# Patient Record
Sex: MALE | Race: BLACK OR AFRICAN AMERICAN | Employment: OTHER | URBAN - METROPOLITAN AREA
[De-identification: names, ages, dates, MRNs, and addresses within clinical notes are randomized per-mention and may not be internally consistent; named-entity substitution may affect disease eponyms.]

---

## 2017-01-05 ENCOUNTER — CLINICAL SUPPORT (OUTPATIENT)
Dept: CARDIOLOGY CLINIC | Age: 77
End: 2017-01-05

## 2017-01-05 DIAGNOSIS — R06.09 DOE (DYSPNEA ON EXERTION): ICD-10-CM

## 2017-01-05 DIAGNOSIS — E78.2 MIXED HYPERLIPIDEMIA: ICD-10-CM

## 2017-01-05 DIAGNOSIS — Z98.61 S/P PTCA (PERCUTANEOUS TRANSLUMINAL CORONARY ANGIOPLASTY): ICD-10-CM

## 2017-01-05 DIAGNOSIS — I10 ESSENTIAL HYPERTENSION: ICD-10-CM

## 2017-01-05 DIAGNOSIS — J44.1 COPD WITH EXACERBATION (HCC): ICD-10-CM

## 2017-01-05 DIAGNOSIS — R93.1 ABNORMAL NUCLEAR CARDIAC IMAGING TEST: Primary | ICD-10-CM

## 2017-01-05 DIAGNOSIS — J84.9 CHRONIC INTERSTITIAL LUNG DISEASE (HCC): ICD-10-CM

## 2017-01-05 DIAGNOSIS — I21.09 ACUTE MYOCARDIAL INFARCTION OF ANTERIOR WALL, SUBSEQUENT EPISODE OF CARE (HCC): ICD-10-CM

## 2017-01-05 DIAGNOSIS — I71.9 PENETRATING ATHEROSCLEROTIC ULCER OF AORTA (HCC): ICD-10-CM

## 2017-01-05 NOTE — PROGRESS NOTES
The patient was identified by name and date of birth. The risks and side effects were explained to the patient and questions were answered prior to testing. Myoview stress test was completed.

## 2017-01-09 NOTE — PROGRESS NOTES
Stress test shows old scar, but no evidence of new blockages in heart arteries. This test is not a good test for determining how strong his heart is. Therefore I recommend we set him up for an echocardiogram now. If that is normal, I suspect that his shortness of breath is due to his lung disease and should continue to follow-up with her lung doctor for that and see me in 6 months.

## 2017-01-10 ENCOUNTER — HOSPITAL ENCOUNTER (OUTPATIENT)
Dept: CT IMAGING | Age: 77
Discharge: HOME | End: 2017-01-10
Attending: INTERNAL MEDICINE
Payer: MEDICARE

## 2017-01-10 DIAGNOSIS — J84.9 CHRONIC INTERSTITIAL LUNG DISEASE (HCC): ICD-10-CM

## 2017-01-10 DIAGNOSIS — I71.9 PENETRATING ATHEROSCLEROTIC ULCER OF AORTA (HCC): ICD-10-CM

## 2017-01-10 DIAGNOSIS — J44.1 COPD WITH EXACERBATION (HCC): ICD-10-CM

## 2017-01-10 DIAGNOSIS — E78.2 MIXED HYPERLIPIDEMIA: ICD-10-CM

## 2017-01-10 DIAGNOSIS — R06.09 DOE (DYSPNEA ON EXERTION): ICD-10-CM

## 2017-01-10 DIAGNOSIS — I21.09 ACUTE MYOCARDIAL INFARCTION OF ANTERIOR WALL, SUBSEQUENT EPISODE OF CARE (HCC): ICD-10-CM

## 2017-01-10 DIAGNOSIS — Z98.61 S/P PTCA (PERCUTANEOUS TRANSLUMINAL CORONARY ANGIOPLASTY): ICD-10-CM

## 2017-01-10 DIAGNOSIS — I10 ESSENTIAL HYPERTENSION: ICD-10-CM

## 2017-01-10 PROCEDURE — 74011636320 HC RX REV CODE- 636/320: Performed by: INTERNAL MEDICINE

## 2017-01-10 PROCEDURE — 74011250636 HC RX REV CODE- 250/636: Performed by: INTERNAL MEDICINE

## 2017-01-10 PROCEDURE — 71275 CT ANGIOGRAPHY CHEST: CPT

## 2017-01-10 RX ORDER — SODIUM CHLORIDE 0.9 % (FLUSH) 0.9 %
10 SYRINGE (ML) INJECTION
Status: COMPLETED | OUTPATIENT
Start: 2017-01-10 | End: 2017-01-10

## 2017-01-10 RX ORDER — SODIUM CHLORIDE 9 MG/ML
50 INJECTION, SOLUTION INTRAVENOUS
Status: COMPLETED | OUTPATIENT
Start: 2017-01-10 | End: 2017-01-10

## 2017-01-10 RX ADMIN — IOPAMIDOL 100 ML: 755 INJECTION, SOLUTION INTRAVENOUS at 16:18

## 2017-01-10 RX ADMIN — SODIUM CHLORIDE 50 ML/HR: 900 INJECTION, SOLUTION INTRAVENOUS at 16:19

## 2017-01-10 RX ADMIN — Medication 10 ML: at 16:18

## 2017-01-11 ENCOUNTER — TELEPHONE (OUTPATIENT)
Dept: CARDIOLOGY CLINIC | Age: 77
End: 2017-01-11

## 2017-01-11 NOTE — PROGRESS NOTES
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 There is evidence of severe emphysema, which I think is likely the cause of his shortness of breath. Follow-up as scheduled with his lung doctors. Awaiting echocardiogram as per previous note's recommendation. Please verify it is scheduled.

## 2017-01-11 NOTE — TELEPHONE ENCOUNTER
Result Notes   Notes Recorded by Mack Ennis MD on 2017 at 4:04 PM  Stress test shows old scar, but no evidence of new blockages in heart arteries.  This test is not a good test for determining how strong his heart is.  Therefore I recommend we set him up for an echocardiogram now.  If that is normal, I suspect that his shortness of breath is due to his lung disease and should continue to follow-up with her lung doctor for that and see me in 6 months.     Natalya Sharma informed on on Hippa verified  [facility] will be calling for Echo appt now they are requesting result of the recent CTA

## 2017-01-11 NOTE — TELEPHONE ENCOUNTER
Patient's sister Cari Wolfe(verified HIPPA) returned call. Verified patient with 2 patient identifier  Informed per Dr Luque Massed on the aorta is stable compared with 2015. Pati Waldron is evidence of severe emphysema, which he thinks is likely the cause of his shortness of breath. Need follow-up as scheduled with his lung doctors.  Awaiting echocardiogram as per previous note's recommendation. Informed  will call to schedule ECHO.     Patient verbalized understanding, sent message to  to call and schedule ECHO per Dr Abdirahman Stafford request.

## 2017-01-19 ENCOUNTER — CLINICAL SUPPORT (OUTPATIENT)
Dept: CARDIOLOGY CLINIC | Age: 77
End: 2017-01-19

## 2017-01-19 DIAGNOSIS — R06.09 DOE (DYSPNEA ON EXERTION): ICD-10-CM

## 2017-01-19 DIAGNOSIS — I71.9 PENETRATING ATHEROSCLEROTIC ULCER OF AORTA (HCC): ICD-10-CM

## 2017-01-19 DIAGNOSIS — R93.1 ABNORMAL NUCLEAR CARDIAC IMAGING TEST: ICD-10-CM

## 2017-01-19 DIAGNOSIS — Z98.61 S/P PTCA (PERCUTANEOUS TRANSLUMINAL CORONARY ANGIOPLASTY): ICD-10-CM

## 2017-01-19 DIAGNOSIS — J84.9 CHRONIC INTERSTITIAL LUNG DISEASE (HCC): ICD-10-CM

## 2017-01-19 DIAGNOSIS — J44.1 COPD WITH EXACERBATION (HCC): ICD-10-CM

## 2017-01-19 DIAGNOSIS — I21.09 ACUTE MYOCARDIAL INFARCTION OF ANTERIOR WALL, SUBSEQUENT EPISODE OF CARE (HCC): ICD-10-CM

## 2017-01-19 DIAGNOSIS — I10 ESSENTIAL HYPERTENSION: ICD-10-CM

## 2017-01-19 DIAGNOSIS — E78.2 MIXED HYPERLIPIDEMIA: ICD-10-CM

## 2017-01-27 RX ORDER — METOPROLOL TARTRATE 50 MG/1
50 TABLET ORAL 2 TIMES DAILY
Qty: 60 TAB | Refills: 6 | Status: SHIPPED | OUTPATIENT
Start: 2017-01-27 | End: 2017-03-24 | Stop reason: SDUPTHER

## 2017-01-27 RX ORDER — METOPROLOL TARTRATE 50 MG/1
TABLET ORAL 2 TIMES DAILY
COMMUNITY
End: 2017-01-27 | Stop reason: DRUGHIGH

## 2017-01-27 NOTE — PROGRESS NOTES
Spoke with patient's sister Oscar Palmer)  Verified patient with 2 patient identifier  Informed per Dr Salbador Andrea Echo shows normal heart function.  Dr Salbador Andrea suspect most of his shortness of breath is due to COPD. Burton Fabry, he is on a lot of metoprolol and his heart rate is kind of slow.  He might feel better going down from 75 mg twice a day to 50 mg twice a day.  Please advise him to try.  Follow-up with Dr. Conchita Sanders of pulmonary and if stable from a heart standpoint follow-up in 6 months with Dr Salbador Andrea. Sister verbalized understanding, states that patient has appointment with pulmonary 2/3/2017 and request submit refill request for Metoprolol 50 mg bid to [facility] in [place], [place].

## 2017-03-24 RX ORDER — METOPROLOL TARTRATE 50 MG/1
50 TABLET ORAL 2 TIMES DAILY
Qty: 180 TAB | Refills: 0 | Status: SHIPPED | OUTPATIENT
Start: 2017-03-24 | End: 2017-04-03 | Stop reason: SDUPTHER

## 2017-04-03 RX ORDER — METOPROLOL TARTRATE 50 MG/1
50 TABLET ORAL 2 TIMES DAILY
Qty: 180 TAB | Refills: 0 | Status: SHIPPED | OUTPATIENT
Start: 2017-04-03 | End: 2017-04-05 | Stop reason: SDUPTHER

## 2017-04-05 RX ORDER — METOPROLOL TARTRATE 50 MG/1
50 TABLET ORAL 2 TIMES DAILY
Qty: 180 TAB | Refills: 0 | Status: SHIPPED | OUTPATIENT
Start: 2017-04-05 | End: 2017-04-17 | Stop reason: SDUPTHER

## 2017-04-18 RX ORDER — METOPROLOL TARTRATE 50 MG/1
50 TABLET ORAL 2 TIMES DAILY
Qty: 180 TAB | Refills: 0 | Status: SHIPPED | OUTPATIENT
Start: 2017-04-18 | End: 2017-09-17 | Stop reason: SDUPTHER

## 2017-04-25 ENCOUNTER — OFFICE VISIT (OUTPATIENT)
Dept: INTERNAL MEDICINE CLINIC | Age: 77
End: 2017-04-25

## 2017-04-25 VITALS
TEMPERATURE: 97.5 F | DIASTOLIC BLOOD PRESSURE: 71 MMHG | WEIGHT: 196.6 LBS | HEART RATE: 68 BPM | BODY MASS INDEX: 26.06 KG/M2 | OXYGEN SATURATION: 97 % | SYSTOLIC BLOOD PRESSURE: 128 MMHG | RESPIRATION RATE: 16 BRPM | HEIGHT: 73 IN

## 2017-04-25 DIAGNOSIS — J44.9 CHRONIC OBSTRUCTIVE PULMONARY DISEASE, UNSPECIFIED COPD TYPE (HCC): ICD-10-CM

## 2017-04-25 DIAGNOSIS — I25.10 CORONARY ARTERY DISEASE INVOLVING NATIVE CORONARY ARTERY OF NATIVE HEART WITHOUT ANGINA PECTORIS: ICD-10-CM

## 2017-04-25 DIAGNOSIS — Z98.61 S/P PTCA (PERCUTANEOUS TRANSLUMINAL CORONARY ANGIOPLASTY): ICD-10-CM

## 2017-04-25 DIAGNOSIS — I10 ESSENTIAL HYPERTENSION: Primary | ICD-10-CM

## 2017-04-25 DIAGNOSIS — E78.2 MIXED HYPERLIPIDEMIA: ICD-10-CM

## 2017-04-25 DIAGNOSIS — K21.9 GASTROESOPHAGEAL REFLUX DISEASE, ESOPHAGITIS PRESENCE NOT SPECIFIED: ICD-10-CM

## 2017-04-25 RX ORDER — FAMOTIDINE 20 MG/1
20 TABLET, FILM COATED ORAL
Qty: 90 TAB | Refills: 0 | Status: SHIPPED | OUTPATIENT
Start: 2017-04-25 | End: 2018-04-02 | Stop reason: SDUPTHER

## 2017-04-25 NOTE — PATIENT INSTRUCTIONS
Chronic Obstructive Pulmonary Disease (COPD): Care Instructions  Your Care Instructions    Chronic obstructive pulmonary disease (COPD) is a general term for a group of lung diseases, including emphysema and chronic bronchitis. People with COPD have decreased airflow in and out of the lungs, which makes it hard to breathe. The airways also can get clogged with thick mucus. Cigarette smoking is a major cause of COPD. Although there is no cure for COPD, you can slow its progress. Following your treatment plan and taking care of yourself can help you feel better and live longer. Follow-up care is a key part of your treatment and safety. Be sure to make and go to all appointments, and call your doctor if you are having problems. It's also a good idea to know your test results and keep a list of the medicines you take. How can you care for yourself at home? Staying healthy  · Do not smoke. This is the most important step you can take to prevent more damage to your lungs. If you need help quitting, talk to your doctor about stop-smoking programs and medicines. These can increase your chances of quitting for good. · Avoid colds and flu. Get a pneumococcal vaccine shot. If you have had one before, ask your doctor whether you need a second dose. Get the flu vaccine every fall. If you must be around people with colds or the flu, wash your hands often. · Avoid secondhand smoke, air pollution, and high altitudes. Also avoid cold, dry air and hot, humid air. Stay at home with your windows closed when air pollution is bad. Medicines and oxygen therapy  · Take your medicines exactly as prescribed. Call your doctor if you think you are having a problem with your medicine. · You may be taking medicines such as:  ¨ Bronchodilators. These help open your airways and make breathing easier. Bronchodilators are either short-acting (work for 6 to 9 hours) or long-acting (work for 24 hours).  You inhale most bronchodilators, so they start to act quickly. Always carry your quick-relief inhaler with you in case you need it while you are away from home. ¨ Corticosteroids (prednisone, budesonide). These reduce airway inflammation. They come in pill or inhaled form. You must take these medicines every day for them to work well. · A spacer may help you get more inhaled medicine to your lungs. Ask your doctor or pharmacist if a spacer is right for you. If it is, ask how to use it properly. · Do not take any vitamins, over-the-counter medicine, or herbal products without talking to your doctor first.  · If your doctor prescribed antibiotics, take them as directed. Do not stop taking them just because you feel better. You need to take the full course of antibiotics. · Oxygen therapy boosts the amount of oxygen in your blood and helps you breathe easier. Use the flow rate your doctor has recommended, and do not change it without talking to your doctor first.  Activity  · Get regular exercise. Walking is an easy way to get exercise. Start out slowly, and walk a little more each day. · Pay attention to your breathing. You are exercising too hard if you cannot talk while you are exercising. · Take short rest breaks when doing household chores and other activities. · Learn breathing methodssuch as breathing through pursed lipsto help you become less short of breath. · If your doctor has not set you up with a pulmonary rehabilitation program, talk to him or her about whether rehab is right for you. Rehab includes exercise programs, education about your disease and how to manage it, help with diet and other changes, and emotional support. Diet  · Eat regular, healthy meals. Use bronchodilators about 1 hour before you eat to make it easier to eat. Eat several small meals instead of three large ones. Drink beverages at the end of the meal. Avoid foods that are hard to chew.   · Eat foods that contain protein so that you do not lose muscle mass.  Mental health  · Talk to your family, friends, or a therapist about your feelings. It is normal to feel frightened, angry, hopeless, helpless, and even guilty. Talking openly about bad feelings can help you cope. If these feelings last, talk to your doctor. When should you call for help? Call 911 anytime you think you may need emergency care. For example, call if:  · You have severe trouble breathing. Call your doctor now or seek immediate medical care if:  · You have new or worse trouble breathing. · You cough up blood. · You have a fever. Watch closely for changes in your health, and be sure to contact your doctor if:  · You cough more deeply or more often, especially if you notice more mucus or a change in the color of your mucus. · You have new or worse swelling in your legs or belly. · You are not getting better as expected. Where can you learn more? Go to http://ria-matt.info/. Roel Sharp in the search box to learn more about \"Chronic Obstructive Pulmonary Disease (COPD): Care Instructions. \"  Current as of: May 23, 2016  Content Version: 11.2  © 2059-9395 [employer]. Care instructions adapted under license by [employer] (which disclaims liability or warranty for this information). If you have questions about a medical condition or this instruction, always ask your healthcare professional. [address] any warranty or liability for your use of this information.

## 2017-04-25 NOTE — PROGRESS NOTES
Patient received paperwork for advance directive during previous visit but has not completed at this time     Reviewed record In preparation for visit and have obtained necessary documentation      1. Have you been to the ER, urgent care clinic since your last visit? Hospitalized since your last visit? NO    2. Have you seen or consulted any other health care providers outside of the [address] [place] [address] since your last visit? Include any pap smears or colon screening.  NO

## 2017-04-25 NOTE — PROGRESS NOTES
HPI  Mr. Sherran Gilford is a 76y.o. year old male, he is seen today for follow up copd, HTN. Gets sob with exertion, not worse since he has seen cardiology. Says after he decreased metoprolol to 50mg bid from 75mg bid per cardiology he has more energy. No dizziness or lightheadedness. No cough or wheezing. Using spiriva daily, not needing albuterol. Has indigestion at night for a couple weeks, eats dinner then goes to bed 15-20 min later. Will always have heartburn with tomato sauce, hamburgers. No edema, no PND or orthopnea. Followed by Dr. Arjun Clark for prostate cancer, gets injections monthly. 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 Chief Complaint   Patient presents with    Blood Pressure Check     Room 1// FASTING    COPD        Prior to Admission medications    Medication Sig Start Date End Date Taking? Authorizing Provider   famotidine (PEPCID) 20 mg tablet Take 1 Tab by mouth daily as needed. Indications: HEARTBURN 4/25/17  Yes Alec Romberg, MD   metoprolol tartrate (LOPRESSOR) 50 mg tablet Take 1 Tab by mouth two (2) times a day. 4/18/17  Yes Barbara Romo NP   atorvastatin (LIPITOR) 40 mg tablet TAKE 1 TABLET EVERY DAY 10/24/16  Yes Alec Romberg, MD   mometasone (NASONEX) 50 mcg/actuation nasal spray 2 Sprays by Both Nostrils route daily. 10/24/16  Yes Alec Romberg, MD   cetirizine (ZYRTEC) 10 mg tablet Take 10 mg by mouth daily as needed for Allergies. Yes Historical Provider   lisinopril (PRINIVIL, ZESTRIL) 2.5 mg tablet TAKE 1 TABLET EVERY DAY 9/4/16  Yes Fabio Gallardo MD   tiotropium [facility]) 18 mcg inhalation capsule Take 1 Cap by inhalation daily. Yes Historical Provider   aspirin 81 mg chewable tablet Take 1 Tab by mouth daily.  RISK OF HEART ATTACK IF ANY MISSED DOSES 10/21/15  Yes Fabio Gallardo MD   albuterol (PROVENTIL HFA, VENTOLIN HFA, PROAIR HFA) 90 mcg/actuation inhaler Take  by inhalation. 117 MCG OF ALBUTEROL SULFATE AND 97 MCG OF ALBUTEROL BASE    Historical Provider         No Known Allergies      REVIEW OF SYSTEMS:  Per HPI    PHYSICAL EXAM:  Visit Vitals    /71 (BP 1 Location: Right arm, BP Patient Position: Sitting)    Pulse 68    Temp 97.5 °F (36.4 °C) (Oral)    Resp 16    Ht 6' 1\" (1.854 m)    Wt 196 lb 9.6 oz (89.2 kg)    SpO2 97%    BMI 25.94 kg/m2     Constitutional: Appears well-developed and well-nourished. No distress. HENT:   Head: Normocephalic and atraumatic. Eyes: No scleral icterus. Neck: no lad, no tm, supple   Cardiovascular: Normal S1/S2, regular rhythm. No murmurs, rubs, or gallops. Pulmonary/Chest: Effort normal and breath sounds normal. No respiratory distress. No wheezes, rhonchi, or rales. Abdomen: Soft, NT/ND, +BS, no rebound or guarding, no masses, no HSM appreciated. Ext: No edema. Neurological: Alert. Psychiatric: Normal mood and affect. Behavior is normal.     Lab Results   Component Value Date/Time    Sodium 143 04/25/2017 02:38 PM    Potassium 4.5 04/25/2017 02:38 PM    Chloride 105 04/25/2017 02:38 PM    CO2 21 04/25/2017 02:38 PM    Anion gap 7 06/09/2016 03:06 AM    Glucose 88 04/25/2017 02:38 PM    BUN 20 04/25/2017 02:38 PM    Creatinine 1.39 04/25/2017 02:38 PM    BUN/Creatinine ratio 14 04/25/2017 02:38 PM    GFR est AA 55 04/25/2017 02:38 PM    GFR est non-AA 48 04/25/2017 02:38 PM    Calcium 9.6 04/25/2017 02:38 PM    Bilirubin, total 0.5 04/25/2017 02:38 PM    AST (SGOT) 18 04/25/2017 02:38 PM    Alk.  phosphatase 44 04/25/2017 02:38 PM    Protein, total 7.2 04/25/2017 02:38 PM    Albumin 4.1 04/25/2017 02:38 PM    Globulin 4.1 06/05/2016 07:15 AM    A-G Ratio 1.3 04/25/2017 02:38 PM    ALT (SGPT) 11 04/25/2017 02:38 PM     Lab Results   Component Value Date/Time    Hemoglobin A1c 6.0 12/12/2011 03:22 PM    Hemoglobin A1c 6.0 07/05/2011 01:44 PM      Lab Results   Component Value Date/Time Cholesterol, total 73 04/25/2017 02:38 PM    HDL Cholesterol 31 04/25/2017 02:38 PM    LDL, calculated 19 04/25/2017 02:38 PM    VLDL, calculated 23 04/25/2017 02:38 PM    Triglyceride 116 04/25/2017 02:38 PM    CHOL/HDL Ratio 2.9 10/20/2015 04:06 AM          ASSESSMENT/PLAN  [place] was seen today for blood pressure check and copd. Diagnoses and all orders for this visit:    Essential hypertension  -     CBC WITH AUTOMATED DIFF    Mixed hyperlipidemia  -     LIPID PANEL  -     METABOLIC PANEL, COMPREHENSIVE  Check lipids  Coronary artery disease involving native coronary artery of native heart without angina pectoris  Followed by cardiology, no symptoms of recurrence, on appropriate medications  S/P PTCA (percutaneous transluminal coronary angioplasty)    Chronic obstructive pulmonary disease, unspecified COPD type ([facility] [address]  Stable, continue current medications - sees pulmonary as well  Gastroesophageal reflux disease, esophagitis presence not specified  -     famotidine (PEPCID) 20 mg tablet; Take 1 Tab by mouth daily as needed. Indications: HEARTBURN  pepcid prn, avoid foods which exacerbate, wait 2-3 hours after eating before going to bed  Other orders  -     CVD REPORT  -     CKD REPORT          There are no preventive care reminders to display for this patient. Follow-up Disposition:  Return in about 6 months (around 10/25/2017) for bp, copd. Reviewed plan of care. Patient has provided input and agrees with goals. The nurse provided the patient and/or family with advanced directive information if needed and encouraged the patient to provide a copy to the office when available.

## 2017-04-25 NOTE — MR AVS SNAPSHOT
Visit Information Date & Time Provider Department Dept. Phone Encounter #  
 4/25/2017  2:00 PM Fabricio Cabrera MD [facility] [phone] [id number] Follow-up Instructions Return in about 6 months (around 10/25/2017) for bp, copd. Routing History Upcoming Health Maintenance Date Due  
 MEDICARE YEARLY EXAM 7/23/2017 GLAUCOMA SCREENING Q2Y 1/1/2019 DTaP/Tdap/Td series (2 - Td) 10/24/2026 Allergies as of 4/25/2017  Review Complete On: 4/25/2017 By: Fabricio Cabrera MD  
 No Known Allergies Current Immunizations  Reviewed on 10/24/2016 Name Date Influenza High Dose Vaccine PF 10/24/2016  9:35 AM  
 Influenza Vaccine (Quad) PF 10/21/2015  1:51 PM  
 Pneumococcal Conjugate (PCV-13) 10/24/2016  9:36 AM,  Deferred (Patient Refused) Pneumococcal Polysaccharide (PPSV-23) 10/21/2015  1:52 PM  
  
 Not reviewed this visit You Were Diagnosed With   
  
 Codes Comments Essential hypertension    -  Primary ICD-10-CM: I10 
ICD-9-CM: 401.9 Mixed hyperlipidemia     ICD-10-CM: E78.2 ICD-9-CM: 272.2 Coronary artery disease involving native coronary artery of native heart without angina pectoris     ICD-10-CM: I25.10 ICD-9-CM: 414.01 S/P PTCA (percutaneous transluminal coronary angioplasty)     ICD-10-CM: Z98.61 ICD-9-CM: V45.82 Chronic obstructive pulmonary disease, unspecified COPD type ([facility]     ICD-10-CM: J44.9 ICD-9-CM: 383 Gastroesophageal reflux disease, esophagitis presence not specified     ICD-10-CM: K21.9 ICD-9-CM: 530.81 Vitals BP Pulse Temp Resp Height(growth percentile) Weight(growth percentile) 128/71 (BP 1 Location: Right arm, BP Patient Position: Sitting) 68 97.5 °F (36.4 °C) (Oral) 16 6' 1\" (1.854 m) 196 lb 9.6 oz (89.2 kg) SpO2 BMI Smoking Status 97% 25.94 kg/m2 Former Smoker Vitals History BMI and BSA Data Body Mass Index Body Surface Area 25.94 kg/m 2 2.14 m 2 Preferred Pharmacy Pharmacy Name Phone [facility] [address] [place] [address] [phone] Your Updated Medication List  
  
   
This list is accurate as of: 4/25/17  2:28 PM.  Always use your most recent med list.  
  
  
  
  
 albuterol 90 mcg/actuation inhaler Commonly known as:  PROVENTIL HFA, VENTOLIN HFA, PROAIR HFA Take  by inhalation. 117 MCG OF ALBUTEROL SULFATE AND 97 MCG OF ALBUTEROL BASE  
  
 aspirin 81 mg chewable tablet Take 1 Tab by mouth daily. RISK OF HEART ATTACK IF ANY MISSED DOSES  
  
 atorvastatin 40 mg tablet Commonly known as:  LIPITOR  
TAKE 1 TABLET EVERY DAY  
  
 famotidine 20 mg tablet Commonly known as:  PEPCID Take 1 Tab by mouth daily as needed. Indications: HEARTBURN  
  
 lisinopril 2.5 mg tablet Commonly known as:  PRINIVIL, ZESTRIL  
TAKE 1 TABLET EVERY DAY  
  
 metoprolol tartrate 50 mg tablet Commonly known as:  LOPRESSOR Take 1 Tab by mouth two (2) times a day. mometasone 50 mcg/actuation nasal spray Commonly known as:  NASONEX  
2 Sprays by Both Nostrils route daily. tiotropium 18 mcg inhalation capsule Commonly known as:  Deatra Severance Take 1 Cap by inhalation daily. ZyrTEC 10 mg tablet Generic drug:  cetirizine Take 10 mg by mouth daily as needed for Allergies. Prescriptions Sent to Pharmacy Refills  
 famotidine (PEPCID) 20 mg tablet 0 Sig: Take 1 Tab by mouth daily as needed. Indications: HEARTBURN Class: Normal  
 Pharmacy: [address] [place] [address], [address] [place] [address] Ph #: [phone] Route: Oral  
  
We Performed the Following CBC WITH AUTOMATED DIFF [94157 CPT(R)] LIPID PANEL [07038 CPT(R)] METABOLIC PANEL, COMPREHENSIVE [05648 CPT(R)] Follow-up Instructions Return in about 6 months (around 10/25/2017) for bp, copd. Patient Instructions Chronic Obstructive Pulmonary Disease (COPD): Care Instructions Your Care Instructions Chronic obstructive pulmonary disease (COPD) is a general term for a group of lung diseases, including emphysema and chronic bronchitis. People with COPD have decreased airflow in and out of the lungs, which makes it hard to breathe. The airways also can get clogged with thick mucus. Cigarette smoking is a major cause of COPD. Although there is no cure for COPD, you can slow its progress. Following your treatment plan and taking care of yourself can help you feel better and live longer. Follow-up care is a key part of your treatment and safety. Be sure to make and go to all appointments, and call your doctor if you are having problems. It's also a good idea to know your test results and keep a list of the medicines you take. How can you care for yourself at home? Staying healthy · Do not smoke. This is the most important step you can take to prevent more damage to your lungs. If you need help quitting, talk to your doctor about stop-smoking programs and medicines. These can increase your chances of quitting for good. · Avoid colds and flu. Get a pneumococcal vaccine shot. If you have had one before, ask your doctor whether you need a second dose. Get the flu vaccine every fall. If you must be around people with colds or the flu, wash your hands often. · Avoid secondhand smoke, air pollution, and high altitudes. Also avoid cold, dry air and hot, humid air. Stay at home with your windows closed when air pollution is bad. Medicines and oxygen therapy · Take your medicines exactly as prescribed. Call your doctor if you think you are having a problem with your medicine. · You may be taking medicines such as: ¨ Bronchodilators. These help open your airways and make breathing easier. Bronchodilators are either short-acting (work for 6 to 9 hours) or long-acting (work for 24 hours).  You inhale most bronchodilators, so they start to act quickly. Always carry your quick-relief inhaler with you in case you need it while you are away from home. ¨ Corticosteroids (prednisone, budesonide). These reduce airway inflammation. They come in pill or inhaled form. You must take these medicines every day for them to work well. · A spacer may help you get more inhaled medicine to your lungs. Ask your doctor or pharmacist if a spacer is right for you. If it is, ask how to use it properly. · Do not take any vitamins, over-the-counter medicine, or herbal products without talking to your doctor first. 
· If your doctor prescribed antibiotics, take them as directed. Do not stop taking them just because you feel better. You need to take the full course of antibiotics. · Oxygen therapy boosts the amount of oxygen in your blood and helps you breathe easier. Use the flow rate your doctor has recommended, and do not change it without talking to your doctor first. 
Activity · Get regular exercise. Walking is an easy way to get exercise. Start out slowly, and walk a little more each day. · Pay attention to your breathing. You are exercising too hard if you cannot talk while you are exercising. · Take short rest breaks when doing household chores and other activities. · Learn breathing methodssuch as breathing through pursed lipsto help you become less short of breath. · If your doctor has not set you up with a pulmonary rehabilitation program, talk to him or her about whether rehab is right for you. Rehab includes exercise programs, education about your disease and how to manage it, help with diet and other changes, and emotional support. Diet · Eat regular, healthy meals. Use bronchodilators about 1 hour before you eat to make it easier to eat. Eat several small meals instead of three large ones. Drink beverages at the end of the meal. Avoid foods that are hard to chew. · Eat foods that contain protein so that you do not lose muscle mass. Mental health · Talk to your family, friends, or a therapist about your feelings. It is normal to feel frightened, angry, hopeless, helpless, and even guilty. Talking openly about bad feelings can help you cope. If these feelings last, talk to your doctor. When should you call for help? Call 911 anytime you think you may need emergency care. For example, call if: 
· You have severe trouble breathing. Call your doctor now or seek immediate medical care if: 
· You have new or worse trouble breathing. · You cough up blood. · You have a fever. Watch closely for changes in your health, and be sure to contact your doctor if: 
· You cough more deeply or more often, especially if you notice more mucus or a change in the color of your mucus. · You have new or worse swelling in your legs or belly. · You are not getting better as expected. Where can you learn more? Go to http://riaJetSuite.info/. Rodríguez Hickey in the search box to learn more about \"Chronic Obstructive Pulmonary Disease (COPD): Care Instructions. \" Current as of: May 23, 2016 Content Version: 11.2 © 3363-1767 [employer]. Care instructions adapted under license by [employer] (which disclaims liability or warranty for this information). If you have questions about a medical condition or this instruction, always ask your healthcare professional. [address] any warranty or liability for your use of this information. Please provide this summary of care documentation to your next provider. Your primary care clinician is listed as Rosita Almeida. If you have any questions after today's visit, please call [phone].

## 2017-04-26 LAB
ALBUMIN SERPL-MCNC: 4.1 G/DL (ref 3.5–4.8)
ALBUMIN/GLOB SERPL: 1.3 {RATIO} (ref 1.2–2.2)
ALP SERPL-CCNC: 44 IU/L (ref 39–117)
ALT SERPL-CCNC: 11 IU/L (ref 0–44)
AST SERPL-CCNC: 18 IU/L (ref 0–40)
BASOPHILS # BLD AUTO: 0 X10E3/UL (ref 0–0.2)
BASOPHILS NFR BLD AUTO: 0 %
BILIRUB SERPL-MCNC: 0.5 MG/DL (ref 0–1.2)
BUN SERPL-MCNC: 20 MG/DL (ref 8–27)
BUN/CREAT SERPL: 14 (ref 10–24)
CALCIUM SERPL-MCNC: 9.6 MG/DL (ref 8.6–10.2)
CHLORIDE SERPL-SCNC: 105 MMOL/L (ref 96–106)
CHOLEST SERPL-MCNC: 73 MG/DL (ref 100–199)
CO2 SERPL-SCNC: 21 MMOL/L (ref 18–29)
CREAT SERPL-MCNC: 1.39 MG/DL (ref 0.76–1.27)
EOSINOPHIL # BLD AUTO: 0.1 X10E3/UL (ref 0–0.4)
EOSINOPHIL NFR BLD AUTO: 2 %
ERYTHROCYTE [DISTWIDTH] IN BLOOD BY AUTOMATED COUNT: 18.2 % (ref 12.3–15.4)
GLOBULIN SER CALC-MCNC: 3.1 G/DL (ref 1.5–4.5)
GLUCOSE SERPL-MCNC: 88 MG/DL (ref 65–99)
HCT VFR BLD AUTO: 34.9 % (ref 37.5–51)
HDLC SERPL-MCNC: 31 MG/DL
HGB BLD-MCNC: 11.5 G/DL (ref 12.6–17.7)
IMM GRANULOCYTES # BLD: 0 X10E3/UL (ref 0–0.1)
IMM GRANULOCYTES NFR BLD: 0 %
INTERPRETATION, 910389: NORMAL
INTERPRETATION: NORMAL
LDLC SERPL CALC-MCNC: 19 MG/DL (ref 0–99)
LYMPHOCYTES # BLD AUTO: 2.2 X10E3/UL (ref 0.7–3.1)
LYMPHOCYTES NFR BLD AUTO: 46 %
MCH RBC QN AUTO: 27.3 PG (ref 26.6–33)
MCHC RBC AUTO-ENTMCNC: 33 G/DL (ref 31.5–35.7)
MCV RBC AUTO: 83 FL (ref 79–97)
MONOCYTES # BLD AUTO: 0.4 X10E3/UL (ref 0.1–0.9)
MONOCYTES NFR BLD AUTO: 8 %
NEUTROPHILS # BLD AUTO: 2.1 X10E3/UL (ref 1.4–7)
NEUTROPHILS NFR BLD AUTO: 44 %
PDF IMAGE, 910387: NORMAL
PLATELET # BLD AUTO: 136 X10E3/UL (ref 150–379)
POTASSIUM SERPL-SCNC: 4.5 MMOL/L (ref 3.5–5.2)
PROT SERPL-MCNC: 7.2 G/DL (ref 6–8.5)
RBC # BLD AUTO: 4.21 X10E6/UL (ref 4.14–5.8)
SODIUM SERPL-SCNC: 143 MMOL/L (ref 134–144)
TRIGL SERPL-MCNC: 116 MG/DL (ref 0–149)
VLDLC SERPL CALC-MCNC: 23 MG/DL (ref 5–40)
WBC # BLD AUTO: 4.7 X10E3/UL (ref 3.4–10.8)

## 2017-08-03 ENCOUNTER — OFFICE VISIT (OUTPATIENT)
Dept: INTERNAL MEDICINE CLINIC | Age: 77
End: 2017-08-03

## 2017-08-03 VITALS
DIASTOLIC BLOOD PRESSURE: 68 MMHG | OXYGEN SATURATION: 96 % | BODY MASS INDEX: 26.37 KG/M2 | TEMPERATURE: 95.1 F | HEART RATE: 62 BPM | HEIGHT: 73 IN | RESPIRATION RATE: 14 BRPM | SYSTOLIC BLOOD PRESSURE: 126 MMHG | WEIGHT: 199 LBS

## 2017-08-03 DIAGNOSIS — S39.012A LOW BACK STRAIN, INITIAL ENCOUNTER: ICD-10-CM

## 2017-08-03 DIAGNOSIS — Z00.00 MEDICARE ANNUAL WELLNESS VISIT, SUBSEQUENT: Primary | ICD-10-CM

## 2017-08-03 DIAGNOSIS — Z71.89 ADVANCE DIRECTIVE DISCUSSED WITH PATIENT: ICD-10-CM

## 2017-08-03 NOTE — PATIENT INSTRUCTIONS
Apply heat for 15 minutes 3 or 4 times a day. Do not fall asleep with heating on. Tylenol 325 mg tablets, 2 tablets every 4-6 hours, but no more than 4 times a day  If not improving in 4 weeks, return for recheck and to consider an X-ray    The best way to stay healthy is to live a healthy lifestyle. A healthy lifestyle includes regular exercise, eating a well-balanced diet, keeping a healthy weight and not smoking. Regular physical exams and screening tests are another important way to take care of yourself. Preventive exams provided by health care providers can find health problems early when treatment works best and can keep you from getting certain diseases or illnesses. Preventive services include exams, lab tests, screenings, shots, monitoring and information to help you take care of your own health. All people over 65 should have a pneumonia shot. Pneumonia shots are usually only needed once in a lifetime unless your doctor decides differently. In addition to your physical exam, some screening tests are recommended:    All people over 65 should have a yearly flu shot. People over 65 are at medium to high risk for Hepatitis B. Three shots are needed for complete protection. Bone mass measurement (dexa scan) is recommended every two years. Diabetes Mellitus screening is recommended every year. Glaucoma is an eye disease caused by high pressure in the eye. An eye exam is recommended every year. Cardiovascular screening tests that check your cholesterol and other blood fat (lipid) levels are recommended every five years. Colorectal Cancer screening tests help to find pre-cancerous polyps (growths in the colon) so they can be removed before they turn into cancer. Tests ordered for screening depend on your personal and family history risk factors.     Prostate Cancer Screening (annually up to age 76)    Screening for breast cancer is recommended yearly with a Mammogram.    Screening for cervical and vaginal cancer is recommended with a pelvic and Pap test every two years. However if you have had an abnormal pap in the past  three years or at high risk for cervical or vaginal cancer Medicare will cover a pap test and a pelvic exam every year. Here is a list of your current Health Maintenance items with a due date:  Health Maintenance Due   Topic Date Due    Annual Well Visit  07/23/2017    Flu Vaccine  08/01/2017          Back Strain: Care Instructions  Your Care Instructions    Back strain happens when you overstretch, or pull, a muscle in your back. You may hurt your back in an accident or when you exercise or lift something. Most back pain will get better with rest and time. You can take care of yourself at home to help your back heal.  Follow-up care is a key part of your treatment and safety. Be sure to make and go to all appointments, and call your doctor if you are having problems. It's also a good idea to know your test results and keep a list of the medicines you take. How can you care for yourself at home? · Try to stay as active as you can, but stop or reduce any activity that causes pain. · Put ice or a cold pack on the sore muscle for 10 to 20 minutes at a time to stop swelling. Try this every 1 to 2 hours for 3 days (when you are awake) or until the swelling goes down. Put a thin cloth between the ice pack and your skin. · After 2 or 3 days, apply a heating pad on low or a warm cloth to your back. Some doctors suggest that you go back and forth between hot and cold treatments. · Take pain medicines exactly as directed. ¨ If the doctor gave you a prescription medicine for pain, take it as prescribed. ¨ If you are not taking a prescription pain medicine, ask your doctor if you can take an over-the-counter medicine. · Try sleeping on your side with a pillow between your legs. Or put a pillow under your knees when you lie on your back.  These measures can ease pain in your lower back. · Return to your usual level of activity slowly. When should you call for help? Call 911 anytime you think you may need emergency care. For example, call if:  · You are unable to move a leg at all. Call your doctor now or seek immediate medical care if:  · You have new or worse symptoms in your legs, belly, or buttocks. Symptoms may include:  ¨ Numbness or tingling. ¨ Weakness. ¨ Pain. · You lose bladder or bowel control. Watch closely for changes in your health, and be sure to contact your doctor if you are not getting better as expected. Where can you learn more? Go to http://ria-matt.info/. Enter D011 in the search box to learn more about \"Back Strain: Care Instructions. \"  Current as of: March 21, 2017  Content Version: 11.3  © 2066-7744 [employer]. Care instructions adapted under license by [employer] (which disclaims liability or warranty for this information). If you have questions about a medical condition or this instruction, always ask your healthcare professional. [place] [address] any warranty or liability for your use of this information. Well Visit, Over 72: Care Instructions  Your Care Instructions  Physical exams can help you stay healthy. Your doctor has checked your overall health and may have suggested ways to take good care of yourself. He or she also may have recommended tests. At home, you can help prevent illness with healthy eating, regular exercise, and other steps. Follow-up care is a key part of your treatment and safety. Be sure to make and go to all appointments, and call your doctor if you are having problems. It's also a good idea to know your test results and keep a list of the medicines you take. How can you care for yourself at home? · Reach and stay at a healthy weight.  This will lower your risk for many problems, such as obesity, diabetes, heart disease, and high blood pressure. · Get at least 30 minutes of exercise on most days of the week. Walking is a good choice. You also may want to do other activities, such as running, swimming, cycling, or playing tennis or team sports. · Do not smoke. Smoking can make health problems worse. If you need help quitting, talk to your doctor about stop-smoking programs and medicines. These can increase your chances of quitting for good. · Protect your skin from too much sun. When you're outdoors from 10 a.m. to 4 p.m., stay in the shade or cover up with clothing and a hat with a wide brim. Wear sunglasses that block UV rays. Even when it's cloudy, put broad-spectrum sunscreen (SPF 30 or higher) on any exposed skin. · See a dentist one or two times a year for checkups and to have your teeth cleaned. · Wear a seat belt in the car. · Limit alcohol to 2 drinks a day for men and 1 drink a day for women. Too much alcohol can cause health problems. Follow your doctor's advice about when to have certain tests. These tests can spot problems early. For men and women  · Cholesterol. Your doctor will tell you how often to have this done based on your overall health and other things that can increase your risk for heart attack and stroke. · Blood pressure. Have your blood pressure checked during a routine doctor visit. Your doctor will tell you how often to check your blood pressure based on your age, your blood pressure results, and other factors. · Diabetes. Ask your doctor whether you should have tests for diabetes. · Vision. Experts recommend that you have yearly exams for glaucoma and other age-related eye problems. · Hearing. Tell your doctor if you notice any change in your hearing. You can have tests to find out how well you hear. · Colon cancer tests. Keep having colon cancer tests as your doctor recommends. You can have one of several types of tests. · Heart attack and stroke risk.  At least every 4 to 6 years, you should have your risk for heart attack and stroke assessed. Your doctor uses factors such as your age, blood pressure, cholesterol, and whether you smoke or have diabetes to show what your risk for a heart attack or stroke is over the next 10 years. · Osteoporosis. Talk to your doctor about whether you should have a bone density test to find out whether you have thinning bones. Also ask your doctor about whether you should take calcium and vitamin D supplements. For women  · Pap test and pelvic exam. You may no longer need a Pap test. Talk with your doctor about whether to stop or continue to have Pap tests. · Breast exam and mammogram. Ask how often you should have a mammogram, which is an X-ray of your breasts. A mammogram can spot breast cancer before it can be felt and when it is easiest to treat. · Thyroid disease. Talk to your doctor about whether to have your thyroid checked as part of a regular physical exam. Women have an increased chance of a thyroid problem. For men  · Prostate exam. Talk to your doctor about whether you should have a blood test (called a PSA test) for prostate cancer. Experts disagree on whether men should have this test. Some experts recommend that you discuss the benefits and risks of the test with your doctor. · Abdominal aortic aneurysm. Ask your doctor whether you should have a test to check for an aneurysm. You may need a test if you ever smoked or if your parent, brother, sister, or child has had an aneurysm. When should you call for help? Watch closely for changes in your health, and be sure to contact your doctor if you have any problems or symptoms that concern you. Where can you learn more? Go to http://ria-matt.info/. Enter V859 in the search box to learn more about \"Well Visit, Over 65: Care Instructions. \"  Current as of: July 19, 2016  Content Version: 11.3  © 8691-1586 [employer].  Care instructions adapted under license by [employer] (which disclaims liability or warranty for this information). If you have questions about a medical condition or this instruction, always ask your healthcare professional. [address] any warranty or liability for your use of this information.

## 2017-08-03 NOTE — ACP (ADVANCE CARE PLANNING)
With patient's permission advance care planning discussed. Primary SDM would be daughter Michelle Bernheim. Secondary SDM would be daughter Renuka Brewster. He also names his sister Danial Chatman   He wants  life prolonging treatment if death is imminent. He is not sure what he wants regarding life prolonging treatment if there is overwhelming, permanent neurologic injury. Will consider. Reviewed paperwork. Appointment made with [facility]. Conversation took 4 minutes.

## 2017-08-03 NOTE — PROGRESS NOTES
Reviewed record In preparation for visit and have obtained necessary documentation. Has info on advanced directive but has not filled them out. 1. Have you been to the ER, urgent care clinic or hospitalized since your last visit? No     2. Have you seen or consulted any other health care providers outside of the [address] [place] [address] since your last visit? Include any pap smears or colon screening. Dr Danya Cavanaugh reviewed with provider.     Health Maintenance reviewed:

## 2017-08-03 NOTE — PROGRESS NOTES
HISTORY OF PRESENT ILLNESS  Nathan Garza is a 76 y.o. male. HPI  He presents for presents with a 2 day history of low back problems. Symptoms include intermittent pain in low back (tight band in character; 7/10 in severity), denies weakness, denies numbness, denies paresthesias. No bowel or bladder problem. Initial inciting event: none. Symptoms are worst: no particular time of day. If lies on left side has pain, less pain if lies on right side. Exacerbating factors identifiable by patient are standing, walking. Alleviating factors identifiable by patient are sitting. Treatments so far initiated by patient: none Daughter suggeted he try heat, but he did not. Previous lower back problems: none. Previous workup: none. Previous treatments: none.     Patient Active Problem List   Diagnosis Code    ED (erectile dysfunction) N52.9    HTN (hypertension) I10    Prostate cancer ([facility] C61    PAD (peripheral artery disease) ([facility]) I73.9    STEMI (ST elevation myocardial infarction) ([facility] I21.3    Chronic interstitial lung disease ([address] J84.9    S/P PTCA (percutaneous transluminal coronary angioplasty) Z80.64    COPD with exacerbation ([facility]) J44.1    Acute myocardial infarction of anterior wall, subsequent episode of care ([facility] I21.09    Penetrating atherosclerotic ulcer of aorta ([address]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 Manuela Alaniz - diagnosed 2/2012 - s/p EBRT on Lupron      Past Surgical History:   Procedure Laterality Date    HX ORTHOPAEDIC  1961    left knee surgery     Social History     Social History    Marital status:  Spouse name: N/A    Number of children: N/A    Years of education: N/A     Social History Main Topics    Smoking status: Former Smoker     Packs/day: 0.50     Years: 40.00     Types: Cigarettes     Quit date: 10/19/2015    Smokeless tobacco: Never Used      Comment: trying quit 10/13/15 - 1/2 ppd    Alcohol use No    Drug use: No    Sexual activity: Not Currently     Other Topics Concern    None     Social History Narrative     Family History   Problem Relation Age of Onset    Heart Disease Mother      pacemaker    Diabetes Mother     Diabetes Sister     Heart Disease Brother      No Known Allergies  Current Outpatient Prescriptions   Medication Sig Dispense Refill    famotidine (PEPCID) 20 mg tablet Take 1 Tab by mouth daily as needed. Indications: HEARTBURN 90 Tab 0    metoprolol tartrate (LOPRESSOR) 50 mg tablet Take 1 Tab by mouth two (2) times a day. 180 Tab 0    atorvastatin (LIPITOR) 40 mg tablet TAKE 1 TABLET EVERY DAY 90 Tab 4    mometasone (NASONEX) 50 mcg/actuation nasal spray 2 Sprays by Both Nostrils route daily. 1 Container 6    cetirizine (ZYRTEC) 10 mg tablet Take 10 mg by mouth daily as needed for Allergies.  lisinopril (PRINIVIL, ZESTRIL) 2.5 mg tablet TAKE 1 TABLET EVERY DAY 90 Tab 3    tiotropium (SPIRIVA) 18 mcg inhalation capsule Take 1 Cap by inhalation daily.  albuterol (PROVENTIL HFA, VENTOLIN HFA, PROAIR HFA) 90 mcg/actuation inhaler Take  by inhalation. 117 MCG OF ALBUTEROL SULFATE AND 97 MCG OF ALBUTEROL BASE      aspirin 81 mg chewable tablet Take 1 Tab by mouth daily. RISK OF HEART ATTACK IF ANY MISSED DOSES 90 Tab 3         ROS       Visit Vitals    /68 (BP 1 Location: Left arm, BP Patient Position: Sitting)    Pulse 62    Temp 95.1 °F (35.1 °C) (Oral)    Resp 14    Ht 6' 1\" (1.854 m)    Wt 199 lb (90.3 kg)    SpO2 96%    BMI 26.25 kg/m2     Physical Exam   Constitutional: He is oriented to person, place, and time.  He appears well-developed and well-nourished. HENT:   Head: Normocephalic and atraumatic. Neck: Neck supple. Cardiovascular: Normal rate, regular rhythm, S1 normal, S2 normal and normal heart sounds. Exam reveals no gallop. No murmur heard. Pulses:       Dorsalis pedis pulses are 2+ on the right side, and 2+ on the left side. Posterior tibial pulses are 2+ on the right side, and 2+ on the left side. Pulmonary/Chest: Effort normal and breath sounds normal. He has no wheezes. He has no rales. Musculoskeletal:        Thoracic back: He exhibits no tenderness, no bony tenderness, no deformity and no spasm. Lumbar back: He exhibits pain (in left lumbar area with lateral bending to the right. ). He exhibits normal range of motion, no tenderness, no bony tenderness, no deformity and no spasm. Neurological: He is alert and oriented to person, place, and time. No sensory deficit. Gait normal.   Reflex Scores:       Patellar reflexes are 2+ on the right side and 2+ on the left side. Achilles reflexes are 2+ on the right side and 2+ on the left side. Motor strength on right:  5/5 to dorsiflexion ankle, 5/5 plantar flexion of ankle,   5/5 flexion knee, 5/5 extension of knee,   5/5 flexion of hip    Motor strength on left:  5/5 to dorsiflexion ankle, 5/5 plantar flexion of ankle,   5/5 flexion knee, 5/5 extension of knee,   5/5 flexion of hip   Skin: Skin is warm and dry. Psychiatric: He has a normal mood and affect. Nursing note and vitals reviewed. ASSESSMENT and PLAN    ICD-10-CM ICD-9-CM    1. Medicare annual wellness visit, subsequent Z00.00 V70.0    2. Advance directive discussed with patient Z71.89 V65.49    3. Low back strain, initial encounter S39.012A 847.2      Diagnoses and all orders for this visit:    1. Medicare annual wellness visit, subsequent    2. Advance directive discussed with patient    3. Low back strain, initial encounter  Suggest heat 3-4 times a day. Stay active as tolerated.  Return if pain persists more than one month. May take acetaminophen, but avoid Aleve and Advil. Follow-up Disposition:  Return if symptoms worsen or fail to improve. I have discussed the diagnosis with the patient and the intended plan as seen in the above orders. Patient is in agreement. The patient has received an after-visit summary and questions were answered concerning future plans.   I have discussed medication side effects and warnings with the patient as well.    reviewed diet, exercise and weight control

## 2017-08-03 NOTE — MR AVS SNAPSHOT
Visit Information Date & Time Provider Department Dept. Phone Encounter #  
 8/3/2017  9:15 AM MD Sheldon Chase [phone] [id number] Follow-up Instructions Return if symptoms worsen or fail to improve. Your Appointments 8/18/2017  9:15 AM  
6 MONTH with Jessa Moody MD  
[facility] [address] [place] [address]) Appt Note: [address]  
[phone] [address] [facility]  
  
    
 8/24/2017  1:00 PM  
MEETING with NURSE NAVIGATOR FLODALY Moreira [address] [place] [address]) Appt Note: honoring choices [address] [address] [phone]  
  
   
 [address] [place] [address]  
  
    
 10/25/2017  2:30 PM  
ROUTINE CARE with MD Sheldon Baker [address] [place] [address]) Appt Note: 6mth f/u; bp, copd [address] [address] [phone]  
  
   
 8 [facility] [address] Upcoming Health Maintenance Date Due  
 MEDICARE YEARLY EXAM 7/23/2017 INFLUENZA AGE 9 TO ADULT 8/1/2017 GLAUCOMA SCREENING Q2Y 1/1/2019 DTaP/Tdap/Td series (2 - Td) 10/24/2026 Allergies as of 8/3/2017  Review Complete On: 8/3/2017 By: Karime Alamo MD  
 No Known Allergies Current Immunizations  Reviewed on 8/3/2017 Name Date Influenza High Dose Vaccine PF 10/24/2016  9:35 AM  
 Influenza Vaccine (Quad) PF 10/21/2015  1:51 PM  
 Pneumococcal Conjugate (PCV-13) 10/24/2016  9:36 AM,  Deferred (Patient Refused) Pneumococcal Polysaccharide (PPSV-23) 10/21/2015  1:52 PM  
  
 Reviewed by Nader Abad LPN on 7/1/1227 at  3:60 AM  
You Were Diagnosed With   
  
 Codes Comments Medicare annual wellness visit, subsequent    -  Primary ICD-10-CM: Z00.00 ICD-9-CM: V70.0 Advance directive discussed with patient     ICD-10-CM: Z71.89 ICD-9-CM: V65.49 Low back strain, initial encounter     ICD-10-CM: S39.012A ICD-9-CM: 299. 2 Vitals BP Pulse Temp Resp Height(growth percentile) Weight(growth percentile) 126/68 (BP 1 Location: Left arm, BP Patient Position: Sitting) 62 95.1 °F (35.1 °C) (Oral) 14 6' 1\" (1.854 m) 199 lb (90.3 kg) SpO2 BMI Smoking Status 96% 26.25 kg/m2 Former Smoker Vitals History BMI and BSA Data Body Mass Index Body Surface Area  
 26.25 kg/m 2 2.16 m 2 Preferred Pharmacy Pharmacy Name Phone Ran  [address] [facility] [address] [phone] Your Updated Medication List  
  
   
This list is accurate as of: 8/3/17  9:47 AM.  Always use your most recent med list.  
  
  
  
  
 albuterol 90 mcg/actuation inhaler Commonly known as:  PROVENTIL HFA, VENTOLIN HFA, PROAIR HFA Take  by inhalation. 117 MCG OF ALBUTEROL SULFATE AND 97 MCG OF ALBUTEROL BASE  
  
 aspirin 81 mg chewable tablet Take 1 Tab by mouth daily. RISK OF HEART ATTACK IF ANY MISSED DOSES  
  
 atorvastatin 40 mg tablet Commonly known as:  LIPITOR  
TAKE 1 TABLET EVERY DAY  
  
 famotidine 20 mg tablet Commonly known as:  PEPCID Take 1 Tab by mouth daily as needed. Indications: HEARTBURN  
  
 lisinopril 2.5 mg tablet Commonly known as:  PRINIVIL, ZESTRIL  
TAKE 1 TABLET EVERY DAY  
  
 metoprolol tartrate 50 mg tablet Commonly known as:  LOPRESSOR Take 1 Tab by mouth two (2) times a day. mometasone 50 mcg/actuation nasal spray Commonly known as:  NASONEX  
2 Sprays by Both Nostrils route daily. tiotropium 18 mcg inhalation capsule Commonly known as:  Johnie Aase Take 1 Cap by inhalation daily. ZyrTEC 10 mg tablet Generic drug:  cetirizine Take 10 mg by mouth daily as needed for Allergies. Follow-up Instructions Return if symptoms worsen or fail to improve. Patient Instructions Apply heat for 15 minutes 3 or 4 times a day. Do not fall asleep with heating on. Tylenol 325 mg tablets, 2 tablets every 4-6 hours, but no more than 4 times a day If not improving in 4 weeks, return for recheck and to consider an X-ray The best way to stay healthy is to live a healthy lifestyle. A healthy lifestyle includes regular exercise, eating a well-balanced diet, keeping a healthy weight and not smoking. Regular physical exams and screening tests are another important way to take care of yourself. Preventive exams provided by health care providers can find health problems early when treatment works best and can keep you from getting certain diseases or illnesses. Preventive services include exams, lab tests, screenings, shots, monitoring and information to help you take care of your own health. All people over 65 should have a pneumonia shot. Pneumonia shots are usually only needed once in a lifetime unless your doctor decides differently. In addition to your physical exam, some screening tests are recommended: 
 
All people over 65 should have a yearly flu shot. People over 65 are at medium to high risk for Hepatitis B. Three shots are needed for complete protection. Bone mass measurement (dexa scan) is recommended every two years. Diabetes Mellitus screening is recommended every year. Glaucoma is an eye disease caused by high pressure in the eye. An eye exam is recommended every year. Cardiovascular screening tests that check your cholesterol and other blood fat (lipid) levels are recommended every five years. Colorectal Cancer screening tests help to find pre-cancerous polyps (growths in the colon) so they can be removed before they turn into cancer. Tests ordered for screening depend on your personal and family history risk factors. Prostate Cancer Screening (annually up to age 76) Screening for breast cancer is recommended yearly with a Mammogram. 
 
Screening for cervical and vaginal cancer is recommended with a pelvic and Pap test every two years. However if you have had an abnormal pap in the past  three years or at high risk for cervical or vaginal cancer Medicare will cover a pap test and a pelvic exam every year. Here is a list of your current Health Maintenance items with a due date: 
Health Maintenance Due Topic Date Due  
 Annual Well Visit  07/23/2017  Flu Vaccine  08/01/2017 Back Strain: Care Instructions Your Care Instructions Back strain happens when you overstretch, or pull, a muscle in your back. You may hurt your back in an accident or when you exercise or lift something. Most back pain will get better with rest and time. You can take care of yourself at home to help your back heal. 
Follow-up care is a key part of your treatment and safety. Be sure to make and go to all appointments, and call your doctor if you are having problems. It's also a good idea to know your test results and keep a list of the medicines you take. How can you care for yourself at home? · Try to stay as active as you can, but stop or reduce any activity that causes pain. · Put ice or a cold pack on the sore muscle for 10 to 20 minutes at a time to stop swelling. Try this every 1 to 2 hours for 3 days (when you are awake) or until the swelling goes down. Put a thin cloth between the ice pack and your skin. · After 2 or 3 days, apply a heating pad on low or a warm cloth to your back. Some doctors suggest that you go back and forth between hot and cold treatments. · Take pain medicines exactly as directed. ¨ If the doctor gave you a prescription medicine for pain, take it as prescribed. ¨ If you are not taking a prescription pain medicine, ask your doctor if you can take an over-the-counter medicine. · Try sleeping on your side with a pillow between your legs.  Or put a pillow under your knees when you lie on your back. These measures can ease pain in your lower back. · Return to your usual level of activity slowly. When should you call for help? Call 911 anytime you think you may need emergency care. For example, call if: 
· You are unable to move a leg at all. Call your doctor now or seek immediate medical care if: 
· You have new or worse symptoms in your legs, belly, or buttocks. Symptoms may include: ¨ Numbness or tingling. ¨ Weakness. ¨ Pain. · You lose bladder or bowel control. Watch closely for changes in your health, and be sure to contact your doctor if you are not getting better as expected. Where can you learn more? Go to http://ria-matt.info/. Enter R711 in the search box to learn more about \"Back Strain: Care Instructions. \" Current as of: March 21, 2017 Content Version: 11.3 © 2608-2421 [employer]. Care instructions adapted under license by [employer] (which disclaims liability or warranty for this information). If you have questions about a medical condition or this instruction, always ask your healthcare professional. [place] [address] any warranty or liability for your use of this information. Well Visit, Over 72: Care Instructions Your Care Instructions Physical exams can help you stay healthy. Your doctor has checked your overall health and may have suggested ways to take good care of yourself. He or she also may have recommended tests. At home, you can help prevent illness with healthy eating, regular exercise, and other steps. Follow-up care is a key part of your treatment and safety. Be sure to make and go to all appointments, and call your doctor if you are having problems. It's also a good idea to know your test results and keep a list of the medicines you take. How can you care for yourself at home? · Reach and stay at a healthy weight. This will lower your risk for many problems, such as obesity, diabetes, heart disease, and high blood pressure. · Get at least 30 minutes of exercise on most days of the week. Walking is a good choice. You also may want to do other activities, such as running, swimming, cycling, or playing tennis or team sports. · Do not smoke. Smoking can make health problems worse. If you need help quitting, talk to your doctor about stop-smoking programs and medicines. These can increase your chances of quitting for good. · Protect your skin from too much sun. When you're outdoors from 10 a.m. to 4 p.m., stay in the shade or cover up with clothing and a hat with a wide brim. Wear sunglasses that block UV rays. Even when it's cloudy, put broad-spectrum sunscreen (SPF 30 or higher) on any exposed skin. · See a dentist one or two times a year for checkups and to have your teeth cleaned. · Wear a seat belt in the car. · Limit alcohol to 2 drinks a day for men and 1 drink a day for women. Too much alcohol can cause health problems. Follow your doctor's advice about when to have certain tests. These tests can spot problems early. For men and women · Cholesterol. Your doctor will tell you how often to have this done based on your overall health and other things that can increase your risk for heart attack and stroke. · Blood pressure. Have your blood pressure checked during a routine doctor visit. Your doctor will tell you how often to check your blood pressure based on your age, your blood pressure results, and other factors. · Diabetes. Ask your doctor whether you should have tests for diabetes. · Vision. Experts recommend that you have yearly exams for glaucoma and other age-related eye problems. · Hearing. Tell your doctor if you notice any change in your hearing. You can have tests to find out how well you hear. · Colon cancer tests.  Keep having colon cancer tests as your doctor recommends. You can have one of several types of tests. · Heart attack and stroke risk. At least every 4 to 6 years, you should have your risk for heart attack and stroke assessed. Your doctor uses factors such as your age, blood pressure, cholesterol, and whether you smoke or have diabetes to show what your risk for a heart attack or stroke is over the next 10 years. · Osteoporosis. Talk to your doctor about whether you should have a bone density test to find out whether you have thinning bones. Also ask your doctor about whether you should take calcium and vitamin D supplements. For women · Pap test and pelvic exam. You may no longer need a Pap test. Talk with your doctor about whether to stop or continue to have Pap tests. · Breast exam and mammogram. Ask how often you should have a mammogram, which is an X-ray of your breasts. A mammogram can spot breast cancer before it can be felt and when it is easiest to treat. · Thyroid disease. Talk to your doctor about whether to have your thyroid checked as part of a regular physical exam. Women have an increased chance of a thyroid problem. For men · Prostate exam. Talk to your doctor about whether you should have a blood test (called a PSA test) for prostate cancer. Experts disagree on whether men should have this test. Some experts recommend that you discuss the benefits and risks of the test with your doctor. · Abdominal aortic aneurysm. Ask your doctor whether you should have a test to check for an aneurysm. You may need a test if you ever smoked or if your parent, brother, sister, or child has had an aneurysm. When should you call for help? Watch closely for changes in your health, and be sure to contact your doctor if you have any problems or symptoms that concern you. Where can you learn more? Go to http://ria-matt.info/. Enter U111 in the search box to learn more about \"Well Visit, Over 65: Care Instructions. \" 
 Current as of: July 19, 2016 Content Version: 11.3 © 5507-1221 [employer]. Care instructions adapted under license by [employer] (which disclaims liability or warranty for this information). If you have questions about a medical condition or this instruction, always ask your healthcare professional. [address] any warranty or liability for your use of this information. Introducing [facility]! Dear Yee Alejandro: Thank you for requesting a [employer] account. Our records indicate that you have previously registered for a [employer] account but its currently inactive. Please call our [employer] support line at [phone]. Additional Information If you have questions, please visit the Frequently Asked Questions section of the [employer] website at [email or web address] Remember, [employer] is NOT to be used for urgent needs. For medical emergencies, dial 911. Now available from your iPhone and Android! Please provide this summary of care documentation to your next provider. Your primary care clinician is listed as Rosita Almeida. If you have any questions after today's visit, please call [phone].

## 2017-08-03 NOTE — PROGRESS NOTES
This is a Subsequent Medicare Annual Wellness Visit providing Personalized Prevention Plan Services (PPPS) (Performed 12 months after initial AWV and PPPS )    I have reviewed the patient's medical history in detail and updated the computerized patient record. History     Past Medical History:   Diagnosis Date    CAD (coronary artery disease)     mi 10/19/2015 with stent    COPD [facility]chronic obstructive [facility] disease) [facility]     Hypertension     Prostate cancer ([facility] 5/8/2012    Dr. Bonnie Livingston - diagnosed 2/2012 - s/p EBRT on Lupron       Past Surgical History:   Procedure Laterality Date    HX ORTHOPAEDIC  1961    left knee surgery     Current Outpatient Prescriptions   Medication Sig Dispense Refill    famotidine (PEPCID) 20 mg tablet Take 1 Tab by mouth daily as needed. Indications: HEARTBURN 90 Tab 0    metoprolol tartrate (LOPRESSOR) 50 mg tablet Take 1 Tab by mouth two (2) times a day. 180 Tab 0    atorvastatin (LIPITOR) 40 mg tablet TAKE 1 TABLET EVERY DAY 90 Tab 4    mometasone (NASONEX) 50 mcg/actuation nasal spray 2 Sprays by Both Nostrils route daily. 1 Container 6    cetirizine (ZYRTEC) 10 mg tablet Take 10 mg by mouth daily as needed for Allergies.  lisinopril (PRINIVIL, ZESTRIL) 2.5 mg tablet TAKE 1 TABLET EVERY DAY 90 Tab 3    tiotropium (SPIRIVA) 18 mcg inhalation capsule Take 1 Cap by inhalation daily.  albuterol (PROVENTIL HFA, VENTOLIN HFA, PROAIR HFA) 90 mcg/actuation inhaler Take  by inhalation. 117 MCG OF ALBUTEROL SULFATE AND 97 MCG OF ALBUTEROL BASE      aspirin 81 mg chewable tablet Take 1 Tab by mouth daily.  RISK OF HEART ATTACK IF ANY MISSED DOSES 80 Tab 3     No Known Allergies  Family History   Problem Relation Age of Onset    Heart Disease Mother      pacemaker    Diabetes Mother     Diabetes Sister     Heart Disease Brother      Social History   Substance Use Topics    Smoking status: Former Smoker     Packs/day: 0.50     Years: 40.00     Types: Cigarettes     Quit date: 10/19/2015    Smokeless tobacco: Never Used      Comment: trying quit 10/13/15 - 1/2 ppd    Alcohol use No     Patient Active Problem List   Diagnosis Code    ED (erectile dysfunction) N52.9    HTN (hypertension) I10    Prostate cancer ([facility] C61    PAD (peripheral artery disease) ([facility] I73.9    STEMI (ST elevation myocardial infarction) ([facility] I21.3    Chronic interstitial lung disease ([facility] J84.9    S/P PTCA (percutaneous transluminal coronary angioplasty) Z98.61    COPD with exacerbation ([facility] J44.1    Acute myocardial infarction of anterior wall, subsequent episode of care ([facility] I21.09    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 No    Do you average more than 14 drinks per week? No        Functional Ability and Level of Safety:     Hearing Loss   none    Activities of Daily Living   Self-care. Requires assistance with: no ADLs    Fall Risk   Fall Risk Assessment, last 12 mths 8/3/2017   Able to walk? Yes   Fall in past 12 months?  No     Abuse Screen   Patient is not abused    Review of Systems   Not required    Physical Examination     Evaluation of Cognitive Function:  Mood/affect:  happy  Appearance: age appropriate, casually dressed and within normal Limits  Family member/caregiver input: none  Cognition and memory appear normal.         Patient Care Team:  Jill Blank MD as PCP - Sara Ricketts MD as Physician (Urology)  Farhan Ferris NP as Nurse Practitioner (Nurse Practitioner)  Escobar Gonzales MD as Physician (Cardiology)  Ashwini Vance MD as Surgeon (Cardiothoracic Surgery)  Shashi Campos RN as Nurse Liana Griggs MD as Physician (Urology)    Advice/Referrals/Counseling   Education and counseling provided:  End-of-Life planning (with patient's consent)  Will participate in honoring choices.        Assessment/Plan   See other note this date

## 2017-08-18 ENCOUNTER — OFFICE VISIT (OUTPATIENT)
Dept: CARDIOLOGY CLINIC | Age: 77
End: 2017-08-18

## 2017-08-18 VITALS
RESPIRATION RATE: 20 BRPM | HEIGHT: 73 IN | DIASTOLIC BLOOD PRESSURE: 68 MMHG | HEART RATE: 68 BPM | WEIGHT: 194.8 LBS | OXYGEN SATURATION: 98 % | BODY MASS INDEX: 25.82 KG/M2 | SYSTOLIC BLOOD PRESSURE: 110 MMHG

## 2017-08-18 DIAGNOSIS — I21.09 ACUTE MYOCARDIAL INFARCTION OF ANTERIOR WALL, SUBSEQUENT EPISODE OF CARE (HCC): ICD-10-CM

## 2017-08-18 DIAGNOSIS — J84.9 CHRONIC INTERSTITIAL LUNG DISEASE (HCC): ICD-10-CM

## 2017-08-18 DIAGNOSIS — I10 ESSENTIAL HYPERTENSION: ICD-10-CM

## 2017-08-18 DIAGNOSIS — J44.1 COPD WITH EXACERBATION (HCC): ICD-10-CM

## 2017-08-18 DIAGNOSIS — I71.9 PENETRATING ATHEROSCLEROTIC ULCER OF AORTA (HCC): ICD-10-CM

## 2017-08-18 DIAGNOSIS — Z98.61 S/P PTCA (PERCUTANEOUS TRANSLUMINAL CORONARY ANGIOPLASTY): ICD-10-CM

## 2017-08-18 DIAGNOSIS — I25.10 CORONARY ARTERY DISEASE INVOLVING NATIVE CORONARY ARTERY OF NATIVE HEART WITHOUT ANGINA PECTORIS: Primary | ICD-10-CM

## 2017-08-18 DIAGNOSIS — I73.9 PAD (PERIPHERAL ARTERY DISEASE) (HCC): ICD-10-CM

## 2017-08-18 DIAGNOSIS — E78.2 MIXED HYPERLIPIDEMIA: ICD-10-CM

## 2017-08-18 RX ORDER — CHOLECALCIFEROL (VITAMIN D3) 125 MCG
CAPSULE ORAL
COMMUNITY
End: 2018-02-08

## 2017-08-18 NOTE — PROGRESS NOTES
[address] [facility] [address] [facility]  [phone]     Subjective:      Estrella Raya is a 76 y.o. male is here for routine f/u. The patient denies chest pain/ shortness of breath, orthopnea, PND, LE edema, palpitations, syncope, or presyncope. Patient Active Problem List    Diagnosis Date Noted    Coronary artery disease involving native coronary artery of native heart without angina pectoris 04/25/2017    Advance directive discussed with patient 07/22/2016    Hyperlipidemia 06/27/2016    Acute myocardial infarction of anterior wall, subsequent episode of care [facility]) 06/06/2016    Penetrating atherosclerotic ulcer of aorta ([facility] 06/06/2016    COPD with exacerbation ([facility] 06/05/2016    S/P PTCA (percutaneous transluminal coronary angioplasty) 10/21/2015    Chronic interstitial lung disease ([address] 10/20/2015    STEMI (ST elevation myocardial infarction) ([facility] 10/19/2015    PAD (peripheral artery disease) ([address] 12/31/2013    Prostate cancer ([address] 05/08/2012    HTN (hypertension) 07/05/2011    ED (erectile dysfunction) 02/24/2010      Nasir Cheung MD  Past Medical History:   Diagnosis Date    CAD (coronary artery disease)     mi 10/19/2015 with stent    COPD (chronic obstructive pulmonary disease) ([facility]     Hypertension     Prostate cancer ([facility] 5/8/2012    Dr. Álvaro Rick - diagnosed 2/2012 - s/p EBRT on Lupron       Past Surgical History:   Procedure Laterality Date    HX ORTHOPAEDIC  1961    left knee surgery     No Known Allergies   Family History   Problem Relation Age of Onset    Heart Disease Mother      pacemaker    Diabetes Mother     Diabetes Sister     Heart Disease Brother       Social History     Social History    Marital status:      Spouse name: N/A    Number of children: N/A    Years of education: N/A     Occupational History    Not on file.      Social History Main Topics    Smoking status: Former Smoker     Packs/day: 0.50 Years: 40.00     Types: Cigarettes     Quit date: 10/19/2015    Smokeless tobacco: Never Used      Comment: trying quit 10/13/15 - 1/2 ppd    Alcohol use No    Drug use: No    Sexual activity: Not Currently     Other Topics Concern    Not on file     Social History Narrative      Current Outpatient Prescriptions   Medication Sig    calcium-cholecalciferol, d3, (CALCIUM 600 + D) 600-125 mg-unit tab Take  by mouth.  cholecalciferol, vitamin D3, (VITAMIN D3) 2,000 unit tab Take  by mouth.  famotidine (PEPCID) 20 mg tablet Take 1 Tab by mouth daily as needed. Indications: HEARTBURN    metoprolol tartrate (LOPRESSOR) 50 mg tablet Take 1 Tab by mouth two (2) times a day.  atorvastatin (LIPITOR) 40 mg tablet TAKE 1 TABLET EVERY DAY    mometasone (NASONEX) 50 mcg/actuation nasal spray 2 Sprays by Both Nostrils route daily.  lisinopril (PRINIVIL, ZESTRIL) 2.5 mg tablet TAKE 1 TABLET EVERY DAY    tiotropium (SPIRIVA) 18 mcg inhalation capsule Take 1 Cap by inhalation daily.  albuterol (PROVENTIL HFA, VENTOLIN HFA, PROAIR HFA) 90 mcg/actuation inhaler Take  by inhalation. 117 MCG OF ALBUTEROL SULFATE AND 97 MCG OF ALBUTEROL BASE    aspirin 81 mg chewable tablet Take 1 Tab by mouth daily. RISK OF HEART ATTACK IF ANY MISSED DOSES    cetirizine (ZYRTEC) 10 mg tablet Take 10 mg by mouth daily as needed for Allergies. No current facility-administered medications for this visit. Review of Symptoms:  11 systems reviewed, negative other than as stated in the HPI    Physical ExamPhysical Exam:    Vitals:    08/18/17 0937 08/18/17 0945   BP: 104/60 110/68   Pulse: 68    Resp: 20    SpO2: 98%    Weight: 194 lb 12.8 oz (88.4 kg)    Height: 6' 1\" (1.854 m)      Body mass index is 25.7 kg/(m^2). General PE   Gen:  NAD  Mental Status - Alert. General Appearance - Not in acute distress. Chest and Lung Exam   Inspection: Accessory muscles - No use of accessory muscles in breathing.    Auscultation: Breath sounds: - Normal.   Cardiovascular   Inspection: Jugular vein - Bilateral - Inspection Normal.   Palpation/Percussion:   Apical Impulse: - Normal.   Auscultation: Rhythm - Regular. Heart Sounds - S1 WNL and S2 WNL. No S3 or S4. Murmurs & Other Heart Sounds: Auscultation of the heart reveals - No Murmurs. Peripheral Vascular   Upper Extremity: Inspection - Bilateral - No Cyanotic nailbeds or Digital clubbing. Lower Extremity:   Palpation: Edema - Bilateral - No edema. Abdomen:   Soft, non-tender, bowel sounds are active.   Neuro: A&O times 3, CN and motor grossly WNL    Labs:   Lab Results   Component Value Date/Time    Cholesterol, total 73 04/25/2017 02:38 PM    Cholesterol, total 95 10/20/2015 04:06 AM    Cholesterol, total 124 05/14/2015 11:24 AM    Cholesterol, total 136 12/31/2013 10:41 AM    Cholesterol, total 135 03/22/2013 08:52 AM    HDL Cholesterol 31 04/25/2017 02:38 PM    HDL Cholesterol 33 10/20/2015 04:06 AM    HDL Cholesterol 35 05/14/2015 11:24 AM    HDL Cholesterol 36 12/31/2013 10:41 AM    HDL Cholesterol 34 03/22/2013 08:52 AM    LDL, calculated 19 04/25/2017 02:38 PM    LDL, calculated 39.6 10/20/2015 04:06 AM    LDL, calculated 69 05/14/2015 11:24 AM    LDL, calculated 77 12/31/2013 10:41 AM    LDL, calculated 79 03/22/2013 08:52 AM    Triglyceride 116 04/25/2017 02:38 PM    Triglyceride 112 10/20/2015 04:06 AM    Triglyceride 98 05/14/2015 11:24 AM    Triglyceride 113 12/31/2013 10:41 AM    Triglyceride 109 03/22/2013 08:52 AM    CHOL/HDL Ratio 2.9 10/20/2015 04:06 AM     Lab Results   Component Value Date/Time    CK 1211 10/21/2015 04:12 AM     Lab Results   Component Value Date/Time    Sodium 143 04/25/2017 02:38 PM    Potassium 4.5 04/25/2017 02:38 PM    Chloride 105 04/25/2017 02:38 PM    CO2 21 04/25/2017 02:38 PM    Anion gap 7 06/09/2016 03:06 AM    Glucose 88 04/25/2017 02:38 PM    BUN 20 04/25/2017 02:38 PM    Creatinine 1.39 04/25/2017 02:38 PM    BUN/Creatinine ratio 14 04/25/2017 02:38 PM    GFR est AA 55 04/25/2017 02:38 PM    GFR est non-AA 48 04/25/2017 02:38 PM    Calcium 9.6 04/25/2017 02:38 PM    Bilirubin, total 0.5 04/25/2017 02:38 PM    AST (SGOT) 18 04/25/2017 02:38 PM    Alk. phosphatase 44 04/25/2017 02:38 PM    Protein, total 7.2 04/25/2017 02:38 PM    Albumin 4.1 04/25/2017 02:38 PM    Globulin 4.1 06/05/2016 07:15 AM    A-G Ratio 1.3 04/25/2017 02:38 PM    ALT (SGPT) 11 04/25/2017 02:38 PM       EKG:  NSR, old TWI noted     Assessment:        1. Coronary artery disease involving native coronary artery of native heart without angina pectoris    2. PAD (peripheral artery disease) ([facility]    3. Mixed hyperlipidemia    4. Acute myocardial infarction of anterior wall, subsequent episode of care ([facility]    5. Penetrating atherosclerotic ulcer of aorta ([facility]    6. Essential hypertension    7. COPD with exacerbation ([facility]    8. S/P PTCA (percutaneous transluminal coronary angioplasty)    9. Chronic interstitial lung disease ([facility]        Orders Placed This Encounter    AMB POC EKG ROUTINE W/ 12 LEADS, INTER & REP     Order Specific Question:   Reason for Exam:     Answer:   routine    calcium-cholecalciferol, d3, (CALCIUM 600 + D) 600-125 mg-unit tab     Sig: Take  by mouth.  cholecalciferol, vitamin D3, (VITAMIN D3) 2,000 unit tab     Sig: Take  by mouth. Plan:     Chronic WHEAT:  Suspect due to COPD and interstitial lung disease. Lexiscan Myoview January 2017 showed an anteroapical infarct without ischemia, no ischemia in the distribution of the borderline left circumflex and right coronary lesions. Echo with normal EF and no significant valvular pathology January 2017.   Follows regularly with Dr. Kimberly Dahl of pulmonary, currently uses home oxygen as needed and appears comfortable with light ambulation in the office without oxygen.     CAD status post PCI to the LAD:  Continue current cardioprotective regimen.     Lipids and labs followed by PCP, at goal in April.     Penetrating aortic ulcer:   Stable on serial CT scans in 2015 and 2017.     Counseled on diet and exercise. Follow-up in 1 year, sooner as needed.     Facundo Cedeno MD

## 2017-08-18 NOTE — MR AVS SNAPSHOT
Visit Information Date & Time Provider Department Dept. Phone Encounter #  
 8/18/2017  9:15 AM Galen Mosher, [address] [facility] [phone] [id number] Your Appointments 9/13/2017  1:00 PM  
MEETING with [address]) Appt Note: [facility] [address] [facility] [address] [phone]  
  
   
 [address] [place] [address]  
  
    
 10/25/2017  2:30 PM  
ROUTINE CARE with Minoo Hansen MD  
[address] [place] [address]) Appt Note: 6mth f/u; bp, copd [address] [facility] [address] [phone]  
  
   
 [address] [place] [address]  
  
    
 2/22/2018 10:15 AM  
6 MONTH with Galen Mosher MD  
[facility] [address] [place] [address]) Appt Note: per Dr. Minoo Myers f/u,jaa  
 [address]  
[phone] [address] Upcoming Health Maintenance Date Due INFLUENZA AGE 9 TO ADULT 8/1/2017 MEDICARE YEARLY EXAM 8/4/2018 GLAUCOMA SCREENING Q2Y 1/1/2019 DTaP/Tdap/Td series (2 - Td) 10/24/2026 Allergies as of 8/18/2017  Review Complete On: 8/18/2017 By: Galen Mosher MD  
 No Known Allergies Current Immunizations  Reviewed on 8/3/2017 Name Date Influenza High Dose Vaccine PF 10/24/2016  9:35 AM  
 Influenza Vaccine (Quad) PF 10/21/2015  1:51 PM  
 Pneumococcal Conjugate (PCV-13) 10/24/2016  9:36 AM,  Deferred (Patient Refused) Pneumococcal Polysaccharide (PPSV-23) 10/21/2015  1:52 PM  
  
 Not reviewed this visit You Were Diagnosed With   
  
 Codes Comments Coronary artery disease involving native coronary artery of native heart without angina pectoris    -  Primary ICD-10-CM: I25.10 ICD-9-CM: 414.01   
 PAD [facility]peripheral artery disease) [facility]     ICD-10-CM: I73.9 ICD-9-CM: 443.9 Mixed hyperlipidemia     ICD-10-CM: E78.2 ICD-9-CM: 272.2 Acute myocardial infarction of anterior wall, subsequent episode of care [facility])     ICD-10-CM: I21.09 
ICD-9-CM: 410.12 Penetrating atherosclerotic ulcer of aorta ([facility]     ICD-10-CM: I70.0 ICD-9-CM: 440.0 Essential hypertension     ICD-10-CM: I10 
ICD-9-CM: 401.9 COPD with exacerbation ([facility]     ICD-10-CM: J44.1 ICD-9-CM: 491.21 S/P PTCA (percutaneous transluminal coronary angioplasty)     ICD-10-CM: Z98.61 ICD-9-CM: V45.82 Chronic interstitial lung disease ([facility]     ICD-10-CM: J84.9 ICD-9-CM: 145 Vitals BP Pulse Resp Height(growth percentile) Weight(growth percentile) SpO2  
 110/68 (BP 1 Location: Right arm, BP Patient Position: Sitting) 68 20 6' 1\" (1.854 m) 194 lb 12.8 oz (88.4 kg) 98% BMI Smoking Status 25.7 kg/m2 Former Smoker Vitals History BMI and BSA Data Body Mass Index Body Surface Area 25.7 kg/m 2 2.13 m 2 Preferred Pharmacy Pharmacy Name Phone [facility] [address] [phone] Your Updated Medication List  
  
   
This list is accurate as of: 8/18/17 10:14 AM.  Always use your most recent med list.  
  
  
  
  
 albuterol 90 mcg/actuation inhaler Commonly known as:  PROVENTIL HFA, VENTOLIN HFA, PROAIR HFA Take  by inhalation. 117 MCG OF ALBUTEROL SULFATE AND 97 MCG OF ALBUTEROL BASE  
  
 aspirin 81 mg chewable tablet Take 1 Tab by mouth daily. RISK OF HEART ATTACK IF ANY MISSED DOSES  
  
 atorvastatin 40 mg tablet Commonly known as:  LIPITOR  
TAKE 1 TABLET EVERY DAY  
  
 CALCIUM 600 + D 600-125 mg-unit Tab Generic drug:  calcium-cholecalciferol (d3) Take  by mouth. famotidine 20 mg tablet Commonly known as:  PEPCID Take 1 Tab by mouth daily as needed. Indications: HEARTBURN  
  
 lisinopril 2.5 mg tablet Commonly known as:  Yelena Reynolds  
 TAKE 1 TABLET EVERY DAY  
  
 metoprolol tartrate 50 mg tablet Commonly known as:  LOPRESSOR Take 1 Tab by mouth two (2) times a day. mometasone 50 mcg/actuation nasal spray Commonly known as:  NASONEX  
2 Sprays by Both Nostrils route daily. tiotropium 18 mcg inhalation capsule Commonly known as:  Lex Buckle Take 1 Cap by inhalation daily. VITAMIN D3 2,000 unit Tab Generic drug:  cholecalciferol (vitamin D3) Take  by mouth. ZyrTEC 10 mg tablet Generic drug:  cetirizine Take 10 mg by mouth daily as needed for Allergies. We Performed the Following AMB POC EKG ROUTINE W/ 12 LEADS, INTER & REP [71595 CPT(R)] Introducing [facility]! Uday Mcdonough introduces [employer] patient portal. Now you can access parts of your medical record, email your doctor's office, and request medication refills online. 1. In your internet browser, go to [email or web address] 2. Click on the First Time User? Click Here link in the Sign In box. You will see the New Member Sign Up page. 3. Enter your [employer] Access Code exactly as it appears below. You will not need to use this code after youve completed the sign-up process. If you do not sign up before the expiration date, you must request a new code. · [employer] Access Code: [email or web address] Expires: 11/16/2017  9:30 AM 
 
4. Enter the last four digits of your Social Security Number (xxxx) and Date of Birth (mm/dd/yyyy) as indicated and click Submit. You will be taken to the next sign-up page. 5. Create a [employer] ID. This will be your [employer] login ID and cannot be changed, so think of one that is secure and easy to remember. 6. Create a [employer] password. You can change your password at any time. 7. Enter your Password Reset Question and Answer. This can be used at a later time if you forget your password. 8. Enter your e-mail address.  You will receive e-mail notification when new information is available in [employer]. 9. Click Sign Up. You can now view and download portions of your medical record. 10. Click the Download Summary menu link to download a portable copy of your medical information. If you have questions, please visit the Frequently Asked Questions section of the [employer] website. Remember, [employer] is NOT to be used for urgent needs. For medical emergencies, dial 911. Now available from your iPhone and Android! Please provide this summary of care documentation to your next provider. Your primary care clinician is listed as Rosita Almeida. If you have any questions after today's visit, please call [phone].

## 2017-08-29 ENCOUNTER — TELEPHONE (OUTPATIENT)
Dept: INTERNAL MEDICINE CLINIC | Age: 77
End: 2017-08-29

## 2017-08-29 DIAGNOSIS — C61 PROSTATE CANCER (HCC): Primary | ICD-10-CM

## 2017-08-29 NOTE — TELEPHONE ENCOUNTER
Insurance company calling to advise that per pt's policy,they have someone go out to do a health screening and one thing they screen for is cardiovascular issues; they found that pt does have issues pertaining to his heart and advised a report will be faxed over

## 2017-09-07 NOTE — TELEPHONE ENCOUNTER
----- Message from Cynthia Sanon sent at 9/7/2017  1:33 PM EDT -----  Regarding: Young  Patient is seeing    Physician:  Dileep Camara     Specialty:  Urology      Phone    [phone]   Fax    [phone] [address] [address]    Patient already seen today 09.07.17   Dx: C61-Prostate Cancer    Insurance referral already done they needed one within 15 min or pt could not be seen today.

## 2017-09-14 ENCOUNTER — HOSPITAL ENCOUNTER (OUTPATIENT)
Dept: NUCLEAR MEDICINE | Age: 77
Discharge: HOME | End: 2017-09-14
Attending: UROLOGY
Payer: MEDICARE

## 2017-09-14 ENCOUNTER — HOSPITAL ENCOUNTER (OUTPATIENT)
Dept: CT IMAGING | Age: 77
Discharge: HOME | End: 2017-09-14
Attending: UROLOGY
Payer: MEDICARE

## 2017-09-14 DIAGNOSIS — C61 PROSTATE CANCER (HCC): ICD-10-CM

## 2017-09-14 LAB — CREAT BLD-MCNC: 1.4 MG/DL (ref 0.6–1.3)

## 2017-09-14 PROCEDURE — 78306 BONE IMAGING WHOLE BODY: CPT

## 2017-09-14 PROCEDURE — 82565 ASSAY OF CREATININE: CPT

## 2017-09-14 PROCEDURE — 74011636320 HC RX REV CODE- 636/320: Performed by: UROLOGY

## 2017-09-14 PROCEDURE — 74177 CT ABD & PELVIS W/CONTRAST: CPT

## 2017-09-14 PROCEDURE — 74011250636 HC RX REV CODE- 250/636: Performed by: UROLOGY

## 2017-09-14 PROCEDURE — 74011000255 HC RX REV CODE- 255: Performed by: UROLOGY

## 2017-09-14 RX ORDER — SODIUM CHLORIDE 0.9 % (FLUSH) 0.9 %
10 SYRINGE (ML) INJECTION
Status: COMPLETED | OUTPATIENT
Start: 2017-09-14 | End: 2017-09-14

## 2017-09-14 RX ORDER — SODIUM CHLORIDE 9 MG/ML
50 INJECTION, SOLUTION INTRAVENOUS
Status: COMPLETED | OUTPATIENT
Start: 2017-09-14 | End: 2017-09-14

## 2017-09-14 RX ORDER — BARIUM SULFATE 20 MG/ML
900 SUSPENSION ORAL
Status: COMPLETED | OUTPATIENT
Start: 2017-09-14 | End: 2017-09-14

## 2017-09-14 RX ADMIN — SODIUM CHLORIDE 50 ML/HR: 900 INJECTION, SOLUTION INTRAVENOUS at 13:07

## 2017-09-14 RX ADMIN — Medication 10 ML: at 13:08

## 2017-09-14 RX ADMIN — BARIUM SULFATE 900 ML: 21 SUSPENSION ORAL at 13:08

## 2017-09-14 RX ADMIN — IOPAMIDOL 100 ML: 755 INJECTION, SOLUTION INTRAVENOUS at 13:07

## 2017-09-18 RX ORDER — METOPROLOL TARTRATE 50 MG/1
TABLET ORAL
Qty: 180 TAB | Refills: 0 | Status: SHIPPED | OUTPATIENT
Start: 2017-09-18 | End: 2017-11-20 | Stop reason: SDUPTHER

## 2017-11-20 RX ORDER — METOPROLOL TARTRATE 50 MG/1
TABLET ORAL
Qty: 180 TAB | Refills: 0 | Status: SHIPPED | OUTPATIENT
Start: 2017-11-20 | End: 2018-03-19 | Stop reason: SDUPTHER

## 2017-12-29 DIAGNOSIS — E78.5 HYPERLIPIDEMIA, UNSPECIFIED HYPERLIPIDEMIA TYPE: ICD-10-CM

## 2017-12-29 RX ORDER — ATORVASTATIN CALCIUM 40 MG/1
TABLET, FILM COATED ORAL
Qty: 90 TAB | Refills: 4 | Status: SHIPPED | OUTPATIENT
Start: 2017-12-29 | End: 2018-07-05 | Stop reason: SDUPTHER

## 2018-01-02 ENCOUNTER — TELEPHONE (OUTPATIENT)
Dept: INTERNAL MEDICINE CLINIC | Age: 78
End: 2018-01-02

## 2018-01-02 DIAGNOSIS — E78.5 HYPERLIPIDEMIA, UNSPECIFIED HYPERLIPIDEMIA TYPE: ICD-10-CM

## 2018-01-02 RX ORDER — ATORVASTATIN CALCIUM 40 MG/1
TABLET, FILM COATED ORAL
Qty: 90 TAB | Refills: 4 | Status: CANCELLED | OUTPATIENT
Start: 2018-01-02

## 2018-02-08 ENCOUNTER — OFFICE VISIT (OUTPATIENT)
Dept: INTERNAL MEDICINE CLINIC | Age: 78
End: 2018-02-08

## 2018-02-08 VITALS
DIASTOLIC BLOOD PRESSURE: 52 MMHG | OXYGEN SATURATION: 94 % | TEMPERATURE: 97.9 F | HEIGHT: 73 IN | HEART RATE: 73 BPM | SYSTOLIC BLOOD PRESSURE: 118 MMHG | BODY MASS INDEX: 26.29 KG/M2 | WEIGHT: 198.4 LBS | RESPIRATION RATE: 16 BRPM

## 2018-02-08 DIAGNOSIS — I10 ESSENTIAL HYPERTENSION: ICD-10-CM

## 2018-02-08 DIAGNOSIS — C61 PROSTATE CANCER (HCC): ICD-10-CM

## 2018-02-08 DIAGNOSIS — K21.9 GASTROESOPHAGEAL REFLUX DISEASE, ESOPHAGITIS PRESENCE NOT SPECIFIED: ICD-10-CM

## 2018-02-08 DIAGNOSIS — E78.2 MIXED HYPERLIPIDEMIA: ICD-10-CM

## 2018-02-08 DIAGNOSIS — J44.9 CHRONIC OBSTRUCTIVE PULMONARY DISEASE, UNSPECIFIED COPD TYPE (HCC): Primary | ICD-10-CM

## 2018-02-08 RX ORDER — ENZALUTAMIDE 40 MG/1
160 CAPSULE ORAL DAILY
COMMUNITY
Start: 2018-01-11 | End: 2020-01-01

## 2018-02-08 NOTE — PROGRESS NOTES
HPI  Mr. Polo Aguillon is a 76y.o. year old male, he is seen today for follow up HTN, COPD. Just came from urologist office Dr. Tomy Pallas this morning. Got injection in his stomach which he will continue monthly for prostate CA. No cough, sometimes sob. Had a spell of right sided chest pain while sitting yesterday, lasted few minutes then resolved on its own. No vision changes, focal weakness or sweats or nausea associated. No indigestion or heartburn. Checks BP at home often and no higher than 572 systolic. Chief Complaint   Patient presents with    Blood Pressure Check     Room 3// NON fasting     COPD     pt reports he uses spiriva \"when I think about it\"        Prior to Admission medications    Medication Sig Start Date End Date Taking? Authorizing Provider   XTANDI 40 mg capsule  1/11/18  Yes Historical Provider   atorvastatin (LIPITOR) 40 mg tablet TAKE 1 TABLET EVERY DAY 12/29/17  Yes Jesus Pineda MD   metoprolol tartrate (LOPRESSOR) 50 mg tablet TAKE 1 TABLET TWICE DAILY 11/20/17  Yes Abbe Abarca NP   lisinopril (PRINIVIL, ZESTRIL) 2.5 mg tablet TAKE 1 TABLET EVERY DAY 9/21/17  Yes Casimiro Joiner MD   calcium-cholecalciferol, d3, (CALCIUM 600 + D) 600-125 mg-unit tab Take  by mouth. Yes Historical Provider   famotidine (PEPCID) 20 mg tablet Take 1 Tab by mouth daily as needed. Indications: HEARTBURN 4/25/17  Yes Jesus Pineda MD   mometasone (NASONEX) 50 mcg/actuation nasal spray 2 Sprays by Both Nostrils route daily. 10/24/16  Yes Jesus Pineda MD   tiotropium [facility]) 18 mcg inhalation capsule Take 1 Cap by inhalation daily. Yes Historical Provider   albuterol (PROVENTIL HFA, VENTOLIN HFA, PROAIR HFA) 90 mcg/actuation inhaler Take  by inhalation. 117 MCG OF ALBUTEROL SULFATE AND 97 MCG OF ALBUTEROL BASE   Yes Historical Provider   aspirin 81 mg chewable tablet Take 1 Tab by mouth daily.  RISK OF HEART ATTACK IF ANY MISSED DOSES 10/21/15  Yes Casimiro Joiner MD cholecalciferol, vitamin D3, (VITAMIN D3) 2,000 unit tab Take  by mouth. Historical Provider   cetirizine (ZYRTEC) 10 mg tablet Take 10 mg by mouth daily as needed for Allergies. Historical Provider         No Known Allergies      REVIEW OF SYSTEMS:  Per HPI    PHYSICAL EXAM:  Visit Vitals    /52    Pulse 73    Temp 97.9 °F (36.6 °C) (Oral)    Resp 16    Ht 6' 1\" (1.854 m)    Wt 198 lb 6.4 oz (90 kg)    SpO2 94%    BMI 26.18 kg/m2     Constitutional: Appears well-developed and well-nourished. No distress. HENT:   Head: Normocephalic and atraumatic. Eyes: No scleral icterus. Cardiovascular: Normal S1/S2, regular rhythm. No murmurs, rubs, or gallops. Pulmonary/Chest: Effort normal and breath sounds normal. No respiratory distress. No wheezes, rhonchi, or rales. Ext: No edema. Neurological: Alert. Psychiatric: Normal mood and affect. Behavior is normal.     Lab Results   Component Value Date/Time    Sodium 143 04/25/2017 02:38 PM    Potassium 4.5 04/25/2017 02:38 PM    Chloride 105 04/25/2017 02:38 PM    CO2 21 04/25/2017 02:38 PM    Anion gap 7 06/09/2016 03:06 AM    Glucose 88 04/25/2017 02:38 PM    BUN 20 04/25/2017 02:38 PM    Creatinine 1.39 (H) 04/25/2017 02:38 PM    BUN/Creatinine ratio 14 04/25/2017 02:38 PM    GFR est AA 55 (L) 04/25/2017 02:38 PM    GFR est non-AA 48 (L) 04/25/2017 02:38 PM    Calcium 9.6 04/25/2017 02:38 PM    Bilirubin, total 0.5 04/25/2017 02:38 PM    AST (SGOT) 18 04/25/2017 02:38 PM    Alk.  phosphatase 44 04/25/2017 02:38 PM    Protein, total 7.2 04/25/2017 02:38 PM    Albumin 4.1 04/25/2017 02:38 PM    Globulin 4.1 (H) 06/05/2016 07:15 AM    A-G Ratio 1.3 04/25/2017 02:38 PM    ALT (SGPT) 11 04/25/2017 02:38 PM     Lab Results   Component Value Date/Time    Hemoglobin A1c 6.0 (H) 12/12/2011 03:22 PM    Hemoglobin A1c 6.0 (H) 07/05/2011 01:44 PM      Lab Results   Component Value Date/Time    Cholesterol, total 73 (L) 04/25/2017 02:38 PM    HDL Cholesterol 31 (L) 04/25/2017 02:38 PM    LDL, calculated 19 04/25/2017 02:38 PM    VLDL, calculated 23 04/25/2017 02:38 PM    Triglyceride 116 04/25/2017 02:38 PM    CHOL/HDL Ratio 2.9 10/20/2015 04:06 AM          ASSESSMENT/PLAN  Diagnoses and all orders for this visit:    1. Chronic obstructive pulmonary disease, unspecified COPD type ([facility]  Advised spiriva daily, overall controlled  2. Essential hypertension  -     METABOLIC PANEL, COMPREHENSIVE  -     CBC WITH AUTOMATED DIFF  Controlled on current regimen, continue   3. Mixed hyperlipidemia  Lipids yearly - have been at goal on statin  4. Prostate cancer [facility])  Followed by Dr. Ashley Ceballos - will request notes  5. Gastroesophageal reflux disease, esophagitis presence not specified  Controlled with pepcid prn        There are no preventive care reminders to display for this patient. Follow-up Disposition:  Return in about 6 months (around 8/8/2018) for bp, chol, copd. Reviewed plan of care. Patient has provided input and agrees with goals. The nurse provided the patient and/or family with advanced directive information if needed and encouraged the patient to provide a copy to the office when available.

## 2018-02-08 NOTE — PROGRESS NOTES
Kim Brewster  Identified pt with two pt identifiers(name and ). Chief Complaint   Patient presents with    Blood Pressure Check     Room 3// NON fasting     COPD     pt reports he uses spiriva \"when I think about it\"       1. Have you been to the ER, urgent care clinic since your last visit? Hospitalized since your last visit? NO    2. Have you seen or consulted any other health care providers outside of the [address] [place] [address] since your last visit? Include any pap smears or colon screening. NO      Dr Kayley Lopez notified of reason for visit, vitals and flowsheets obtained on patients. Patient received paperwork for advance directive during previous visit but has not completed at this time     Reviewed record In preparation for visit, huddled with provider and have obtained necessary documentation      There are no preventive care reminders to display for this patient.     Wt Readings from Last 3 Encounters:   18 198 lb 6.4 oz (90 kg)   17 194 lb 12.8 oz (88.4 kg)   17 199 lb (90.3 kg)     Temp Readings from Last 3 Encounters:   18 97.9 °F (36.6 °C) (Oral)   17 95.1 °F (35.1 °C) (Oral)   17 97.5 °F (36.4 °C) (Oral)     BP Readings from Last 3 Encounters:   18 163/75   17 110/68   17 126/68     Pulse Readings from Last 3 Encounters:   18 73   17 68   17 62     Vitals:    18 1424   BP: 163/75   Pulse: 73   Resp: 16   Temp: 97.9 °F (36.6 °C)   TempSrc: Oral   SpO2: 94%   Weight: 198 lb 6.4 oz (90 kg)   Height: 6' 1\" (1.854 m)   PainSc:   0 - No pain         Learning Assessment:  :     Learning Assessment 2014   PRIMARY LEARNER Patient   PRIMARY LANGUAGE ENGLISH   LEARNER PREFERENCE PRIMARY DEMONSTRATION   ANSWERED BY patient     self       Depression Screening:  :     PHQ over the last two weeks 8/3/2017   Little interest or pleasure in doing things Not at all   Feeling down, depressed or hopeless Not at all   Total Score PHQ 2 0       Fall Risk Assessment:  :     Fall Risk Assessment, last 12 mths 8/18/2017   Able to walk? Yes   Fall in past 12 months? No       Abuse Screening:  :     Abuse Screening Questionnaire 8/3/2017 10/13/2015   Do you ever feel afraid of your partner? N N   Are you in a relationship with someone who physically or mentally threatens you? N N   Is it safe for you to go home? Y Y       ADL Screening:  :     ADL Assessment 5/14/2015   Feeding yourself No Help Needed   Getting from bed to chair No Help Needed   Getting dressed No Help Needed   Bathing or showering No Help Needed   Walk across the room (includes cane/walker) No Help Needed   Using the telphone No Help Needed   Taking your medications No Help Needed   Preparing meals No Help Needed   Managing money (expenses/bills) No Help Needed   Moderately strenuous housework (laundry) No Help Needed   Shopping for personal items (toiletries/medicines) No Help Needed   Shopping for groceries No Help Needed   Driving No Help Needed   Climbing a flight of stairs No Help Needed   Getting to places beyond walking distances No Help Needed              I have received verbal consent from Gregorio Quintanilla to discuss any/all medical information while they are present in the room. Medication reconciliation up to date and corrected with patient at this time.

## 2018-02-08 NOTE — MR AVS SNAPSHOT
[address] [place] [address] [phone] Patient: Marhta Quinteros MRN: [id number] Visit Information Date & Time Provider Department Dept. Phone Encounter #  
 2/8/2018  2:30 PM Adolfo Hanson MD Evelia Cabot [phone] [id number] Follow-up Instructions Return in about 6 months (around 8/8/2018) for bp, chol, copd. Your Appointments 2/22/2018 10:15 AM  
6 MONTH with Edgardo John MD  
[facility] Appt Note: per Dr. Maya Larios f/u,jaa  
 [address] [facility]  
[phone] [address] [facility] Upcoming Health Maintenance Date Due  
 MEDICARE YEARLY EXAM 8/4/2018 GLAUCOMA SCREENING Q2Y 1/1/2019 DTaP/Tdap/Td series (2 - Td) 10/24/2026 Allergies as of 2/8/2018  Review Complete On: 2/8/2018 By: Adolfo Hanson MD  
 No Known Allergies Current Immunizations  Reviewed on 2/8/2018 Name Date Influenza High Dose Vaccine PF 10/24/2016  9:35 AM  
 Influenza Vaccine 10/3/2017 Influenza Vaccine (Quad) PF 10/21/2015  1:51 PM  
 Pneumococcal Conjugate (PCV-13) 10/24/2016  9:36 AM,  Deferred (Patient Refused) Pneumococcal Polysaccharide (PPSV-23) 10/21/2015  1:52 PM  
  
 Reviewed by Adolfo Hanson MD on 2/8/2018 at  3:00 PM  
You Were Diagnosed With   
  
 Codes Comments Chronic obstructive pulmonary disease, unspecified COPD type ([facility]    -  Primary ICD-10-CM: J44.9 ICD-9-CM: 126 Essential hypertension     ICD-10-CM: I10 
ICD-9-CM: 401.9 Mixed hyperlipidemia     ICD-10-CM: E78.2 ICD-9-CM: 272.2 Prostate cancer [facility])     ICD-10-CM: M18 ICD-9-CM: 245 Gastroesophageal reflux disease, esophagitis presence not specified     ICD-10-CM: K21.9 ICD-9-CM: 530.81 Vitals BP Pulse Temp Resp Height(growth percentile) Weight(growth percentile) 118/52 73 97.9 °F (36.6 °C) (Oral) 16 6' 1\" (1.854 m) 198 lb 6.4 oz (90 kg) SpO2 BMI Smoking Status 94% 26.18 kg/m2 Former Smoker Vitals History BMI and BSA Data Body Mass Index Body Surface Area  
 26.18 kg/m 2 2.15 m 2 Preferred Pharmacy Pharmacy Name Phone [facility] [address] [place] [address] [address] [place] [address] [phone] Your Updated Medication List  
  
   
This list is accurate as of: 2/8/18  3:01 PM.  Always use your most recent med list.  
  
  
  
  
 albuterol 90 mcg/actuation inhaler Commonly known as:  PROVENTIL HFA, VENTOLIN HFA, PROAIR HFA Take  by inhalation. 117 MCG OF ALBUTEROL SULFATE AND 97 MCG OF ALBUTEROL BASE  
  
 aspirin 81 mg chewable tablet Take 1 Tab by mouth daily. RISK OF HEART ATTACK IF ANY MISSED DOSES  
  
 atorvastatin 40 mg tablet Commonly known as:  LIPITOR  
TAKE 1 TABLET EVERY DAY  
  
 CALCIUM 600 + D 600-125 mg-unit Tab Generic drug:  calcium-cholecalciferol (d3) Take  by mouth. famotidine 20 mg tablet Commonly known as:  PEPCID Take 1 Tab by mouth daily as needed. Indications: HEARTBURN  
  
 lisinopril 2.5 mg tablet Commonly known as:  PRINIVIL, ZESTRIL  
TAKE 1 TABLET EVERY DAY  
  
 metoprolol tartrate 50 mg tablet Commonly known as:  LOPRESSOR  
TAKE 1 TABLET TWICE DAILY  
  
 mometasone 50 mcg/actuation nasal spray Commonly known as:  NASONEX  
2 Sprays by Both Nostrils route daily. tiotropium 18 mcg inhalation capsule Commonly known as:  Charlotte Horseman Take 1 Cap by inhalation daily. XTANDI 40 mg capsule Generic drug:  enzalutamide Take 160 mg by mouth daily. We Performed the Following CBC WITH AUTOMATED DIFF [31343 CPT(R)] METABOLIC PANEL, COMPREHENSIVE [39944 CPT(R)] Follow-up Instructions Return in about 6 months (around 8/8/2018) for bp, chol, copd. Introducing [address]! [facility] introduces [employer]iat patient portal. Now you can access parts of your medical record, email your doctor's office, and request medication refills online. 1. In your internet browser, go to [email or web address] 2. Click on the First Time User? Click Here link in the Sign In box. You will see the New Member Sign Up page. 3. Enter your [employer] Access Code exactly as it appears below. You will not need to use this code after youve completed the sign-up process. If you do not sign up before the expiration date, you must request a new code. · [employer] Access Code: [email or web address] Expires: 5/9/2018  3:01 PM 
 
4. Enter the last four digits of your Social Security Number (xxxx) and Date of Birth (mm/dd/yyyy) as indicated and click Submit. You will be taken to the next sign-up page. 5. Create a [employer] ID. This will be your [employer] login ID and cannot be changed, so think of one that is secure and easy to remember. 6. Create a [employer] password. You can change your password at any time. 7. Enter your Password Reset Question and Answer. This can be used at a later time if you forget your password. 8. Enter your e-mail address. You will receive e-mail notification when new information is available in [address] 9. Click Sign Up. You can now view and download portions of your medical record. 10. Click the Download Summary menu link to download a portable copy of your medical information. If you have questions, please visit the Frequently Asked Questions section of the [employer] website. Remember, [employer] is NOT to be used for urgent needs. For medical emergencies, dial 911. Now available from your iPhone and Android! Please provide this summary of care documentation to your next provider. Your primary care clinician is listed as Rosita Almeida. If you have any questions after today's visit, please call [phone].

## 2018-02-09 LAB
ALBUMIN SERPL-MCNC: 4 G/DL (ref 3.5–4.8)
ALBUMIN/GLOB SERPL: 1.4 {RATIO} (ref 1.2–2.2)
ALP SERPL-CCNC: 72 IU/L (ref 39–117)
ALT SERPL-CCNC: 8 IU/L (ref 0–44)
AST SERPL-CCNC: 12 IU/L (ref 0–40)
BASOPHILS # BLD AUTO: 0 X10E3/UL (ref 0–0.2)
BASOPHILS NFR BLD AUTO: 0 %
BILIRUB SERPL-MCNC: 0.3 MG/DL (ref 0–1.2)
BUN SERPL-MCNC: 12 MG/DL (ref 8–27)
BUN/CREAT SERPL: 11 (ref 10–24)
CALCIUM SERPL-MCNC: 8.8 MG/DL (ref 8.6–10.2)
CHLORIDE SERPL-SCNC: 107 MMOL/L (ref 96–106)
CO2 SERPL-SCNC: 21 MMOL/L (ref 18–29)
CREAT SERPL-MCNC: 1.06 MG/DL (ref 0.76–1.27)
EOSINOPHIL # BLD AUTO: 0.1 X10E3/UL (ref 0–0.4)
EOSINOPHIL NFR BLD AUTO: 1 %
ERYTHROCYTE [DISTWIDTH] IN BLOOD BY AUTOMATED COUNT: 17 % (ref 12.3–15.4)
GFR SERPLBLD CREATININE-BSD FMLA CKD-EPI: 66 ML/MIN/1.73
GFR SERPLBLD CREATININE-BSD FMLA CKD-EPI: 77 ML/MIN/1.73
GLOBULIN SER CALC-MCNC: 2.9 G/DL (ref 1.5–4.5)
GLUCOSE SERPL-MCNC: 105 MG/DL (ref 65–99)
HCT VFR BLD AUTO: 36.4 % (ref 37.5–51)
HGB BLD-MCNC: 12.1 G/DL (ref 13–17.7)
IMM GRANULOCYTES # BLD: 0 X10E3/UL (ref 0–0.1)
IMM GRANULOCYTES NFR BLD: 1 %
LYMPHOCYTES # BLD AUTO: 1.8 X10E3/UL (ref 0.7–3.1)
LYMPHOCYTES NFR BLD AUTO: 42 %
MCH RBC QN AUTO: 28.6 PG (ref 26.6–33)
MCHC RBC AUTO-ENTMCNC: 33.2 G/DL (ref 31.5–35.7)
MCV RBC AUTO: 86 FL (ref 79–97)
MONOCYTES # BLD AUTO: 0.5 X10E3/UL (ref 0.1–0.9)
MONOCYTES NFR BLD AUTO: 13 %
NEUTROPHILS # BLD AUTO: 1.8 X10E3/UL (ref 1.4–7)
NEUTROPHILS NFR BLD AUTO: 43 %
PLATELET # BLD AUTO: 139 X10E3/UL (ref 150–379)
POTASSIUM SERPL-SCNC: 3.9 MMOL/L (ref 3.5–5.2)
PROT SERPL-MCNC: 6.9 G/DL (ref 6–8.5)
RBC # BLD AUTO: 4.23 X10E6/UL (ref 4.14–5.8)
SODIUM SERPL-SCNC: 143 MMOL/L (ref 134–144)
WBC # BLD AUTO: 4.1 X10E3/UL (ref 3.4–10.8)

## 2018-02-22 ENCOUNTER — OFFICE VISIT (OUTPATIENT)
Dept: CARDIOLOGY CLINIC | Age: 78
End: 2018-02-22

## 2018-02-22 VITALS
SYSTOLIC BLOOD PRESSURE: 110 MMHG | OXYGEN SATURATION: 96 % | BODY MASS INDEX: 25.61 KG/M2 | WEIGHT: 189.1 LBS | HEART RATE: 64 BPM | RESPIRATION RATE: 20 BRPM | HEIGHT: 72 IN | DIASTOLIC BLOOD PRESSURE: 60 MMHG

## 2018-02-22 DIAGNOSIS — Z98.61 S/P PTCA (PERCUTANEOUS TRANSLUMINAL CORONARY ANGIOPLASTY): ICD-10-CM

## 2018-02-22 DIAGNOSIS — E78.2 MIXED HYPERLIPIDEMIA: ICD-10-CM

## 2018-02-22 DIAGNOSIS — J84.9 CHRONIC INTERSTITIAL LUNG DISEASE (HCC): ICD-10-CM

## 2018-02-22 DIAGNOSIS — I25.10 CORONARY ARTERY DISEASE INVOLVING NATIVE CORONARY ARTERY OF NATIVE HEART WITHOUT ANGINA PECTORIS: Primary | ICD-10-CM

## 2018-02-22 DIAGNOSIS — I10 ESSENTIAL HYPERTENSION: ICD-10-CM

## 2018-02-22 NOTE — PROGRESS NOTES
Ashlee Gordon DNP, ANP-BC  Subjective/HPI:     Ton Myers is a 76 y.o. male is here for routine f/u. The patient denies exertional  chest pain/ resting shortness of breath, orthopnea, PND, LE edema, palpitations, syncope, presyncope or fatigue. Pt reports mild WHEAT climbing 1 flight of stairs, unchanged from previous visits. C/O tenderness from recent abdominal injection for prostate CA, denies fever. 2017 ECHO   SUMMARY:  Left ventricle: Systolic function was normal. Ejection fraction was  estimated to be 55 %. There was hypokinesis of the apical wall(s). There  was moderate concentric hypertrophy. Doppler parameters were consistent  with abnormal left ventricular relaxation (grade 1 diastolic dysfunction). Aortic valve: There was mild regurgitation. Stress Test: 2017  Impression:   Myocardial perfusion imaging is abnormal: there is a large area of infarction in the anterior and apical regions. Overall left ventricular systolic function was abnormal: with regional wall motion abnormalities (as noted above).  Compared to the study from 11/10/2015, perfusion images are unchanged, however LVEF has reduced from 54% to 26%.      Patient Active Problem List   Diagnosis Code    ED (erectile dysfunction) N52.9    HTN (hypertension) I10    Prostate cancer ([address] C61    PAD (peripheral artery disease) ([address] I73.9    STEMI (ST elevation myocardial infarction) ([address] I21.3    Chronic interstitial lung disease ([address] J84.9    S/P PTCA (percutaneous transluminal coronary angioplasty) Z98.61    COPD with exacerbation ([address] J44.1    Acute myocardial infarction of anterior wall, subsequent episode of care ([address] I21.09    Penetrating atherosclerotic ulcer of aorta ([address] I70.0    Hyperlipidemia E78.5    Advance directive discussed with patient Z70.80    Coronary artery disease involving native coronary artery of native heart without angina pectoris I25.10       PCP Provider  Brook Wilcox, MD  Past Medical History:   Diagnosis Date    CAD (coronary artery disease)     mi 10/19/2015 with stent    COPD (chronic obstructive pulmonary disease) ([facility]     Hypertension     Prostate cancer ([facility] 5/8/2012    Dr. Nivia Celaya - diagnosed 2/2012 - s/p EBRT on Lupron       Past Surgical History:   Procedure Laterality Date    HX ORTHOPAEDIC  1961    left knee surgery     No Known Allergies   Family History   Problem Relation Age of Onset    Heart Disease Mother      pacemaker    Diabetes Mother     Diabetes Sister     Heart Disease Brother       Current Outpatient Prescriptions   Medication Sig    XTANDI 40 mg capsule Take 160 mg by mouth daily.  atorvastatin (LIPITOR) 40 mg tablet TAKE 1 TABLET EVERY DAY    metoprolol tartrate (LOPRESSOR) 50 mg tablet TAKE 1 TABLET TWICE DAILY    lisinopril (PRINIVIL, ZESTRIL) 2.5 mg tablet TAKE 1 TABLET EVERY DAY    famotidine (PEPCID) 20 mg tablet Take 1 Tab by mouth daily as needed. Indications: HEARTBURN    mometasone (NASONEX) 50 mcg/actuation nasal spray 2 Sprays by Both Nostrils route daily.  tiotropium (SPIRIVA) 18 mcg inhalation capsule Take 1 Cap by inhalation daily.  albuterol (PROVENTIL HFA, VENTOLIN HFA, PROAIR HFA) 90 mcg/actuation inhaler Take  by inhalation. 117 MCG OF ALBUTEROL SULFATE AND 97 MCG OF ALBUTEROL BASE    aspirin 81 mg chewable tablet Take 1 Tab by mouth daily. RISK OF HEART ATTACK IF ANY MISSED DOSES    calcium-cholecalciferol, d3, (CALCIUM 600 + D) 600-125 mg-unit tab Take  by mouth. No current facility-administered medications for this visit. Vitals:    02/22/18 1021 02/22/18 1022   BP: 120/60 110/60   Pulse: 64    Resp: 20    SpO2: 96%    Weight: 189 lb 1.6 oz (85.8 kg)    Height: 6' (1.829 m)      Social History     Social History    Marital status:      Spouse name: N/A    Number of children: N/A    Years of education: N/A     Occupational History    Not on file.      Social History Main Topics    Smoking status: Former Smoker     Packs/day: 0.50     Years: 40.00     Types: Cigarettes     Quit date: 10/19/2015    Smokeless tobacco: Never Used      Comment: trying quit 10/13/15 - 1/2 ppd    Alcohol use No    Drug use: No    Sexual activity: Not Currently     Other Topics Concern    Not on file     Social History Narrative       I have reviewed the nurses notes, vitals, problem list, allergy list, medical history, family, social history and medications. Review of Symptoms:    General: Pt denies excessive weight gain or loss. Pt is able to conduct ADL's  HEENT: Denies blurred vision, headaches, epistaxis and difficulty swallowing. Respiratory: Denies shortness of breath, WHEAT, wheezing or stridor. Cardiovascular: Denies precordial pain, palpitations, edema or PND  Gastrointestinal: Denies poor appetite, indigestion, +abdominal pain from recent shots. no blood in stool  Musculoskeletal: Denies pain or swelling from muscles or joints  Neurologic: Denies tremor, paresthesias, or sensory motor disturbance  Skin: Denies rash, itching or texture change. Physical Exam:      General: Well developed, in no acute distress, cooperative and alert  HEENT: No carotid bruits, no JVD, trach is midline. Neck Supple, PEERL, EOM intact. Heart:  Normal S1/S2 negative S3 or S4. Regular, no murmur, gallop or rub.   Respiratory: Clear bilaterally x 4, no wheezing or rales  Abdomen:   Soft, tenderness around umbilical region, , no masses, bowel sounds are active.   Extremities:  No edema, normal cap refill, no cyanosis, atraumatic. Neuro: A&Ox3, speech clear, gait slow/cautious    Skin: Skin color is normal. No rashes or lesions.  Non diaphoretic  Vascular: 2+ pulses symmetric in all extremities    Cardiographics    ECG: NSR   Results for orders placed or performed during the hospital encounter of 06/05/16   EKG, 12 LEAD, INITIAL   Result Value Ref Range    Ventricular Rate 77 BPM    Atrial Rate 77 BPM P-R Interval 146 ms    QRS Duration 86 ms    Q-T Interval 430 ms    QTC Calculation (Bezet) 486 ms    Calculated P Axis 70 degrees    Calculated R Axis 55 degrees    Calculated T Axis 82 degrees    Diagnosis       Normal sinus rhythm  Possible Left atrial enlargement  Cannot rule out Anterior infarct (cited on or before 19-OCT-2015)    When compared with ECG of 21-OCT-2015 05:53,  Questionable change in initial forces of Anteroseptal leads  ST no longer elevated in Anterior leads  Nonspecific T wave abnormality no longer evident in Inferior leads  T wave inversion less evident in Anterior leads  QT has shortened  Confirmed by Abel Bustillo ([id number]) on 6/5/2016 1:03:16 PM     Results for orders placed or performed in visit on 12/31/13   AMB POC EKG ROUTINE W/ 12 LEADS, INTER & REP    Narrative    EKG sinus rhythm with biphasic t in V4-V5         Cardiology Labs:  Lab Results   Component Value Date/Time    Cholesterol, total 73 (L) 04/25/2017 02:38 PM    HDL Cholesterol 31 (L) 04/25/2017 02:38 PM    LDL, calculated 19 04/25/2017 02:38 PM    Triglyceride 116 04/25/2017 02:38 PM    CHOL/HDL Ratio 2.9 10/20/2015 04:06 AM       Lab Results   Component Value Date/Time    Sodium 143 02/08/2018 03:04 PM    Potassium 3.9 02/08/2018 03:04 PM    Chloride 107 (H) 02/08/2018 03:04 PM    CO2 21 02/08/2018 03:04 PM    Anion gap 7 06/09/2016 03:06 AM    Glucose 105 (H) 02/08/2018 03:04 PM    BUN 12 02/08/2018 03:04 PM    Creatinine 1.06 02/08/2018 03:04 PM    BUN/Creatinine ratio 11 02/08/2018 03:04 PM    GFR est AA 77 02/08/2018 03:04 PM    GFR est non-AA 66 02/08/2018 03:04 PM    Calcium 8.8 02/08/2018 03:04 PM    Bilirubin, total 0.3 02/08/2018 03:04 PM    AST (SGOT) 12 02/08/2018 03:04 PM    Alk.  phosphatase 72 02/08/2018 03:04 PM    Protein, total 6.9 02/08/2018 03:04 PM    Albumin 4.0 02/08/2018 03:04 PM    Globulin 4.1 (H) 06/05/2016 07:15 AM    A-G Ratio 1.4 02/08/2018 03:04 PM    ALT (SGPT) 8 02/08/2018 03:04 PM Assessment:     Assessment:     Diagnoses and all orders for this visit:    1. Coronary artery disease involving native coronary artery of native heart without angina pectoris    2. Essential hypertension  -     AMB POC EKG ROUTINE W/ 12 LEADS, INTER & REP    3. Mixed hyperlipidemia    4. S/P PTCA (percutaneous transluminal coronary angioplasty)    5. Chronic interstitial lung disease ([facility]        ICD-10-CM ICD-9-CM    1. Coronary artery disease involving native coronary artery of native heart without angina pectoris I25.10 414.01    2. Essential hypertension I10 401.9 AMB POC EKG ROUTINE W/ 12 LEADS, INTER & REP   3. Mixed hyperlipidemia E78.2 272.2    4. S/P PTCA (percutaneous transluminal coronary angioplasty) Z98.61 V45.82    5. Chronic interstitial lung disease ([facility] J84.9 515      Orders Placed This Encounter    AMB POC EKG ROUTINE W/ 12 LEADS, INTER & REP     Order Specific Question:   Reason for Exam:     Answer:   routine        Plan:     1. Atherosclerotic heart disease: History of PTCA stenting 2015, no ischemia on nuclear stress test 2017. Ejection fraction on echo 55%  2. Hypertension: Controlled 110/60  3. Hyperlipidemia: LDL 19, 2017. Labs per primary care  Dyspnea on exertion going up 1 flight of stairs unchanged from previous evaluation and workup. Followed by pulmonary for interstitial lung disease. 4.  Counseled on diet and exercise- eventual goal of 30-60 minutes 5-7 times a week as per AHA guidelines. Melina George MD    This note was created using voice recognition software. Despite editing, there may be syntax errors.

## 2018-02-22 NOTE — PROGRESS NOTES
1. Have you been to the ER, urgent care clinic since your last visit? Hospitalized since your last visit? No    2. Have you seen or consulted any other health care providers outside of the [address] [place] [address] since your last visit? Include any pap smears or colon screening. No     Patient C/O SOB and swelling in abdomen from shots.

## 2018-02-22 NOTE — MR AVS SNAPSHOT
[address] [facility] 
[phone] Patient: Jose Daniel Mcintosh MRN: [id number] Visit Information Date & Time Provider Department Dept. Phone Encounter #  
 2/22/2018 10:15 AM Chito Soler [facility] [phone] [id number] Your Appointments 8/9/2018  2:00 PM  
ROUTINE CARE with Isaak Valerio MD  
[address]) Appt Note: 6mth f/u; bp, chol, copd. [address] [place] [address] [phone]  
  
   
 [facility]  
  
    
 2/25/2019 10:45 AM  
ESTABLISHED PATIENT with Raoul Knight MD  
[facility] [address] [place] [address]) [address] [facility]  
[phone] [address] [facility] Upcoming Health Maintenance Date Due  
 MEDICARE YEARLY EXAM 8/4/2018 GLAUCOMA SCREENING Q2Y 1/1/2019 DTaP/Tdap/Td series (2 - Td) 10/24/2026 Allergies as of 2/22/2018  Review Complete On: 2/22/2018 By: Raoul Knight MD  
 No Known Allergies Current Immunizations  Reviewed on 2/8/2018 Name Date Influenza High Dose Vaccine PF 10/24/2016  9:35 AM  
 Influenza Vaccine 10/3/2017 Influenza Vaccine (Quad) PF 10/21/2015  1:51 PM  
 Pneumococcal Conjugate (PCV-13) 10/24/2016  9:36 AM,  Deferred (Patient Refused) Pneumococcal Polysaccharide (PPSV-23) 10/21/2015  1:52 PM  
  
 Not reviewed this visit You Were Diagnosed With   
  
 Codes Comments Coronary artery disease involving native coronary artery of native heart without angina pectoris    -  Primary ICD-10-CM: I25.10 ICD-9-CM: 414.01 Essential hypertension     ICD-10-CM: I10 
ICD-9-CM: 401.9 Mixed hyperlipidemia     ICD-10-CM: E78.2 ICD-9-CM: 272.2  S/P PTCA (percutaneous transluminal coronary angioplasty)     ICD-10-CM: Z98.61 ICD-9-CM: V45.82 Chronic interstitial lung disease ([facility]     ICD-10-CM: J84.9 ICD-9-CM: 682 Vitals BP Pulse Resp Height(growth percentile) Weight(growth percentile) SpO2  
 110/60 (BP 1 Location: Right arm, BP Patient Position: Sitting) 64 20 6' (1.829 m) 189 lb 1.6 oz (85.8 kg) 96% BMI Smoking Status 25.65 kg/m2 Former Smoker Vitals History BMI and BSA Data Body Mass Index Body Surface Area  
 25.65 kg/m 2 2.09 m 2 Preferred Pharmacy Pharmacy Name Phone Ran  [address] [facility] [address] [phone] Your Updated Medication List  
  
   
This list is accurate as of 2/22/18 11:01 AM.  Always use your most recent med list.  
  
  
  
  
 albuterol 90 mcg/actuation inhaler Commonly known as:  PROVENTIL HFA, VENTOLIN HFA, PROAIR HFA Take  by inhalation. 117 MCG OF ALBUTEROL SULFATE AND 97 MCG OF ALBUTEROL BASE  
  
 aspirin 81 mg chewable tablet Take 1 Tab by mouth daily. RISK OF HEART ATTACK IF ANY MISSED DOSES  
  
 atorvastatin 40 mg tablet Commonly known as:  LIPITOR  
TAKE 1 TABLET EVERY DAY  
  
 CALCIUM 600 + D 600-125 mg-unit Tab Generic drug:  calcium-cholecalciferol (d3) Take  by mouth. famotidine 20 mg tablet Commonly known as:  PEPCID Take 1 Tab by mouth daily as needed. Indications: HEARTBURN  
  
 lisinopril 2.5 mg tablet Commonly known as:  PRINIVIL, ZESTRIL  
TAKE 1 TABLET EVERY DAY  
  
 metoprolol tartrate 50 mg tablet Commonly known as:  LOPRESSOR  
TAKE 1 TABLET TWICE DAILY  
  
 mometasone 50 mcg/actuation nasal spray Commonly known as:  NASONEX  
2 Sprays by Both Nostrils route daily. tiotropium 18 mcg inhalation capsule Commonly known as:  Joby Bibber Take 1 Cap by inhalation daily. XTANDI 40 mg capsule Generic drug:  enzalutamide Take 160 mg by mouth daily. We Performed the Following AMB POC EKG ROUTINE W/ 12 LEADS, INTER & REP [64156 CPT(R)] Introducing [facility]! [facility] introduces [employer] patient portal. Now you can access parts of your medical record, email your doctor's office, and request medication refills online. 1. In your internet browser, go to [email or web address] 2. Click on the First Time User? Click Here link in the Sign In box. You will see the New Member Sign Up page. 3. Enter your [employer] Access Code exactly as it appears below. You will not need to use this code after youve completed the sign-up process. If you do not sign up before the expiration date, you must request a new code. · [employer] Access Code: [email or web address] Expires: 5/9/2018  3:01 PM 
 
4. Enter the last four digits of your Social Security Number (xxxx) and Date of Birth (mm/dd/yyyy) as indicated and click Submit. You will be taken to the next sign-up page. 5. Create a [employer] ID. This will be your [employer] login ID and cannot be changed, so think of one that is secure and easy to remember. 6. Create a [employer] password. You can change your password at any time. 7. Enter your Password Reset Question and Answer. This can be used at a later time if you forget your password. 8. Enter your e-mail address. You will receive e-mail notification when new information is available in [address] 9. Click Sign Up. You can now view and download portions of your medical record. 10. Click the Download Summary menu link to download a portable copy of your medical information. If you have questions, please visit the Frequently Asked Questions section of the [employer] website. Remember, [employer] is NOT to be used for urgent needs. For medical emergencies, dial 911. Now available from your iPhone and Android! Please provide this summary of care documentation to your next provider. Your primary care clinician is listed as Rosita Almeida.  If you have any questions after today's visit, please call [phone].

## 2018-03-19 RX ORDER — METOPROLOL TARTRATE 50 MG/1
TABLET ORAL
Qty: 180 TAB | Refills: 3 | Status: SHIPPED | OUTPATIENT
Start: 2018-03-19 | End: 2018-04-02 | Stop reason: SDUPTHER

## 2018-04-02 RX ORDER — METOPROLOL TARTRATE 50 MG/1
TABLET ORAL
Qty: 60 TAB | Refills: 6 | Status: SHIPPED | OUTPATIENT
Start: 2018-04-02 | End: 2020-01-01 | Stop reason: SDUPTHER

## 2018-04-02 RX ORDER — METOPROLOL TARTRATE 50 MG/1
TABLET ORAL
Qty: 180 TAB | Refills: 3 | Status: SHIPPED | OUTPATIENT
Start: 2018-04-02 | End: 2018-08-20 | Stop reason: SDUPTHER

## 2018-04-02 RX ORDER — LISINOPRIL 2.5 MG/1
TABLET ORAL
Qty: 90 TAB | Refills: 3 | Status: SHIPPED | OUTPATIENT
Start: 2018-04-02 | End: 2019-03-19 | Stop reason: SDUPTHER

## 2018-05-11 ENCOUNTER — OFFICE VISIT (OUTPATIENT)
Dept: INTERNAL MEDICINE CLINIC | Facility: CLINIC | Age: 78
End: 2018-05-11

## 2018-05-11 VITALS
TEMPERATURE: 98.2 F | WEIGHT: 194 LBS | SYSTOLIC BLOOD PRESSURE: 135 MMHG | DIASTOLIC BLOOD PRESSURE: 72 MMHG | BODY MASS INDEX: 26.28 KG/M2 | RESPIRATION RATE: 18 BRPM | HEIGHT: 72 IN | OXYGEN SATURATION: 94 % | HEART RATE: 76 BPM

## 2018-05-11 DIAGNOSIS — C61 PROSTATE CANCER (HCC): ICD-10-CM

## 2018-05-11 DIAGNOSIS — S39.011A ABDOMINAL MUSCLE STRAIN, INITIAL ENCOUNTER: Primary | ICD-10-CM

## 2018-05-11 NOTE — PATIENT INSTRUCTIONS
Take your time when rising from a chair or the bed, trying to not place too much strain on your stomach muscles, using chairs with arm rests may be helpful. Please contact our office if your symptoms worsen or do not improve. Please call 911 or go directly to the Emergency Department if you develop shortness of breath, chest pain, difficulty breathing or worsening of your symptoms.

## 2018-05-11 NOTE — PROGRESS NOTES
HPI  Delta Larose is a [de-identified]y.o. year old male patient of Boubacar Shrestha MD who presents for abd pain. Pt has a hx of prostate ca (follows with urology), STEMI (follows with cards), COPD (follows with pulm), HTN, PAD, ED, and GERD. Pt states a few weeks ago he noticed his stomach started hurting when getting up from a seated position. Does not hurt any other time. Feels better when up moving around, pain goes away on its own. Location is bilateral lower abdomen, describes pain as dull. Not affected by food intake. Has not tried any medications. Denies constipation, nausea, vomiting, or diarrhea. Denies f/c. No change in diet. Denies urinary symptoms. No blood in urine or stool. Denies epigastric pain, burning,  Indigestion, or difficulty swallowing. No recent travel. No sick contacts. No new meds. No hx of abd surgery or IBS/IBD. Never had a colonoscopy. No ETOH use. Not currently in pain. 3 or 4 months ago saw prostate cancer MD, got two injections in abdomen- had lots of pain after, was put on an antibiotic-not sure which one, Sx improved, no longer getting abdominal injections. Still gets injections in hip every 6 months, shot in arm every month. Sees MD again in September. 9/2017 Whole Body Nuclear Bone Scan shows IMPRESSION: Right eighth rib metastasis shows slight interval increase activity. No definite new lesions.         Patient Active Problem List   Diagnosis Code    ED (erectile dysfunction) N52.9    HTN (hypertension) I10    Prostate cancer ([facility] C61    PAD (peripheral artery disease) ([address] I73.9    STEMI (ST elevation myocardial infarction) ([address] I21.3    Chronic interstitial lung disease ([address] J84.9    S/P PTCA (percutaneous transluminal coronary angioplasty) Z80.64    COPD with exacerbation (HCC) J44.1    Acute myocardial infarction of anterior wall, subsequent episode of care ([address] I21.09    Penetrating atherosclerotic ulcer of aorta ([address] I70.0    Hyperlipidemia E78.5    Advance directive discussed with patient Z70.80    Coronary artery disease involving native coronary artery of native heart without angina pectoris I25.10     Past Medical History:   Diagnosis Date    CAD (coronary artery disease)     mi 10/19/2015 with stent    COPD [facility]chronic obstructive pulmonary disease) ([facility]     Hypertension     Prostate cancer ([facility] 5/8/2012    Dr. Villa Ballard - diagnosed 2/2012 - s/p EBRT on Lupron      Past Surgical History:   Procedure Laterality Date    HX ORTHOPAEDIC  1961    left knee surgery     Social History     Social History    Marital status:      Spouse name: N/A    Number of children: N/A    Years of education: N/A     Social History Main Topics    Smoking status: Former Smoker     Packs/day: 0.50     Years: 40.00     Types: Cigarettes     Quit date: 10/19/2015    Smokeless tobacco: Never Used      Comment: trying quit 10/13/15 - 1/2 ppd    Alcohol use No    Drug use: No    Sexual activity: Not Currently     Other Topics Concern    Not on file     Social History Narrative     Family History   Problem Relation Age of Onset    Heart Disease Mother      pacemaker    Diabetes Mother     Diabetes Sister     Heart Disease Brother      No Known Allergies    MEDICATIONS  Current Outpatient Prescriptions   Medication Sig    famotidine (PEPCID) 20 mg tablet TAKE 1 TABLET EVERY DAY AS NEEDED  FOR  HEARTBURN    lisinopril (PRINIVIL, ZESTRIL) 2.5 mg tablet TAKE 1 TABLET EVERY DAY    metoprolol tartrate (LOPRESSOR) 50 mg tablet TAKE ONE TABLET BY MOUTH TWICE DAILY    metoprolol tartrate (LOPRESSOR) 50 mg tablet TAKE 1 TABLET TWICE DAILY    XTANDI 40 mg capsule Take 160 mg by mouth daily.  atorvastatin (LIPITOR) 40 mg tablet TAKE 1 TABLET EVERY DAY    mometasone (NASONEX) 50 mcg/actuation nasal spray 2 Sprays by Both Nostrils route daily.  tiotropium (SPIRIVA) 18 mcg inhalation capsule Take 1 Cap by inhalation daily.     albuterol (PROVENTIL HFA, VENTOLIN HFA, PROAIR HFA) 90 mcg/actuation inhaler Take  by inhalation. 117 MCG OF ALBUTEROL SULFATE AND 97 MCG OF ALBUTEROL BASE    aspirin 81 mg chewable tablet Take 1 Tab by mouth daily. RISK OF HEART ATTACK IF ANY MISSED DOSES     No current facility-administered medications for this visit. REVIEW OF SYSTEMS  Per HPI        Visit Vitals    /72 (BP 1 Location: Left arm, BP Patient Position: Sitting)    Pulse 76    Temp 98.2 °F (36.8 °C) (Oral)    Resp 18    Ht 6' (1.829 m)    Wt 194 lb (88 kg)    SpO2 94%    BMI 26.31 kg/m2         General: Well-developed, well-nourished. In no distress. A&O x 3. Head: Normocephalic, atraumatic. Eyes: Conjunctiva clear. .    Mouth/Throat: Lips, mucosa, and tongue normal.    Neck: Supple, symmetrical, trachea midline, no lymphadenopathy, no carotid bruits, no JVD, thyroid: not enlarged, symmetric, no tenderness/mass/nodules. Lungs: Clear to auscultation bilaterally. No crackles or wheezes. No use of accessory muscles. Speaks in full sentences without SOB. Chest Wall: No tenderness or deformity. Heart: RRR, normal S1 and S2, no murmur, click, rub, or gallop. Back: Symmetric. ROM intact. No CVA tenderness. Abdomen: Soft, non-distended, bowel sounds normal. No tenderness. No masses. No hepatosplenomegaly. Negative Rosving's sign neg. Negative Farmer's sign. McBurney's pt non-tender. No rebound tenderness. No guarding. No rigidity. Had pt rise from seated position on table and again on chair and he denied any pain during these  movements today. Skin: No rashes or lesions. Neurological: CN II-XII intact. Normal strength, sensation, and reflexes throughout. Pulses:       Dorsalis pedis pulses are 2+ on the right side, and 2+ on the left side. Posterior tibial pulses are 2+ on the right side, and 2+ on the left side. Musculoskeletal: Gait normal. ROM normal at both knees and shoulders. Psychiatric: Normal mood and affect.  Behavior is normal.       Results for orders placed or performed in visit on 66/25/13   METABOLIC PANEL, COMPREHENSIVE   Result Value Ref Range    Glucose 105 (H) 65 - 99 mg/dL    BUN 12 8 - 27 mg/dL    Creatinine 1.06 0.76 - 1.27 mg/dL    GFR est non-AA 66 >59 mL/min/1.73    GFR est AA 77 >59 mL/min/1.73    BUN/Creatinine ratio 11 10 - 24    Sodium 143 134 - 144 mmol/L    Potassium 3.9 3.5 - 5.2 mmol/L    Chloride 107 (H) 96 - 106 mmol/L    CO2 21 18 - 29 mmol/L    Calcium 8.8 8.6 - 10.2 mg/dL    Protein, total 6.9 6.0 - 8.5 g/dL    Albumin 4.0 3.5 - 4.8 g/dL    GLOBULIN, TOTAL 2.9 1.5 - 4.5 g/dL    A-G Ratio 1.4 1.2 - 2.2    Bilirubin, total 0.3 0.0 - 1.2 mg/dL    Alk. phosphatase 72 39 - 117 IU/L    AST (SGOT) 12 0 - 40 IU/L    ALT (SGPT) 8 0 - 44 IU/L   CBC WITH AUTOMATED DIFF   Result Value Ref Range    WBC 4.1 3.4 - 10.8 x10E3/uL    RBC 4.23 4. 14 - 5.80 x10E6/uL    HGB 12.1 (L) 13.0 - 17.7 g/dL    HCT 36.4 (L) 37.5 - 51.0 %    MCV 86 79 - 97 fL    MCH 28.6 26.6 - 33.0 pg    MCHC 33.2 31.5 - 35.7 g/dL    RDW 17.0 (H) 12.3 - 15.4 %    PLATELET 086 (L) 852 - 379 x10E3/uL    NEUTROPHILS 43 Not Estab. %    Lymphocytes 42 Not Estab. %    MONOCYTES 13 Not Estab. %    EOSINOPHILS 1 Not Estab. %    BASOPHILS 0 Not Estab. %    ABS. NEUTROPHILS 1.8 1.4 - 7.0 x10E3/uL    Abs Lymphocytes 1.8 0.7 - 3.1 x10E3/uL    ABS. MONOCYTES 0.5 0.1 - 0.9 x10E3/uL    ABS. EOSINOPHILS 0.1 0.0 - 0.4 x10E3/uL    ABS. BASOPHILS 0.0 0.0 - 0.2 x10E3/uL    IMMATURE GRANULOCYTES 1 Not Estab. %    ABS. IMM. GRANS. 0.0 0.0 - 0.1 x10E3/uL         ASSESSMENT and PLAN  Diagnoses and all orders for this visit:    Discussed plan with Dr. Robb Olivarez who agrees. 1. Abdominal muscle strain, initial encounter  -likely muscular strain, pt will monitor and advise if sx worsen or do not improve     2.  Prostate cancer [facility])  -continue to follow with oncologist        Patient Instructions   Take your time when rising from a chair or the bed, trying to not place too much strain on your stomach muscles, using chairs with arm rests may be helpful. Please contact our office if your symptoms worsen or do not improve. Please call 911 or go directly to the Emergency Department if you develop shortness of breath, chest pain, difficulty breathing or worsening of your symptoms. Please keep your follow-up appointment with Boubacar Shrestha MD.     Follow-up Disposition:  Return if symptoms worsen or fail to improve, for Keep August f/u with Dr. Daren Acosta. I have discussed the diagnosis with the patient and the intended plan as seen in the above orders. Patient is in agreement. The patient has received an after-visit summary and questions were answered concerning future plans. I have discussed medication side effects and warnings with the patient as well. Warning signs for the above conditions were discussed including when to call our office or go to the emergency room. The nurse provided the patient and/or family with advanced directive information if needed and encouraged the patient to provide a copy to the office when available.

## 2018-05-11 NOTE — PROGRESS NOTES
Simran Laguna  Identified pt with two pt identifiers(name and ). Chief Complaint   Patient presents with    Abdominal Pain     RM 4A     Per patient states after he stands for along time and sits down he has pain across his abdomen ,  After he gets up and moves around he states he feels better. Denies constipation , normal BM      1. Have you been to the ER, urgent care clinic since your last visit? Hospitalized since your last visit? NO    2. Have you seen or consulted any other health care providers outside of the [facility] since your last visit? Include any pap smears or colon screening. Brittney Khalil NP notified of reason for visit, vitals and flowsheets obtained on patients. Patient received paperwork for advance directive during previous visit but has not completed at this time     Reviewed record In preparation for visit, huddled with provider and have obtained necessary documentation      There are no preventive care reminders to display for this patient.     Wt Readings from Last 3 Encounters:   18 194 lb (88 kg)   18 189 lb 1.6 oz (85.8 kg)   18 198 lb 6.4 oz (90 kg)     Temp Readings from Last 3 Encounters:   18 98.2 °F (36.8 °C) (Oral)   18 97.9 °F (36.6 °C) (Oral)   17 95.1 °F (35.1 °C) (Oral)     BP Readings from Last 3 Encounters:   18 135/72   18 110/60   18 118/52     Pulse Readings from Last 3 Encounters:   18 76   18 64   18 73     Vitals:    18 1306   BP: 135/72   Pulse: 76   Resp: 18   Temp: 98.2 °F (36.8 °C)   TempSrc: Oral   SpO2: 94%   Weight: 194 lb (88 kg)   Height: 6' (1.829 m)   PainSc:   0 - No pain         Learning Assessment:  :     Learning Assessment 2014   PRIMARY LEARNER Patient   PRIMARY LANGUAGE ENGLISH   LEARNER PREFERENCE PRIMARY DEMONSTRATION   ANSWERED BY patient     self       Depression Screening:  :     PHQ over the last two weeks 8/3/2017   Little interest or pleasure in doing things Not at all   Feeling down, depressed or hopeless Not at all   Total Score PHQ 2 0       Fall Risk Assessment:  :     Fall Risk Assessment, last 12 mths 8/18/2017   Able to walk? Yes   Fall in past 12 months? No       Abuse Screening:  :     Abuse Screening Questionnaire 8/3/2017 10/13/2015   Do you ever feel afraid of your partner? N N   Are you in a relationship with someone who physically or mentally threatens you? N N   Is it safe for you to go home? Y Y       ADL Screening:  :     ADL Assessment 5/14/2015   Feeding yourself No Help Needed   Getting from bed to chair No Help Needed   Getting dressed No Help Needed   Bathing or showering No Help Needed   Walk across the room (includes cane/walker) No Help Needed   Using the telphone No Help Needed   Taking your medications No Help Needed   Preparing meals No Help Needed   Managing money (expenses/bills) No Help Needed   Moderately strenuous housework (laundry) No Help Needed   Shopping for personal items (toiletries/medicines) No Help Needed   Shopping for groceries No Help Needed   Driving No Help Needed   Climbing a flight of stairs No Help Needed   Getting to places beyond walking distances No Help Needed            I have received verbal consent from [facility] to discuss any/all medical information while they are present in the room. Medication reconciliation up to date and corrected with patient at this time.

## 2018-05-11 NOTE — MR AVS SNAPSHOT
[address] [facility] [address] [phone] Patient: Luke Sandoval MRN: [id number] Visit Information Date & Time Provider Department Dept. Phone Encounter #  
 5/11/2018  1:30 PM Amina Gomez, [address] [facility] [address] [place] [address] [id number] Follow-up Instructions Return if symptoms worsen or fail to improve, for Keep August f/u with Dr. Alejandra Howard. Your Appointments 8/9/2018  2:00 PM  
ROUTINE CARE with Minoo Hansen MD  
[address]) Appt Note: 6mth f/u; bp, chol, copd. [address] [place] [id number] [phone]  
  
   
 [address] [place] [address]  
  
    
 2/25/2019 10:45 AM  
ESTABLISHED PATIENT with Galen Mosher MD  
[facility] [address] [place] [address]) [address] [facility]  
[phone] [address] [facility] Upcoming Health Maintenance Date Due Influenza Age 5 to Adult 8/1/2018 MEDICARE YEARLY EXAM 8/4/2018 GLAUCOMA SCREENING Q2Y 1/1/2019 DTaP/Tdap/Td series (2 - Td) 10/24/2026 Allergies as of 5/11/2018  Review Complete On: 5/11/2018 By: Amina Gomez NP No Known Allergies Current Immunizations  Reviewed on 5/11/2018 Name Date Influenza High Dose Vaccine PF 10/24/2016  9:35 AM  
 Influenza Vaccine 10/3/2017 Influenza Vaccine (Quad) PF 10/21/2015  1:51 PM  
 Pneumococcal Conjugate (PCV-13) 10/24/2016  9:36 AM,  Deferred (Patient Refused) Pneumococcal Polysaccharide (PPSV-23) 10/21/2015  1:52 PM  
  
 Reviewed by Bryson Byrne LPN on 2/44/4835 at  1:13 PM  
You Were Diagnosed With   
  
 Codes Comments Abdominal muscle strain, initial encounter    -  Primary ICD-10-CM: R59.823A ICD-9-CM: 626. 8 Prostate cancer [facility])     ICD-10-CM: N79 ICD-9-CM: 114 Vitals BP Pulse Temp Resp Height(growth percentile) Weight(growth percentile) 135/72 (BP 1 Location: Left arm, BP Patient Position: Sitting) 76 98.2 °F (36.8 °C) (Oral) 18 6' (1.829 m) 194 lb (88 kg) SpO2 BMI Smoking Status 94% 26.31 kg/m2 Former Smoker Vitals History BMI and BSA Data Body Mass Index Body Surface Area  
 26.31 kg/m 2 2.11 m 2 Preferred Pharmacy Pharmacy Name Phone [facility] [address] [place] [address] [phone] Your Updated Medication List  
  
   
This list is accurate as of 5/11/18  1:42 PM.  Always use your most recent med list.  
  
  
  
  
 albuterol 90 mcg/actuation inhaler Commonly known as:  PROVENTIL HFA, VENTOLIN HFA, PROAIR HFA Take  by inhalation. 117 MCG OF ALBUTEROL SULFATE AND 97 MCG OF ALBUTEROL BASE  
  
 aspirin 81 mg chewable tablet Take 1 Tab by mouth daily. RISK OF HEART ATTACK IF ANY MISSED DOSES  
  
 atorvastatin 40 mg tablet Commonly known as:  LIPITOR  
TAKE 1 TABLET EVERY DAY  
  
 famotidine 20 mg tablet Commonly known as:  PEPCID  
TAKE 1 TABLET EVERY DAY AS NEEDED  FOR  HEARTBURN  
  
 lisinopril 2.5 mg tablet Commonly known as:  PRINIVIL, ZESTRIL  
TAKE 1 TABLET EVERY DAY  
  
 * metoprolol tartrate 50 mg tablet Commonly known as:  LOPRESSOR  
TAKE ONE TABLET BY MOUTH TWICE DAILY * metoprolol tartrate 50 mg tablet Commonly known as:  LOPRESSOR  
TAKE 1 TABLET TWICE DAILY  
  
 mometasone 50 mcg/actuation nasal spray Commonly known as:  NASONEX  
2 Sprays by Both Nostrils route daily. tiotropium 18 mcg inhalation capsule Commonly known as:  Redd Portillo Take 1 Cap by inhalation daily. XTANDI 40 mg capsule Generic drug:  enzalutamide Take 160 mg by mouth daily. * Notice: This list has 2 medication(s) that are the same as other medications prescribed for you.  Read the directions carefully, and ask your doctor or other care provider to review them with you. Follow-up Instructions Return if symptoms worsen or fail to improve, for Keep August f/u with Dr. Contreras Rodriguez. Patient Instructions Take your time when rising from a chair or the bed, trying to not place too much strain on your stomach muscles, using chairs with arm rests may be helpful. Please contact our office if your symptoms worsen or do not improve. Please call 911 or go directly to the Emergency Department if you develop shortness of breath, chest pain, difficulty breathing or worsening of your symptoms. Introducing [facility]! Zuleyma Norris introduces [employer] patient portal. Now you can access parts of your medical record, email your doctor's office, and request medication refills online. 1. In your internet browser, go to [email or web address] 2. Click on the First Time User? Click Here link in the Sign In box. You will see the New Member Sign Up page. 3. Enter your [employer] Access Code exactly as it appears below. You will not need to use this code after youve completed the sign-up process. If you do not sign up before the expiration date, you must request a new code. · [employer] Access Code: [email or web address] Expires: 8/9/2018  1:42 PM 
 
4. Enter the last four digits of your Social Security Number (xxxx) and Date of Birth (mm/dd/yyyy) as indicated and click Submit. You will be taken to the next sign-up page. 5. Create a [email or web address] ID. This will be your e[employer] login ID and cannot be changed, so think of one that is secure and easy to remember. 6. Create a e[employer] password. You can change your password at any time. 7. Enter your Password Reset Question and Answer. This can be used at a later time if you forget your password. 8. Enter your e-mail address. You will receive e-mail notification when new information is available in [address] 9. Click Sign Up. You can now view and download portions of your medical record. 10. Click the Download Summary menu link to download a portable copy of your medical information. If you have questions, please visit the Frequently Asked Questions section of the [facility] website. Remember, [facility] is NOT to be used for urgent needs. For medical emergencies, dial 911. Now available from your iPhone and Android! Please provide this summary of care documentation to your next provider. Your primary care clinician is listed as Rostia Almeida. If you have any questions after today's visit, please call [phone].

## 2018-06-07 ENCOUNTER — TELEPHONE (OUTPATIENT)
Dept: CARDIOLOGY CLINIC | Age: 78
End: 2018-06-07

## 2018-07-05 DIAGNOSIS — E78.5 HYPERLIPIDEMIA, UNSPECIFIED HYPERLIPIDEMIA TYPE: ICD-10-CM

## 2018-07-05 RX ORDER — ATORVASTATIN CALCIUM 40 MG/1
TABLET, FILM COATED ORAL
Qty: 90 TAB | Refills: 4 | Status: SHIPPED | OUTPATIENT
Start: 2018-07-05 | End: 2018-09-20 | Stop reason: SDUPTHER

## 2018-07-05 NOTE — TELEPHONE ENCOUNTER
Pt states completely out of his cholesterol medication, would like to know if able to have Rx called in by tomorrow afternoon

## 2018-08-20 ENCOUNTER — OFFICE VISIT (OUTPATIENT)
Dept: INTERNAL MEDICINE CLINIC | Facility: CLINIC | Age: 78
End: 2018-08-20

## 2018-08-20 VITALS
TEMPERATURE: 97.7 F | BODY MASS INDEX: 25.22 KG/M2 | DIASTOLIC BLOOD PRESSURE: 61 MMHG | SYSTOLIC BLOOD PRESSURE: 109 MMHG | HEIGHT: 72 IN | OXYGEN SATURATION: 94 % | HEART RATE: 77 BPM | RESPIRATION RATE: 18 BRPM | WEIGHT: 186.2 LBS

## 2018-08-20 DIAGNOSIS — R68.83 CHILLS WITHOUT FEVER: Primary | ICD-10-CM

## 2018-08-20 NOTE — PROGRESS NOTES
HPI  Unique Mancera is a [de-identified]y.o. year old male patient of Nanci Warren MD who presents with c/o shaking episode. Pt has history of has ED (erectile dysfunction), HTN (hypertension), Prostate cancer ([facility] PAD (peripheral artery disease) ([address] STEMI (ST elevation myocardial infarction) ([address] Chronic interstitial lung disease ([address] S/P PTCA (percutaneous transluminal coronary angioplasty), COPD with exacerbation ([address] Acute myocardial infarction of anterior wall, subsequent episode of care [facility]), Penetrating atherosclerotic ulcer of aorta ([facility] Hyperlipidemia, Advance directive discussed with patient, and Coronary artery disease involving native coronary artery of native heart without angina pectoris on his problem list..    P complains of whole body shaking last night woke him from his sleep, lasted about 30 minutes, then he fell back asleep. Never happened before. [place] like chills, his sister put blankets on him. Denies any pain during. Denies any LOC. Denies any blurred vision, dizziness, N/V or HA associated. Woke up this AM felt sore. Denies any hx of seizures. Hasn't had any ETOH in 40 years. Denies any new medications. Ate something before bed like he normally does. Denies current CP or SOB. Denies any fevers. Has a chronic cough, has recently seen Dr. Allie Longoria with Pulmonary. Denies any hemoptysis. Denies any pain with urination, blood in urine, he does have urinary frequency. Has hx of prostate ca- has f/u in September.          Patient Active Problem List   Diagnosis Code    ED (erectile dysfunction) N52.9    HTN (hypertension) I10    Prostate cancer ([address] C61    PAD (peripheral artery disease) ([address] I73.9    STEMI (ST elevation myocardial infarction) ([address] I21.3    Chronic interstitial lung disease ([address] J84.9    S/P PTCA (percutaneous transluminal coronary angioplasty) Z80.64    COPD with exacerbation ([address] J44.1    Acute myocardial infarction of anterior wall, subsequent episode of care ([facility] I21.09    Penetrating atherosclerotic ulcer of aorta (HCC) I70.0    Hyperlipidemia E78.5    Advance directive discussed with patient Z70.80    Coronary artery disease involving native coronary artery of native heart without angina pectoris I25.10     Past Medical History:   Diagnosis Date    CAD (coronary artery disease)     mi 10/19/2015 with stent    COPD (chronic obstructive pulmonary disease) ([facility]     Hypertension     Prostate cancer ([facility] 5/8/2012    Dr. Adams Credit - diagnosed 2/2012 - s/p EBRT on Lupron      Past Surgical History:   Procedure Laterality Date    HX ORTHOPAEDIC  1961    left knee surgery     Social History     Social History    Marital status:      Spouse name: N/A    Number of children: N/A    Years of education: N/A     Social History Main Topics    Smoking status: Former Smoker     Packs/day: 0.50     Years: 40.00     Types: Cigarettes     Quit date: 10/19/2015    Smokeless tobacco: Never Used      Comment: trying quit 10/13/15 - 1/2 ppd    Alcohol use No    Drug use: No    Sexual activity: Not Currently     Other Topics Concern    None     Social History Narrative     Family History   Problem Relation Age of Onset    Heart Disease Mother      pacemaker    Diabetes Mother     Diabetes Sister     Heart Disease Brother      No Known Allergies    MEDICATIONS  Current Outpatient Prescriptions   Medication Sig    atorvastatin (LIPITOR) 40 mg tablet TAKE 1 TABLET EVERY DAY    famotidine (PEPCID) 20 mg tablet TAKE 1 TABLET EVERY DAY AS NEEDED  FOR  HEARTBURN    lisinopril (PRINIVIL, ZESTRIL) 2.5 mg tablet TAKE 1 TABLET EVERY DAY    metoprolol tartrate (LOPRESSOR) 50 mg tablet TAKE ONE TABLET BY MOUTH TWICE DAILY    XTANDI 40 mg capsule Take 160 mg by mouth daily.  mometasone (NASONEX) 50 mcg/actuation nasal spray 2 Sprays by Both Nostrils route daily.  tiotropium (SPIRIVA) 18 mcg inhalation capsule Take 1 Cap by inhalation daily.     albuterol (PROVENTIL HFA, VENTOLIN HFA, PROAIR HFA) 90 mcg/actuation inhaler Take  by inhalation. 117 MCG OF ALBUTEROL SULFATE AND 97 MCG OF ALBUTEROL BASE    aspirin 81 mg chewable tablet Take 1 Tab by mouth daily. RISK OF HEART ATTACK IF ANY MISSED DOSES     No current facility-administered medications for this visit. REVIEW OF SYSTEMS  Per HPI        Visit Vitals    /61 (BP 1 Location: Left arm, BP Patient Position: Sitting)    Pulse 77    Temp 97.7 °F (36.5 °C) (Oral)    Resp 18    Ht 6' (1.829 m)    Wt 186 lb 3.2 oz (84.5 kg)    SpO2 94%    BMI 25.25 kg/m2         General: Well-developed, well-nourished. In no distress. A&O x 3. Head: Normocephalic, atraumatic. Eyes: Conjunctiva clear. Pupils equal, round, reactive to light. Extraocular movements intact. Ears/Nose:  Nares normal. Septum midline. Normal nasal mucosa. No drainage or sinus tenderness. Mouth/Throat: Lips, mucosa, and tongue normal. Oropharynx benign. Neck: Supple, symmetrical, trachea midline, no lymphadenopathy, no carotid bruits, no JVD, thyroid: not enlarged, symmetric, no tenderness/mass/nodules. Lungs: Clear to auscultation bilaterally. No crackles or wheezes. No use of accessory muscles. Speaks in full sentences without SOB. Chest Wall: No tenderness or deformity. Heart: RRR, normal S1 and S2, no murmur, click, rub, or gallop. Back: Symmetric. ROM intact. Abdomen: Soft, non-distended  Skin: No rashes or lesions. Neurological: CN II-XII intact. Normal strength, sensation throughout. Equal  strength. Neurovasc: No edema appreciated. Posterior tibial pulses are 2+ on the right side, and 2+ on the left side. Musculoskeletal: Gait normal.  Psychiatric: Normal mood and affect.  Behavior is normal.       Lab Results   Component Value Date/Time    WBC 4.1 02/08/2018 03:04 PM    HGB 12.1 (L) 02/08/2018 03:04 PM    HCT 36.4 (L) 02/08/2018 03:04 PM    PLATELET 038 (L) 68/36/8407 03:04 PM    MCV 86 02/08/2018 03:04 PM     Lab Results   Component Value Date/Time    Sodium 143 02/08/2018 03:04 PM    Potassium 3.9 02/08/2018 03:04 PM    Chloride 107 (H) 02/08/2018 03:04 PM    CO2 21 02/08/2018 03:04 PM    Anion gap 7 06/09/2016 03:06 AM    Glucose 105 (H) 02/08/2018 03:04 PM    BUN 12 02/08/2018 03:04 PM    Creatinine 1.06 02/08/2018 03:04 PM    BUN/Creatinine ratio 11 02/08/2018 03:04 PM    GFR est AA 77 02/08/2018 03:04 PM    GFR est non-AA 66 02/08/2018 03:04 PM    Calcium 8.8 02/08/2018 03:04 PM    Bilirubin, total 0.3 02/08/2018 03:04 PM    AST (SGOT) 12 02/08/2018 03:04 PM    Alk. phosphatase 72 02/08/2018 03:04 PM    Protein, total 6.9 02/08/2018 03:04 PM    Albumin 4.0 02/08/2018 03:04 PM    Globulin 4.1 (H) 06/05/2016 07:15 AM    A-G Ratio 1.4 02/08/2018 03:04 PM    ALT (SGPT) 8 02/08/2018 03:04 PM     Lab Results   Component Value Date/Time    Cholesterol, total 73 (L) 04/25/2017 02:38 PM    HDL Cholesterol 31 (L) 04/25/2017 02:38 PM    LDL, calculated 19 04/25/2017 02:38 PM    VLDL, calculated 23 04/25/2017 02:38 PM    Triglyceride 116 04/25/2017 02:38 PM    CHOL/HDL Ratio 2.9 10/20/2015 04:06 AM         ASSESSMENT and PLAN  Diagnoses and all orders for this visit:    1. Chills without fever  -no sign of infection today  -pt is neurovasc intact  -suspect single episode of chills  -pt to monitor his temperature and monitor for any signs of infection  -call office if sx persist or worsen            Patient Instructions   Please monitor your temperature and call the office if temperature is 101 or higher or if temperature is abnormally low. Please contact our office if your symptoms worsen or do not improve. Please call 911 or go directly to the Emergency Department if you develop shortness of breath, chest pain, difficulty breathing or worsening of your symptoms.          Please keep your follow-up appointment with Littie Loveless, MD.     Follow-up Disposition:  Return if symptoms worsen or fail to improve. Health Maintenance Due   Topic Date Due    Influenza Age 5 to Adult  08/01/2018    MEDICARE YEARLY EXAM  08/04/2018       I have discussed the diagnosis with the patient and the intended plan as seen in the above orders. Patient is in agreement. The patient has received an after-visit summary and questions were answered concerning future plans. I have discussed medication side effects and warnings with the patient as well. Warning signs for the above conditions were discussed including when to call our office or go to the emergency room. The nurse provided the patient and/or family with advanced directive information if needed and encouraged the patient to provide a copy to the office when available.

## 2018-08-20 NOTE — PATIENT INSTRUCTIONS
Please monitor your temperature and call the office if temperature is 101 or higher or if temperature is abnormally low. Please contact our office if your symptoms worsen or do not improve. Please call 911 or go directly to the Emergency Department if you develop shortness of breath, chest pain, difficulty breathing or worsening of your symptoms.

## 2018-08-20 NOTE — PROGRESS NOTES
Davey Art  Identified pt with two pt identifiers(name and ). Chief Complaint   Patient presents with   Jordana Partida     started last night/ in bed when started/ went away this morning left him sore       Reviewed record In preparation for visit and have obtained necessary documentation. Has info on advanced directive but has not filled them out. 1. Have you been to the ER, urgent care clinic or hospitalized since your last visit? No     2. Have you seen or consulted any other health care providers outside of the [address] [place] [address] since your last visit? Include any pap smears or colon screening. Dr. Marshall Prieto reviewed with provider. Health Maintenance reviewed:     Health Maintenance Due   Topic    Influenza Age 5 to Adult     MEDICARE YEARLY EXAM    rtne  Wt Readings from Last 3 Encounters:   18 186 lb 3.2 oz (84.5 kg)   18 194 lb (88 kg)   18 189 lb 1.6 oz (85.8 kg)   r? N N   Are you in a relationship with someone who physically or mentally threatens you? N N   Is it safe for you to go home? Eladia Lathe        Temp Readings from Last 3 Encounters:   18 97.7 °F (36.5 °C) (Oral)   18 98.2 °F (36.8 °C) (Oral)   18 97.9 °F (36.6 °C) (Oral)                                                         No  BP Readings from Last 3 Encounters:   18 109/61   18 135/72   18 110/60      PHQ over the l  Learning Assessment 2014   PRIMARY LEARNER Patient   PRIMARY LANGUAGE ENGLISH   LEARNER PREFERENCE PRIMARY DEMONSTRATION   ANSWERED BY patient     self   ast two weeks 8/3/2017   Little interest or pleasure in doing things Not at all   Feeling down, depressed, irritable, or hopeless Not at all   Total Score PHQ 2 0   you in a relationship with some  Fall Risk Assessment, last 12 mths 2017   Able to walk? Yes   Fall in past 12 months?  No

## 2018-09-20 DIAGNOSIS — E78.5 HYPERLIPIDEMIA, UNSPECIFIED HYPERLIPIDEMIA TYPE: ICD-10-CM

## 2018-09-20 RX ORDER — ATORVASTATIN CALCIUM 40 MG/1
TABLET, FILM COATED ORAL
Qty: 90 TAB | Refills: 4 | Status: SHIPPED | OUTPATIENT
Start: 2018-09-20 | End: 2020-01-01

## 2018-09-20 NOTE — TELEPHONE ENCOUNTER
iJnny Cherie Becerril came in and wants to have his medication Atorvastatin 40 mg sent to [facility] that way he doesn't have to pay for it.

## 2018-11-20 ENCOUNTER — TELEPHONE (OUTPATIENT)
Dept: INTERNAL MEDICINE CLINIC | Facility: CLINIC | Age: 78
End: 2018-11-20

## 2018-11-20 NOTE — TELEPHONE ENCOUNTER
Incoming call from pt sister Hernan Regan, on  [facility]) would like appt for pt SOB and discuss his O2 use. Refer to Dr Jonah Killian, pt's pulmonologist. If there is an issue getting in with them, advise to call us back.

## 2018-12-16 ENCOUNTER — HOSPITAL ENCOUNTER (EMERGENCY)
Age: 78
Discharge: HOME | End: 2018-12-16
Attending: EMERGENCY MEDICINE
Payer: MEDICARE

## 2018-12-16 ENCOUNTER — APPOINTMENT (OUTPATIENT)
Dept: GENERAL RADIOLOGY | Age: 78
End: 2018-12-16
Attending: EMERGENCY MEDICINE
Payer: MEDICARE

## 2018-12-16 VITALS
RESPIRATION RATE: 28 BRPM | HEART RATE: 69 BPM | DIASTOLIC BLOOD PRESSURE: 71 MMHG | HEIGHT: 72 IN | TEMPERATURE: 97.2 F | SYSTOLIC BLOOD PRESSURE: 118 MMHG | BODY MASS INDEX: 25.95 KG/M2 | OXYGEN SATURATION: 93 % | WEIGHT: 191.58 LBS

## 2018-12-16 DIAGNOSIS — J44.1 COPD EXACERBATION (HCC): Primary | ICD-10-CM

## 2018-12-16 LAB
ALBUMIN SERPL-MCNC: 3.3 G/DL (ref 3.5–5)
ALBUMIN/GLOB SERPL: 0.7 {RATIO} (ref 1.1–2.2)
ALP SERPL-CCNC: 74 U/L (ref 45–117)
ALT SERPL-CCNC: 12 U/L (ref 12–78)
ANION GAP SERPL CALC-SCNC: 9 MMOL/L (ref 5–15)
AST SERPL-CCNC: 15 U/L (ref 15–37)
BASOPHILS # BLD: 0 K/UL (ref 0–0.1)
BASOPHILS NFR BLD: 1 % (ref 0–1)
BILIRUB SERPL-MCNC: 0.5 MG/DL (ref 0.2–1)
BUN SERPL-MCNC: 15 MG/DL (ref 6–20)
BUN/CREAT SERPL: 10 (ref 12–20)
CALCIUM SERPL-MCNC: 9.1 MG/DL (ref 8.5–10.1)
CHLORIDE SERPL-SCNC: 108 MMOL/L (ref 97–108)
CO2 SERPL-SCNC: 25 MMOL/L (ref 21–32)
CREAT SERPL-MCNC: 1.43 MG/DL (ref 0.7–1.3)
DIFFERENTIAL METHOD BLD: ABNORMAL
EOSINOPHIL # BLD: 0.1 K/UL (ref 0–0.4)
EOSINOPHIL NFR BLD: 2 % (ref 0–7)
ERYTHROCYTE [DISTWIDTH] IN BLOOD BY AUTOMATED COUNT: 16.3 % (ref 11.5–14.5)
FLUAV AG NPH QL IA: NEGATIVE
FLUBV AG NOSE QL IA: NEGATIVE
GLOBULIN SER CALC-MCNC: 4.6 G/DL (ref 2–4)
GLUCOSE SERPL-MCNC: 90 MG/DL (ref 65–100)
HCT VFR BLD AUTO: 36.2 % (ref 36.6–50.3)
HGB BLD-MCNC: 11.8 G/DL (ref 12.1–17)
IMM GRANULOCYTES # BLD: 0 K/UL (ref 0–0.04)
IMM GRANULOCYTES NFR BLD AUTO: 1 % (ref 0–0.5)
LYMPHOCYTES # BLD: 1.8 K/UL (ref 0.8–3.5)
LYMPHOCYTES NFR BLD: 42 % (ref 12–49)
MCH RBC QN AUTO: 29.1 PG (ref 26–34)
MCHC RBC AUTO-ENTMCNC: 32.6 G/DL (ref 30–36.5)
MCV RBC AUTO: 89.2 FL (ref 80–99)
MONOCYTES # BLD: 0.5 K/UL (ref 0–1)
MONOCYTES NFR BLD: 11 % (ref 5–13)
NEUTS SEG # BLD: 2 K/UL (ref 1.8–8)
NEUTS SEG NFR BLD: 45 % (ref 32–75)
NRBC # BLD: 0 K/UL (ref 0–0.01)
NRBC BLD-RTO: 0 PER 100 WBC
PLATELET # BLD AUTO: 155 K/UL (ref 150–400)
PMV BLD AUTO: 12.2 FL (ref 8.9–12.9)
POTASSIUM SERPL-SCNC: 3.8 MMOL/L (ref 3.5–5.1)
PROT SERPL-MCNC: 7.9 G/DL (ref 6.4–8.2)
RBC # BLD AUTO: 4.06 M/UL (ref 4.1–5.7)
SODIUM SERPL-SCNC: 142 MMOL/L (ref 136–145)
TROPONIN I SERPL-MCNC: <0.05 NG/ML
WBC # BLD AUTO: 4.3 K/UL (ref 4.1–11.1)

## 2018-12-16 PROCEDURE — 74011000250 HC RX REV CODE- 250: Performed by: EMERGENCY MEDICINE

## 2018-12-16 PROCEDURE — 94640 AIRWAY INHALATION TREATMENT: CPT

## 2018-12-16 PROCEDURE — 85025 COMPLETE CBC W/AUTO DIFF WBC: CPT

## 2018-12-16 PROCEDURE — 93005 ELECTROCARDIOGRAM TRACING: CPT

## 2018-12-16 PROCEDURE — 74011636637 HC RX REV CODE- 636/637: Performed by: EMERGENCY MEDICINE

## 2018-12-16 PROCEDURE — 77030029684 HC NEB SM VOL KT MONA -A

## 2018-12-16 PROCEDURE — 84484 ASSAY OF TROPONIN QUANT: CPT

## 2018-12-16 PROCEDURE — 71046 X-RAY EXAM CHEST 2 VIEWS: CPT

## 2018-12-16 PROCEDURE — 80053 COMPREHEN METABOLIC PANEL: CPT

## 2018-12-16 PROCEDURE — 99285 EMERGENCY DEPT VISIT HI MDM: CPT

## 2018-12-16 PROCEDURE — 87804 INFLUENZA ASSAY W/OPTIC: CPT

## 2018-12-16 PROCEDURE — 36415 COLL VENOUS BLD VENIPUNCTURE: CPT

## 2018-12-16 RX ORDER — PREDNISONE 20 MG/1
60 TABLET ORAL DAILY
Qty: 9 TAB | Refills: 0 | Status: SHIPPED | OUTPATIENT
Start: 2018-12-16 | End: 2018-12-19

## 2018-12-16 RX ORDER — PREDNISONE 20 MG/1
60 TABLET ORAL
Status: COMPLETED | OUTPATIENT
Start: 2018-12-16 | End: 2018-12-16

## 2018-12-16 RX ORDER — IPRATROPIUM BROMIDE AND ALBUTEROL SULFATE 2.5; .5 MG/3ML; MG/3ML
3 SOLUTION RESPIRATORY (INHALATION)
Status: COMPLETED | OUTPATIENT
Start: 2018-12-16 | End: 2018-12-16

## 2018-12-16 RX ADMIN — IPRATROPIUM BROMIDE AND ALBUTEROL SULFATE 3 ML: .5; 3 SOLUTION RESPIRATORY (INHALATION) at 15:24

## 2018-12-16 RX ADMIN — PREDNISONE 60 MG: 20 TABLET ORAL at 15:24

## 2018-12-16 RX ADMIN — IPRATROPIUM BROMIDE AND ALBUTEROL SULFATE 3 ML: .5; 3 SOLUTION RESPIRATORY (INHALATION) at 14:01

## 2018-12-16 NOTE — ED PROVIDER NOTES
EMERGENCY DEPARTMENT HISTORY AND PHYSICAL EXAM      Date: 12/16/2018  Patient Name: Ventura Vazquez    History of Presenting Illness     Chief Complaint   Patient presents with    Shortness of Breath     worsened this morning       History Provided By: Patient and Patient's Daughter    HPI: Ventura Vazquez, [de-identified] y.o. male with PMHx significant for CAD, prostate CA, COPD, HTN, presents via WC to the ED with cc of worsening SOB this AM. Pt reports that he wears 3L O2 at home. However, this AM, he was WHEAT and more SOB than usual. He reports that for the past 2 wks, he has had voice changes, and for the past 1 wk, he has had productive cough. He denies taking any meds for his sxs. He reports being on a blood thinner. He denies CP or BLE swelling. He reports being followed by Dr. Jose Reynoso, pulmonologist, and is followed by Dr. Marcela Escobar, cardiologist. He did not try using his inhaler at home for his sx    There are no other complaints, changes, or physical findings at this time. PCP: Alfredo Ortiz MD    Social Hx:   Social History     Tobacco Use   Smoking Status Former Smoker    Packs/day: 0.50    Years: 40.00    Pack years: 20.00    Types: Cigarettes    Last attempt to quit: 10/19/2015    Years since quitting: 3.1   Smokeless Tobacco Never Used   Tobacco Comment    trying quit 10/13/15 - 1/2 ppd   ,   Social History     Substance and Sexual Activity   Alcohol Use No    Alcohol/week: 0.0 oz   ,   Social History     Substance and Sexual Activity   Drug Use No       Past History     Past Surgical History:  Past Surgical History:   Procedure Laterality Date    HX ORTHOPAEDIC  1961    left knee surgery       Family History:  Family History   Problem Relation Age of Onset    Heart Disease Mother         pacemaker    Diabetes Mother     Diabetes Sister     Heart Disease Brother        Allergies:  No Known Allergies      Review of Systems   Review of Systems   Constitutional: Negative for chills and fever.    HENT: Positive for voice change. Negative for congestion, rhinorrhea and sore throat. Respiratory: Positive for cough (productive) and shortness of breath. Cardiovascular: Negative for chest pain, palpitations and leg swelling. Gastrointestinal: Negative for abdominal pain, nausea and vomiting. Genitourinary: Negative for dysuria and urgency. Skin: Negative for rash. Neurological: Negative for dizziness, light-headedness and headaches. All other systems reviewed and are negative. Physical Exam   Physical Exam   Constitutional: He is oriented to person, place, and time. He appears well-developed and well-nourished. No distress. Hoarse voice   HENT:   Head: Normocephalic and atraumatic. Eyes: Conjunctivae and EOM are normal. Pupils are equal, round, and reactive to light. Neck: Normal range of motion. Cardiovascular: Normal rate, regular rhythm and intact distal pulses. Pulmonary/Chest: Effort normal. No stridor. No respiratory distress. Diffuse diminished breath sounds   Abdominal: Soft. He exhibits no distension. There is no tenderness. Musculoskeletal: Normal range of motion. Neurological: He is alert and oriented to person, place, and time. Skin: Skin is warm and dry. Psychiatric: He has a normal mood and affect. Nursing note and vitals reviewed.       Diagnostic Study Results     Labs -     Recent Results (from the past 12 hour(s))   EKG, 12 LEAD, INITIAL    Collection Time: 12/16/18 12:35 PM   Result Value Ref Range    Ventricular Rate 69 BPM    Atrial Rate 69 BPM    P-R Interval 146 ms    QRS Duration 88 ms    Q-T Interval 468 ms    QTC Calculation (Bezet) 501 ms    Calculated P Axis 65 degrees    Calculated R Axis 48 degrees    Calculated T Axis 113 degrees    Diagnosis       Normal sinus rhythm  Anterior infarct (cited on or before 19-OCT-2015)  T wave abnormality, consider lateral ischemia  Prolonged QT  When compared with ECG of 05-JUN-2016 07:09,  Questionable change in initial forces of Anterior leads  T wave inversion more evident in Anterior leads     CBC WITH AUTOMATED DIFF    Collection Time: 12/16/18  1:54 PM   Result Value Ref Range    WBC 4.3 4.1 - 11.1 K/uL    RBC 4.06 (L) 4.10 - 5.70 M/uL    HGB 11.8 (L) 12.1 - 17.0 g/dL    HCT 36.2 (L) 36.6 - 50.3 %    MCV 89.2 80.0 - 99.0 FL    MCH 29.1 26.0 - 34.0 PG    MCHC 32.6 30.0 - 36.5 g/dL    RDW 16.3 (H) 11.5 - 14.5 %    PLATELET 239 254 - 404 K/uL    MPV 12.2 8.9 - 12.9 FL    NRBC 0.0 0  WBC    ABSOLUTE NRBC 0.00 0.00 - 0.01 K/uL    NEUTROPHILS 45 32 - 75 %    LYMPHOCYTES 42 12 - 49 %    MONOCYTES 11 5 - 13 %    EOSINOPHILS 2 0 - 7 %    BASOPHILS 1 0 - 1 %    IMMATURE GRANULOCYTES 1 (H) 0.0 - 0.5 %    ABS. NEUTROPHILS 2.0 1.8 - 8.0 K/UL    ABS. LYMPHOCYTES 1.8 0.8 - 3.5 K/UL    ABS. MONOCYTES 0.5 0.0 - 1.0 K/UL    ABS. EOSINOPHILS 0.1 0.0 - 0.4 K/UL    ABS. BASOPHILS 0.0 0.0 - 0.1 K/UL    ABS. IMM. GRANS. 0.0 0.00 - 0.04 K/UL    DF AUTOMATED     METABOLIC PANEL, COMPREHENSIVE    Collection Time: 12/16/18  1:54 PM   Result Value Ref Range    Sodium 142 136 - 145 mmol/L    Potassium 3.8 3.5 - 5.1 mmol/L    Chloride 108 97 - 108 mmol/L    CO2 25 21 - 32 mmol/L    Anion gap 9 5 - 15 mmol/L    Glucose 90 65 - 100 mg/dL    BUN 15 6 - 20 MG/DL    Creatinine 1.43 (H) 0.70 - 1.30 MG/DL    BUN/Creatinine ratio 10 (L) 12 - 20      GFR est AA 58 (L) >60 ml/min/1.73m2    GFR est non-AA 48 (L) >60 ml/min/1.73m2    Calcium 9.1 8.5 - 10.1 MG/DL    Bilirubin, total 0.5 0.2 - 1.0 MG/DL    ALT (SGPT) 12 12 - 78 U/L    AST (SGOT) 15 15 - 37 U/L    Alk.  phosphatase 74 45 - 117 U/L    Protein, total 7.9 6.4 - 8.2 g/dL    Albumin 3.3 (L) 3.5 - 5.0 g/dL    Globulin 4.6 (H) 2.0 - 4.0 g/dL    A-G Ratio 0.7 (L) 1.1 - 2.2     TROPONIN I    Collection Time: 12/16/18  1:54 PM   Result Value Ref Range    Troponin-I, Qt. <0.05 <0.05 ng/mL   INFLUENZA A & B AG (RAPID TEST)    Collection Time: 12/16/18  1:54 PM   Result Value Ref Range    Influenza A Antigen NEGATIVE  NEG      Influenza B Antigen NEGATIVE  NEG         Radiologic Studies -   XR CHEST PA LAT   Final Result   IMPRESSION: No acute findings. Emphysema with chronic changes as above. CT Results  (Last 48 hours)    None        CXR Results  (Last 48 hours)               12/16/18 1328  XR CHEST PA LAT Final result    Impression:  IMPRESSION: No acute findings. Emphysema with chronic changes as above. Narrative:  EXAM: XR CHEST PA LAT       INDICATION: Shortness of breath which worsened this morning. COMPARISON: Chest x-ray 6/5/2016 and CT thorax 1/10/2017. Patrick Vann FINDINGS: PA and lateral radiographs of the chest demonstrate hyperinflated and   hyperlucent lungs with chronic coarsened interstitium but no acute alveolar or   interstitial infiltrate. There is no pneumothorax. The cardiac and mediastinal   contours and pulmonary vascularity are normal. Atherosclerotic calcifications   affect the aortic arch. Chest wall structures and visualized upper abdomen show   no acute interval change. Medical Decision Making   I am the first provider for this patient. I reviewed the vital signs, available nursing notes, past medical history, past surgical history, family history and social history. Vital Signs-Reviewed the patient's vital signs. Patient Vitals for the past 12 hrs:   Temp Pulse Resp BP SpO2   12/16/18 1500  69 28 118/71 93 %   12/16/18 1400  71 21 150/69 93 %   12/16/18 1315  67 23 126/60 93 %   12/16/18 1223 97.2 °F (36.2 °C) 81 26 165/69 91 %       Pulse Oximetry Analysis - 91% on 3L NC    EKG interpretation: (Preliminary) 12:35  Rhythm: normal sinus rhythm; and regular . Rate (approx.): 69; Axis: normal; AR interval: normal; QRS interval: Prolonged QT; ST/T wave: T wave abnormality; Other findings: No change from prior 2/22/18.   Written by LATRELL Donovan, as dictated by Gisell Amador MD.    Records Reviewed: Nursing Notes, Old Medical Records, Previous electrocardiograms, Previous Radiology Studies and Previous Laboratory Studies    Provider Notes (Medical Decision Making):   DDx: Laryngitis, viral illness, COPD exacerbation, PNA. Plan: basic labs, trop, ekg, CXR, duoneb, re-eval    ED Course:   Initial assessment performed. The patients presenting problems have been discussed, and they are in agreement with the care plan formulated and outlined with them. I have encouraged them to ask questions as they arise throughout their visit. Progress Notes:  3:10 PM  Pt still sounds tight on re-examination. Will get another neb. Will get ambulatory pulse ox.    4:40 PM  Ambulated without getting SOB. Feeling better. Lung exam with improved air movement. Will d/c on steroids with instructions to f/u with PCP. Will also give ENT f/u with instructions to see if hoarse voice continues. Return precautions discussed. Critical Care Time:   0 minutes. Disposition:  Discharge Note:  4:49 PM  The patient has been re-evaluated and is ready for discharge. Reviewed available results with patient. Counseled patient/parent/guardian on diagnosis and care plan. Patient has expressed understanding, and all questions have been answered. Patient agrees with plan and agrees to follow up as recommended, or return to the ED if their symptoms worsen. Discharge instructions have been provided and explained to the patient, along with reasons to return to the ED. PLAN:  1. Current Discharge Medication List      START taking these medications    Details   predniSONE (DELTASONE) 20 mg tablet Take 60 mg by mouth daily for 3 days. Qty: 9 Tab, Refills: 0           2.    Follow-up Information     Follow up With Specialties Details Why Contact Info    Andrey Huff MD Internal Medicine Schedule an appointment as soon as possible for a visit  [address] [place] [address]  [phone]      [facility] EMERGENCY DEPT Emergency Medicine  As needed, If symptoms worsen [address] [facility]  [address]   [address] [address] [phone]    Miladis Sadler MD Otolaryngology Schedule an appointment as soon as possible for a visit  [address]  [address]  [address] [phone]          Return to ED if worse     Diagnosis     Clinical Impression:   1. COPD exacerbation ([address]        Attestations: This note is prepared by Gabriel Vallecillo, acting as scribe for MD Calvin Covarrubias MD: The scribe's documentation has been prepared under my direction and personally reviewed by me in its entirety. I confirm that the note above accurately reflects all work, treatment, procedures, and medical decision making performed by me.

## 2018-12-16 NOTE — ED NOTES
Patient ambulated approximately one hundred feet with continuous pulse oximetry and 2L of oxygen via nasal cannula.  Oxygen saturation noted to be 91% Patient denied any symptoms at that time MD Augustin made aware

## 2018-12-16 NOTE — ED NOTES
Patient discharged by Dr. Juan Pablo Rosado. Patient provided with discharge instructions Rx and instructions on follow up care. Patient out of ED via wheelchair accompanied by daughter.

## 2018-12-16 NOTE — DISCHARGE INSTRUCTIONS
Chronic Obstructive Pulmonary Disease (COPD): Care Instructions  Your Care Instructions    Chronic obstructive pulmonary disease (COPD) is a general term for a group of lung diseases, including emphysema and chronic bronchitis. People with COPD have decreased airflow in and out of the lungs, which makes it hard to breathe. The airways also can get clogged with thick mucus. Cigarette smoking is a major cause of COPD. Although there is no cure for COPD, you can slow its progress. Following your treatment plan and taking care of yourself can help you feel better and live longer. Follow-up care is a key part of your treatment and safety. Be sure to make and go to all appointments, and call your doctor if you are having problems. It's also a good idea to know your test results and keep a list of the medicines you take. How can you care for yourself at home?   Staying healthy    · Do not smoke. This is the most important step you can take to prevent more damage to your lungs. If you need help quitting, talk to your doctor about stop-smoking programs and medicines. These can increase your chances of quitting for good.     · Avoid colds and flu. Get a pneumococcal vaccine shot. If you have had one before, ask your doctor whether you need a second dose. Get the flu vaccine every fall. If you must be around people with colds or the flu, wash your hands often.     · Avoid secondhand smoke, air pollution, and high altitudes. Also avoid cold, dry air and hot, humid air. Stay at home with your windows closed when air pollution is bad.    Medicines and oxygen therapy    · Take your medicines exactly as prescribed. Call your doctor if you think you are having a problem with your medicine.     · You may be taking medicines such as:  ? Bronchodilators. These help open your airways and make breathing easier. Bronchodilators are either short-acting (work for 6 to 9 hours) or long-acting (work for 24 hours).  You inhale most bronchodilators, so they start to act quickly. Always carry your quick-relief inhaler with you in case you need it while you are away from home. ? Corticosteroids (prednisone, budesonide). These reduce airway inflammation. They come in pill or inhaled form. You must take these medicines every day for them to work well.     · A spacer may help you get more inhaled medicine to your lungs. Ask your doctor or pharmacist if a spacer is right for you. If it is, ask how to use it properly.     · Do not take any vitamins, over-the-counter medicine, or herbal products without talking to your doctor first.     · If your doctor prescribed antibiotics, take them as directed. Do not stop taking them just because you feel better. You need to take the full course of antibiotics.     · Oxygen therapy boosts the amount of oxygen in your blood and helps you breathe easier. Use the flow rate your doctor has recommended, and do not change it without talking to your doctor first.   Activity    · Get regular exercise. Walking is an easy way to get exercise. Start out slowly, and walk a little more each day.     · Pay attention to your breathing. You are exercising too hard if you cannot talk while you are exercising.     · Take short rest breaks when doing household chores and other activities.     · Learn breathing methods--such as breathing through pursed lips--to help you become less short of breath.     · If your doctor has not set you up with a pulmonary rehabilitation program, talk to him or her about whether rehab is right for you. Rehab includes exercise programs, education about your disease and how to manage it, help with diet and other changes, and emotional support. Diet    · Eat regular, healthy meals. Use bronchodilators about 1 hour before you eat to make it easier to eat. Eat several small meals instead of three large ones.  Drink beverages at the end of the meal. Avoid foods that are hard to chew.     · Eat foods that contain protein so that you do not lose muscle mass.     · Talk with your doctor if you gain too much weight or if you lose weight without trying.    Mental health    · Talk to your family, friends, or a therapist about your feelings. It is normal to feel frightened, angry, hopeless, helpless, and even guilty. Talking openly about bad feelings can help you cope. If these feelings last, talk to your doctor. When should you call for help? Call 911 anytime you think you may need emergency care. For example, call if:    · You have severe trouble breathing.    Call your doctor now or seek immediate medical care if:    · You have new or worse trouble breathing.     · You cough up blood.     · You have a fever.    Watch closely for changes in your health, and be sure to contact your doctor if:    · You cough more deeply or more often, especially if you notice more mucus or a change in the color of your mucus.     · You have new or worse swelling in your legs or belly.     · You are not getting better as expected. Where can you learn more? Go to http://ria-matt.info/. Nazanin Vásquez in the search box to learn more about \"Chronic Obstructive Pulmonary Disease (COPD): Care Instructions. \"  Current as of: December 6, 2017  Content Version: 11.8  © 1956-2916 [employer]. Care instructions adapted under license by [employer] (which disclaims liability or warranty for this information). If you have questions about a medical condition or this instruction, always ask your healthcare professional. [place] [address] any warranty or liability for your use of this information.

## 2018-12-17 LAB
ATRIAL RATE: 69 BPM
CALCULATED P AXIS, ECG09: 65 DEGREES
CALCULATED R AXIS, ECG10: 48 DEGREES
CALCULATED T AXIS, ECG11: 113 DEGREES
DIAGNOSIS, 93000: NORMAL
P-R INTERVAL, ECG05: 146 MS
Q-T INTERVAL, ECG07: 468 MS
QRS DURATION, ECG06: 88 MS
QTC CALCULATION (BEZET), ECG08: 501 MS
VENTRICULAR RATE, ECG03: 69 BPM

## 2019-01-14 ENCOUNTER — APPOINTMENT (OUTPATIENT)
Dept: CT IMAGING | Age: 79
End: 2019-01-14
Attending: OTOLARYNGOLOGY
Payer: MEDICARE

## 2019-01-14 ENCOUNTER — HOSPITAL ENCOUNTER (OUTPATIENT)
Dept: CT IMAGING | Age: 79
Discharge: HOME | End: 2019-01-14
Attending: OTOLARYNGOLOGY
Payer: MEDICARE

## 2019-01-14 DIAGNOSIS — C61 PROSTATE CANCER (HCC): ICD-10-CM

## 2019-01-14 DIAGNOSIS — R49.9 VOICE DISORDER: ICD-10-CM

## 2019-01-14 DIAGNOSIS — J38.01 UNILATERAL VOCAL CORD PARALYSIS: ICD-10-CM

## 2019-01-14 DIAGNOSIS — J38.3 DISORDER OF VOCAL CORD: ICD-10-CM

## 2019-01-14 DIAGNOSIS — J44.9 COAD (CHRONIC OBSTRUCTIVE AIRWAYS DISEASE) (HCC): ICD-10-CM

## 2019-01-14 PROCEDURE — 74011250636 HC RX REV CODE- 250/636: Performed by: OTOLARYNGOLOGY

## 2019-01-14 PROCEDURE — 70491 CT SOFT TISSUE NECK W/DYE: CPT

## 2019-01-14 PROCEDURE — 74011636320 HC RX REV CODE- 636/320: Performed by: OTOLARYNGOLOGY

## 2019-01-14 PROCEDURE — 71260 CT THORAX DX C+: CPT

## 2019-01-14 RX ORDER — SODIUM CHLORIDE 9 MG/ML
50 INJECTION, SOLUTION INTRAVENOUS
Status: COMPLETED | OUTPATIENT
Start: 2019-01-14 | End: 2019-01-14

## 2019-01-14 RX ORDER — SODIUM CHLORIDE 0.9 % (FLUSH) 0.9 %
10 SYRINGE (ML) INJECTION
Status: COMPLETED | OUTPATIENT
Start: 2019-01-14 | End: 2019-01-14

## 2019-01-14 RX ADMIN — Medication 10 ML: at 12:32

## 2019-01-14 RX ADMIN — IOPAMIDOL 100 ML: 755 INJECTION, SOLUTION INTRAVENOUS at 12:32

## 2019-01-14 RX ADMIN — SODIUM CHLORIDE 50 ML/HR: 900 INJECTION, SOLUTION INTRAVENOUS at 12:32

## 2019-02-18 ENCOUNTER — OFFICE VISIT (OUTPATIENT)
Dept: INTERNAL MEDICINE CLINIC | Facility: CLINIC | Age: 79
End: 2019-02-18

## 2019-02-18 VITALS
DIASTOLIC BLOOD PRESSURE: 68 MMHG | HEIGHT: 72 IN | BODY MASS INDEX: 25.26 KG/M2 | HEART RATE: 61 BPM | RESPIRATION RATE: 12 BRPM | TEMPERATURE: 94.6 F | WEIGHT: 186.5 LBS | SYSTOLIC BLOOD PRESSURE: 119 MMHG | OXYGEN SATURATION: 94 %

## 2019-02-18 DIAGNOSIS — J01.90 ACUTE SINUSITIS, RECURRENCE NOT SPECIFIED, UNSPECIFIED LOCATION: Primary | ICD-10-CM

## 2019-02-18 DIAGNOSIS — J44.9 CHRONIC OBSTRUCTIVE PULMONARY DISEASE, UNSPECIFIED COPD TYPE (HCC): ICD-10-CM

## 2019-02-18 RX ORDER — FLUTICASONE PROPIONATE 50 MCG
2 SPRAY, SUSPENSION (ML) NASAL DAILY
Qty: 1 BOTTLE | Refills: 1 | Status: SHIPPED | OUTPATIENT
Start: 2019-02-18

## 2019-02-18 RX ORDER — AMOXICILLIN AND CLAVULANATE POTASSIUM 875; 125 MG/1; MG/1
1 TABLET, FILM COATED ORAL EVERY 12 HOURS
Qty: 14 TAB | Refills: 0 | Status: SHIPPED | OUTPATIENT
Start: 2019-02-18 | End: 2019-03-27

## 2019-02-18 RX ORDER — ALBUTEROL SULFATE 90 UG/1
AEROSOL, METERED RESPIRATORY (INHALATION)
Qty: 1 INHALER | Refills: 1 | Status: SHIPPED | OUTPATIENT
Start: 2019-02-18 | End: 2020-01-01 | Stop reason: SDUPTHER

## 2019-02-18 NOTE — PATIENT INSTRUCTIONS
Sinusitis: Care Instructions Your Care Instructions Sinusitis is an infection of the lining of the sinus cavities in your head. Sinusitis often follows a cold. It causes pain and pressure in your head and face. In most cases, sinusitis gets better on its own in 1 to 2 weeks. But some mild symptoms may last for several weeks. Sometimes antibiotics are needed. Follow-up care is a key part of your treatment and safety. Be sure to make and go to all appointments, and call your doctor if you are having problems. It's also a good idea to know your test results and keep a list of the medicines you take. How can you care for yourself at home? · Take an over-the-counter pain medicine, such as acetaminophen (Tylenol), ibuprofen (Advil, Motrin), or naproxen (Aleve). Read and follow all instructions on the label. · If the doctor prescribed antibiotics, take them as directed. Do not stop taking them just because you feel better. You need to take the full course of antibiotics. · Be careful when taking over-the-counter cold or flu medicines and Tylenol at the same time. Many of these medicines have acetaminophen, which is Tylenol. Read the labels to make sure that you are not taking more than the recommended dose. Too much acetaminophen (Tylenol) can be harmful. · Breathe warm, moist air from a steamy shower, a hot bath, or a sink filled with hot water. Avoid cold, dry air. Using a humidifier in your home may help. Follow the directions for cleaning the machine. · Use saline (saltwater) nasal washes to help keep your nasal passages open and wash out mucus and bacteria. You can buy saline nose drops at a grocery store or drugstore. Or you can make your own at home by adding 1 teaspoon of salt and 1 teaspoon of baking soda to 2 cups of distilled water. If you make your own, fill a bulb syringe with the solution, insert the tip into your nostril, and squeeze gently. Debi Goad your nose. · Put a hot, wet towel or a warm gel pack on your face 3 or 4 times a day for 5 to 10 minutes each time. · Try a decongestant nasal spray like oxymetazoline (Afrin). Do not use it for more than 3 days in a row. Using it for more than 3 days can make your congestion worse. When should you call for help? Call your doctor now or seek immediate medical care if: 
  · You have new or worse swelling or redness in your face or around your eyes.  
  · You have a new or higher fever.  
 Watch closely for changes in your health, and be sure to contact your doctor if: 
  · You have new or worse facial pain.  
  · The mucus from your nose becomes thicker (like pus) or has new blood in it.  
  · You are not getting better as expected. Where can you learn more? Go to http://ria-matt.info/. Enter C487 in the search box to learn more about \"Sinusitis: Care Instructions. \" Current as of: March 27, 2018 Content Version: 11.9 © 3580-3572 [employer]. Care instructions adapted under license by [employer] (which disclaims liability or warranty for this information). If you have questions about a medical condition or this instruction, always ask your healthcare professional. [address] any warranty or liability for your use of this information.

## 2019-02-18 NOTE — PROGRESS NOTES
Irl Shone  Identified pt with two pt identifiers(name and ). Chief Complaint Patient presents with  Chest Congestion Room 1B  Cold  Cough Reviewed record In preparation for visit and have obtained necessary documentation. Has info on advanced directive but has not filled them out. 1. Have you been to the ER, urgent care clinic or hospitalized since your last visit? [facility] ER 18 
 
2. Have you seen or consulted any other health care providers outside of the [address] [place] [address] since your last visit? Include any pap smears or colon screening. Eye, pulmonary, CT, ENT Vitals reviewed with provider. Health Maintenance reviewed:  
 
Health Maintenance Due Topic  Shingrix Vaccine Age 50> (1 of 2)  MEDICARE YEARLY EXAM   
  
 
 
Wt Readings from Last 3 Encounters:  
19 186 lb 8 oz (84.6 kg) 18 191 lb 9.3 oz (86.9 kg) 18 186 lb 3.2 oz (84.5 kg) Temp Readings from Last 3 Encounters:  
19 (!) 94.6 °F (34.8 °C) (Oral) 18 97.2 °F (36.2 °C)  
18 97.7 °F (36.5 °C) (Oral) BP Readings from Last 3 Encounters:  
19 119/68  
18 118/71  
18 109/61 Pulse Readings from Last 3 Encounters:  
19 61  
18 69  
18 77 Vitals:  
 19 0962 BP: 119/68 Pulse: 61 Resp: 12 Temp: (!) 94.6 °F (34.8 °C) TempSrc: Oral  
SpO2: 94% Weight: 186 lb 8 oz (84.6 kg) Height: 6' (1.829 m) PainSc:   0 - No pain Learning Assessment:  
:  
 
 
Learning Assessment 2014 PRIMARY LEARNER Patient PRIMARY LANGUAGE ENGLISH  
LEARNER PREFERENCE PRIMARY DEMONSTRATION  
ANSWERED BY patient  
  self Depression Screening:  
:  
 
 
3 most recent PHQ Screens 2019 Little interest or pleasure in doing things Not at all Feeling down, depressed, irritable, or hopeless Not at all Total Score PHQ 2 0 Fall Risk Assessment:  
:  
 
 
 Fall Risk Assessment, last 12 mths 8/18/2017 Able to walk? Yes Fall in past 12 months? No  
  
 
Abuse Screening:  
:  
 
 
Abuse Screening Questionnaire 2/18/2019 8/3/2017 10/13/2015 Do you ever feel afraid of your partner? N N N Are you in a relationship with someone who physically or mentally threatens you? N N N Is it safe for you to go home? Thuan Jacobo  
  
 
ADL Screening:  
:  
 
 

## 2019-02-18 NOTE — PROGRESS NOTES
HISTORY OF PRESENT ILLNESS Nathan Garza is a [de-identified] y.o. male. HPI  He complains of 2 months of cough and chest congestion. Associated symptoms include coryza and post nasal drip  He denies achiness, post nasal drip, shortness of breath, sore throat, wheezing, fever, chills, or night sweats. Clinical course has been unchanged since that time. He has tried no treatment. . Past history is significant for ILD. Patient is a former smoker. 3 most recent PHQ Screens 2/18/2019 Little interest or pleasure in doing things Not at all Feeling down, depressed, irritable, or hopeless Not at all Total Score PHQ 2 0 Patient Active Problem List  
Diagnosis Code  ED (erectile dysfunction) N52.9  
 HTN (hypertension) I10  
 [facility] ([facility] C61  
 PAD (peripheral artery disease) ([facility]) I73.9  
 STEMI (ST elevation myocardial infarction) ([facility]) I21.3  Chronic interstitial lung disease ([facility]) J84.9  
 S/P PTCA (percutaneous transluminal coronary angioplasty) Z98.61  
 COPD with exacerbation ([facility] J44.1  Acute myocardial infarction of anterior wall, subsequent episode of care ([facility] I21.09  
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  
Diagnosis Date  CAD (coronary artery disease)   
 mi 10/19/2015 with stent  COPD (chronic obstructive pulmonary disease) ([facility])  Hypertension  Prostate cancer ([facility] 5/8/2012 Dr. Manuela Alaniz - diagnosed 2/2012 - s/p EBRT on Lupron Past Surgical History:  
Procedure Laterality Date  HX ORTHOPAEDIC  1961  
 left knee surgery Social History Socioeconomic History  Marital status:  Spouse name: Not on file  Number of children: Not on file  Years of education: Not on file  Highest education level: Not on file Tobacco Use  Smoking status: Former Smoker Packs/day: 0.50 Years: 40.00 Pack years: 20.00 Types: Cigarettes Last attempt to quit: 10/19/2015 Years since quitting: 3.3  Smokeless tobacco: Never Used  Tobacco comment: trying quit 10/13/15 - 1/2 ppd Substance and Sexual Activity  Alcohol use: No  
  Alcohol/week: 0.0 oz  Drug use: No  
 Sexual activity: Not Currently Family History Problem Relation Age of Onset  Heart Disease Mother   
     pacemaker  Diabetes Mother  Diabetes Sister  Heart Disease Brother No Known Allergies Current Outpatient Medications Medication Sig Dispense Refill  Oxygen 3L at night  albuterol (PROVENTIL HFA, VENTOLIN HFA, PROAIR HFA) 90 mcg/actuation inhaler Use 2 puffs very 4 hours prn 1 Inhaler 1  
 amoxicillin-clavulanate (AUGMENTIN) 875-125 mg per tablet Take 1 Tab by mouth every twelve (12) hours. 14 Tab 0  
 fluticasone (FLONASE) 50 mcg/actuation nasal spray 2 Sprays by Both Nostrils route daily. 1 Bottle 1  
 atorvastatin (LIPITOR) 40 mg tablet TAKE 1 TABLET EVERY DAY 90 Tab 4  
 famotidine (PEPCID) 20 mg tablet TAKE 1 TABLET EVERY DAY AS NEEDED  FOR  HEARTBURN 90 Tab 3  
 lisinopril (PRINIVIL, ZESTRIL) 2.5 mg tablet TAKE 1 TABLET EVERY DAY 90 Tab 3  
 metoprolol tartrate (LOPRESSOR) 50 mg tablet TAKE ONE TABLET BY MOUTH TWICE DAILY 60 Tab 6  XTANDI 40 mg capsule Take 160 mg by mouth daily.  aspirin 81 mg chewable tablet Take 1 Tab by mouth daily. RISK OF HEART ATTACK IF ANY MISSED DOSES 90 Tab 3  
 
ROS Visit Vitals /68 (BP 1 Location: Left arm, BP Patient Position: Sitting) Pulse 61 Temp (!) 94.6 °F (34.8 °C) (Oral) Resp 12 Ht 6' (1.829 m) Wt 186 lb 8 oz (84.6 kg) SpO2 94% BMI 25.29 kg/m² Physical Exam  
Constitutional: He is oriented to person, place, and time. He appears well-developed and well-nourished.   
HENT:  
 Head: Normocephalic and atraumatic. Right Ear: Tympanic membrane and ear canal normal.  
Left Ear: Tympanic membrane and ear canal normal.  
Nose: No mucosal edema. Mouth/Throat: Oropharynx is clear and moist and mucous membranes are normal. Mucous membranes are not pale and not dry. No oropharyngeal exudate, posterior oropharyngeal edema or posterior oropharyngeal erythema. Eyes: Conjunctivae are normal. Pupils are equal, round, and reactive to light. Right eye exhibits no discharge. Left eye exhibits no discharge. Neck: Neck supple. Cardiovascular: Normal rate, regular rhythm, S1 normal, S2 normal and normal heart sounds. Exam reveals no gallop. No murmur heard. Pulmonary/Chest: Effort normal and breath sounds normal. He has no wheezes. He has no rhonchi. He has no rales. Lymphadenopathy:  
  He has no cervical adenopathy. Neurological: He is alert and oriented to person, place, and time. Skin: Skin is warm, dry and intact. No rash noted. Nursing note and vitals reviewed. ASSESSMENT and PLAN 
  ICD-10-CM ICD-9-CM 1. Acute sinusitis, recurrence not specified, unspecified location J01.90 461.9 amoxicillin-clavulanate (AUGMENTIN) 875-125 mg per tablet  
   fluticasone (FLONASE) 50 mcg/actuation nasal spray 2. Chronic obstructive pulmonary disease, unspecified COPD type ([facility]) J44.9 496 albuterol (PROVENTIL HFA, VENTOLIN HFA, PROAIR HFA) 90 mcg/actuation inhaler Diagnoses and all orders for this visit: 
 
1. Acute sinusitis, recurrence not specified, unspecified location 
-     amoxicillin-clavulanate (AUGMENTIN) 875-125 mg per tablet; Take 1 Tab by mouth every twelve (12) hours. -     fluticasone (FLONASE) 50 mcg/actuation nasal spray; 2 Sprays by Both Nostrils route daily.  
 
2. Chronic obstructive pulmonary disease, unspecified COPD type ([facility] 
-     albuterol (PROVENTIL HFA, VENTOLIN HFA, PROAIR HFA) 90 mcg/actuation inhaler; Use 2 puffs very 4 hours prn 
 
 
 Follow-up Disposition: 
Return if symptoms worsen or fail to improve. I have discussed the diagnosis, evaluation and treatment options and the intended plan with the patient. Patient understands and is in agreement. The patient has received an after-visit summary and questions were answered concerning future plans. I have discussed side effects and warnings of any new medications with the patient as well.

## 2019-02-25 ENCOUNTER — OFFICE VISIT (OUTPATIENT)
Dept: CARDIOLOGY CLINIC | Age: 79
End: 2019-02-25

## 2019-02-25 VITALS
RESPIRATION RATE: 24 BRPM | BODY MASS INDEX: 25.35 KG/M2 | HEART RATE: 80 BPM | OXYGEN SATURATION: 95 % | DIASTOLIC BLOOD PRESSURE: 70 MMHG | WEIGHT: 187.2 LBS | SYSTOLIC BLOOD PRESSURE: 120 MMHG | HEIGHT: 72 IN

## 2019-02-25 DIAGNOSIS — I10 ESSENTIAL HYPERTENSION: ICD-10-CM

## 2019-02-25 DIAGNOSIS — I25.10 CORONARY ARTERY DISEASE INVOLVING NATIVE CORONARY ARTERY OF NATIVE HEART WITHOUT ANGINA PECTORIS: Primary | ICD-10-CM

## 2019-02-25 DIAGNOSIS — J84.9 CHRONIC INTERSTITIAL LUNG DISEASE (HCC): ICD-10-CM

## 2019-02-25 DIAGNOSIS — Z98.61 S/P PTCA (PERCUTANEOUS TRANSLUMINAL CORONARY ANGIOPLASTY): ICD-10-CM

## 2019-02-25 DIAGNOSIS — E78.2 MIXED HYPERLIPIDEMIA: ICD-10-CM

## 2019-02-25 DIAGNOSIS — J44.9 CHRONIC OBSTRUCTIVE PULMONARY DISEASE, UNSPECIFIED COPD TYPE (HCC): ICD-10-CM

## 2019-02-25 NOTE — PROGRESS NOTES
1. Have you been to the ER, urgent care clinic since your last visit? Hospitalized since your last visit? No 
 
2. Have you seen or consulted any other health care providers outside of the [address] [place] [address] since your last visit? Include any pap smears or colon screening. ENT 2/2019  & Urology Chief Complaint Patient presents with  Follow-up CAD Patient C/O SOB and coughing with  Congestion.

## 2019-02-25 NOTE — PROGRESS NOTES
[address] [place] [address] [address] [facility]  [phone] Subjective:  
  
Lety Eaton is a [de-identified] y.o. male is here for routine f/u. Reports unchanged sob, underlying copd. The patient denies chest pain, orthopnea, PND, LE edema, palpitations, syncope, or presyncope. Patient Active Problem List  
 Diagnosis Date Noted  Coronary artery disease involving native coronary artery of native heart without angina pectoris 04/25/2017  Advance directive discussed with patient 07/22/2016  Hyperlipidemia 06/27/2016  Acute myocardial infarction of anterior wall, subsequent episode of care ([facility] 06/06/2016  Penetrating atherosclerotic ulcer of aorta ([facility] 06/06/2016  COPD with exacerbation ([address] 06/05/2016  S/P PTCA (percutaneous transluminal coronary angioplasty) 10/21/2015  Chronic interstitial lung disease ([address] 10/20/2015  
 STEMI (ST elevation myocardial infarction) ([address] 10/19/2015  PAD (peripheral artery disease) ([address] 12/31/2013  Prostate cancer ([address] 05/08/2012  
 HTN (hypertension) 07/05/2011  ED (erectile dysfunction) 02/24/2010 Ivanna Walsh MD 
Past Medical History:  
Diagnosis Date  CAD (coronary artery disease)   
 mi 10/19/2015 with stent  COPD (chronic obstructive pulmonary disease) (HCC)  Hypertension  Prostate cancer ([address] 5/8/2012 Dr. Mitul Boyd - diagnosed 2/2012 - s/p EBRT on Lupron Past Surgical History:  
Procedure Laterality Date  HX ORTHOPAEDIC  1961  
 left knee surgery No Known Allergies Family History Problem Relation Age of Onset  Heart Disease Mother   
     pacemaker  Diabetes Mother  Diabetes Sister  Heart Disease Brother Social History Socioeconomic History  Marital status:  Spouse name: Not on file  Number of children: Not on file  Years of education: Not on file  Highest education level: Not on file Social Needs  Financial resource strain: Not on file  Food insecurity - worry: Not on file  Food insecurity - inability: Not on file  Transportation needs - medical: Not on file  Transportation needs - non-medical: Not on file Occupational History  Not on file Tobacco Use  Smoking status: Former Smoker Packs/day: 0.50 Years: 40.00 Pack years: 20.00 Types: Cigarettes Last attempt to quit: 10/19/2015 Years since quitting: 3.3  Smokeless tobacco: Never Used  Tobacco comment: trying quit 10/13/15 - 1/2 ppd Substance and Sexual Activity  Alcohol use: No  
  Alcohol/week: 0.0 oz  Drug use: No  
 Sexual activity: Not Currently Other Topics Concern  Not on file Social History Narrative  Not on file Current Outpatient Medications Medication Sig  
 Oxygen 3L at night  albuterol (PROVENTIL HFA, VENTOLIN HFA, PROAIR HFA) 90 mcg/actuation inhaler Use 2 puffs very 4 hours prn  
 amoxicillin-clavulanate (AUGMENTIN) 875-125 mg per tablet Take 1 Tab by mouth every twelve (12) hours.  fluticasone (FLONASE) 50 mcg/actuation nasal spray 2 Sprays by Both Nostrils route daily.  atorvastatin (LIPITOR) 40 mg tablet TAKE 1 TABLET EVERY DAY  famotidine (PEPCID) 20 mg tablet TAKE 1 TABLET EVERY DAY AS NEEDED  FOR  HEARTBURN  
 lisinopril (PRINIVIL, ZESTRIL) 2.5 mg tablet TAKE 1 TABLET EVERY DAY  metoprolol tartrate (LOPRESSOR) 50 mg tablet TAKE ONE TABLET BY MOUTH TWICE DAILY  XTANDI 40 mg capsule Take 160 mg by mouth daily.  aspirin 81 mg chewable tablet Take 1 Tab by mouth daily. RISK OF HEART ATTACK IF ANY MISSED DOSES No current facility-administered medications for this visit. Review of Symptoms: 
11 systems reviewed, negative other than as stated in the HPI Physical ExamPhysical Exam:   
Vitals:  
 02/25/19 1053 02/25/19 1054 BP: 112/68 120/70 Pulse: 80 Resp: 24 SpO2: 95% Weight: 187 lb 3.2 oz (84.9 kg) Height: 6' (1.829 m) Body mass index is 25.39 kg/m². General PE Gen:  NAD Mental Status - Alert. General Appearance - Not in acute distress. Chest and Lung Exam  
Inspection: Accessory muscles - No use of accessory muscles in breathing. Auscultation:  
Breath sounds: - Normal.  
Cardiovascular Inspection: Jugular vein - Bilateral - Inspection Normal.  
Palpation/Percussion:  
Apical Impulse: - Normal.  
Auscultation: Rhythm - Regular. Heart Sounds - S1 WNL and S2 WNL. No S3 or S4. Murmurs & Other Heart Sounds: Auscultation of the heart reveals - No Murmurs. Peripheral Vascular Upper Extremity: Inspection - Bilateral - No Cyanotic nailbeds or Digital clubbing. Lower Extremity:  
Palpation: Edema - Bilateral - No edema. Abdomen:   Soft, non-tender, bowel sounds are active. Neuro: A&O times 3, CN and motor grossly WNL Labs:  
Lab Results Component Value Date/Time Cholesterol, total 73 (L) 04/25/2017 02:38 PM  
 Cholesterol, total 95 10/20/2015 04:06 AM  
 Cholesterol, total 124 05/14/2015 11:24 AM  
 Cholesterol, total 136 12/31/2013 10:41 AM  
 Cholesterol, total 135 03/22/2013 08:52 AM  
 HDL Cholesterol 31 (L) 04/25/2017 02:38 PM  
 HDL Cholesterol 33 10/20/2015 04:06 AM  
 HDL Cholesterol 35 (L) 05/14/2015 11:24 AM  
 HDL Cholesterol 36 (L) 12/31/2013 10:41 AM  
 HDL Cholesterol 34 (L) 03/22/2013 08:52 AM  
 LDL, calculated 19 04/25/2017 02:38 PM  
 LDL, calculated 39.6 10/20/2015 04:06 AM  
 LDL, calculated 69 05/14/2015 11:24 AM  
 LDL, calculated 77 12/31/2013 10:41 AM  
 LDL, calculated 79 03/22/2013 08:52 AM  
 Triglyceride 116 04/25/2017 02:38 PM  
 Triglyceride 112 10/20/2015 04:06 AM  
 Triglyceride 98 05/14/2015 11:24 AM  
 Triglyceride 113 12/31/2013 10:41 AM  
 Triglyceride 109 03/22/2013 08:52 AM  
 CHOL/HDL Ratio 2.9 10/20/2015 04:06 AM  
 
Lab Results Component Value Date/Time CK 1,211 (H) 10/21/2015 04:12 AM  
 
Lab Results Component Value Date/Time Sodium 142 12/16/2018 01:54 PM  
 Potassium 3.8 12/16/2018 01:54 PM  
 Chloride 108 12/16/2018 01:54 PM  
 CO2 25 12/16/2018 01:54 PM  
 Anion gap 9 12/16/2018 01:54 PM  
 Glucose 90 12/16/2018 01:54 PM  
 BUN 15 12/16/2018 01:54 PM  
 Creatinine 1.43 (H) 12/16/2018 01:54 PM  
 BUN/Creatinine ratio 10 (L) 12/16/2018 01:54 PM  
 GFR est AA 58 (L) 12/16/2018 01:54 PM  
 GFR est non-AA 48 (L) 12/16/2018 01:54 PM  
 Calcium 9.1 12/16/2018 01:54 PM  
 Bilirubin, total 0.5 12/16/2018 01:54 PM  
 AST (SGOT) 15 12/16/2018 01:54 PM  
 Alk. phosphatase 74 12/16/2018 01:54 PM  
 Protein, total 7.9 12/16/2018 01:54 PM  
 Albumin 3.3 (L) 12/16/2018 01:54 PM  
 Globulin 4.6 (H) 12/16/2018 01:54 PM  
 A-G Ratio 0.7 (L) 12/16/2018 01:54 PM  
 ALT (SGPT) 12 12/16/2018 01:54 PM  
 
 
EKG: 
SR 
 
 Assessment: 
 
 Assessment: 1. Coronary artery disease involving native coronary artery of native heart without angina pectoris 2. Mixed hyperlipidemia 3. Essential hypertension 4. Chronic obstructive pulmonary disease, unspecified COPD type ([address] 5. Chronic interstitial lung disease ([address] 6. S/P PTCA (percutaneous transluminal coronary angioplasty) Orders Placed This Encounter  AMB POC EKG ROUTINE W/ 12 LEADS, INTER & REP Order Specific Question:   Reason for Exam: Answer:   routine Plan:  
 
Patient presents for f/u, doing well and stable from cardiac standpoint. Reports loss of voice since November, followed by ENT. Endorses unchanged sob. Atherosclerotic heart disease History of PTCA stenting 2015, no ischemia on nuclear stress test 2017. Continue ASA, BB, statin Ejection fraction 55%, mild AR per echo in January 2017 Hypertension Controlled Hyperlipidemia: On statin. Lipids and labs followed by PCP 
 
COPD, ILD On oxygen therapy at night Followed by Dr Genesis Diamond CT chest/neck 1/19: Abnormal right vocal cord. Left lung base nodule. Severe COPD 
ENT: Dr Anabela Hardy Prostate cancer Followed by Dr Jeremy Vaughn On Xt[facility] Counseled on diet and exercise- eventual goal of 30-60 minutes 5-7 times a week as per AHA guidelines. 
  
 
Continue current care and f/u in 1 yr, sooner PRN.  
 
Branden Graves MD

## 2019-02-27 ENCOUNTER — TELEPHONE (OUTPATIENT)
Dept: INTERNAL MEDICINE CLINIC | Facility: CLINIC | Age: 79
End: 2019-02-27

## 2019-02-27 NOTE — TELEPHONE ENCOUNTER
Pt came in because he had a question about his medication. He received amoxicillin from the [address] [place] [address] (qty 14 had one left) then he received the same medication from his [facility] DrJinny Beltran was wondering if he should take the ones that he just received along with what he got from [address] or should he only take what he got from [address] Pt thought you weren't suppose to take a lot of the antibotic medications.

## 2019-02-27 NOTE — TELEPHONE ENCOUNTER
Pt advised that the Augmentin from mail order was sent in in error. Pt is feeling better. Advised not to take second round of abx.

## 2019-03-23 ENCOUNTER — APPOINTMENT (OUTPATIENT)
Dept: GENERAL RADIOLOGY | Age: 79
DRG: 191 | End: 2019-03-23
Attending: EMERGENCY MEDICINE
Payer: MEDICARE

## 2019-03-23 ENCOUNTER — HOSPITAL ENCOUNTER (INPATIENT)
Age: 79
LOS: 2 days | Discharge: HOME | DRG: 191 | End: 2019-03-25
Attending: EMERGENCY MEDICINE | Admitting: FAMILY MEDICINE
Payer: MEDICARE

## 2019-03-23 DIAGNOSIS — J44.1 ACUTE EXACERBATION OF CHRONIC OBSTRUCTIVE PULMONARY DISEASE (COPD) (HCC): Primary | ICD-10-CM

## 2019-03-23 PROBLEM — R09.02 HYPOXEMIA: Status: ACTIVE | Noted: 2019-03-23

## 2019-03-23 LAB
ALBUMIN SERPL-MCNC: 3.6 G/DL (ref 3.5–5)
ALBUMIN/GLOB SERPL: 0.9 {RATIO} (ref 1.1–2.2)
ALP SERPL-CCNC: 62 U/L (ref 45–117)
ALT SERPL-CCNC: 12 U/L (ref 12–78)
ANION GAP SERPL CALC-SCNC: 9 MMOL/L (ref 5–15)
AST SERPL-CCNC: 13 U/L (ref 15–37)
ATRIAL RATE: 95 BPM
BASOPHILS # BLD: 0 K/UL (ref 0–0.1)
BASOPHILS NFR BLD: 0 % (ref 0–1)
BILIRUB SERPL-MCNC: 0.5 MG/DL (ref 0.2–1)
BNP SERPL-MCNC: 342 PG/ML
BUN SERPL-MCNC: 15 MG/DL (ref 6–20)
BUN/CREAT SERPL: 12 (ref 12–20)
CALCIUM SERPL-MCNC: 9 MG/DL (ref 8.5–10.1)
CALCULATED P AXIS, ECG09: 47 DEGREES
CALCULATED R AXIS, ECG10: 25 DEGREES
CALCULATED T AXIS, ECG11: 84 DEGREES
CHLORIDE SERPL-SCNC: 111 MMOL/L (ref 97–108)
CO2 SERPL-SCNC: 24 MMOL/L (ref 21–32)
CREAT SERPL-MCNC: 1.23 MG/DL (ref 0.7–1.3)
DIAGNOSIS, 93000: NORMAL
DIFFERENTIAL METHOD BLD: ABNORMAL
EOSINOPHIL # BLD: 0.1 K/UL (ref 0–0.4)
EOSINOPHIL NFR BLD: 1 % (ref 0–7)
ERYTHROCYTE [DISTWIDTH] IN BLOOD BY AUTOMATED COUNT: 17 % (ref 11.5–14.5)
FLUAV AG NPH QL IA: NEGATIVE
FLUBV AG NOSE QL IA: NEGATIVE
GLOBULIN SER CALC-MCNC: 4 G/DL (ref 2–4)
GLUCOSE SERPL-MCNC: 123 MG/DL (ref 65–100)
HCT VFR BLD AUTO: 35.5 % (ref 36.6–50.3)
HGB BLD-MCNC: 11.8 G/DL (ref 12.1–17)
IMM GRANULOCYTES # BLD AUTO: 0 K/UL (ref 0–0.04)
IMM GRANULOCYTES NFR BLD AUTO: 0 % (ref 0–0.5)
LACTATE BLD-SCNC: 1.58 MMOL/L (ref 0.4–2)
LYMPHOCYTES # BLD: 1.5 K/UL (ref 0.8–3.5)
LYMPHOCYTES NFR BLD: 29 % (ref 12–49)
MAGNESIUM SERPL-MCNC: 2 MG/DL (ref 1.6–2.4)
MCH RBC QN AUTO: 29.4 PG (ref 26–34)
MCHC RBC AUTO-ENTMCNC: 33.2 G/DL (ref 30–36.5)
MCV RBC AUTO: 88.5 FL (ref 80–99)
MONOCYTES # BLD: 0.5 K/UL (ref 0–1)
MONOCYTES NFR BLD: 11 % (ref 5–13)
NEUTS SEG # BLD: 3 K/UL (ref 1.8–8)
NEUTS SEG NFR BLD: 59 % (ref 32–75)
NRBC # BLD: 0 K/UL (ref 0–0.01)
NRBC BLD-RTO: 0 PER 100 WBC
P-R INTERVAL, ECG05: 140 MS
PLATELET # BLD AUTO: 127 K/UL (ref 150–400)
PMV BLD AUTO: 11.8 FL (ref 8.9–12.9)
POTASSIUM SERPL-SCNC: 3.6 MMOL/L (ref 3.5–5.1)
PROT SERPL-MCNC: 7.6 G/DL (ref 6.4–8.2)
Q-T INTERVAL, ECG07: 402 MS
QRS DURATION, ECG06: 80 MS
QTC CALCULATION (BEZET), ECG08: 505 MS
RBC # BLD AUTO: 4.01 M/UL (ref 4.1–5.7)
SODIUM SERPL-SCNC: 144 MMOL/L (ref 136–145)
TROPONIN I SERPL-MCNC: <0.05 NG/ML
VENTRICULAR RATE, ECG03: 95 BPM
WBC # BLD AUTO: 5.1 K/UL (ref 4.1–11.1)

## 2019-03-23 PROCEDURE — 84484 ASSAY OF TROPONIN QUANT: CPT

## 2019-03-23 PROCEDURE — 83735 ASSAY OF MAGNESIUM: CPT

## 2019-03-23 PROCEDURE — 85025 COMPLETE CBC W/AUTO DIFF WBC: CPT

## 2019-03-23 PROCEDURE — 94640 AIRWAY INHALATION TREATMENT: CPT

## 2019-03-23 PROCEDURE — 87804 INFLUENZA ASSAY W/OPTIC: CPT

## 2019-03-23 PROCEDURE — 74011000250 HC RX REV CODE- 250: Performed by: EMERGENCY MEDICINE

## 2019-03-23 PROCEDURE — 99285 EMERGENCY DEPT VISIT HI MDM: CPT

## 2019-03-23 PROCEDURE — 83880 ASSAY OF NATRIURETIC PEPTIDE: CPT

## 2019-03-23 PROCEDURE — 65660000000 HC RM CCU STEPDOWN

## 2019-03-23 PROCEDURE — 74011250636 HC RX REV CODE- 250/636: Performed by: FAMILY MEDICINE

## 2019-03-23 PROCEDURE — 77030029684 HC NEB SM VOL KT MONA -A

## 2019-03-23 PROCEDURE — 93005 ELECTROCARDIOGRAM TRACING: CPT

## 2019-03-23 PROCEDURE — 74011250636 HC RX REV CODE- 250/636: Performed by: EMERGENCY MEDICINE

## 2019-03-23 PROCEDURE — 80053 COMPREHEN METABOLIC PANEL: CPT

## 2019-03-23 PROCEDURE — 36415 COLL VENOUS BLD VENIPUNCTURE: CPT

## 2019-03-23 PROCEDURE — 96374 THER/PROPH/DIAG INJ IV PUSH: CPT

## 2019-03-23 PROCEDURE — 83605 ASSAY OF LACTIC ACID: CPT

## 2019-03-23 PROCEDURE — 87040 BLOOD CULTURE FOR BACTERIA: CPT

## 2019-03-23 PROCEDURE — 71045 X-RAY EXAM CHEST 1 VIEW: CPT

## 2019-03-23 PROCEDURE — 74011250637 HC RX REV CODE- 250/637: Performed by: FAMILY MEDICINE

## 2019-03-23 PROCEDURE — 77010033678 HC OXYGEN DAILY

## 2019-03-23 RX ORDER — SODIUM CHLORIDE 0.9 % (FLUSH) 0.9 %
5-40 SYRINGE (ML) INJECTION EVERY 8 HOURS
Status: DISCONTINUED | OUTPATIENT
Start: 2019-03-23 | End: 2019-03-25 | Stop reason: HOSPADM

## 2019-03-23 RX ORDER — HEPARIN SODIUM 5000 [USP'U]/ML
5000 INJECTION, SOLUTION INTRAVENOUS; SUBCUTANEOUS EVERY 8 HOURS
Status: DISCONTINUED | OUTPATIENT
Start: 2019-03-23 | End: 2019-03-25 | Stop reason: HOSPADM

## 2019-03-23 RX ORDER — IPRATROPIUM BROMIDE AND ALBUTEROL SULFATE 2.5; .5 MG/3ML; MG/3ML
3 SOLUTION RESPIRATORY (INHALATION) ONCE
Status: COMPLETED | OUTPATIENT
Start: 2019-03-23 | End: 2019-03-23

## 2019-03-23 RX ORDER — SODIUM CHLORIDE 0.9 % (FLUSH) 0.9 %
5-40 SYRINGE (ML) INJECTION AS NEEDED
Status: DISCONTINUED | OUTPATIENT
Start: 2019-03-23 | End: 2019-03-25 | Stop reason: HOSPADM

## 2019-03-23 RX ORDER — IPRATROPIUM BROMIDE 0.5 MG/2.5ML
0.5 SOLUTION RESPIRATORY (INHALATION)
Status: DISCONTINUED | OUTPATIENT
Start: 2019-03-23 | End: 2019-03-25 | Stop reason: HOSPADM

## 2019-03-23 RX ORDER — ACETAMINOPHEN 325 MG/1
650 TABLET ORAL
Status: DISCONTINUED | OUTPATIENT
Start: 2019-03-23 | End: 2019-03-25 | Stop reason: HOSPADM

## 2019-03-23 RX ORDER — ATORVASTATIN CALCIUM 40 MG/1
40 TABLET, FILM COATED ORAL
Status: DISCONTINUED | OUTPATIENT
Start: 2019-03-23 | End: 2019-03-25 | Stop reason: HOSPADM

## 2019-03-23 RX ORDER — ALBUTEROL SULFATE 0.83 MG/ML
2.5 SOLUTION RESPIRATORY (INHALATION)
Status: DISCONTINUED | OUTPATIENT
Start: 2019-03-23 | End: 2019-03-25 | Stop reason: HOSPADM

## 2019-03-23 RX ORDER — FAMOTIDINE 20 MG/1
20 TABLET, FILM COATED ORAL DAILY
Status: DISCONTINUED | OUTPATIENT
Start: 2019-03-23 | End: 2019-03-25 | Stop reason: HOSPADM

## 2019-03-23 RX ORDER — DOXYCYCLINE HYCLATE 100 MG
100 TABLET ORAL EVERY 12 HOURS
Status: DISCONTINUED | OUTPATIENT
Start: 2019-03-23 | End: 2019-03-25 | Stop reason: HOSPADM

## 2019-03-23 RX ORDER — SODIUM CHLORIDE 0.9 % (FLUSH) 0.9 %
5-40 SYRINGE (ML) INJECTION AS NEEDED
Status: DISCONTINUED | OUTPATIENT
Start: 2019-03-23 | End: 2019-03-25 | Stop reason: SDUPTHER

## 2019-03-23 RX ORDER — GUAIFENESIN 100 MG/5ML
81 LIQUID (ML) ORAL DAILY
Status: DISCONTINUED | OUTPATIENT
Start: 2019-03-23 | End: 2019-03-25 | Stop reason: HOSPADM

## 2019-03-23 RX ORDER — IPRATROPIUM BROMIDE AND ALBUTEROL SULFATE 2.5; .5 MG/3ML; MG/3ML
3 SOLUTION RESPIRATORY (INHALATION)
Status: COMPLETED | OUTPATIENT
Start: 2019-03-23 | End: 2019-03-23

## 2019-03-23 RX ORDER — METOPROLOL TARTRATE 50 MG/1
50 TABLET ORAL 2 TIMES DAILY
Status: DISCONTINUED | OUTPATIENT
Start: 2019-03-23 | End: 2019-03-25 | Stop reason: HOSPADM

## 2019-03-23 RX ORDER — SODIUM CHLORIDE 0.9 % (FLUSH) 0.9 %
5-40 SYRINGE (ML) INJECTION EVERY 8 HOURS
Status: DISCONTINUED | OUTPATIENT
Start: 2019-03-23 | End: 2019-03-25 | Stop reason: SDUPTHER

## 2019-03-23 RX ORDER — FLUTICASONE PROPIONATE 50 MCG
2 SPRAY, SUSPENSION (ML) NASAL DAILY
Status: DISCONTINUED | OUTPATIENT
Start: 2019-03-23 | End: 2019-03-25 | Stop reason: HOSPADM

## 2019-03-23 RX ADMIN — METHYLPREDNISOLONE SODIUM SUCCINATE 60 MG: 125 INJECTION, POWDER, FOR SOLUTION INTRAMUSCULAR; INTRAVENOUS at 15:05

## 2019-03-23 RX ADMIN — METHYLPREDNISOLONE SODIUM SUCCINATE 125 MG: 125 INJECTION, POWDER, FOR SOLUTION INTRAMUSCULAR; INTRAVENOUS at 07:05

## 2019-03-23 RX ADMIN — METHYLPREDNISOLONE SODIUM SUCCINATE 60 MG: 125 INJECTION, POWDER, FOR SOLUTION INTRAMUSCULAR; INTRAVENOUS at 22:09

## 2019-03-23 RX ADMIN — DOXYCYCLINE HYCLATE 100 MG: 100 TABLET, COATED ORAL at 13:10

## 2019-03-23 RX ADMIN — FAMOTIDINE 20 MG: 20 TABLET ORAL at 13:10

## 2019-03-23 RX ADMIN — ATORVASTATIN CALCIUM 40 MG: 40 TABLET, FILM COATED ORAL at 21:02

## 2019-03-23 RX ADMIN — METOPROLOL TARTRATE 50 MG: 50 TABLET ORAL at 21:02

## 2019-03-23 RX ADMIN — Medication 10 ML: at 21:04

## 2019-03-23 RX ADMIN — IPRATROPIUM BROMIDE AND ALBUTEROL SULFATE 3 ML: .5; 3 SOLUTION RESPIRATORY (INHALATION) at 07:05

## 2019-03-23 RX ADMIN — METOPROLOL TARTRATE 50 MG: 50 TABLET ORAL at 13:10

## 2019-03-23 RX ADMIN — IPRATROPIUM BROMIDE AND ALBUTEROL SULFATE 3 ML: .5; 3 SOLUTION RESPIRATORY (INHALATION) at 08:22

## 2019-03-23 RX ADMIN — DOXYCYCLINE HYCLATE 100 MG: 100 TABLET, COATED ORAL at 21:02

## 2019-03-23 RX ADMIN — Medication 10 ML: at 21:03

## 2019-03-23 RX ADMIN — ASPIRIN 81 MG 81 MG: 81 TABLET ORAL at 13:10

## 2019-03-23 NOTE — PROGRESS NOTES
Primary Nurse Buck Cobb RN and Summer, RN performed a dual skin assessment on this patient No impairment noted Joe score is 22

## 2019-03-23 NOTE — ED NOTES
SpO2 89% with the pt receiving 3L O2 via NC. Increased O2 to 4L. SpO2 now 97%. No obvious distress noted. Will continue to monitor.

## 2019-03-23 NOTE — ED NOTES
Pharmacy called and notified pt Gertha Curling at bedside. Pharmacy to send tech to label medication

## 2019-03-23 NOTE — ED NOTES
TRANSFER - OUT REPORT: 
 
Verbal report given to [facility]) on Vallorie Meter  being transferred to [facility](unit) for routine progression of care. Report consisted of patients Situation, Background, Assessment and  
Recommendations(SBAR). Information from the following report(s) SBAR was reviewed with the receiving nurse. Lines:  
Peripheral IV 03/23/19 Right Antecubital (Active) Site Assessment Clean, dry, & intact 3/23/2019  8:27 AM  
Phlebitis Assessment 0 3/23/2019  8:27 AM  
Infiltration Assessment 0 3/23/2019  8:27 AM  
Dressing Status Clean, dry, & intact 3/23/2019  8:27 AM  
  
 
Opportunity for questions and clarification was provided. Patient transported with: 
 [employer]

## 2019-03-23 NOTE — H&P
Hospitalist Admission NoteNAME: Trey Sanderson :  1940 MRN:  [id number] Date/Time:  3/23/2019 9:55 AM 
 
Patient PCP: Tanya Cifuentes MD 
______________________________________________________________________ Given the patient's current clinical presentation, I have a high level of concern for decompensation if discharged from the emergency department. Complex decision making was performed, which includes reviewing the patient's available past medical records, laboratory results, and x-ray films. My assessment of this patient's clinical condition and my plan of care is as follows. Assessment / Plan: Active Problems: COPD exacerbation ([facility] (3/23/2019) Presented with worsening dyspnea, cough and hypoxic when arrives, improving, continue steroid nebs and abx HTN (hypertension) (2011) Continue monitoring reordered home anti  HTN meds [facility] (2012) Overview: Dr. Joyce Gaming - diagnosed 2012 - s/p EBRT on Lupron for 18 mos - PSA 2015 29, CT/bone scan with L4 sclerotic lesion, likely met -  
    treated with casodex 50mg daily x 3 weeks the lupron every 6 mos and  
    prolia every 6 mos per Dr. Sumit Lopez Continue Tahira Leopard Hyperlipidemia (2016) Will continue statin Hypoxemia (3/23/2019) 
 due to  COPD exacerbation treatment as above Code Status: Full code Surrogate Decision Maker: DVT Prophylaxis: Heparin GI Prophylaxis: not indicated Baseline: Independent ADL Subjective: CHIEF COMPLAINT: shortness of breath HISTORY OF PRESENT ILLNESS:    
Judy Razo is a 78 y.o.    male  With PMH of HTN COPD, prostate cancer, CAD who presents with shortness of breath, has history of COPD and on 3 liter of oxygen, feels that 3 liter in not enough, started  having cough and dyspnea this morning, severe and progressive, called EMS, patient symptoms started sudden with worsening course, cough and minimal sputum production , no flu like symptoms, no fever or chills, has history of prostate cancer see Dr. Roslyn Leary on  [facility], no history of stroke, independent lives at home. We were asked to admit for work up and evaluation of the above problems. Past Medical History:  
Diagnosis Date  CAD (coronary artery disease)   
 mi 10/19/2015 with stent  COPD (chronic obstructive pulmonary disease) (HCC)  Hypertension  Prostate cancer ([facility] 5/8/2012 Dr. Zara Kaiser - diagnosed 2/2012 - s/p EBRT on Lupron Past Surgical History:  
Procedure Laterality Date  HX ORTHOPAEDIC  1961  
 left knee surgery Social History Tobacco Use  Smoking status: Former Smoker Packs/day: 0.50 Years: 40.00 Pack years: 20.00 Types: Cigarettes Last attempt to quit: 10/19/2015 Years since quitting: 3.4  Smokeless tobacco: Never Used  Tobacco comment: trying quit 10/13/15 - 1/2 ppd Substance Use Topics  Alcohol use: No  
  Alcohol/week: 0.0 oz Family History Problem Relation Age of Onset  Heart Disease Mother   
     pacemaker  Diabetes Mother  Diabetes Sister  Heart Disease Brother No Known Allergies Prior to Admission medications Medication Sig Start Date End Date Taking? Authorizing Provider  
lisinopril (PRINIVIL, ZESTRIL) 2.5 mg tablet TAKE 1 TABLET BY MOUTH ONCE DAILY 3/19/19  Yes Asmita Waterman MD  
Oxygen 3L at night   Yes Provider, Historical  
atorvastatin (LIPITOR) 40 mg tablet TAKE 1 TABLET EVERY DAY 9/20/18  Yes Saulo Carroll MD  
metoprolol tartrate (LOPRESSOR) 50 mg tablet TAKE ONE TABLET BY MOUTH TWICE DAILY 4/2/18  Yes Asmita Waterman MD  
XTANDI 40 mg capsule Take 160 mg by mouth daily. 1/11/18  Yes Provider, Historical  
aspirin 81 mg chewable tablet Take 1 Tab by mouth daily.  RISK OF HEART ATTACK IF ANY MISSED DOSES 10/21/15  Yes Cammie Rodriguez MD  
albuterol (PROVENTIL HFA, VENTOLIN HFA, PROAIR HFA) 90 mcg/actuation inhaler Use 2 puffs very 4 hours prn 2/18/19   Daniella Irizarry MD  
amoxicillin-clavulanate (AUGMENTIN) 875-125 mg per tablet Take 1 Tab by mouth every twelve (12) hours. 2/18/19   Daniella Irizarry MD  
fluticasone (FLONASE) 50 mcg/actuation nasal spray 2 Sprays by Both Nostrils route daily. 2/18/19   Daniella Irizarry MD  
famotidine (PEPCID) 20 mg tablet TAKE 1 TABLET EVERY DAY AS NEEDED  FOR  HEARTBURN 4/2/18   Pancho Saalzar MD  
 
 
REVIEW OF SYSTEMS:    
I am not able to complete the review of systems because: The patient is intubated and sedated The patient has altered mental status due to his acute medical problems The patient has baseline aphasia from prior stroke(s) The patient has baseline dementia and is not reliable historian The patient is in acute medical distress and unable to provide information Total of 12 systems reviewed as follows:   
   POSITIVE= underlined text  Negative = text not underlined General:  fever, chills, sweats, generalized weakness, weight loss/gain,  
   loss of appetite Eyes:    blurred vision, eye pain, loss of vision, double vision ENT:    rhinorrhea, pharyngitis Respiratory:   cough, sputum production, SOB, WHEAT, wheezing, pleuritic pain  
Cardiology:   chest pain, palpitations, orthopnea, PND, edema, syncope Gastrointestinal:  abdominal pain , N/V, diarrhea, dysphagia, constipation, bleeding Genitourinary:  frequency, urgency, dysuria, hematuria, incontinence Muskuloskeletal :  arthralgia, myalgia, back pain Hematology:  easy bruising, nose or gum bleeding, lymphadenopathy Dermatological: rash, ulceration, pruritis, color change / jaundice Endocrine:   hot flashes or polydipsia Neurological:  headache, dizziness, confusion, focal weakness, paresthesia, 
 Speech difficulties, memory loss, gait difficulty Psychological: Feelings of anxiety, depression, agitation Objective: VITALS:   
Visit Vitals /66 Pulse (!) 102 Temp 98 °F (36.7 °C) Resp 26 Ht 6' (1.829 m) Wt 187 lb 6.3 oz (85 kg) SpO2 97% BMI 25.41 kg/m² PHYSICAL EXAM: 
 
General:    Alert, cooperative, no distress, appears stated age. HEENT: Atraumatic, anicteric sclerae, pink conjunctivae No oral ulcers, mucosa moist, throat clear, dentition fair Neck:  Supple, symmetrical,  thyroid: non tender Lungs:   Occasional  Wheezing, few  Rhonchi. No rales. Chest wall:  No tenderness  No Accessory muscle use. Heart:   Regular  rhythm,  No  murmur   No edema Abdomen:   Soft, non-tender. Not distended. Bowel sounds normal 
Extremities: No cyanosis. No clubbing,   
  Skin turgor normal, Capillary refill normal, Radial dial pulse 2+ Skin:     Not pale. Not Jaundiced  No rashes Psych:  Good insight. Not depressed. Not anxious or agitated. Neurologic: EOMs intact. No facial asymmetry. No aphasia or slurred speech. Symmetrical strength, Sensation grossly intact. Alert and oriented X 4.  
 
_______________________________________________________________________ Care Plan discussed with: 
  Comments Patient Family RN Care Manager Consultant:     
_______________________________________________________________________ Expected  Disposition:  
Home with Family HH/PT/OT/RN   
SNF/LTC   
NAEL   
________________________________________________________________________ TOTAL TIME:  35 Minutes Critical Care Provided     Minutes non procedure based Comments Reviewed previous records  
>50% of visit spent in counseling and coordination of care  Discussion with patient and/or family and questions answered 
  
 
________________________________________________________________________ Signed: Georgia Sparrow MD 
 
 Procedures: see electronic medical records for all procedures/Xrays and details which were not copied into this note but were reviewed prior to creation of Plan. LAB DATA REVIEWED:   
Recent Results (from the past 24 hour(s)) EKG, 12 LEAD, INITIAL Collection Time: 03/23/19  6:43 AM  
Result Value Ref Range Ventricular Rate 95 BPM  
 Atrial Rate 95 BPM  
 P-R Interval 140 ms QRS Duration 80 ms  
 Q-T Interval 402 ms QTC Calculation (Bezet) 505 ms Calculated P Axis 47 degrees Calculated R Axis 25 degrees Calculated T Axis 84 degrees Diagnosis Normal sinus rhythm Possible Inferior infarct , age undetermined Anterior infarct (cited on or before 23-MAR-2019) T wave abnormality, consider lateral ischemia Prolonged QT When compared with ECG of 16-DEC-2018 12:35, No significant change was found CBC WITH AUTOMATED DIFF Collection Time: 03/23/19  6:56 AM  
Result Value Ref Range WBC 5.1 4.1 - 11.1 K/uL  
 RBC 4.01 (L) 4.10 - 5.70 M/uL  
 HGB 11.8 (L) 12.1 - 17.0 g/dL HCT 35.5 (L) 36.6 - 50.3 % MCV 88.5 80.0 - 99.0 FL  
 MCH 29.4 26.0 - 34.0 PG  
 MCHC 33.2 30.0 - 36.5 g/dL  
 RDW 17.0 (H) 11.5 - 14.5 % PLATELET 538 (L) 856 - 400 K/uL MPV 11.8 8.9 - 12.9 FL  
 NRBC 0.0 0  WBC ABSOLUTE NRBC 0.00 0.00 - 0.01 K/uL NEUTROPHILS 59 32 - 75 % LYMPHOCYTES 29 12 - 49 % MONOCYTES 11 5 - 13 % EOSINOPHILS 1 0 - 7 % BASOPHILS 0 0 - 1 % IMMATURE GRANULOCYTES 0 0.0 - 0.5 % ABS. NEUTROPHILS 3.0 1.8 - 8.0 K/UL  
 ABS. LYMPHOCYTES 1.5 0.8 - 3.5 K/UL  
 ABS. MONOCYTES 0.5 0.0 - 1.0 K/UL  
 ABS. EOSINOPHILS 0.1 0.0 - 0.4 K/UL  
 ABS. BASOPHILS 0.0 0.0 - 0.1 K/UL  
 ABS. IMM. GRANS. 0.0 0.00 - 0.04 K/UL  
 DF AUTOMATED METABOLIC PANEL, COMPREHENSIVE Collection Time: 03/23/19  6:56 AM  
Result Value Ref Range Sodium 144 136 - 145 mmol/L Potassium 3.6 3.5 - 5.1 mmol/L  Chloride 111 (H) 97 - 108 mmol/L  
 CO2 24 21 - 32 mmol/L  
 Anion gap 9 5 - 15 mmol/L Glucose 123 (H) 65 - 100 mg/dL BUN 15 6 - 20 MG/DL Creatinine 1.23 0.70 - 1.30 MG/DL  
 BUN/Creatinine ratio 12 12 - 20 GFR est AA >60 >60 ml/min/1.73m2 GFR est non-AA 56 (L) >60 ml/min/1.73m2 Calcium 9.0 8.5 - 10.1 MG/DL Bilirubin, total 0.5 0.2 - 1.0 MG/DL  
 ALT (SGPT) 12 12 - 78 U/L  
 AST (SGOT) 13 (L) 15 - 37 U/L Alk. phosphatase 62 45 - 117 U/L Protein, total 7.6 6.4 - 8.2 g/dL Albumin 3.6 3.5 - 5.0 g/dL Globulin 4.0 2.0 - 4.0 g/dL A-G Ratio 0.9 (L) 1.1 - 2.2 MAGNESIUM Collection Time: 03/23/19  6:56 AM  
Result Value Ref Range Magnesium 2.0 1.6 - 2.4 mg/dL NT-PRO BNP Collection Time: 03/23/19  6:56 AM  
Result Value Ref Range NT pro- <450 PG/ML  
TROPONIN I Collection Time: 03/23/19  6:56 AM  
Result Value Ref Range Troponin-I, Qt. <0.05 <0.05 ng/mL POC LACTIC ACID Collection Time: 03/23/19  7:02 AM  
Result Value Ref Range Lactic Acid (POC) 1.58 0.40 - 2.00 mmol/L INFLUENZA A & B AG (RAPID TEST) Collection Time: 03/23/19  8:13 AM  
Result Value Ref Range Influenza A Antigen NEGATIVE  NEG  Influenza B Antigen NEGATIVE  NEG

## 2019-03-23 NOTE — ED NOTES
Bedside shift change report given to [address], RN (oncoming nurse) by Nicolasa Carmona RN (offgoing nurse). Report included the following information SBAR, Kardex, ED Summary, Intake/Output, MAR, Recent Results and Cardiac Rhythm NSR.

## 2019-03-23 NOTE — PROGRESS NOTES
Bedside shift change report given to [facility], RN (oncoming nurse). Report included the following information SBAR, Kardex, Intake/Output, MAR and Recent Results. SHIFT SUMMARY: 
 
 
CONCERNS TO ADDRESS WITH MD: 
 
 
 
[address] NURSING NOTE Admission Date 3/23/2019 Admission Diagnosis COPD exacerbation ([facility] [J44.1] Hypoxemia [R09.02] Consults IP CONSULT TO HOSPITALIST Cardiac Monitoring [x] Yes [] No  
  
Purposeful Hourly Rounding [x] Yes   
Shavonne Score Total Score: 1 Shavonne score 3 or > [x] Bed Alarm [] Avasys [] 1:1 sitter [] Patient refused (Signed refusal form in chart) Joe Score Joe Score: 20 Joe score 14 or < [] PMT consult [] Wound Care consult  
 []  Specialty bed  [] Nutrition consult Influenza Vaccine Received Flu Vaccine for Current Season (usually Sept-March): Yes Oxygen needs? [] Room air Oxygen @  []1L    []2L    []3L   [x]4L    []5L   []6L via NC Chronic home O2 use? [x] Yes [] No 
Perform O2 challenge test and document in progress note using smartphrase (.Homeoxygen) Last bowel movement Urinary Catheter LDAs Peripheral IV 03/23/19 Right Antecubital (Active) Site Assessment Clean, dry, & intact 3/23/2019  7:39 PM  
Phlebitis Assessment 0 3/23/2019  7:39 PM  
Infiltration Assessment 0 3/23/2019  7:39 PM  
Dressing Status Clean, dry, & intact; Clean 3/23/2019  7:39 PM  
Dressing Type Transparent 3/23/2019  7:39 PM  
Hub Color/Line Status Pink;Capped 3/23/2019  7:39 PM  
                  
  
Readmission Risk Assessment Tool Score Medium Risk 25 Total Score 3 Has Seen PCP in Last 6 Months (Yes=3, No=0)  
 5 Pt. Coverage (Medicare=5 , Medicaid, or Self-Pay=4) 10 Charlson Comorbidity Score (Age + Comorbid Conditions) Criteria that do not apply:  
 . Living with Significant Other. Assisted Living. LTAC. SNF. or  
Rehab Patient Length of Stay (>5 days = 3) IP Visits Last 12 Months (1-3=4, 4=9, >4=11) Expected Length of Stay - - - Actual Length of Stay 0

## 2019-03-23 NOTE — ED TRIAGE NOTES
Pt ambulated from [facility] to triage with family on home oxygen @ 3 LPM Pt initially with saturations of 88-89% on 3 LPM after ambulating After sitting for minute pt oxygen saturations increased to 92% on 3 LPM

## 2019-03-23 NOTE — ED NOTES
Bedside and Verbal shift change report given to [address] RN (oncoming nurse) by this RN (offgoing nurse). Report included the following information SBAR.

## 2019-03-23 NOTE — PROGRESS NOTES
Problem: Falls - Risk of 
Goal: *Absence of Falls Description Document Brian Billings Fall Risk and appropriate interventions in the flowsheet.  
Outcome: Progressing Towards Goal

## 2019-03-23 NOTE — ED PROVIDER NOTES
EMERGENCY DEPARTMENT HISTORY AND PHYSICAL EXAM 
 
 
Date: 3/23/2019 Patient Name: Trey Sanderson History of Presenting Illness Chief Complaint Patient presents with  Shortness of Breath Ambulatory c/o SOB x 230 AM today Hx of COPD History Provided By: Patient, Patient's Wife and EMS 
 
HPI: Trey Sanderson, 78 y.o. male with PMHx significant for COPD, presents via private vehicle to the ED with cc of shortness of breath that began around 230 this morning. Patient with a history of oxygen dependent COPD normally on 3 L at home. Sees Dr. Evin Arshad with pulmonary. He reports a minimally productive cough of yellow and white sputum for the past several days. Denies any fevers or chills. Denies any chest pain. Denies any orthopnea. Denies any nausea vomiting diarrhea. He has had a flu shot this year. He denies any lower extremity swelling. PMHx: Significant for COPD, CAD, hypertension PSHx: Significant for left knee surgery Social Hx: Former smoker quit in 2015. Nondrinker, no illicit drug use There are no other complaints, changes, or physical findings at this time. PCP: Tanya Cifuentes MD 
 
No current facility-administered medications on file prior to encounter. Current Outpatient Medications on File Prior to Encounter Medication Sig Dispense Refill  lisinopril (PRINIVIL, ZESTRIL) 2.5 mg tablet TAKE 1 TABLET BY MOUTH ONCE DAILY 90 Tab 3  
 Oxygen 3L at night  atorvastatin (LIPITOR) 40 mg tablet TAKE 1 TABLET EVERY DAY 90 Tab 4  
 metoprolol tartrate (LOPRESSOR) 50 mg tablet TAKE ONE TABLET BY MOUTH TWICE DAILY 60 Tab 6  XTANDI 40 mg capsule Take 160 mg by mouth daily.  aspirin 81 mg chewable tablet Take 1 Tab by mouth daily.  RISK OF HEART ATTACK IF ANY MISSED DOSES 90 Tab 3  
 albuterol (PROVENTIL HFA, VENTOLIN HFA, PROAIR HFA) 90 mcg/actuation inhaler Use 2 puffs very 4 hours prn 1 Inhaler 1  
  amoxicillin-clavulanate (AUGMENTIN) 875-125 mg per tablet Take 1 Tab by mouth every twelve (12) hours. 14 Tab 0  
 fluticasone (FLONASE) 50 mcg/actuation nasal spray 2 Sprays by Both Nostrils route daily. 1 Bottle 1  
 famotidine (PEPCID) 20 mg tablet TAKE 1 TABLET EVERY DAY AS NEEDED  FOR  HEARTBURN 90 Tab 3 Past History Past Medical History: 
Past Medical History:  
Diagnosis Date  CAD (coronary artery disease)   
 mi 10/19/2015 with stent  COPD (chronic obstructive pulmonary disease) (HCC)  Hypertension  Prostate cancer ([facility] [address] 5/8/2012 Dr. Philip Ho - diagnosed 2/2012 - s/p EBRT on Lupron Past Surgical History: 
Past Surgical History:  
Procedure Laterality Date  HX ORTHOPAEDIC  1961  
 left knee surgery Family History: 
Family History Problem Relation Age of Onset  Heart Disease Mother   
     pacemaker  Diabetes Mother  Diabetes Sister  Heart Disease Brother Social History: 
Social History Tobacco Use  Smoking status: Former Smoker Packs/day: 0.50 Years: 40.00 Pack years: 20.00 Types: Cigarettes Last attempt to quit: 10/19/2015 Years since quitting: 3.4  Smokeless tobacco: Never Used  Tobacco comment: trying quit 10/13/15 - 1/2 ppd Substance Use Topics  Alcohol use: No  
  Alcohol/week: 0.0 oz  Drug use: No  
 
 
Allergies: 
No Known Allergies Review of Systems Review of Systems Constitutional: Negative for activity change, chills and fever. HENT: Negative for congestion and sore throat. Eyes: Negative for pain and redness. Respiratory: Positive for cough, chest tightness and shortness of breath. Cardiovascular: Negative for chest pain and palpitations. Gastrointestinal: Negative for abdominal pain, diarrhea, nausea and vomiting. Genitourinary: Negative for dysuria, frequency and urgency. Musculoskeletal: Negative for back pain and neck pain. Skin: Negative for rash. Neurological: Negative for syncope, light-headedness and headaches. Psychiatric/Behavioral: Negative for confusion. All other systems reviewed and are negative. Physical Exam  
Physical Exam  
Constitutional: He is oriented to person, place, and time. He appears well-developed and well-nourished. No distress. HENT:  
Head: Normocephalic. Nose: Nose normal.  
Mouth/Throat: Oropharynx is clear and moist. No oropharyngeal exudate. Eyes: Pupils are equal, round, and reactive to light. Conjunctivae are normal. No scleral icterus. Neck: Normal range of motion. Neck supple. No JVD present. No tracheal deviation present. No thyromegaly present. Cardiovascular: Normal rate, regular rhythm and intact distal pulses. Exam reveals no gallop and no friction rub. No murmur heard. Pulmonary/Chest: Effort normal. No stridor. No respiratory distress. He has wheezes. He has no rales. Bilateral expiratory wheezing with mildly increased work of breathing. Abdominal: Soft. Bowel sounds are normal. He exhibits no distension. There is no tenderness. There is no rebound and no guarding. Musculoskeletal: Normal range of motion. He exhibits no edema. Lymphadenopathy:  
  He has no cervical adenopathy. Neurological: He is alert and oriented to person, place, and time. No cranial nerve deficit. He exhibits normal muscle tone. Coordination normal.  
Skin: Skin is warm and dry. No rash noted. He is not diaphoretic. No erythema. Psychiatric: He has a normal mood and affect. His behavior is normal.  
Nursing note and vitals reviewed. Diagnostic Study Results Labs - Recent Results (from the past 12 hour(s)) EKG, 12 LEAD, INITIAL Collection Time: 03/23/19  6:43 AM  
Result Value Ref Range Ventricular Rate 95 BPM  
 Atrial Rate 95 BPM  
 P-R Interval 140 ms QRS Duration 80 ms  
 Q-T Interval 402 ms QTC Calculation (Bezet) 505 ms Calculated P Axis 47 degrees Calculated R Axis 25 degrees Calculated T Axis 84 degrees Diagnosis Normal sinus rhythm Possible Inferior infarct , age undetermined Anterior infarct (cited on or before 23-MAR-2019) T wave abnormality, consider lateral ischemia Prolonged QT When compared with ECG of 16-DEC-2018 12:35, No significant change was found CBC WITH AUTOMATED DIFF Collection Time: 03/23/19  6:56 AM  
Result Value Ref Range WBC 5.1 4.1 - 11.1 K/uL  
 RBC 4.01 (L) 4.10 - 5.70 M/uL  
 HGB 11.8 (L) 12.1 - 17.0 g/dL HCT 35.5 (L) 36.6 - 50.3 % MCV 88.5 80.0 - 99.0 FL  
 MCH 29.4 26.0 - 34.0 PG  
 MCHC 33.2 30.0 - 36.5 g/dL  
 RDW 17.0 (H) 11.5 - 14.5 % PLATELET 472 (L) 499 - 400 K/uL MPV 11.8 8.9 - 12.9 FL  
 NRBC 0.0 0  WBC ABSOLUTE NRBC 0.00 0.00 - 0.01 K/uL NEUTROPHILS 59 32 - 75 % LYMPHOCYTES 29 12 - 49 % MONOCYTES 11 5 - 13 % EOSINOPHILS 1 0 - 7 % BASOPHILS 0 0 - 1 % IMMATURE GRANULOCYTES 0 0.0 - 0.5 % ABS. NEUTROPHILS 3.0 1.8 - 8.0 K/UL  
 ABS. LYMPHOCYTES 1.5 0.8 - 3.5 K/UL  
 ABS. MONOCYTES 0.5 0.0 - 1.0 K/UL  
 ABS. EOSINOPHILS 0.1 0.0 - 0.4 K/UL  
 ABS. BASOPHILS 0.0 0.0 - 0.1 K/UL  
 ABS. IMM. GRANS. 0.0 0.00 - 0.04 K/UL  
 DF AUTOMATED METABOLIC PANEL, COMPREHENSIVE Collection Time: 03/23/19  6:56 AM  
Result Value Ref Range Sodium 144 136 - 145 mmol/L Potassium 3.6 3.5 - 5.1 mmol/L Chloride 111 (H) 97 - 108 mmol/L  
 CO2 24 21 - 32 mmol/L Anion gap 9 5 - 15 mmol/L Glucose 123 (H) 65 - 100 mg/dL BUN 15 6 - 20 MG/DL Creatinine 1.23 0.70 - 1.30 MG/DL  
 BUN/Creatinine ratio 12 12 - 20 GFR est AA >60 >60 ml/min/1.73m2 GFR est non-AA 56 (L) >60 ml/min/1.73m2 Calcium 9.0 8.5 - 10.1 MG/DL Bilirubin, total 0.5 0.2 - 1.0 MG/DL  
 ALT (SGPT) 12 12 - 78 U/L  
 AST (SGOT) 13 (L) 15 - 37 U/L Alk. phosphatase 62 45 - 117 U/L Protein, total 7.6 6.4 - 8.2 g/dL Albumin 3.6 3.5 - 5.0 g/dL Globulin 4.0 2.0 - 4.0 g/dL A-G Ratio 0.9 (L) 1.1 - 2.2 MAGNESIUM Collection Time: 03/23/19  6:56 AM  
Result Value Ref Range Magnesium 2.0 1.6 - 2.4 mg/dL NT-PRO BNP Collection Time: 03/23/19  6:56 AM  
Result Value Ref Range NT pro- <450 PG/ML  
TROPONIN I Collection Time: 03/23/19  6:56 AM  
Result Value Ref Range Troponin-I, Qt. <0.05 <0.05 ng/mL POC LACTIC ACID Collection Time: 03/23/19  7:02 AM  
Result Value Ref Range Lactic Acid (POC) 1.58 0.40 - 2.00 mmol/L Radiologic Studies -  
XR CHEST PORT Final Result IMPRESSION: No Acute Disease. CT Results  (Last 48 hours) None CXR Results  (Last 48 hours) 03/23/19 0704  XR CHEST PORT Final result Impression:  IMPRESSION: No Acute Disease. Narrative:  EXAM: Portable CXR. 0637 hours. INDICATION: SOB  
   
FINDINGS:  
The lungs are clear. Heart is normal in size. There is no overt pulmonary edema. There is no evident pneumothorax, adenopathy or pleural effusion. No significant  
change since 12/16/2018. Medical Decision Making I am the first provider for this patient. I reviewed the vital signs, available nursing notes, past medical history, past surgical history, family history and social history. Vital Signs-Reviewed the patient's vital signs. Patient Vitals for the past 12 hrs: 
 Temp Pulse Resp BP SpO2  
03/23/19 0825     97 % 03/23/19 0820     (!) 89 % 03/23/19 0800    141/66 92 % 03/23/19 0730    161/65 90 % 03/23/19 0700    145/68 93 % 03/23/19 0658    141/87 92 % 03/23/19 0643     92 % 03/23/19 0638 98 °F (36.7 °C) (!) 102 26 151/74 (!) 88 % Pulse Oximetry Analysis - 88% on RA Cardiac Monitor:  
Rate: 102 bpm 
Rhythm: Sinus Tachycardia ED EKG interpretation: 06:43 Rhythm: normal sinus rhythm; and regular .  Rate (approx.): 96; Axis: normal; [place] Interval: normal; QRS interval: normal ; ST/T wave: non-specific changes; This EKG was interpreted by Delvis Jean MD,ED Provider. Records Reviewed: Nursing Notes and Old Medical Records Provider Notes (Medical Decision Making): DDX: Acute COPD, CHF, CAD, ACS, pneumonia. ED Course:  
Initial assessment performed. The patients presenting problems have been discussed, and they are in agreement with the care plan formulated and outlined with them. I have encouraged them to ask questions as they arise throughout their visit. PROGRESS NOTE 
08:27 Pt reevaluated. Pt feeling slightly better after nebs and steroids. CXR clear. No increased WBC negative troponin; only minimally elevated BNP; will admit to hospitalist for COPD exacerbation with mildly increased oxygen requirement from baseline. Written by Delvis Jean MD  
 
08:28 I spoke with Dr. Maria L Traore, hospitalist, he will evaluate the patient for admission. Critical Care Time:  
0 Disposition: 
Admit to hospital 
 
PLAN: 
1. Current Discharge Medication List  
  
 
2. Follow-up Information None Return to ED if worse Diagnosis Clinical Impression: 1. Acute exacerbation of chronic obstructive pulmonary disease (COPD) ([facility] [address]

## 2019-03-24 LAB
ANION GAP SERPL CALC-SCNC: 8 MMOL/L (ref 5–15)
BUN SERPL-MCNC: 20 MG/DL (ref 6–20)
BUN/CREAT SERPL: 15 (ref 12–20)
CALCIUM SERPL-MCNC: 8.9 MG/DL (ref 8.5–10.1)
CHLORIDE SERPL-SCNC: 111 MMOL/L (ref 97–108)
CO2 SERPL-SCNC: 20 MMOL/L (ref 21–32)
CREAT SERPL-MCNC: 1.33 MG/DL (ref 0.7–1.3)
GLUCOSE SERPL-MCNC: 188 MG/DL (ref 65–100)
POTASSIUM SERPL-SCNC: 4.1 MMOL/L (ref 3.5–5.1)
SODIUM SERPL-SCNC: 139 MMOL/L (ref 136–145)

## 2019-03-24 PROCEDURE — 74011250637 HC RX REV CODE- 250/637: Performed by: FAMILY MEDICINE

## 2019-03-24 PROCEDURE — 74011636637 HC RX REV CODE- 636/637: Performed by: NEUROMUSCULOSKELETAL MEDICINE & OMM

## 2019-03-24 PROCEDURE — 36415 COLL VENOUS BLD VENIPUNCTURE: CPT

## 2019-03-24 PROCEDURE — 74011250636 HC RX REV CODE- 250/636: Performed by: FAMILY MEDICINE

## 2019-03-24 PROCEDURE — 80048 BASIC METABOLIC PNL TOTAL CA: CPT

## 2019-03-24 PROCEDURE — 77010033678 HC OXYGEN DAILY

## 2019-03-24 PROCEDURE — 65660000000 HC RM CCU STEPDOWN

## 2019-03-24 PROCEDURE — 74011250637 HC RX REV CODE- 250/637: Performed by: NEUROMUSCULOSKELETAL MEDICINE & OMM

## 2019-03-24 PROCEDURE — 94760 N-INVAS EAR/PLS OXIMETRY 1: CPT

## 2019-03-24 RX ORDER — IPRATROPIUM BROMIDE AND ALBUTEROL SULFATE 2.5; .5 MG/3ML; MG/3ML
3 SOLUTION RESPIRATORY (INHALATION)
Qty: 30 NEBULE | Refills: 3 | Status: SHIPPED | OUTPATIENT
Start: 2019-03-24 | End: 2019-03-25 | Stop reason: SDUPTHER

## 2019-03-24 RX ORDER — PREDNISONE 20 MG/1
60 TABLET ORAL
Status: DISCONTINUED | OUTPATIENT
Start: 2019-03-24 | End: 2019-03-25 | Stop reason: HOSPADM

## 2019-03-24 RX ORDER — FLUTICASONE PROPIONATE AND SALMETEROL 250; 50 UG/1; UG/1
1 POWDER RESPIRATORY (INHALATION)
Status: DISCONTINUED | OUTPATIENT
Start: 2019-03-24 | End: 2019-03-24 | Stop reason: CLARIF

## 2019-03-24 RX ORDER — FLUTICASONE FUROATE AND VILANTEROL 100; 25 UG/1; UG/1
1 POWDER RESPIRATORY (INHALATION) DAILY
Qty: 1 INHALER | Refills: 3 | Status: SHIPPED | OUTPATIENT
Start: 2019-03-24 | End: 2019-03-25

## 2019-03-24 RX ORDER — FLUTICASONE FUROATE AND VILANTEROL 100; 25 UG/1; UG/1
1 POWDER RESPIRATORY (INHALATION) DAILY
Status: DISCONTINUED | OUTPATIENT
Start: 2019-03-24 | End: 2019-03-25 | Stop reason: HOSPADM

## 2019-03-24 RX ORDER — PREDNISONE 20 MG/1
TABLET ORAL
Qty: 25 TAB | Refills: 0 | Status: SHIPPED | OUTPATIENT
Start: 2019-03-25 | End: 2019-03-25

## 2019-03-24 RX ADMIN — PREDNISONE 60 MG: 20 TABLET ORAL at 10:36

## 2019-03-24 RX ADMIN — Medication 10 ML: at 05:13

## 2019-03-24 RX ADMIN — METOPROLOL TARTRATE 50 MG: 50 TABLET ORAL at 09:16

## 2019-03-24 RX ADMIN — Medication 10 ML: at 13:30

## 2019-03-24 RX ADMIN — METHYLPREDNISOLONE SODIUM SUCCINATE 60 MG: 125 INJECTION, POWDER, FOR SOLUTION INTRAMUSCULAR; INTRAVENOUS at 06:01

## 2019-03-24 RX ADMIN — FLUTICASONE PROPIONATE 2 SPRAY: 50 SPRAY, METERED NASAL at 10:36

## 2019-03-24 RX ADMIN — DOXYCYCLINE HYCLATE 100 MG: 100 TABLET, COATED ORAL at 09:15

## 2019-03-24 RX ADMIN — Medication 10 ML: at 21:46

## 2019-03-24 RX ADMIN — FLUTICASONE FUROATE AND VILANTEROL TRIFENATATE 1 PUFF: 100; 25 POWDER RESPIRATORY (INHALATION) at 13:30

## 2019-03-24 RX ADMIN — ATORVASTATIN CALCIUM 40 MG: 40 TABLET, FILM COATED ORAL at 21:45

## 2019-03-24 RX ADMIN — ASPIRIN 81 MG 81 MG: 81 TABLET ORAL at 09:14

## 2019-03-24 RX ADMIN — DOXYCYCLINE HYCLATE 100 MG: 100 TABLET, COATED ORAL at 21:45

## 2019-03-24 RX ADMIN — FAMOTIDINE 20 MG: 20 TABLET ORAL at 09:16

## 2019-03-24 RX ADMIN — HEPARIN SODIUM 5000 UNITS: 5000 INJECTION INTRAVENOUS; SUBCUTANEOUS at 21:45

## 2019-03-24 RX ADMIN — METOPROLOL TARTRATE 50 MG: 50 TABLET ORAL at 21:45

## 2019-03-24 NOTE — PROGRESS NOTES
Home Oxygen Test 
Date of test: 3/24/19 Time of test: 1300 Sa02 95 % on 3 Liters (chronic) AT REST. Sa02 92 % on 3 Liters (chronic) DURING AMBULATION. Sa02 92 % on 3 Liters DURING AMBULATION. Sa02 92 % on 3 Liters AT REST/AFTER AMBULATION.

## 2019-03-24 NOTE — PROGRESS NOTES
Pt's family is not comfortable with pt being discharged today, they state he lives too far away from the hospital in case anything were to happen and has 14 steps to climb and do not feel pt is ready for discharge. Dr. Anny Dennison is aware and agreeable to pt staying today and discharging tomorrow. Will continue to monitor pt condition.

## 2019-03-24 NOTE — DISCHARGE SUMMARY
Hospitalist Discharge Summary Patient ID: 
Jaycob Ceja 
[id number] 
45 y.o. 
1940 PCP on record: Steff Pena MD 
 
Admit date: 3/23/2019 Discharge date and time: 3/24/2019 DISCHARGE DIAGNOSIS: 
 
  COPD exacerbation ([facility] [address] (3/23/2019) HTN (hypertension) (7/5/2011) [facility] (5/8/2012) 
     
  
  Hyperlipidemia (6/27/2016) Hypoxemia (3/23/2019) CONSULTATIONS: 
IP CONSULT TO HOSPITALIST Excerpted HPI from H&P of Chance Colin MD: 
Rachel Fishman is a 78 y.o.  male  With PMH of HTN COPD, prostate cancer, CAD who presents with shortness of breath, has history of COPD and on 3 liter of oxygen, feels that 3 liter in not enough, started  having cough and dyspnea this morning, severe and progressive, called EMS, patient symptoms started sudden with worsening course, cough and minimal sputum production , no flu like symptoms, no fever or chills, has history of prostate cancer see Dr. Mena Escobar on  [facility], no history of stroke, independent lives at home. We were asked to admit for work up and evaluation of the above problems. ______________________________________________________________________ DISCHARGE SUMMARY/HOSPITAL COURSE:  for full details see H&P, daily progress notes, labs, consult notes. Pt was admitted to acute inpt medicine for copd exacerbation. The pt's condition improved with iv steroids and nebs. The pt is planned to walk in the hallway and if does well will go home with steroid taper. _______________________________________________________________________ Patient seen and examined by me on discharge day. Pertinent Findings: 
Gen:    Not in distress Chest: Clear lungs CVS:   Regular rhythm. No edema Abd:  Soft, not distended, not tender Neuro:  Alert, nad 
_______________________________________________________________________ DISCHARGE MEDICATIONS:  
Current Discharge Medication List  
 START taking these medications Details  
fluticasone furoate-vilanterol (BREO ELLIPTA) 100-25 mcg/dose inhaler Take 1 Puff by inhalation daily. Qty: 1 Inhaler, Refills: 3  
  
predniSONE (DELTASONE) 20 mg tablet Take 60 mg by mouth daily (with breakfast) for 3 days, THEN 40 mg daily (with breakfast) for 3 days, THEN 20 mg daily (with breakfast) for 5 days. Qty: 25 Tab, Refills: 0 CONTINUE these medications which have NOT CHANGED Details  
lisinopril (PRINIVIL, ZESTRIL) 2.5 mg tablet TAKE 1 TABLET BY MOUTH ONCE DAILY Qty: 90 Tab, Refills: 3 Oxygen 3L at night  
  
atorvastatin (LIPITOR) 40 mg tablet TAKE 1 TABLET EVERY DAY Qty: 90 Tab, Refills: 4 Associated Diagnoses: Hyperlipidemia, unspecified hyperlipidemia type  
  
metoprolol tartrate (LOPRESSOR) 50 mg tablet TAKE ONE TABLET BY MOUTH TWICE DAILY Qty: 60 Tab, Refills: 6 Comments: Please consider 90 day supplies to promote better adherence XTANDI 40 mg capsule Take 160 mg by mouth daily. aspirin 81 mg chewable tablet Take 1 Tab by mouth daily. RISK OF HEART ATTACK IF ANY MISSED DOSES Qty: 90 Tab, Refills: 3  
  
albuterol (PROVENTIL HFA, VENTOLIN HFA, PROAIR HFA) 90 mcg/actuation inhaler Use 2 puffs very 4 hours prn 
Qty: 1 Inhaler, Refills: 1 Associated Diagnoses: Chronic obstructive pulmonary disease, unspecified COPD type ([facility])  
  
amoxicillin-clavulanate (AUGMENTIN) 875-125 mg per tablet Take 1 Tab by mouth every twelve (12) hours. Qty: 14 Tab, Refills: 0 Associated Diagnoses: Acute sinusitis, recurrence not specified, unspecified location  
  
fluticasone (FLONASE) 50 mcg/actuation nasal spray 2 Sprays by Both Nostrils route daily. Qty: 1 Bottle, Refills: 1 Associated Diagnoses: Acute sinusitis, recurrence not specified, unspecified location  
  
famotidine (PEPCID) 20 mg tablet TAKE 1 TABLET EVERY DAY AS NEEDED  FOR  HEARTBURN Qty: 90 Tab, Refills: 3 Associated Diagnoses: Gastroesophageal reflux disease, esophagitis presence not specified My Recommended Diet, Activity, Wound Care, and follow-up labs are listed in the patient's Discharge Insturctions which I have personally completed and reviewed. ______________________________________________________________________ Risk of deterioration: Moderate Condition at Discharge:  Stable 
______________________________________________________________________ Disposition Home with family, no needs 
______________________________________________________________________ Care Plan discussed with:  
Patient, Family, RN, Care Manager, Consultant 
 
______________________________________________________________________ Code Status: Full Code 
______________________________________________________________________ Follow up with: PCP : Adilia Blackburn MD 
Follow-up Information Follow up With Specialties Details Why Contact Info Patient's Own Medication is located in the Patient's:  Nona Blackburn MD Internal Medicine   [address] [place] [id number] [phone] Total time in minutes spent coordinating this discharge (includes going over instructions, follow-up, prescriptions, and preparing report for sign off to her PCP) :  30 minutes Signed: 
Alex Mendoza DO

## 2019-03-25 ENCOUNTER — HOME HEALTH ADMISSION (OUTPATIENT)
Dept: HOME HEALTH SERVICES | Facility: HOME HEALTH | Age: 79
End: 2019-03-25

## 2019-03-25 VITALS
SYSTOLIC BLOOD PRESSURE: 122 MMHG | DIASTOLIC BLOOD PRESSURE: 54 MMHG | HEIGHT: 72 IN | TEMPERATURE: 97.2 F | OXYGEN SATURATION: 96 % | HEART RATE: 56 BPM | BODY MASS INDEX: 25.38 KG/M2 | WEIGHT: 187.39 LBS | RESPIRATION RATE: 18 BRPM

## 2019-03-25 LAB
ANION GAP SERPL CALC-SCNC: 10 MMOL/L (ref 5–15)
BUN SERPL-MCNC: 23 MG/DL (ref 6–20)
BUN/CREAT SERPL: 20 (ref 12–20)
CALCIUM SERPL-MCNC: 8.9 MG/DL (ref 8.5–10.1)
CHLORIDE SERPL-SCNC: 110 MMOL/L (ref 97–108)
CO2 SERPL-SCNC: 22 MMOL/L (ref 21–32)
CREAT SERPL-MCNC: 1.14 MG/DL (ref 0.7–1.3)
GLUCOSE SERPL-MCNC: 90 MG/DL (ref 65–100)
POTASSIUM SERPL-SCNC: 3.7 MMOL/L (ref 3.5–5.1)
SODIUM SERPL-SCNC: 142 MMOL/L (ref 136–145)

## 2019-03-25 PROCEDURE — 36415 COLL VENOUS BLD VENIPUNCTURE: CPT

## 2019-03-25 PROCEDURE — 74011250637 HC RX REV CODE- 250/637: Performed by: FAMILY MEDICINE

## 2019-03-25 PROCEDURE — 80048 BASIC METABOLIC PNL TOTAL CA: CPT

## 2019-03-25 PROCEDURE — 74011636637 HC RX REV CODE- 636/637: Performed by: NEUROMUSCULOSKELETAL MEDICINE & OMM

## 2019-03-25 PROCEDURE — 77010033678 HC OXYGEN DAILY

## 2019-03-25 RX ORDER — FLUTICASONE FUROATE AND VILANTEROL 100; 25 UG/1; UG/1
1 POWDER RESPIRATORY (INHALATION) DAILY
Qty: 1 INHALER | Refills: 4 | Status: SHIPPED | OUTPATIENT
Start: 2019-03-25

## 2019-03-25 RX ORDER — IPRATROPIUM BROMIDE AND ALBUTEROL SULFATE 2.5; .5 MG/3ML; MG/3ML
3 SOLUTION RESPIRATORY (INHALATION)
Qty: 30 NEBULE | Refills: 4 | Status: SHIPPED | OUTPATIENT
Start: 2019-03-25 | End: 2019-10-19 | Stop reason: SDUPTHER

## 2019-03-25 RX ORDER — PREDNISONE 20 MG/1
TABLET ORAL
Qty: 26 TAB | Refills: 0 | Status: SHIPPED | OUTPATIENT
Start: 2019-03-25 | End: 2019-04-06

## 2019-03-25 RX ADMIN — PREDNISONE 60 MG: 20 TABLET ORAL at 08:34

## 2019-03-25 RX ADMIN — FLUTICASONE FUROATE AND VILANTEROL TRIFENATATE 1 PUFF: 100; 25 POWDER RESPIRATORY (INHALATION) at 08:38

## 2019-03-25 RX ADMIN — Medication 10 ML: at 05:44

## 2019-03-25 RX ADMIN — DOXYCYCLINE HYCLATE 100 MG: 100 TABLET, COATED ORAL at 08:33

## 2019-03-25 RX ADMIN — FLUTICASONE PROPIONATE 2 SPRAY: 50 SPRAY, METERED NASAL at 08:37

## 2019-03-25 RX ADMIN — FAMOTIDINE 20 MG: 20 TABLET ORAL at 08:34

## 2019-03-25 RX ADMIN — ASPIRIN 81 MG 81 MG: 81 TABLET ORAL at 08:33

## 2019-03-25 RX ADMIN — METOPROLOL TARTRATE 50 MG: 50 TABLET ORAL at 08:34

## 2019-03-25 RX ADMIN — Medication 10 ML: at 05:43

## 2019-03-25 NOTE — DISCHARGE SUMMARY
Hospitalist Discharge Summary  
  
Patient ID: 
Jaycob Ceja 
[id number] 
78 y.o. 
1940 
  
PCP on record: Steff Pena MD 
  
Admit date: 3/23/2019 Discharge date and time: 3/25/2019 
  
  
DISCHARGE DIAGNOSIS: 
  
  COPD exacerbation ([facility] [address] (3/23/2019)   
  HTN (hypertension) (7/5/2011)   
  [facility] (5/8/2012) 
 Chronic respiratory failure with hypoxia   
  Hyperlipidemia (6/27/2016)   
  Hypoxemia (3/23/2019)   
  
  
CONSULTATIONS: 
IP CONSULT TO HOSPITALIST 
  
Excerpted HPI from H&P of Chance Colin MD: 
Cricket Joseph is a 78 y.o.   male  With PMH of HTN COPD, prostate cancer, CAD who presents with shortness of breath, has history of COPD and on 3 liter of oxygen, feels that 3 liter in not enough, started  having cough and dyspnea this morning, severe and progressive, called EMS, patient symptoms started sudden with worsening course, cough and minimal sputum production , no flu like symptoms, no fever or chills, has history of prostate cancer see Dr. Kim on  [facility], no history of stroke, independent lives at home. We were asked to admit for work up and evaluation of the above problems.  
  
  
  
______________________________________________________________________ DISCHARGE SUMMARY/HOSPITAL COURSE:  for full details see H&P, daily progress notes, labs, consult notes.  
  
  
Pt was admitted to acute in medicine for copd exacerbation. The pt's condition improved with iv steroids and nebs. The pt is planned to walk in the hallway and if does well will go home with steroid taper. The pt remained overnight on 3/24 for dyspnea with activity 
  
  
_______________________________________________________________________ Patient seen and examined by me on discharge day. Pertinent Findings: 
Gen:    Not in distress Chest:  Clear lungs CVS:    Regular rhythm. No edema Abd:     Soft, not distended, not tender Neuro:  Alert, nad _______________________________________________________________________ DISCHARGE MEDICATIONS:  
    
Current Discharge Medication List  
   
START taking these medications  
  Details  
fluticasone furoate-vilanterol (BREO ELLIPTA) 100-25 mcg/dose inhaler Take 1 Puff by inhalation daily. Qty: 1 Inhaler, Refills: 3  
   
predniSONE (DELTASONE) 20 mg tablet Take 60 mg by mouth daily (with breakfast) for 3 days, THEN 40 mg daily (with breakfast) for 3 days, THEN 20 mg daily (with breakfast) for 5 days. Qty: 25 Tab, Refills: 0  
   
   
    
CONTINUE these medications which have NOT CHANGED  
  Details  
lisinopril (PRINIVIL, ZESTRIL) 2.5 mg tablet TAKE 1 TABLET BY MOUTH ONCE DAILY Qty: 90 Tab, Refills: 3  
   
Oxygen 3L at night  
   
atorvastatin (LIPITOR) 40 mg tablet TAKE 1 TABLET EVERY DAY Qty: 90 Tab, Refills: 4  
  Associated Diagnoses: Hyperlipidemia, unspecified hyperlipidemia type  
   
metoprolol tartrate (LOPRESSOR) 50 mg tablet TAKE ONE TABLET BY MOUTH TWICE DAILY Qty: 60 Tab, Refills: 6  
  Comments: Please consider 90 day supplies to promote better adherence  
   
XTANDI 40 mg capsule Take 160 mg by mouth daily.  
   
aspirin 81 mg chewable tablet Take 1 Tab by mouth daily. RISK OF HEART ATTACK IF ANY MISSED DOSES Qty: 90 Tab, Refills: 3  
   
albuterol (PROVENTIL HFA, VENTOLIN HFA, PROAIR HFA) 90 mcg/actuation inhaler Use 2 puffs very 4 hours prn 
Qty: 1 Inhaler, Refills: 1  
  Associated Diagnoses: Chronic obstructive pulmonary disease, unspecified COPD type ([facility])  
   
amoxicillin-clavulanate (AUGMENTIN) 875-125 mg per tablet Take 1 Tab by mouth every twelve (12) hours. Qty: 14 Tab, Refills: 0  
  Associated Diagnoses: Acute sinusitis, recurrence not specified, unspecified location  
   
fluticasone (FLONASE) 50 mcg/actuation nasal spray 2 Sprays by Both Nostrils route daily. Qty: 1 Bottle, Refills: 1  
  Associated Diagnoses: Acute sinusitis, recurrence not specified, unspecified location    
famotidine (PEPCID) 20 mg tablet TAKE 1 TABLET EVERY DAY AS NEEDED  FOR  HEARTBURN Qty: 90 Tab, Refills: 3  
  Associated Diagnoses: Gastroesophageal reflux disease, esophagitis presence not specified  
   
   
  
  
My Recommended Diet, Activity, Wound Care, and follow-up labs are listed in the patient's Discharge Insturctions which I have personally completed and reviewed. 
  
______________________________________________________________________ 
  
Risk of deterioration: Moderate 
  
Condition at Discharge:  Stable 
______________________________________________________________________ 
  
Disposition Home with family, no needs 
______________________________________________________________________ 
  
Care Plan discussed with:  
Patient, Family, RN, Care Manager, Consultant 
  
______________________________________________________________________ 
  
Code Status: Full Code 
______________________________________________________________________ 
  
  
Follow up with: PCP : Agnieszka Craig MD 
        
Follow-up Information   
  Follow up With Specialties Details Why Contact Info  
  Patient's Own Medication is located in the Patient's:  Tegan Craig MD Internal Medicine     [address] [address] [phone] 
   
   
  
  
  
  
Total time in minutes spent coordinating this discharge (includes going over instructions, follow-up, prescriptions, and preparing report for sign off to her PCP) :  30 minutes 
  
Signed: 
Alva Arias DO Routing History

## 2019-03-25 NOTE — PROGRESS NOTES
Problem: Falls - Risk of 
Goal: *Absence of Falls Description Document Hamp Naty Fall Risk and appropriate interventions in the flowsheet. Outcome: Progressing Towards Goal 
  
Problem: Patient Education: Go to Patient Education Activity Goal: Patient/Family Education Outcome: Progressing Towards Goal 
  
Problem: Pressure Injury - Risk of 
Goal: *Prevention of pressure injury Description Document Joe Scale and appropriate interventions in the flowsheet. Outcome: Progressing Towards Goal 
  
Problem: Patient Education: Go to Patient Education Activity Goal: Patient/Family Education Outcome: Progressing Towards Goal 
  
Problem: Breathing Pattern - Ineffective Goal: *Absence of hypoxia Outcome: Progressing Towards Goal 
Goal: *Use of effective breathing techniques Outcome: Progressing Towards Goal 
Goal: *PALLIATIVE CARE:  Alleviation of Dyspnea Outcome: Progressing Towards Goal 
  
Problem: Patient Education: Go to Patient Education Activity Goal: Patient/Family Education Outcome: Progressing Towards Goal 
  
Problem: Chronic Obstructive Pulmonary Disease (COPD) Goal: *Oxygen saturation during activity within specified parameters 3/24/2019 2241 by Sue Agosto Outcome: Progressing Towards Goal 
3/24/2019 2035 by Sue Agosto Outcome: Progressing Towards Goal 
Goal: *Able to remain out of bed as prescribed 3/24/2019 2241 by Sue Agosto Outcome: Progressing Towards Goal 
3/24/2019 2035 by Sue Agosto Outcome: Progressing Towards Goal 
Goal: *Absence of hypoxia 3/24/2019 2241 by Sue Agosto Outcome: Progressing Towards Goal 
3/24/2019 2035 by Sue Agosto Outcome: Progressing Towards Goal 
Goal: *Optimize nutritional status 3/24/2019 2241 by Sue Agosto Outcome: Progressing Towards Goal 
3/24/2019 2035 by Sue Agosto Outcome: Progressing Towards Goal 
  
Problem: Patient Education: Go to Patient Education Activity Goal: Patient/Family Education 3/24/2019 2241 by Rosana Rivas Outcome: Progressing Towards Goal 
3/24/2019 2035 by Rosana Rivas Outcome: Progressing Towards Goal

## 2019-03-25 NOTE — PROGRESS NOTES
Pt is being discharged from the hospital.  Iv and telemetry were removed. One prescription was given to the pt. Pt has his own portable O2 with him for discharge. Pt and family member verbalized understanding of discharge instructions. Pt has nebulizer machine with him. No s/s of distress at discharge. Pt g daughter to transport home.

## 2019-03-25 NOTE — PROGRESS NOTES
Reason for Admission: COPD exacerbation RRAT Score:  18 Do you (patient/family) have any concerns for transition/discharge? No concerns indicated. Plan for utilizing home health:  No plan for [place]. Agreeable to [facility]. Likelihood of readmission? Moderate Transition of Care Plan: CM met with patient and his sister at the bedside to introduce role, verify demographics, and discuss discharge planning. Patient was awake and alert/oriented x4. Pt is an 77 yo  male admitted to [facility] on 3/23/19 for COPD exacerbation. Pt lives with his daughter in a two story home with 1 MARTIN. Pt is independent in ALD/IADLs to include driving. Pt's daughter, sister, and granddaughters are available to assist if necessary. Pt reports no concerns with ambulation at this time. Pt chronically uses oxygen at 3L via NC which is serviced through [employer]. Pt has a portable concentrator, a portable tank, and a home concentrator. Pt has his portable concentrator here for d/c. Pt has not used HH or SNF in the past but is open to [facility]. Referral sent to [facility] via [facility]. Pt has a nurse through his [email or web address] that visits the patient in the home once a month to take his vitals and check his medications. Pt is to receive a nebulizer with supplies prior to his d/c - referral sent to [employer] Pt is to follow-up with his PCP and Oncologist at his scheduled appointment times. PCP: Dr. Gabbie Aleman Pharmacy: [facility] in [place] [facility] PCP Verified by CM: Yes(Dr. Cartwright Speaker) Mode of Transport at Discharge: Other (see comment)(granddaughter) Transition of Care Consult (CM Consult): Other(home with [facility] and f/u appts) MyChart Signup: No 
Discharge Durable Medical Equipment: No(home oxygen - tank, concentrator, and portable concentrator through [employer]) Physical Therapy Consult: No 
Occupational Therapy Consult: No 
 Speech Therapy Consult: No 
Current Support Network: Relative's Home, Family Lives Nearby(lives with daughter) Confirm Follow Up Transport: Self Plan discussed with Pt/Family/Caregiver: Yes(discussed with pt and pt's sister at bedside) Discharge Location Discharge Placement: Home Doc Phyllis MSW Care Manager [phone]

## 2019-03-25 NOTE — PROGRESS NOTES
Bedside shift change report given to Maricel Riggins (oncoming nurse) by Dannie Sarmiento RN (offgoing nurse). Report included the following information SBAR.

## 2019-03-25 NOTE — PROGRESS NOTES
CM acknowledges consult for nebulizer. Referral and order sent to [facility] via [employer]. COURTNEY Bansal Care Manager [phone]

## 2019-03-25 NOTE — ROUTINE PROCESS
The following appointments have been successfully scheduled: 
 
Date/time Wednesday, March 27, 2019 03:00 PM 
Patient  Emily Mccormick [address] [id number] [address] [place] [address] OFFICE-LIZBETH CEBALLOS Appointment type Any Provider [employer]

## 2019-03-25 NOTE — PROGRESS NOTES
CM delivered nebulizer with supplies to pt. Discussed with RN to demonstrate use with pt. Medicare pt has received, reviewed, and signed 2nd IM letter informing them of their right to appeal the discharge. Signed copied has been placed on pt bedside chart. COURTNEY Monteiro Care Manager [phone]

## 2019-03-25 NOTE — PROGRESS NOTES
Problem: Falls - Risk of 
Goal: *Absence of Falls Description Document Cy Ward Fall Risk and appropriate interventions in the flowsheet. Outcome: Progressing Towards Goal 
  
Problem: Patient Education: Go to Patient Education Activity Goal: Patient/Family Education Outcome: Progressing Towards Goal 
  
Problem: Pressure Injury - Risk of 
Goal: *Prevention of pressure injury Description Document Joe Scale and appropriate interventions in the flowsheet. Outcome: Progressing Towards Goal 
  
Problem: Patient Education: Go to Patient Education Activity Goal: Patient/Family Education Outcome: Progressing Towards Goal 
  
Problem: Chronic Obstructive Pulmonary Disease (COPD) Goal: *Able to remain out of bed as prescribed Outcome: Progressing Towards Goal

## 2019-03-27 ENCOUNTER — HOME CARE VISIT (OUTPATIENT)
Dept: SCHEDULING | Facility: HOME HEALTH | Age: 79
End: 2019-03-27

## 2019-03-27 ENCOUNTER — OFFICE VISIT (OUTPATIENT)
Dept: INTERNAL MEDICINE CLINIC | Facility: CLINIC | Age: 79
End: 2019-03-27

## 2019-03-27 VITALS
HEIGHT: 72 IN | OXYGEN SATURATION: 95 % | DIASTOLIC BLOOD PRESSURE: 64 MMHG | WEIGHT: 189 LBS | RESPIRATION RATE: 14 BRPM | BODY MASS INDEX: 25.6 KG/M2 | SYSTOLIC BLOOD PRESSURE: 120 MMHG | HEART RATE: 71 BPM | TEMPERATURE: 97.4 F

## 2019-03-27 DIAGNOSIS — J44.1 COPD WITH EXACERBATION (HCC): Primary | ICD-10-CM

## 2019-03-27 DIAGNOSIS — R09.02 HYPOXEMIA: ICD-10-CM

## 2019-03-27 DIAGNOSIS — C61 PROSTATE CANCER (HCC): ICD-10-CM

## 2019-03-27 DIAGNOSIS — I10 ESSENTIAL HYPERTENSION: ICD-10-CM

## 2019-03-27 PROCEDURE — G0495 RN CARE TRAIN/EDU IN HH: HCPCS

## 2019-03-27 NOTE — PROGRESS NOTES
Tommie Coto  Identified pt with two pt identifiers(name and ). Chief Complaint   Patient presents with   [facility] Follow Up     Room 2C COPD       Reviewed record In preparation for visit and have obtained necessary documentation. Has info on advanced directive but has not filled them out. 1. Have you been to the ER, urgent care clinic or hospitalized since your last visit? [facility] ER 3/23/19    2. Have you seen or consulted any other health care providers outside of the [address] [place] [address] since your last visit? Include any pap smears or colon screening. Dr Lonnie Mckeon reviewed with provider. Health Maintenance reviewed: ====Cyndi Sarabia Invitation====    Patient was invited to [facility] on this date and given the information folder for review. Recommended appointment with Cyndi Sarabia facilitator for ACP conversation regarding advance directives. [x] Yes  [] No  Referral sent to [facility] team member or Coordinator for follow-up    [] Yes  [x] No  Patient scheduled an appointment.        Site of Referral: [facility] Due   Topic    Shingrix Vaccine Age 50> (1 of 2)    MEDICARE YEARLY EXAM           Wt Readings from Last 3 Encounters:   19 189 lb (85.7 kg)   19 187 lb 6.3 oz (85 kg)   19 187 lb 3.2 oz (84.9 kg)        Temp Readings from Last 3 Encounters:   19 97.4 °F (36.3 °C) (Oral)   19 97.2 °F (36.2 °C)   19 (!) 94.6 °F (34.8 °C) (Oral)        BP Readings from Last 3 Encounters:   19 158/83   19 122/54   19 120/70        Pulse Readings from Last 3 Encounters:   19 71   19 (!) 56   19 80        Vitals:    19 1513   BP: 158/83   Pulse: 71   Resp: 14   Temp: 97.4 °F (36.3 °C)   TempSrc: Oral   SpO2: 91%   Weight: 189 lb (85.7 kg)   Height: 6' (1.829 m)   PainSc:   0 - No pain          Learning Assessment:   :       Learning Assessment 2014   PRIMARY LEARNER Patient   PRIMARY LANGUAGE ENGLISH   LEARNER PREFERENCE PRIMARY DEMONSTRATION   ANSWERED BY patient     self        Depression Screening:   :       3 most recent PHQ Screens 2/25/2019   Little interest or pleasure in doing things Not at all   Feeling down, depressed, irritable, or hopeless Not at all   Total Score PHQ 2 0        Fall Risk Assessment:   :       Fall Risk Assessment, last 12 mths 2/25/2019   Able to walk? Yes   Fall in past 12 months? No        Abuse Screening:   :       Abuse Screening Questionnaire 2/25/2019 2/18/2019 8/3/2017 10/13/2015   Do you ever feel afraid of your partner? N N N N   Are you in a relationship with someone who physically or mentally threatens you? N N N N   Is it safe for you to go home?  Y Y Y Y        ADL Screening:   :       ADL Assessment 3/27/2019   Feeding yourself No Help Needed   Getting from bed to chair No Help Needed   Getting dressed No Help Needed   Bathing or showering No Help Needed   Walk across the room (includes cane/walker) No Help Needed   Using the telphone No Help Needed   Taking your medications No Help Needed   Preparing meals No Help Needed   Managing money (expenses/bills) Help Needed   Moderately strenuous housework (laundry) Help Needed   Shopping for personal items (toiletries/medicines) Help Needed   Shopping for groceries Help Needed   Driving No Help Needed   Climbing a flight of stairs No Help Needed   Getting to places beyond walking distances No Help Needed

## 2019-03-27 NOTE — PROGRESS NOTES
HPI  Mr. Judy Razo is a 78y.o. year old male, he is seen today Here for TCM visit. Admitted 3/23 - 3/25  to [facility] for COPD exacerbation. First contact made by me today. Hospital records reviewed. Was admitted for copd exacerbation manifest ans increasing sob and cough which was somewhat sudden. He was treated with iv steroids and nebulizer treatments. CXR was negative for acute process. As long as he has O2 not feeling sob since home. Since being on prednisone cough improved. Has minimal sputum - sometimes white, sometimes yellow. Has runny nose. No f/c. No n/v/abd pain or diarrhea. Has urinay freuqency, no dysuria. No edema. Will Dr. Terell Rand tomorrow for prostate CA follow up. Now on breo per pulmonarfy. Voice has been high since taking new inhaler about 2 mos ago - ?symbicort. Has seen ENT for same - says vocal cords \"weren't working\". Tells me no clear etiology for higher voice/hoarseness, will have re-eval in June. No pain. No dysphagia, appetite is good. Hasn't needed duoneb since home from hospital.   No pnd or orthopnea. Chief Complaint   Patient presents with   [facility] Follow Up     Room 2C COPD        Prior to Admission medications    Medication Sig Start Date End Date Taking? Authorizing Provider   albuterol-ipratropium (DUO-NEB) 2.5 mg-0.5 mg/3 ml nebu 3 mL by Nebulization route every six (6) hours as needed (sob). 3/25/19  Yes Jl Gary, DO   fluticasone furoate-vilanterol (BREO ELLIPTA) 100-25 mcg/dose inhaler Take 1 Puff by inhalation daily. 3/25/19  Yes Jl Gary, DO   predniSONE (DELTASONE) 20 mg tablet Take 60 mg by mouth daily (with breakfast) for 3 days, THEN 40 mg daily (with breakfast) for 3 days, THEN 20 mg daily (with breakfast) for 6 days.  3/25/19 4/6/19 Yes Jl Gary, DO   lisinopril (PRINIVIL, ZESTRIL) 2.5 mg tablet TAKE 1 TABLET BY MOUTH ONCE DAILY 3/19/19  Yes Phyllistine MD Santi   Oxygen 3L continouus   Yes Provider, Historical   albuterol (PROVENTIL HFA, VENTOLIN HFA, PROAIR HFA) 90 mcg/actuation inhaler Use 2 puffs very 4 hours prn 2/18/19  Yes Aggie Ruiz MD   fluticasone [facility]) 50 mcg/actuation nasal spray 2 Sprays by Both Nostrils route daily. 2/18/19  Yes Aggie Ruiz MD   atorvastatin (LIPITOR) 40 mg tablet TAKE 1 TABLET EVERY DAY 9/20/18  Yes Steff Pena MD   famotidine (PEPCID) 20 mg tablet TAKE 1 TABLET EVERY DAY AS NEEDED  FOR  HEARTBURN 4/2/18  Yes Steff Pena MD   metoprolol tartrate (LOPRESSOR) 50 mg tablet TAKE ONE TABLET BY MOUTH TWICE DAILY 4/2/18  Yes [place] Camacho MD   XTANDI 40 mg capsule Take 160 mg by mouth daily. 1/11/18  Yes Provider, Historical   aspirin 81 mg chewable tablet Take 1 Tab by mouth daily. RISK OF HEART ATTACK IF ANY MISSED DOSES 10/21/15  Yes [place] Camacho MD         No Known Allergies      REVIEW OF SYSTEMS:  Per HPI    PHYSICAL EXAM:  Visit Vitals  /64   Pulse 71   Temp 97.4 °F (36.3 °C) (Oral)   Resp 14   Ht 6' (1.829 m)   Wt 189 lb (85.7 kg)   SpO2 95% Comment: 3L   BMI 25.63 kg/m²     Constitutional: Appears well-developed and well-nourished. No distress. NC O2 in place  HENT:   Head: Normocephalic and atraumatic. Eyes: No scleral icterus. Mouth: OP clear without lesions, no pharyngeal exudate  Neck: no lad, no tm, supple   Cardiovascular: Normal S1/S2, regular rhythm. No murmurs, rubs, or gallops. Pulmonary/Chest: [place] normal and breath sounds normal. No respiratory distress. No wheezes, rhonchi, or rales. Abdomen: Soft, NT/ND, +BS, no rebound or guarding. Ext: No edema. Neurological: Alert. Psychiatric: Normal mood and affect.  Behavior is normal.     Lab Results   Component Value Date/Time    Sodium 142 03/25/2019 04:05 AM    Potassium 3.7 03/25/2019 04:05 AM    Chloride 110 (H) 03/25/2019 04:05 AM    CO2 22 03/25/2019 04:05 AM    Anion gap 10 03/25/2019 04:05 AM    Glucose 90 03/25/2019 04:05 AM    BUN 23 (H) 03/25/2019 04:05 AM Creatinine 1.14 03/25/2019 04:05 AM    BUN/Creatinine ratio 20 03/25/2019 04:05 AM    GFR est AA >60 03/25/2019 04:05 AM    GFR est non-AA >60 03/25/2019 04:05 AM    Calcium 8.9 03/25/2019 04:05 AM    Bilirubin, total 0.5 03/23/2019 06:56 AM    AST (SGOT) 13 (L) 03/23/2019 06:56 AM    Alk. phosphatase 62 03/23/2019 06:56 AM    Protein, total 7.6 03/23/2019 06:56 AM    Albumin 3.6 03/23/2019 06:56 AM    Globulin 4.0 03/23/2019 06:56 AM    A-G Ratio 0.9 (L) 03/23/2019 06:56 AM    ALT (SGPT) 12 03/23/2019 06:56 AM     Lab Results   Component Value Date/Time    Hemoglobin A1c 6.0 (H) 12/12/2011 03:22 PM    Hemoglobin A1c 6.0 (H) 07/05/2011 01:44 PM      Lab Results   Component Value Date/Time    Cholesterol, total 73 (L) 04/25/2017 02:38 PM    HDL Cholesterol 31 (L) 04/25/2017 02:38 PM    LDL, calculated 19 04/25/2017 02:38 PM    VLDL, calculated 23 04/25/2017 02:38 PM    Triglyceride 116 04/25/2017 02:38 PM    CHOL/HDL Ratio 2.9 10/20/2015 04:06 AM          ASSESSMENT/PLAN  Diagnoses and all orders for this visit:    1. COPD with exacerbation ([facility] [address]  Resolved - continue current medications   2. Prostate cancer ([facility] [address]  Followed by Dr. Roslyn Leary  3. Essential hypertension  Controlled on current regimen, continue   4. Hypoxemia  On 3L O2 chronically        Health Maintenance Due   Topic Date Due    Shingrix Vaccine Age 49> (1 of 2) 02/28/1988    MEDICARE YEARLY EXAM  08/04/2018         keep follow up already scheduled       Reviewed plan of care. Patient has provided input and agrees with goals. The nurse provided the patient and/or family with advanced directive information if needed and encouraged the patient to provide a copy to the office when available.

## 2019-03-28 LAB
BACTERIA SPEC CULT: NORMAL
SERVICE CMNT-IMP: NORMAL

## 2019-03-29 ENCOUNTER — PATIENT OUTREACH (OUTPATIENT)
Dept: INTERNAL MEDICINE CLINIC | Facility: CLINIC | Age: 79
End: 2019-03-29

## 2019-03-29 NOTE — PROGRESS NOTES
Hospital Discharge Follow-Up Date/Time:  3/29/2019 4:45 PM 
 
Patient was admitted to [facility] on 3/23/19 and discharged on 3/25/19 for COPD Exacerbation. The physician discharge summary was available at the time of outreach. Patient was contacted within 4 business days of discharge. Top Challenges reviewed with the provider - Home Nebulizer ordered/delivered to pt in hosp prior to d/c 
 
- PCP f/u scheduled 3/27/19 
 
- Per HCM note pt visited monthly by [address] [place] [address] Method of communication with provider :chart routing Inpatient RRAT score: 18 Was this a readmission? no  
Patient stated reason for the readmission: n/a Nurse Navigator (NN) contacted the patient by telephone to perform post hospital discharge assessment. Verified name and  with patient as identifiers. Provided introduction to self, and explanation of the Nurse Navigator role. Unable to review discharge instructions and red flags with patient. Difficulty comprehending pt due to high pitched quality of pt's voice. Pt requested NN contact dtr Rikki Claude for more information. Attempted to contact dtr @ listed home #. Message left. No mobile # listed for dtr. Pt attempted to provide dtr's mobile #. NN unable to comprehend. Disease Specific:   COPD Summary of patient's top problems: 
1. COPD Exacerbation- cxr- no acute process. SOB, dyspnea improved with IV Steroids and Nebs. D/c'd on Prednisone taper, Tram [place], home Nebulizer. DARIELA Alaniz 1153 visit. PCP f/u 3/27/19. Home Health orders at discharge: [facility] company: [address] [place] [address] Date of initial visit: 3/27/19 Durable Medical Equipment ordered/company: Nebulizer/[place] [facility] Durable Medical Equipment received: in hosp prior to d/c Barriers to care? TBD Advance Care Planning:  
Does patient have an Advance Directive:  not on file Medication(s):  
New Medications at Discharge: fluticasone furoate-vilanterol (BREO ELLIPTA) 100-25 mcg/dose inhaler Take 1 Puff by inhalation daily. Qty: 1 Inhaler, Refills: 3  
   
predniSONE (DELTASONE) 20 mg tablet Take 60 mg by mouth daily (with breakfast) for 3 days, THEN 40 mg daily (with breakfast) for 3 days, THEN 20 mg daily (with breakfast) for 5 days. Qty: 25 Tab, Refills: 0 Changed Medications at Discharge: none Discontinued Medications at Discharge: none Medication reconciliation was performed by PCP during 3/27/19 [facility] OV. Referral to Pharm D needed: no  
 
Current Outpatient Medications Medication Sig  
 albuterol-ipratropium (DUO-NEB) 2.5 mg-0.5 mg/3 ml nebu 3 mL by Nebulization route every six (6) hours as needed (sob).  fluticasone furoate-vilanterol (BREO ELLIPTA) 100-25 mcg/dose inhaler Take 1 Puff by inhalation daily.  predniSONE (DELTASONE) 20 mg tablet Take 60 mg by mouth daily (with breakfast) for 3 days, THEN 40 mg daily (with breakfast) for 3 days, THEN 20 mg daily (with breakfast) for 6 days.  lisinopril (PRINIVIL, ZESTRIL) 2.5 mg tablet TAKE 1 TABLET BY MOUTH ONCE DAILY  Oxygen 3L continouus  albuterol (PROVENTIL HFA, VENTOLIN HFA, PROAIR HFA) 90 mcg/actuation inhaler Use 2 puffs very 4 hours prn  fluticasone (FLONASE) 50 mcg/actuation nasal spray 2 Sprays by Both Nostrils route daily.  atorvastatin (LIPITOR) 40 mg tablet TAKE 1 TABLET EVERY DAY  famotidine (PEPCID) 20 mg tablet TAKE 1 TABLET EVERY DAY AS NEEDED  FOR  HEARTBURN  
 metoprolol tartrate (LOPRESSOR) 50 mg tablet TAKE ONE TABLET BY MOUTH TWICE DAILY  XTANDI 40 mg capsule Take 160 mg by mouth daily.  aspirin 81 mg chewable tablet Take 1 Tab by mouth daily. RISK OF HEART ATTACK IF ANY MISSED DOSES No current facility-administered medications for this visit. There are no discontinued medications. [facility] follow up appointment(s):  
Future Appointments Date Time Provider Estrellita Hinson 4/17/2019  2:30 PM Fernanda Vale  [address] [place]  
3/5/2020 11:00 AM Mine Avelar MD [address] [facility] follow up appointment(s): Uro 3/28/19 [facility]:  out of service area Goals  Transitions of Care- collaboration & care coordination to prevent complications post hospitalization. 3/29/19- per EMR review pt attended 3/27/19 PCP [facility] f/u appt. Med rec done by PCP. Pt now has Nebulizer @ home. Using O2 continuous @ 3 L/min via NC. Feeling better since on Prednisone taper. Now on Breo Ellipta. Now has Nebulizer @ home. Voice high ptiched. ENT eval 12/26/18. \"Vocal Cord Paralysis, Voice Quality Disorder\". Per HCM note pt has a Nurse CM thru [facility]. Visits pt monthly. Pt scheduled for Uro f/u 3/28/19 w/ Dr Anna Davidson. Next PCP f/u 4/17/19. Pt contacted. NN having difficulty comprehending pt on phone due to voice quality. Pt confirmed attended Uro appt. NN unable to understand information about visit or when next f/u. Pt confirmed O2 use. Pt requested NN contact dtr Octavio Dose for more information. Pt attempted to provide dtr's mobile #, but NN unable to comprehend. NN attempted to contact dtr @ listed home #. Message left. Plan- next NN f/u ~ 2 weeks-ID.

## 2019-04-01 ENCOUNTER — TELEPHONE (OUTPATIENT)
Dept: INTERNAL MEDICINE CLINIC | Facility: CLINIC | Age: 79
End: 2019-04-01

## 2019-04-01 NOTE — TELEPHONE ENCOUNTER
Pt came into the office to follow up about him receiving another portable O2 tank. Pt stated the doctor was suppose to order him one on Friday.

## 2019-04-11 ENCOUNTER — TELEPHONE (OUTPATIENT)
Dept: INTERNAL MEDICINE CLINIC | Facility: CLINIC | Age: 79
End: 2019-04-11

## 2019-04-11 NOTE — TELEPHONE ENCOUNTER
----- Message from Jannet Ortega sent at 4/11/2019  3:31 PM EDT -----  Regarding: Hernán Jon MD/ telephone  Alphonso Stack Pts sister would like a call back in reference to an O2 tank that was supposed to be ordered.  Pt's sister would like to know the status contact [phone]

## 2019-04-12 NOTE — TELEPHONE ENCOUNTER
Spoke with Graciela Middleton. Current tank is with [address] [place] [address] [phone]    Per [address] [place] [address] pt has their lightest portable O2 tank. It is a 5 lb tank that lasts approx 3-3.5 hrs while running on 2L. Pt has capability to charge it with any outlet or in the car. [employer] does not pay for a second battery but pt can call [address] [place] [address] and order one on their own. Will advise pt of this at his appt this afternoon.

## 2019-04-17 ENCOUNTER — OFFICE VISIT (OUTPATIENT)
Dept: INTERNAL MEDICINE CLINIC | Facility: CLINIC | Age: 79
End: 2019-04-17

## 2019-04-17 VITALS
WEIGHT: 189 LBS | HEIGHT: 72 IN | RESPIRATION RATE: 18 BRPM | BODY MASS INDEX: 25.6 KG/M2 | HEART RATE: 72 BPM | DIASTOLIC BLOOD PRESSURE: 61 MMHG | OXYGEN SATURATION: 95 % | TEMPERATURE: 97.7 F | SYSTOLIC BLOOD PRESSURE: 129 MMHG

## 2019-04-17 DIAGNOSIS — E78.2 MIXED HYPERLIPIDEMIA: ICD-10-CM

## 2019-04-17 DIAGNOSIS — R09.02 HYPOXEMIA: ICD-10-CM

## 2019-04-17 DIAGNOSIS — Z00.00 MEDICARE ANNUAL WELLNESS VISIT, SUBSEQUENT: Primary | ICD-10-CM

## 2019-04-17 DIAGNOSIS — I10 ESSENTIAL HYPERTENSION: ICD-10-CM

## 2019-04-17 DIAGNOSIS — J41.8 MIXED SIMPLE AND MUCOPURULENT CHRONIC BRONCHITIS (HCC): ICD-10-CM

## 2019-04-17 DIAGNOSIS — Z71.89 ADVANCED DIRECTIVES, COUNSELING/DISCUSSION: ICD-10-CM

## 2019-04-17 DIAGNOSIS — Z13.31 SCREENING FOR DEPRESSION: ICD-10-CM

## 2019-04-17 NOTE — PROGRESS NOTES
Trev Patricio  Identified pt with two pt identifiers(name and ). Chief Complaint Patient presents with [facility] Annual Wellness Visit Room 2C//   
 COPD  
  hypoxemia 1. Have you been to the ER, urgent care clinic since your last visit? Hospitalized since your last visit? NO 
 
2. Have you seen or consulted any other health care providers outside of the [address] [place] [address] since your last visit? Include any pap smears or colon screening. NO Provider notified of reason for visit, vitals and flowsheets obtained on patients. Patient received paperwork for advance directive during previous visit but has not completed at this time Reviewed record In preparation for visit, huddled with provider and have obtained necessary documentation Health Maintenance Due Topic  MEDICARE YEARLY EXAM   
 
 
Wt Readings from Last 3 Encounters:  
19 189 lb (85.7 kg) 19 187 lb 6.3 oz (85 kg) 19 187 lb 3.2 oz (84.9 kg) Temp Readings from Last 3 Encounters:  
19 97.4 °F (36.3 °C) (Oral) 19 97.2 °F (36.2 °C)  
19 (!) 94.6 °F (34.8 °C) (Oral) BP Readings from Last 3 Encounters:  
19 120/64  
19 122/54  
19 120/70 Pulse Readings from Last 3 Encounters:  
19 71  
19 (!) 56  
19 80 There were no vitals filed for this visit. Learning Assessment: 
:  
 
Learning Assessment 2014 PRIMARY LEARNER Patient PRIMARY LANGUAGE ENGLISH  
LEARNER PREFERENCE PRIMARY DEMONSTRATION  
ANSWERED BY patient  
  self Depression Screening: 
:  
 
3 most recent PHQ Screens 2019 Little interest or pleasure in doing things Not at all Feeling down, depressed, irritable, or hopeless Not at all Total Score PHQ 2 0 Fall Risk Assessment: 
:  
 
Fall Risk Assessment, last 12 mths 2019 Able to walk? Yes Fall in past 12 months?  No  
 
 
Abuse Screening: 
:  
 
 Abuse Screening Questionnaire 2/25/2019 2/18/2019 8/3/2017 10/13/2015 Do you ever feel afraid of your partner? N N N N Are you in a relationship with someone who physically or mentally threatens you? N N N N Is it safe for you to go home? Tanvi Hanson QAMAR  
 
 
ADL Screening: 
:  
 
ADL Assessment 3/27/2019 Feeding yourself No Help Needed Getting from bed to chair No Help Needed Getting dressed No Help Needed Bathing or showering No Help Needed Walk across the room (includes cane/walker) No Help Needed Using the telphone No Help Needed Taking your medications No Help Needed Preparing meals No Help Needed Managing money (expenses/bills) Help Needed Moderately strenuous housework (laundry) Help Needed Shopping for personal items (toiletries/medicines) Help Needed Shopping for groceries Help Needed Driving No Help Needed Climbing a flight of stairs No Help Needed Getting to places beyond walking distances No Help Needed Medication reconciliation up to date and corrected with patient at this time.

## 2019-04-17 NOTE — PROGRESS NOTES
HPI Mr. Mariposa Villeda is a 78y.o. year old male, he is seen today for follow COPD, AWV. Chronic cough, sputum is sometimes clear, sometimes yellow - chronic - not worse. No chest pain. Sob is stable, worse with exertion. Uses nebs bid most days, helps with cough, sob. Has been riding mower to cut grass, enjoys this. No f/c. No edema, no n/v/abd pain. On O2 24/7. Continues to have high pitched hoarse voice, followed by ENT. Asking for lighter O2 tank - has the lightest weight from his supplier and considering switching. Chief Complaint Patient presents with [facility] Annual Wellness Visit Room 2C// NON fasting  COPD  
  hypoxemia Prior to Admission medications Medication Sig Start Date End Date Taking? Authorizing Provider  
albuterol-ipratropium (DUO-NEB) 2.5 mg-0.5 mg/3 ml nebu 3 mL by Nebulization route every six (6) hours as needed (sob). 3/25/19  Yes Jl Gary, DO  
fluticasone furoate-vilanterol (BREO ELLIPTA) 100-25 mcg/dose inhaler Take 1 Puff by inhalation daily. 3/25/19  Yes Jl Gary, DO  
lisinopril (PRINIVIL, ZESTRIL) 2.5 mg tablet TAKE 1 TABLET BY MOUTH ONCE DAILY 3/19/19  Yes Sharon Sandoval MD  
Oxygen 3L continouus   Yes Provider, Historical  
albuterol (PROVENTIL HFA, VENTOLIN HFA, PROAIR HFA) 90 mcg/actuation inhaler Use 2 puffs very 4 hours prn 2/18/19  Yes Pavan Monae MD  
fluticasone [facility]) 50 mcg/actuation nasal spray 2 Sprays by Both Nostrils route daily. 2/18/19  Yes Pavan Monae MD  
atorvastatin (LIPITOR) 40 mg tablet TAKE 1 TABLET EVERY DAY 9/20/18  Yes Sea Webb MD  
famotidine (PEPCID) 20 mg tablet TAKE 1 TABLET EVERY DAY AS NEEDED  FOR  HEARTBURN 4/2/18  Yes Sea Webb MD  
metoprolol tartrate (LOPRESSOR) 50 mg tablet TAKE ONE TABLET BY MOUTH TWICE DAILY 4/2/18  Yes Sharon Sandoval MD  
XTANDI 40 mg capsule Take 160 mg by mouth daily.  1/11/18  Yes Provider, Historical  
 aspirin 81 mg chewable tablet Take 1 Tab by mouth daily. RISK OF HEART ATTACK IF ANY MISSED DOSES 10/21/15  Yes Duran Spears MD  
 
 
 
No Known Allergies REVIEW OF SYSTEMS: 
Per HPI PHYSICAL EXAM: 
Visit Vitals /61 (BP 1 Location: Left arm, BP Patient Position: Sitting) Pulse 72 Temp 97.7 °F (36.5 °C) (Oral) Resp 18 Ht 6' (1.829 m) Wt 189 lb (85.7 kg) SpO2 95% BMI 25.63 kg/m² Constitutional: Appears well-developed and well-nourished. No distress. HENT:  
Head: Normocephalic and atraumatic. Eyes: No scleral icterus. Neck: no lad, no tm, supple Cardiovascular: Normal S1/S2, regular rhythm. No murmurs, rubs, or gallops. Pulmonary/Chest: Effort normal and breath sounds normal. No respiratory distress. No wheezes, rhonchi, or rales. Ext: No edema. Neurological: Alert. Psychiatric: Normal mood and affect. Behavior is normal. 
  
Lab Results Component Value Date/Time Sodium 142 03/25/2019 04:05 AM  
 Potassium 3.7 03/25/2019 04:05 AM  
 Chloride 110 (H) 03/25/2019 04:05 AM  
 CO2 22 03/25/2019 04:05 AM  
 Anion gap 10 03/25/2019 04:05 AM  
 Glucose 90 03/25/2019 04:05 AM  
 BUN 23 (H) 03/25/2019 04:05 AM  
 Creatinine 1.14 03/25/2019 04:05 AM  
 BUN/Creatinine ratio 20 03/25/2019 04:05 AM  
 GFR est AA >60 03/25/2019 04:05 AM  
 GFR est non-AA >60 03/25/2019 04:05 AM  
 Calcium 8.9 03/25/2019 04:05 AM  
 Bilirubin, total 0.5 03/23/2019 06:56 AM  
 AST (SGOT) 13 (L) 03/23/2019 06:56 AM  
 Alk. phosphatase 62 03/23/2019 06:56 AM  
 Protein, total 7.6 03/23/2019 06:56 AM  
 Albumin 3.6 03/23/2019 06:56 AM  
 Globulin 4.0 03/23/2019 06:56 AM  
 A-G Ratio 0.9 (L) 03/23/2019 06:56 AM  
 ALT (SGPT) 12 03/23/2019 06:56 AM  
 
Lab Results Component Value Date/Time Hemoglobin A1c 6.0 (H) 12/12/2011 03:22 PM  
 Hemoglobin A1c 6.0 (H) 07/05/2011 01:44 PM  
  
Lab Results Component Value Date/Time  Cholesterol, total 73 (L) 04/25/2017 02:38 PM  
 HDL Cholesterol 31 (L) 04/25/2017 02:38 PM  
 LDL, calculated 19 04/25/2017 02:38 PM  
 VLDL, calculated 23 04/25/2017 02:38 PM  
 Triglyceride 116 04/25/2017 02:38 PM  
 CHOL/HDL Ratio 2.9 10/20/2015 04:06 AM  
  
 
 
ASSESSMENT/PLAN Diagnoses and all orders for this visit: 
 
1. Medicare annual wellness visit, subsequent 2. Advanced directives, counseling/discussion 3. Screening for depression 
-     [facility] 4. Essential hypertension Controlled on current regimen, continue 5. Mixed hyperlipidemia Not fasting - last lipids excellent - continue current medications - given co morbidities not as important to check 6. Hypoxemia -     AMB SUPPLY ORDER Family will try to get smaller O2 tank if possible - Rx given 7. Mixed simple and mucopurulent chronic bronchitis ([address] -     AMB SUPPLY ORDER There are no preventive care reminders to display for this patient. Follow-up and Dispositions · Return in about 4 months (around 8/17/2019) for bp, copd. Reviewed plan of care. Patient has provided input and agrees with goals. The nurse provided the patient and/or family with advanced directive information if needed and encouraged the patient to provide a copy to the office when available. This is the Subsequent Medicare Annual Wellness Exam, performed 12 months or more after the Initial AWV or the last Subsequent AWV I have reviewed the patient's medical history in detail and updated the computerized patient record. History Past Medical History:  
Diagnosis Date  CAD (coronary artery disease)   
 mi 10/19/2015 with stent  COPD (chronic obstructive pulmonary disease) (HCC)  Hypertension  Prostate cancer ([address] 5/8/2012 Dr. Zulma Dial - diagnosed 2/2012 - s/p EBRT on Lupron Past Surgical History:  
Procedure Laterality Date  HX ORTHOPAEDIC  1961  
 left knee surgery Current Outpatient Medications Medication Sig Dispense Refill  albuterol-ipratropium (DUO-NEB) 2.5 mg-0.5 mg/3 ml nebu 3 mL by Nebulization route every six (6) hours as needed (sob). 30 Nebule 4  
 fluticasone furoate-vilanterol (BREO ELLIPTA) 100-25 mcg/dose inhaler Take 1 Puff by inhalation daily. 1 Inhaler 4  
 lisinopril (PRINIVIL, ZESTRIL) 2.5 mg tablet TAKE 1 TABLET BY MOUTH ONCE DAILY 90 Tab 3  
 Oxygen 3L continouus  albuterol (PROVENTIL HFA, VENTOLIN HFA, PROAIR HFA) 90 mcg/actuation inhaler Use 2 puffs very 4 hours prn 1 Inhaler 1  
 fluticasone (FLONASE) 50 mcg/actuation nasal spray 2 Sprays by Both Nostrils route daily. 1 Bottle 1  
 atorvastatin (LIPITOR) 40 mg tablet TAKE 1 TABLET EVERY DAY 90 Tab 4  
 famotidine (PEPCID) 20 mg tablet TAKE 1 TABLET EVERY DAY AS NEEDED  FOR  HEARTBURN 90 Tab 3  
 metoprolol tartrate (LOPRESSOR) 50 mg tablet TAKE ONE TABLET BY MOUTH TWICE DAILY 60 Tab 6  XTANDI 40 mg capsule Take 160 mg by mouth daily.  aspirin 81 mg chewable tablet Take 1 Tab by mouth daily. RISK OF HEART ATTACK IF ANY MISSED DOSES 90 Tab 3 No Known Allergies Family History Problem Relation Age of Onset  Heart Disease Mother   
     pacemaker  Diabetes Mother  Diabetes Sister  Heart Disease Brother Social History Tobacco Use  Smoking status: Former Smoker Packs/day: 0.50 Years: 40.00 Pack years: 20.00 Types: Cigarettes Last attempt to quit: 10/19/2015 Years since quitting: 3.5  Smokeless tobacco: Never Used  Tobacco comment: trying quit 10/13/15 - 1/2 ppd Substance Use Topics  Alcohol use: No  
  Alcohol/week: 0.0 oz Patient Active Problem List  
Diagnosis Code  ED (erectile dysfunction) N52.9  
 HTN (hypertension) I10  
 Prostate cancer ([facility] C61  
 PAD (peripheral artery disease) ([facility]) I73.9  
 STEMI (ST elevation myocardial infarction) ([facility]) I21.3  Chronic interstitial lung disease ([facility] J84.9  S/P PTCA (percutaneous transluminal coronary angioplasty) Z98.61  
 COPD with exacerbation ([facility] J44.1  Acute myocardial infarction of anterior wall, subsequent episode of care ([facility] I21.09  
 Penetrating atherosclerotic ulcer of aorta ([facility]) I70.0  Hyperlipidemia E78.5  Advance directive discussed with patient Z70.80  Coronary artery disease involving native coronary artery of native heart without angina pectoris I25.10  Hypoxemia R09.02  
 COPD exacerbation ([facility]) J44.1  Mixed simple and mucopurulent chronic bronchitis ([facility]) J41.8 Depression Risk Factor Screening:  
 
3 most recent PHQ Screens 4/17/2019 Little interest or pleasure in doing things Not at all Feeling down, depressed, irritable, or hopeless Not at all Total Score PHQ 2 0 Alcohol Risk Factor Screening: You do not drink alcohol or very rarely. Functional Ability and Level of Safety:  
Hearing Loss Hearing is good. Activities of Daily Living The home contains: handrails and grab bars Patient does total self care Fall Risk Fall Risk Assessment, last 12 mths 2/25/2019 Able to walk? Yes Fall in past 12 months? No  
 
 
Abuse Screen Patient is not abused Cognitive Screening Evaluation of Cognitive Function: 
Has your family/caregiver stated any concerns about your memory: yes Abnormal may forget what he is talking about - nothing major Patient Care Team  
Patient Care Team: 
Toñito Clark MD as PCP - Cesar Ramos MD as Physician (Urology) Brooklynn Stein NP as Nurse Practitioner (Nurse Practitioner) Kiran Cullen MD as Physician (Cardiology) Baylee García MD as Surgeon (Cardiothoracic Surgery) Santiago Lind MD as Physician (Urology) Nae Boyd MD (Pulmonary Disease) Ashley Vitale RN as Ambulatory Care Navigator (Internal Medicine) Assessment/Plan Education and counseling provided: 
Are appropriate based on today's review and evaluation Diagnoses and all orders for this visit: 
 
1. Medicare annual wellness visit, subsequent 2. Advanced directives, counseling/discussion 3. Screening for depression 
-     [facility] 4. Essential hypertension 5. Mixed hyperlipidemia 6. Hypoxemia -     AMB SUPPLY ORDER 
 
7. Mixed simple and mucopurulent chronic bronchitis ([facility] -     AMB SUPPLY ORDER There are no preventive care reminders to display for this patient.

## 2019-04-17 NOTE — PATIENT INSTRUCTIONS
Medicare Wellness Visit, Male The best way to live healthy is to have a lifestyle where you eat a well-balanced diet, exercise regularly, limit alcohol use, and quit all forms of tobacco/nicotine, if applicable. Regular preventive services are another way to keep healthy. Preventive services (vaccines, screening tests, monitoring & exams) can help personalize your care plan, which helps you manage your own care. Screening tests can find health problems at the earliest stages, when they are easiest to treat. [address] [facility] [address] [facility] follows the current, evidence-based guidelines published by the [facility] Amilcar Alexsander ([facility]) when recommending preventive services for our patients. Because we follow these guidelines, sometimes recommendations change over time as research supports it. (For example, a prostate screening blood test is no longer routinely recommended for men with no symptoms.) Of course, you and your doctor may decide to screen more often for some diseases, based on your risk and co-morbidities (chronic disease you are already diagnosed with). Preventive services for you include: - Medicare offers their members a free annual wellness visit, which is time for you and your primary care provider to discuss and plan for your preventive service needs. Take advantage of this benefit every year! 
-All adults over age 72 should receive the recommended pneumonia vaccines. Current USPSTF guidelines recommend a series of two vaccines for the best pneumonia protection.  
-All adults should have a flu vaccine yearly and an ECG.  All adults age 61 and older should receive a shingles vaccine once in their lifetime.   
-All adults age 38-68 who are overweight should have a diabetes screening test once every three years.  
-Other screening tests & preventive services for persons with diabetes include: an eye exam to screen for diabetic retinopathy, a kidney function test, a foot exam, and stricter control over your cholesterol.  
-Cardiovascular screening for adults with routine risk involves an electrocardiogram (ECG) at intervals determined by the provider.  
-Colorectal cancer screening should be done for adults age 54-65 with no increased risk factors for colorectal cancer. There are a number of acceptable methods of screening for this type of cancer. Each test has its own benefits and drawbacks. Discuss with your provider what is most appropriate for you during your annual wellness visit. The different tests include: colonoscopy (considered the best screening method), a fecal occult blood test, a fecal DNA test, and sigmoidoscopy. 
-All adults born between [facility] should be screened once for Hepatitis C. 
-An Abdominal Aortic Aneurysm (AAA) Screening is recommended for men age 73-68 who has ever smoked in their lifetime. Here is a list of your current Health Maintenance items (your personalized list of preventive services) with a due date: 
Health Maintenance Due Topic Date Due  
 Annual Well Visit  08/04/2018

## 2019-04-23 ENCOUNTER — TELEPHONE (OUTPATIENT)
Dept: INTERNAL MEDICINE CLINIC | Facility: CLINIC | Age: 79
End: 2019-04-23

## 2019-04-23 NOTE — PROGRESS NOTES
PADMINI Manet is a 78y.o. year old male patient of Toñito Clark MD who presents with c/o oxygen eval.    Pt has history of has ED (erectile dysfunction), HTN (hypertension), Prostate cancer ([address] PAD (peripheral artery disease) ([address] STEMI (ST elevation myocardial infarction) ([address] Chronic interstitial lung disease ([address] S/P PTCA (percutaneous transluminal coronary angioplasty), COPD with exacerbation ([address] Acute myocardial infarction of anterior wall, subsequent episode of care [facility]), Penetrating atherosclerotic ulcer of aorta ([address] Hyperlipidemia, Advance directive discussed with patient, Coronary artery disease involving native coronary artery of native heart without angina pectoris, Hypoxemia, COPD exacerbation ([address] and Mixed simple and mucopurulent chronic bronchitis ([address] on their problem list..    Pt here for oxygen re-cert. He has hx of chronic interstitial lung disease, COPD, hypoxemia managed with Breo, duonebs, albuterol inhaler and home supp oxygen at 3L O2 via NC. Follows with Dr. Jaime Mack. Uses oxygen at all times, 3L. Has portable concentrator and stationary unit at home. He is requesting the [employer] portable oxygen tank and inquiring if insurance would cover both that one and his home unit.         Patient Active Problem List   Diagnosis Code    ED (erectile dysfunction) N52.9    HTN (hypertension) I10    Prostate cancer ([address] C61    PAD (peripheral artery disease) ([address] I73.9    STEMI (ST elevation myocardial infarction) ([address] I21.3    Chronic interstitial lung disease ([address] J84.9    S/P PTCA (percutaneous transluminal coronary angioplasty) Z80.64    COPD with exacerbation ([address] J44.1    Acute myocardial infarction of anterior wall, subsequent episode of care ([address] I21.09    Penetrating atherosclerotic ulcer of aorta ([address] I70.0    Hyperlipidemia E78.5    Advance directive discussed with patient Z70.80    Coronary artery disease involving native coronary artery of native heart without angina pectoris I25.10    Hypoxemia R09.02    COPD exacerbation ([facility]) J44.1    Mixed simple and mucopurulent chronic bronchitis ([facility]) J41.8     Past Medical History:   Diagnosis Date    CAD (coronary artery disease)     mi 10/19/2015 with stent    [facility]     Hypertension     Prostate cancer ([facility] 5/8/2012    Dr. Nik Hickey - diagnosed 2/2012 - s/p EBRT on Lupron      Past Surgical History:   Procedure Laterality Date    HX ORTHOPAEDIC  1961    left knee surgery     Social History     Socioeconomic History    Marital status:      Spouse name: Not on file    Number of children: Not on file    Years of education: Not on file    Highest education level: Not on file   Tobacco Use    Smoking status: Former Smoker     Packs/day: 0.50     Years: 40.00     Pack years: 20.00     Types: Cigarettes     Last attempt to quit: 10/19/2015     Years since quitting: 3.5    Smokeless tobacco: Never Used    Tobacco comment: trying quit 10/13/15 - 1/2 ppd   Substance and Sexual Activity    Alcohol use: No     Alcohol/week: 0.0 oz    Drug use: No    Sexual activity: Not Currently     Family History   Problem Relation Age of Onset    Heart Disease Mother         pacemaker    Diabetes Mother     Diabetes Sister     Heart Disease Brother      No Known Allergies    MEDICATIONS  Current Outpatient Medications   Medication Sig    albuterol-ipratropium (DUO-NEB) 2.5 mg-0.5 mg/3 ml nebu 3 mL by Nebulization route every six (6) hours as needed (sob).  fluticasone furoate-vilanterol (BREO ELLIPTA) 100-25 mcg/dose inhaler Take 1 Puff by inhalation daily.     lisinopril (PRINIVIL, ZESTRIL) 2.5 mg tablet TAKE 1 TABLET BY MOUTH ONCE DAILY    Oxygen 3L continouus    albuterol (PROVENTIL HFA, VENTOLIN HFA, PROAIR HFA) 90 mcg/actuation inhaler Use 2 puffs very 4 hours prn    fluticasone (FLONASE) 50 mcg/actuation nasal spray 2 Sprays by Both Nostrils route daily.  atorvastatin (LIPITOR) 40 mg tablet TAKE 1 TABLET EVERY DAY    famotidine (PEPCID) 20 mg tablet TAKE 1 TABLET EVERY DAY AS NEEDED  FOR  HEARTBURN    metoprolol tartrate (LOPRESSOR) 50 mg tablet TAKE ONE TABLET BY MOUTH TWICE DAILY    XTANDI 40 mg capsule Take 160 mg by mouth daily.  aspirin 81 mg chewable tablet Take 1 Tab by mouth daily. RISK OF HEART ATTACK IF ANY MISSED DOSES     No current facility-administered medications for this visit. REVIEW OF SYSTEMS  Per HPI        Visit Vitals  /78 (BP 1 Location: Left arm, BP Patient Position: Sitting)   Pulse 61   Temp 97.5 °F (36.4 °C) (Oral)   Resp 18   Ht 6' (1.829 m)   Wt 190 lb 3.2 oz (86.3 kg)   SpO2 94% Comment: 3LPM at rest   BMI 25.80 kg/m²         General: Well-developed, well-nourished. In no distress. A&O x 3. Head: Normocephalic, atraumatic. Eyes: Conjunctiva clear. Lungs: Clear to auscultation bilaterally. No crackles or wheezes. No use of accessory muscles. Speaks in full sentences without SOB. Chest Wall: No tenderness or deformity. Heart: RRR, normal S1 and S2, no murmur, click, rub, or gallop. Skin: No rashes or lesions. Musculoskeletal: Gait normal.   Psychiatric: Normal mood and affect. Behavior is normal.       SpO2 at rest on RA: 92%  SpO2 with exertion on RA: 86%  SpO2 at rest on 3L O2 via NC: 95%  SpO2 with exertion on 3L O2 via NC: 90%    ASSESSMENT and PLAN  Diagnoses and all orders for this visit:    Pt demonstrated continued need for long term supplemental oxygen therapy at 3L O2 vis NC of continuous oxygen. Nurse called [facility] who states they do not take [facility] [employer] for any products. Will send re-cert forms to pt's current company, [employer]. 1. Chronic interstitial lung disease ([address]  -     LONG TERM OXYGEN THERAPY PRESCRIBED    2. Mixed simple and mucopurulent chronic bronchitis (HCC)  -     LONG TERM OXYGEN THERAPY PRESCRIBED    3.  Hypoxemia  -     LONG TERM OXYGEN THERAPY PRESCRIBED            There are no Patient Instructions on file for this visit. Please keep your follow-up appointment with Rina Reyes MD.         There are no preventive care reminders to display for this patient. I have discussed the diagnosis with the patient and the intended plan as seen in the above orders. Patient is in agreement. The patient has received an after-visit summary and questions were answered concerning future plans. I have discussed medication side effects and warnings with the patient as well. Warning signs for the above conditions were discussed including when to call our office or go to the emergency room. The nurse provided the patient and/or family with advanced directive information if needed and encouraged the patient to provide a copy to the office when available.

## 2019-04-25 ENCOUNTER — OFFICE VISIT (OUTPATIENT)
Dept: INTERNAL MEDICINE CLINIC | Facility: CLINIC | Age: 79
End: 2019-04-25

## 2019-04-25 VITALS
DIASTOLIC BLOOD PRESSURE: 78 MMHG | RESPIRATION RATE: 18 BRPM | TEMPERATURE: 97.5 F | WEIGHT: 190.2 LBS | OXYGEN SATURATION: 94 % | SYSTOLIC BLOOD PRESSURE: 134 MMHG | HEART RATE: 61 BPM | BODY MASS INDEX: 25.76 KG/M2 | HEIGHT: 72 IN

## 2019-04-25 DIAGNOSIS — J84.9 CHRONIC INTERSTITIAL LUNG DISEASE (HCC): Primary | ICD-10-CM

## 2019-04-25 DIAGNOSIS — J41.8 MIXED SIMPLE AND MUCOPURULENT CHRONIC BRONCHITIS (HCC): ICD-10-CM

## 2019-04-25 DIAGNOSIS — R09.02 HYPOXEMIA: ICD-10-CM

## 2019-04-25 NOTE — PROGRESS NOTES
[employer]  Identified pt with two pt identifiers(name and ). Chief Complaint   Patient presents with    O2/Oxygen       Reviewed record In preparation for visit and have obtained necessary documentation. Has info on advanced directive but has not filled them out. 1. Have you been to the ER, urgent care clinic or hospitalized since your last visit? No     2. Have you seen or consulted any other health care providers outside of the [address] [place] [address] since your last visit? Include any pap smears or colon screening. No    Vitals reviewed with provider. Health Maintenance reviewed: There are no preventive care reminders to display for this patient. Wt Readings from Last 3 Encounters:   19 190 lb 3.2 oz (86.3 kg)   19 189 lb (85.7 kg)   19 189 lb (85.7 kg)        Temp Readings from Last 3 Encounters:   19 97.5 °F (36.4 °C) (Oral)   19 97.7 °F (36.5 °C) (Oral)   19 97.4 °F (36.3 °C) (Oral)        BP Readings from Last 3 Encounters:   19 134/78   19 129/61   19 120/64        Pulse Readings from Last 3 Encounters:   19 61   19 72   19 71        Vitals:    19 0901   BP: 134/78   Pulse: 61   Resp: 18   Temp: 97.5 °F (36.4 °C)   TempSrc: Oral   SpO2: 94%   Weight: 190 lb 3.2 oz (86.3 kg)   Height: 6' (1.829 m)   PainSc:   0 - No pain          Learning Assessment:   :       Learning Assessment 2014   PRIMARY LEARNER Patient   PRIMARY LANGUAGE ENGLISH   LEARNER PREFERENCE PRIMARY DEMONSTRATION   ANSWERED BY patient     self        Depression Screening:   :       3 most recent PHQ Screens 2019   Little interest or pleasure in doing things Not at all   Feeling down, depressed, irritable, or hopeless Not at all   Total Score PHQ 2 0        Fall Risk Assessment:   :       Fall Risk Assessment, last 12 mths 2019   Able to walk? Yes   Fall in past 12 months?  No        Abuse Screening:   :       Abuse Screening Questionnaire 2/25/2019 2/18/2019 8/3/2017 10/13/2015   Do you ever feel afraid of your partner? N N N N   Are you in a relationship with someone who physically or mentally threatens you? N N N N   Is it safe for you to go home?  Y Y Y Y        ADL Screening:   :       ADL Assessment 3/27/2019   Feeding yourself No Help Needed   Getting from bed to chair No Help Needed   Getting dressed No Help Needed   Bathing or showering No Help Needed   Walk across the room (includes cane/walker) No Help Needed   Using the telphone No Help Needed   Taking your medications No Help Needed   Preparing meals No Help Needed   Managing money (expenses/bills) Help Needed   Moderately strenuous housework (laundry) Help Needed   Shopping for personal items (toiletries/medicines) Help Needed   Shopping for groceries Help Needed   Driving No Help Needed   Climbing a flight of stairs No Help Needed   Getting to places beyond walking distances No Help Needed

## 2019-04-25 NOTE — PATIENT INSTRUCTIONS
Learning About Hypoxemia  What is hypoxemia? Hypoxemia means that you don't have enough oxygen in your blood. It's a result of diseases that affect your heart or lungs. These include heart failure, COPD, and pulmonary fibrosis (scarring of the lungs). Being at high altitudes can also lead to hypoxemia. What happens when you have hypoxemia? Oxygen gets into your blood through your lungs. Your blood carries the oxygen to all parts of your body. When you have too little oxygen in your blood, your body doesn't get enough of it. With too little oxygen, your heart and other parts of your body don't work very well. What are the symptoms? In addition to the symptoms of whatever is causing your hypoxemia, you may:  · Get tired quickly. · Be short of breath when you are active. · Feel like your heart is pounding or racing. · Feel weak or dizzy. · Become confused. How is hypoxemia treated? Your doctor will do tests to find out how much oxygen is in your blood. He or she will look for the cause of your hypoxemia and treat that problem. For example, if you have heart failure, you may need medicines that help your heart pump better. · If your hypoxemia is not severe, your doctor may give you oxygen through a mask or nasal cannula (say \"OKSANA-charh-catracho\"). A cannula is a thin tube with two openings that fit just inside your nose. · If your hypoxemia is severe, you may have a breathing tube put into your windpipe. The breathing tube is attached to a machine that pushes air into your lungs. This machine is called a ventilator. · If you have a long-term problem with hypoxemia, your doctor may recommend that you use oxygen regularly. Some people need it all the time. Others need it from time to time throughout the day or overnight. Your doctor will tell you how much oxygen you need and how often to use it. Follow-up care is a key part of your treatment and safety.  Be sure to make and go to all appointments, and call your doctor if you are having problems. It's also a good idea to know your test results and keep a list of the medicines you take. Where can you learn more? Go to http://ria-matt.info/. Enter M375 in the search box to learn more about \"Learning About Hypoxemia. \"  Current as of: September 5, 2018  Content Version: 11.9  © 0049-5869 [employer]. Care instructions adapted under license by [employer] (which disclaims liability or warranty for this information). If you have questions about a medical condition or this instruction, always ask your healthcare professional. [place] [address] any warranty or liability for your use of this information.

## 2019-05-06 ENCOUNTER — OFFICE VISIT (OUTPATIENT)
Dept: INTERNAL MEDICINE CLINIC | Facility: CLINIC | Age: 79
End: 2019-05-06

## 2019-05-06 VITALS
OXYGEN SATURATION: 93 % | SYSTOLIC BLOOD PRESSURE: 133 MMHG | HEIGHT: 72 IN | DIASTOLIC BLOOD PRESSURE: 60 MMHG | RESPIRATION RATE: 16 BRPM | BODY MASS INDEX: 25.6 KG/M2 | HEART RATE: 68 BPM | WEIGHT: 189 LBS | TEMPERATURE: 97.4 F

## 2019-05-06 DIAGNOSIS — I10 ESSENTIAL HYPERTENSION: ICD-10-CM

## 2019-05-06 DIAGNOSIS — C61 PROSTATE CANCER (HCC): ICD-10-CM

## 2019-05-06 DIAGNOSIS — R31.9 HEMATURIA, UNSPECIFIED TYPE: Primary | ICD-10-CM

## 2019-05-06 DIAGNOSIS — J41.8 MIXED SIMPLE AND MUCOPURULENT CHRONIC BRONCHITIS (HCC): ICD-10-CM

## 2019-05-06 LAB
BILIRUB UR QL STRIP: NEGATIVE
GLUCOSE UR-MCNC: NEGATIVE MG/DL
KETONES P FAST UR STRIP-MCNC: NEGATIVE MG/DL
PH UR STRIP: 5.5 [PH] (ref 4.6–8)
PROT UR QL STRIP: NEGATIVE
SP GR UR STRIP: 1.01 (ref 1–1.03)
UA UROBILINOGEN AMB POC: NORMAL (ref 0.2–1)
URINALYSIS CLARITY POC: NORMAL
URINALYSIS COLOR POC: NORMAL
URINE BLOOD POC: NORMAL
URINE LEUKOCYTES POC: NORMAL
URINE NITRITES POC: NEGATIVE

## 2019-05-06 NOTE — PROGRESS NOTES
HPI  Mr. Billy Erickson is a 78y.o. year old male, he is seen today for blood in urine which started lat night around 10:30 - bright red blood. Went to sleep, woke at 3 am and was improved. Then by 5-6 am this morning was better. Hasn't noticed any bleeding since. No abdominal pain or back pain, no blood clots, no dysuria. No change in frequency or urgency. No f/c. No n/v. Will see Dr. Carlos Alberto Rivas in about 4 mos. Has prostate CA on Xtandi. Chief Complaint   Patient presents with    Blood in Urine     Room 2B// noted blood in urine last night // denies back pain, frequency, urgency, pain w/ urination         Prior to Admission medications    Medication Sig Start Date End Date Taking? Authorizing Provider   albuterol-ipratropium (DUO-NEB) 2.5 mg-0.5 mg/3 ml nebu 3 mL by Nebulization route every six (6) hours as needed (sob). 3/25/19  Yes Jl Gary, DO   fluticasone furoate-vilanterol (BREO ELLIPTA) 100-25 mcg/dose inhaler Take 1 Puff by inhalation daily. 3/25/19  Yes Jl Gary, DO   lisinopril (PRINIVIL, ZESTRIL) 2.5 mg tablet TAKE 1 TABLET BY MOUTH ONCE DAILY 3/19/19  Yes Karley Mcdaniel MD   Oxygen 3L continouus   Yes Provider, Historical   albuterol (PROVENTIL HFA, VENTOLIN HFA, PROAIR HFA) 90 mcg/actuation inhaler Use 2 puffs very 4 hours prn 2/18/19  Yes Madelaine Jon MD   fluticasone [facility]) 50 mcg/actuation nasal spray 2 Sprays by Both Nostrils route daily. 2/18/19  Yes Madelaine Jon MD   atorvastatin (LIPITOR) 40 mg tablet TAKE 1 TABLET EVERY DAY 9/20/18  Yes Shelton Shore MD   famotidine (PEPCID) 20 mg tablet TAKE 1 TABLET EVERY DAY AS NEEDED  FOR  HEARTBURN 4/2/18  Yes Shelton Shore MD   metoprolol tartrate (LOPRESSOR) 50 mg tablet TAKE ONE TABLET BY MOUTH TWICE DAILY 4/2/18  Yes Karley Mcdaniel MD   XTANDI 40 mg capsule Take 160 mg by mouth daily. 1/11/18  Yes Provider, Historical   aspirin 81 mg chewable tablet Take 1 Tab by mouth daily.  RISK OF HEART ATTACK IF ANY MISSED DOSES 10/21/15  Yes Alexander Platt MD         No Known Allergies      REVIEW OF SYSTEMS:  Per HPI    PHYSICAL EXAM:  Visit Vitals  /60 (BP 1 Location: Right arm, BP Patient Position: Sitting)   Pulse 68   Temp 97.4 °F (36.3 °C) (Oral)   Resp 16   Ht 6' (1.829 m)   Wt 189 lb (85.7 kg)   SpO2 93%   BMI 25.63 kg/m²     Constitutional: Appears well-developed and well-nourished. No distress. HENT:   Head: Normocephalic and atraumatic. Eyes: No scleral icterus. Cardiovascular: Normal S1/S2, regular rhythm. No murmurs, rubs, or gallops. Pulmonary/Chest: Effort normal and breath sounds normal. No respiratory distress. No wheezes, rhonchi, or rales. Abdomen: Soft, NT/ND, +BS, no rebound or guarding, no masses, no HSM appreciated. Back: no cva tenderness  Ext: No edema. Neurological: Alert. Psychiatric: Normal mood and affect. Behavior is normal.     Lab Results   Component Value Date/Time    Sodium 142 03/25/2019 04:05 AM    Potassium 3.7 03/25/2019 04:05 AM    Chloride 110 (H) 03/25/2019 04:05 AM    CO2 22 03/25/2019 04:05 AM    Anion gap 10 03/25/2019 04:05 AM    Glucose 90 03/25/2019 04:05 AM    BUN 23 (H) 03/25/2019 04:05 AM    Creatinine 1.14 03/25/2019 04:05 AM    BUN/Creatinine ratio 20 03/25/2019 04:05 AM    GFR est AA >60 03/25/2019 04:05 AM    GFR est non-AA >60 03/25/2019 04:05 AM    Calcium 8.9 03/25/2019 04:05 AM    Bilirubin, total 0.5 03/23/2019 06:56 AM    AST (SGOT) 13 (L) 03/23/2019 06:56 AM    Alk.  phosphatase 62 03/23/2019 06:56 AM    Protein, total 7.6 03/23/2019 06:56 AM    Albumin 3.6 03/23/2019 06:56 AM    Globulin 4.0 03/23/2019 06:56 AM    A-G Ratio 0.9 (L) 03/23/2019 06:56 AM    ALT (SGPT) 12 03/23/2019 06:56 AM     Lab Results   Component Value Date/Time    Hemoglobin A1c 6.0 (H) 12/12/2011 03:22 PM    Hemoglobin A1c 6.0 (H) 07/05/2011 01:44 PM      Lab Results   Component Value Date/Time    Cholesterol, total 73 (L) 04/25/2017 02:38 PM    HDL Cholesterol 31 (L) 04/25/2017 02:38 PM    LDL, calculated 19 04/25/2017 02:38 PM    VLDL, calculated 23 04/25/2017 02:38 PM    Triglyceride 116 04/25/2017 02:38 PM    CHOL/HDL Ratio 2.9 10/20/2015 04:06 AM          ASSESSMENT/PLAN  Diagnoses and all orders for this visit:    1. Hematuria, unspecified type  -     AMB POC URINALYSIS DIP STICK AUTO W/O MICRO  -     CULTURE, URINE  -     URINALYSIS W/ RFLX MICROSCOPIC  -     REFERRAL TO UROLOGY  -     CBC WITH AUTOMATED DIFF  Painless hematuria - initially gross hematuria and now improved. Has prostate CA on Xtandi and CAD on asa. Doubt kidney stones as source of blood. Send culture, check hgb and platelets, help patient get appt to see urology - if clots, abdominal pain or can't empty bladder to ED. 2. Prostate cancer ([facility] [address]    3. Mixed simple and mucopurulent chronic bronchitis (HCC)  Stable, on O2  4. Essential hypertension  Controlled on current regimen, continue         There are no preventive care reminders to display for this patient. Reviewed plan of care. Patient has provided input and agrees with goals. The nurse provided the patient and/or family with advanced directive information if needed and encouraged the patient to provide a copy to the office when available.

## 2019-05-06 NOTE — PROGRESS NOTES
Ellen Rivas  Identified pt with two pt identifiers(name and ). Chief Complaint   Patient presents with    Blood in Urine     Room . Have you been to the ER, urgent care clinic since your last visit? Hospitalized since your last visit? NO    2. Have you seen or consulted any other health care providers outside of the [address] [place] [address] since your last visit? Include any pap smears or colon screening. NO      Provider notified of reason for visit, vitals and flowsheets obtained on patients. Patient received paperwork for advance directive during previous visit but has not completed at this time     Reviewed record In preparation for visit, huddled with provider and have obtained necessary documentation      There are no preventive care reminders to display for this patient. Wt Readings from Last 3 Encounters:   19 190 lb 3.2 oz (86.3 kg)   19 189 lb (85.7 kg)   19 189 lb (85.7 kg)     Temp Readings from Last 3 Encounters:   19 97.5 °F (36.4 °C) (Oral)   19 97.7 °F (36.5 °C) (Oral)   19 97.4 °F (36.3 °C) (Oral)     BP Readings from Last 3 Encounters:   19 134/78   19 129/61   19 120/64     Pulse Readings from Last 3 Encounters:   19 61   19 72   19 71     There were no vitals filed for this visit. Learning Assessment:  :     Learning Assessment 2014   PRIMARY LEARNER Patient   PRIMARY LANGUAGE ENGLISH   LEARNER PREFERENCE PRIMARY DEMONSTRATION   ANSWERED BY patient     self       Depression Screening:  :     3 most recent PHQ Screens 2019   Little interest or pleasure in doing things Not at all   Feeling down, depressed, irritable, or hopeless Not at all   Total Score PHQ 2 0       Fall Risk Assessment:  :     Fall Risk Assessment, last 12 mths 2019   Able to walk? Yes   Fall in past 12 months?  No       Abuse Screening:  :     Abuse Screening Questionnaire 2019 2019 8/3/2017 10/13/2015   Do you ever feel afraid of your partner? N N N N   Are you in a relationship with someone who physically or mentally threatens you? N N N N   Is it safe for you to go home? Y Y Y Y       ADL Screening:  :     ADL Assessment 3/27/2019   Feeding yourself No Help Needed   Getting from bed to chair No Help Needed   Getting dressed No Help Needed   Bathing or showering No Help Needed   Walk across the room (includes cane/walker) No Help Needed   Using the telphone No Help Needed   Taking your medications No Help Needed   Preparing meals No Help Needed   Managing money (expenses/bills) Help Needed   Moderately strenuous housework (laundry) Help Needed   Shopping for personal items (toiletries/medicines) Help Needed   Shopping for groceries Help Needed   Driving No Help Needed   Climbing a flight of stairs No Help Needed   Getting to places beyond walking distances No Help Needed         Medication reconciliation up to date and corrected with patient at this time.

## 2019-05-09 LAB
APPEARANCE UR: ABNORMAL
BACTERIA #/AREA URNS HPF: ABNORMAL /[HPF]
BACTERIA UR CULT: ABNORMAL
BILIRUB UR QL STRIP: NEGATIVE
CASTS URNS MICRO: ABNORMAL
CASTS URNS QL MICRO: PRESENT /LPF
COLOR UR: YELLOW
EPI CELLS #/AREA URNS HPF: ABNORMAL /HPF (ref 0–10)
GLUCOSE UR QL: NEGATIVE
HGB UR QL STRIP: ABNORMAL
KETONES UR QL STRIP: NEGATIVE
LEUKOCYTE ESTERASE UR QL STRIP: ABNORMAL
MICRO URNS: ABNORMAL
MUCOUS THREADS URNS QL MICRO: PRESENT
NITRITE UR QL STRIP: NEGATIVE
PH UR STRIP: 5.5 [PH] (ref 5–7.5)
PROT UR QL STRIP: NEGATIVE
RBC #/AREA URNS HPF: >30 /HPF (ref 0–2)
SP GR UR: 1.02 (ref 1–1.03)
UROBILINOGEN UR STRIP-MCNC: 0.2 MG/DL (ref 0.2–1)
WBC #/AREA URNS HPF: ABNORMAL /HPF (ref 0–5)

## 2019-05-09 RX ORDER — SULFAMETHOXAZOLE AND TRIMETHOPRIM 800; 160 MG/1; MG/1
1 TABLET ORAL 2 TIMES DAILY
Qty: 10 TAB | Refills: 0 | Status: SHIPPED | OUTPATIENT
Start: 2019-05-09 | End: 2019-05-14

## 2019-06-11 ENCOUNTER — TELEPHONE (OUTPATIENT)
Dept: INTERNAL MEDICINE CLINIC | Facility: CLINIC | Age: 79
End: 2019-06-11

## 2019-06-11 ENCOUNTER — OFFICE VISIT (OUTPATIENT)
Dept: INTERNAL MEDICINE CLINIC | Facility: CLINIC | Age: 79
End: 2019-06-11

## 2019-06-11 VITALS
RESPIRATION RATE: 18 BRPM | DIASTOLIC BLOOD PRESSURE: 70 MMHG | OXYGEN SATURATION: 95 % | WEIGHT: 191 LBS | HEIGHT: 72 IN | BODY MASS INDEX: 25.87 KG/M2 | TEMPERATURE: 97.9 F | SYSTOLIC BLOOD PRESSURE: 130 MMHG | HEART RATE: 66 BPM

## 2019-06-11 DIAGNOSIS — M10.9 ACUTE GOUT INVOLVING TOE OF LEFT FOOT, UNSPECIFIED CAUSE: Primary | ICD-10-CM

## 2019-06-11 DIAGNOSIS — J44.9 CHRONIC OBSTRUCTIVE PULMONARY DISEASE, UNSPECIFIED COPD TYPE (HCC): ICD-10-CM

## 2019-06-11 DIAGNOSIS — I10 ESSENTIAL HYPERTENSION: ICD-10-CM

## 2019-06-11 RX ORDER — COLCHICINE 0.6 MG/1
TABLET ORAL
Qty: 20 TAB | Refills: 1 | Status: SHIPPED | OUTPATIENT
Start: 2019-06-11 | End: 2020-01-01

## 2019-06-11 NOTE — PROGRESS NOTES
HPI Mr. Mary Toro is a 78y.o. year old male, he is seen today for left foot pain. Started suddenly at about 1am with foot pain around great toe, more painful to bear weight. No injury. Had sardines yesterday and thinks may have triggered. Many years since last gout flare. Also complains of cough, congestion for at least a year. No f/c. No sob. Doesn't use nebulizer. Chief Complaint Patient presents with  Difficulty Walking Room 2A// L foot pain, gout? Prior to Admission medications Medication Sig Start Date End Date Taking? Authorizing Provider  
colchicine (COLCRYS) 0.6 mg tablet 0.6mg po tid day one then 0.6mg bid until gout resolved 6/11/19  Yes Jorge Espinoza MD  
albuterol-ipratropium (DUO-NEB) 2.5 mg-0.5 mg/3 ml nebu 3 mL by Nebulization route every six (6) hours as needed (sob). 3/25/19  Yes Jl Gary, DO  
fluticasone furoate-vilanterol (BREO ELLIPTA) 100-25 mcg/dose inhaler Take 1 Puff by inhalation daily. 3/25/19  Yes Jl Gary, DO  
lisinopril (PRINIVIL, ZESTRIL) 2.5 mg tablet TAKE 1 TABLET BY MOUTH ONCE DAILY 3/19/19  Yes Mike Silver MD  
Oxygen 3L continouus   Yes Provider, Historical  
albuterol (PROVENTIL HFA, VENTOLIN HFA, PROAIR HFA) 90 mcg/actuation inhaler Use 2 puffs very 4 hours prn 2/18/19  Yes Christiano Peng MD  
fluticasone [facility]) 50 mcg/actuation nasal spray 2 Sprays by Both Nostrils route daily. 2/18/19  Yes Christiano Peng MD  
atorvastatin (LIPITOR) 40 mg tablet TAKE 1 TABLET EVERY DAY 9/20/18  Yes Jorge Espinoza MD  
famotidine (PEPCID) 20 mg tablet TAKE 1 TABLET EVERY DAY AS NEEDED  FOR  HEARTBURN 4/2/18  Yes Jorge Espinoza MD  
metoprolol tartrate (LOPRESSOR) 50 mg tablet TAKE ONE TABLET BY MOUTH TWICE DAILY 4/2/18  Yes Mike Silver MD  
XTANDI 40 mg capsule Take 160 mg by mouth daily. 1/11/18  Yes Provider, Historical  
aspirin 81 mg chewable tablet Take 1 Tab by mouth daily.  RISK OF HEART ATTACK IF ANY MISSED DOSES 10/21/15  Yes Pratik Dolan MD  
 
 
 
No Known Allergies REVIEW OF SYSTEMS: 
Per HPI PHYSICAL EXAM: 
Visit Vitals /70 Pulse 66 Temp 97.9 °F (36.6 °C) (Oral) Resp 18 Ht 6' (1.829 m) Wt 191 lb (86.6 kg) SpO2 95% Comment: 3L BMI 25.90 kg/m² Constitutional: Appears well-developed and well-nourished. No distress. HENT:  
Head: Normocephalic and atraumatic. Eyes: No scleral icterus. Cardiovascular: Normal S1/S2, regular rhythm. No murmurs, rubs, or gallops. Pulmonary/Chest: Effort normal and breath sounds normal. No respiratory distress. No wheezes, rhonchi, or rales. Left foot: +warmth, tenderness, slight swelling and erythema left great toe and forefoot medially Neurological: Alert. Psychiatric: Normal mood and affect. Behavior is normal. 
  
Lab Results Component Value Date/Time Sodium 142 03/25/2019 04:05 AM  
 Potassium 3.7 03/25/2019 04:05 AM  
 Chloride 110 (H) 03/25/2019 04:05 AM  
 CO2 22 03/25/2019 04:05 AM  
 Anion gap 10 03/25/2019 04:05 AM  
 Glucose 90 03/25/2019 04:05 AM  
 BUN 23 (H) 03/25/2019 04:05 AM  
 Creatinine 1.14 03/25/2019 04:05 AM  
 BUN/Creatinine ratio 20 03/25/2019 04:05 AM  
 GFR est AA >60 03/25/2019 04:05 AM  
 GFR est non-AA >60 03/25/2019 04:05 AM  
 Calcium 8.9 03/25/2019 04:05 AM  
 Bilirubin, total 0.5 03/23/2019 06:56 AM  
 AST (SGOT) 13 (L) 03/23/2019 06:56 AM  
 Alk. phosphatase 62 03/23/2019 06:56 AM  
 Protein, total 7.6 03/23/2019 06:56 AM  
 Albumin 3.6 03/23/2019 06:56 AM  
 Globulin 4.0 03/23/2019 06:56 AM  
 A-G Ratio 0.9 (L) 03/23/2019 06:56 AM  
 ALT (SGPT) 12 03/23/2019 06:56 AM  
 
Lab Results Component Value Date/Time Hemoglobin A1c 6.0 (H) 12/12/2011 03:22 PM  
 Hemoglobin A1c 6.0 (H) 07/05/2011 01:44 PM  
  
Lab Results Component Value Date/Time  Cholesterol, total 73 (L) 04/25/2017 02:38 PM  
 HDL Cholesterol 31 (L) 04/25/2017 02:38 PM  
 LDL, calculated 19 04/25/2017 02:38 PM  
 VLDL, calculated 23 04/25/2017 02:38 PM  
 Triglyceride 116 04/25/2017 02:38 PM  
 CHOL/HDL Ratio 2.9 10/20/2015 04:06 AM  
  
 
 
ASSESSMENT/PLAN Diagnoses and all orders for this visit: 
 
1. Acute gout involving toe of left foot, unspecified cause 
-     colchicine (COLCRYS) 0.6 mg tablet; 0.6mg po tid day one then 0.6mg bid until gout resolved If too expensive would use prednisone 2. Chronic obstructive pulmonary disease, unspecified COPD type ([facility] [address] Suspect reason for chronic cough - will use nebs at least in morning but suspect bid will be better 3. Essential hypertension Controlled on current regimen, continue There are no preventive care reminders to display for this patient. Follow-up and Dispositions · Return if symptoms worsen or fail to improve. Reviewed plan of care. Patient has provided input and agrees with goals. The nurse provided the patient and/or family with advanced directive information if needed and encouraged the patient to provide a copy to the office when available.

## 2019-06-11 NOTE — PATIENT INSTRUCTIONS
Gout: Care Instructions Your Care Instructions Gout is a form of arthritis caused by a buildup of uric acid crystals in a joint. It causes sudden attacks of pain, swelling, redness, and stiffness, usually in one joint, especially the big toe. Gout usually comes on without a cause. But it can be brought on by drinking alcohol (especially beer) or eating seafood and red meat. Taking certain medicines, such as diuretics or aspirin, also can bring on an attack of gout. Taking your medicines as prescribed and following up with your doctor regularly can help you avoid gout attacks in the future. Follow-up care is a key part of your treatment and safety. Be sure to make and go to all appointments, and call your doctor if you are having problems. It's also a good idea to know your test results and keep a list of the medicines you take. How can you care for yourself at home? · If the joint is swollen, put ice or a cold pack on the area for 10 to 20 minutes at a time. Put a thin cloth between the ice and your skin. · Prop up the sore limb on a pillow when you ice it or anytime you sit or lie down during the next 3 days. Try to keep it above the level of your heart. This will help reduce swelling. · Rest sore joints. Avoid activities that put weight or strain on the joints for a few days. Take short rest breaks from your regular activities during the day. · Take your medicines exactly as prescribed. Call your doctor if you think you are having a problem with your medicine. · Take pain medicines exactly as directed. ? If the doctor gave you a prescription medicine for pain, take it as prescribed. ? If you are not taking a prescription pain medicine, ask your doctor if you can take an over-the-counter medicine. · Eat less seafood and red meat. · Check with your doctor before drinking alcohol. · Losing weight, if you are overweight, may help reduce attacks of gout. But do not go on a [employer]. \" Losing a lot of weight in a short amount of time can cause a gout attack. When should you call for help? Call your doctor now or seek immediate medical care if: 
  · You have a fever.  
  · The joint is so painful you cannot use it.  
  · You have sudden, unexplained swelling, redness, warmth, or severe pain in one or more joints.  
 Watch closely for changes in your health, and be sure to contact your doctor if: 
  · You have joint pain.  
  · Your symptoms get worse or are not improving after 2 or 3 days. Where can you learn more? Go to http://ria-matt.info/. Enter N225 in the search box to learn more about \"Gout: Care Instructions. \" Current as of: Cathi 10, 2018 Content Version: 11.9 © 0226-5275 [employer]. Care instructions adapted under license by [employer] (which disclaims liability or warranty for this information). If you have questions about a medical condition or this instruction, always ask your healthcare professional. [place] [address] any warranty or liability for your use of this information. Purine-Restricted Diet: Care Instructions Your Care Instructions Purines are substances that are found in some foods. Your body turns purines into uric acid. High levels of uric acid can cause gout, which is a form of arthritis that causes pain and inflammation in joints. You may be able to help control the amount of uric acid in your body by limiting high-purine foods in your diet. Follow-up care is a key part of your treatment and safety. Be sure to make and go to all appointments, and call your doctor if you are having problems. It's also a good idea to know your test results and keep a list of the medicines you take. How can you care for yourself at home? · Plan your meals and snacks around foods that are low in purines and are safe for you to eat. These foods include: ? Green vegetables and tomatoes. ? Fruits. ? Whole-grain breads, rice, and cereals. ? Eggs, peanut butter, and nuts. ? Low-fat milk, cheese, and other milk products. ? Popcorn. ? Gelatin desserts, chocolate, cocoa, and cakes and sweets, in small amounts. · You can eat certain foods that are medium-high in purines, but eat them only once in a while. These foods include: 
? Legumes, such as dried beans and dried peas. You can have 1 cup cooked legumes each day. ? Asparagus, cauliflower, spinach, mushrooms, and green peas. ? Fish and seafood (other than very high-purine seafood). ? Oatmeal, wheat bran, and wheat germ. · Limit very high-purine foods, including: 
? Organ meats, such as liver, kidneys, sweetbreads, and brains. ? Meats, including joseph, beef, pork, and lamb. ? Game meats and any other meats in large amounts. ? Anchovies, sardines, herring, mackerel, and scallops. ? Gravy. ? Beer. Where can you learn more? Go to http://ria-matt.info/. Enter F448 in the search box to learn more about \"Purine-Restricted Diet: Care Instructions. \" Current as of: March 28, 2018 Content Version: 11.9 © 9552-9200 [employer]. Care instructions adapted under license by [employer] (which disclaims liability or warranty for this information). If you have questions about a medical condition or this instruction, always ask your healthcare professional. [place] [address] any warranty or liability for your use of this information.

## 2019-06-11 NOTE — PROGRESS NOTES
Sky Angeltingham  Identified pt with two pt identifiers(name and ). Chief Complaint Patient presents with  Difficulty Walking Room 2A// L foot pain, gout? 1. Have you been to the ER, urgent care clinic since your last visit? Hospitalized since your last visit? NO 
 
2. Have you seen or consulted any other health care providers outside of the [address] [place] [address] since your last visit? Include any pap smears or colon screening. NO Provider notified of reason for visit, vitals and flowsheets obtained on patients. Patient received paperwork for advance directive during previous visit but has not completed at this time Reviewed record In preparation for visit, huddled with provider and have obtained necessary documentation There are no preventive care reminders to display for this patient. Wt Readings from Last 3 Encounters:  
19 191 lb (86.6 kg) 19 189 lb (85.7 kg) 19 190 lb 3.2 oz (86.3 kg) Temp Readings from Last 3 Encounters:  
19 97.4 °F (36.3 °C) (Oral) 19 97.5 °F (36.4 °C) (Oral) 19 97.7 °F (36.5 °C) (Oral) BP Readings from Last 3 Encounters:  
19 133/60  
19 134/78  
19 129/61 Pulse Readings from Last 3 Encounters:  
19 68  
19 61  
19 72 Vitals:  
 19 1025 Resp: 18 Weight: 191 lb (86.6 kg) Height: 6' (1.829 m) PainSc:   8 PainLoc: Foot Learning Assessment: 
:  
 
Learning Assessment 2014 PRIMARY LEARNER Patient PRIMARY LANGUAGE ENGLISH  
LEARNER PREFERENCE PRIMARY DEMONSTRATION  
ANSWERED BY patient  
  self Depression Screening: 
:  
 
3 most recent PHQ Screens 2019 Little interest or pleasure in doing things Not at all Feeling down, depressed, irritable, or hopeless Not at all Total Score PHQ 2 0 Fall Risk Assessment: 
:  
 
Fall Risk Assessment, last 12 mths 2019 Able to walk?  Yes  
 Fall in past 12 months? No  
 
 
Abuse Screening: 
:  
 
Abuse Screening Questionnaire 2/25/2019 2/18/2019 8/3/2017 10/13/2015 Do you ever feel afraid of your partner? N N N N Are you in a relationship with someone who physically or mentally threatens you? N N N N Is it safe for you to go home? Darya Cantu QAMAR  
 
 
ADL Screening: 
:  
 
ADL Assessment 3/27/2019 Feeding yourself No Help Needed Getting from bed to chair No Help Needed Getting dressed No Help Needed Bathing or showering No Help Needed Walk across the room (includes cane/walker) No Help Needed Using the telphone No Help Needed Taking your medications No Help Needed Preparing meals No Help Needed Managing money (expenses/bills) Help Needed Moderately strenuous housework (laundry) Help Needed Shopping for personal items (toiletries/medicines) Help Needed Shopping for groceries Help Needed Driving No Help Needed Climbing a flight of stairs No Help Needed Getting to places beyond walking distances No Help Needed Medication reconciliation up to date and corrected with patient at this time.

## 2019-06-12 NOTE — TELEPHONE ENCOUNTER
Call from answering service  \"gout pain.:He dose not have gout on problem ist.. Called patient back within 2 minutes and got message that \"[employer] customer is not available; try your call again later. .\"  Unable to leave a message. Will see if he calls back.

## 2019-07-25 ENCOUNTER — OFFICE VISIT (OUTPATIENT)
Dept: URGENT CARE | Age: 79
End: 2019-07-25

## 2019-07-25 VITALS
HEART RATE: 66 BPM | DIASTOLIC BLOOD PRESSURE: 63 MMHG | TEMPERATURE: 98.6 F | SYSTOLIC BLOOD PRESSURE: 139 MMHG | BODY MASS INDEX: 25.87 KG/M2 | HEIGHT: 72 IN | OXYGEN SATURATION: 93 % | WEIGHT: 191 LBS | RESPIRATION RATE: 20 BRPM

## 2019-07-25 DIAGNOSIS — M79.89 PAIN AND SWELLING OF TOE OF LEFT FOOT: ICD-10-CM

## 2019-07-25 DIAGNOSIS — M79.675 PAIN AND SWELLING OF TOE OF LEFT FOOT: ICD-10-CM

## 2019-07-25 DIAGNOSIS — M10.9 ACUTE GOUT INVOLVING TOE OF LEFT FOOT, UNSPECIFIED CAUSE: Primary | ICD-10-CM

## 2019-07-25 RX ORDER — PREDNISONE 10 MG/1
TABLET ORAL
Qty: 21 TAB | Refills: 0 | Status: SHIPPED | OUTPATIENT
Start: 2019-07-25 | End: 2019-08-22 | Stop reason: ALTCHOICE

## 2019-07-25 NOTE — PATIENT INSTRUCTIONS
Purine-Restricted Diet: Care Instructions  Your Care Instructions    Purines are substances that are found in some foods. Your body turns purines into uric acid. High levels of uric acid can cause gout, which is a form of arthritis that causes pain and inflammation in joints. You may be able to help control the amount of uric acid in your body by limiting high-purine foods in your diet. Follow-up care is a key part of your treatment and safety. Be sure to make and go to all appointments, and call your doctor if you are having problems. It's also a good idea to know your test results and keep a list of the medicines you take. How can you care for yourself at home? · Plan your meals and snacks around foods that are low in purines and are safe for you to eat. These foods include:  ? Green vegetables and tomatoes. ? Fruits. ? Whole-grain breads, rice, and cereals. ? Eggs, peanut butter, and nuts. ? Low-fat milk, cheese, and other milk products. ? Popcorn. ? Gelatin desserts, chocolate, cocoa, and cakes and sweets, in small amounts. · You can eat certain foods that are medium-high in purines, but eat them only once in a while. These foods include:  ? Legumes, such as dried beans and dried peas. You can have 1 cup cooked legumes each day. ? Asparagus, cauliflower, spinach, mushrooms, and green peas. ? Fish and seafood (other than very high-purine seafood). ? Oatmeal, wheat bran, and wheat germ. · Limit very high-purine foods, including:  ? Organ meats, such as liver, kidneys, sweetbreads, and brains. ? Meats, including joseph, beef, pork, and lamb. ? Game meats and any other meats in large amounts. ? Anchovies, sardines, herring, mackerel, and scallops. ? Gravy. ? Beer. Where can you learn more? Go to http://ria-matt.info/. Enter F448 in the search box to learn more about \"Purine-Restricted Diet: Care Instructions. \"  Current as of: November 7, 2018  Content Version: 12.1  © 2006-2019 [employer]. Care instructions adapted under license by [employer] (which disclaims liability or warranty for this information). If you have questions about a medical condition or this instruction, always ask your healthcare professional. [address] any warranty or liability for your use of this information. Gout: Care Instructions  Your Care Instructions    Gout is a form of arthritis caused by a buildup of uric acid crystals in a joint. It causes sudden attacks of pain, swelling, redness, and stiffness, usually in one joint, especially the big toe. Gout usually comes on without a cause. But it can be brought on by drinking alcohol (especially beer) or eating seafood and red meat. Taking certain medicines, such as diuretics or aspirin, also can bring on an attack of gout. Taking your medicines as prescribed and following up with your doctor regularly can help you avoid gout attacks in the future. Follow-up care is a key part of your treatment and safety. Be sure to make and go to all appointments, and call your doctor if you are having problems. It's also a good idea to know your test results and keep a list of the medicines you take. How can you care for yourself at home? · If the joint is swollen, put ice or a cold pack on the area for 10 to 20 minutes at a time. Put a thin cloth between the ice and your skin. · Prop up the sore limb on a pillow when you ice it or anytime you sit or lie down during the next 3 days. Try to keep it above the level of your heart. This will help reduce swelling. · Rest sore joints. Avoid activities that put weight or strain on the joints for a few days. Take short rest breaks from your regular activities during the day. · Take your medicines exactly as prescribed. Call your doctor if you think you are having a problem with your medicine. · Take pain medicines exactly as directed.   ? If the doctor gave you a prescription medicine for pain, take it as prescribed. ? If you are not taking a prescription pain medicine, ask your doctor if you can take an over-the-counter medicine. · Eat less seafood and red meat. · Check with your doctor before drinking alcohol. · Losing weight, if you are overweight, may help reduce attacks of gout. But do not go on a [employer]. \" Losing a lot of weight in a short amount of time can cause a gout attack. When should you call for help? Call your doctor now or seek immediate medical care if:    · You have a fever.     · The joint is so painful you cannot use it.     · You have sudden, unexplained swelling, redness, warmth, or severe pain in one or more joints.    Watch closely for changes in your health, and be sure to contact your doctor if:    · You have joint pain.     · Your symptoms get worse or are not improving after 2 or 3 days. Where can you learn more? Go to http://ria-matt.info/. Enter F198 in the search box to learn more about \"Gout: Care Instructions. \"  Current as of: April 1, 2019  Content Version: 12.1  © 9587-8521 [employer]. Care instructions adapted under license by [employer] (which disclaims liability or warranty for this information). If you have questions about a medical condition or this instruction, always ask your healthcare professional. [address] any warranty or liability for your use of this information.

## 2019-07-25 NOTE — PROGRESS NOTES
The history is provided by the patient. Toe Pain   This is a chronic problem. Episode onset: 3 days ago. The problem occurs constantly. The problem has been gradually worsening. Pertinent negatives include no chest pain and no shortness of breath. The symptoms are aggravated by walking (left big toe movement). Nothing relieves the symptoms. He has tried nothing for the symptoms. Patient denies injury but does have a history of gout.     Past Medical History:   Diagnosis Date    CAD (coronary artery disease)     mi 10/19/2015 with stent    [facility] [address]     Hypertension     Prostate cancer ([facility] [address] 5/8/2012    Dr. Meg Ellison - diagnosed 2/2012 - s/p EBRT on Lupron         Past Surgical History:   Procedure Laterality Date    HX ORTHOPAEDIC  1961    left knee surgery         Family History   Problem Relation Age of Onset    Heart Disease Mother         pacemaker    Diabetes Mother     Diabetes Sister     Heart Disease Brother         Social History     Socioeconomic History    Marital status:      Spouse name: Not on file    Number of children: Not on file    Years of education: Not on file    Highest education level: Not on file   Occupational History    Not on file   Social Needs    Financial resource strain: Not on file    Food insecurity:     Worry: Not on file     Inability: Not on file    Transportation needs:     Medical: Not on file     Non-medical: Not on file   Tobacco Use    Smoking status: Former Smoker     Packs/day: 0.50     Years: 40.00     Pack years: 20.00     Types: Cigarettes     Last attempt to quit: 10/19/2015     Years since quitting: 3.7    Smokeless tobacco: Never Used    Tobacco comment: trying quit 10/13/15 - 1/2 ppd   Substance and Sexual Activity    Alcohol use: No     Alcohol/week: 0.0 standard drinks    Drug use: No    Sexual activity: Not Currently   Lifestyle    Physical activity:     Days per week: Not on file Minutes per session: Not on file    Stress: Not on file   Relationships    Social connections:     Talks on phone: Not on file     Gets together: Not on file     Attends [employer] service: Not on file     Active member of club or organization: Not on file     Attends meetings of clubs or organizations: Not on file     Relationship status: Not on file    Intimate partner violence:     Fear of current or ex partner: Not on file     Emotionally abused: Not on file     Physically abused: Not on file     Forced sexual activity: Not on file   Other Topics Concern    Not on file   Social History Narrative    Not on file                ALLERGIES: Patient has no known allergies. Review of Systems   Constitutional: Negative for chills, fatigue and fever. HENT: Negative. Respiratory: Negative for cough, shortness of breath and wheezing. Cardiovascular: Negative for chest pain. Gastrointestinal: Negative. Musculoskeletal: Positive for joint swelling (leg big toe). Skin: Negative. Vitals:    07/25/19 0900   BP: 139/63   Pulse: 66   Resp: 20   Temp: 98.6 °F (37 °C)   SpO2: 93%   Weight: 191 lb (86.6 kg)   Height: 6' (1.829 m)       Physical Exam   Constitutional: He is oriented to person, place, and time. He appears well-developed and well-nourished. HENT:   Right Ear: External ear normal.   Left Ear: External ear normal.   Neck: Normal range of motion. Neck supple. Cardiovascular: Normal rate, regular rhythm and normal heart sounds. Pulmonary/Chest: Effort normal and breath sounds normal. No respiratory distress. He has no wheezes. He has no rales. Abdominal: Soft. Bowel sounds are normal.   Musculoskeletal: He exhibits edema (left big toe) and tenderness. Lymphadenopathy:     He has no cervical adenopathy. Neurological: He is alert and oriented to person, place, and time. Skin: Skin is warm and dry. Psychiatric: He has a normal mood and affect.    Nursing note and vitals reviewed. MDM     Differential Diagnosis; Clinical Impression; Plan:     (M10.9) Acute gout involving toe of left foot, unspecified cause  (primary encounter diagnosis)  (M79.675,  M79.89) Pain and swelling of toe of left foot  Orders Placed This Encounter      predniSONE (STERAPRED DS) 10 mg dose pack          Sig: See administration instruction per 10mg dose pack          Dispense:  21 Tab          Refill:  0    Patient has a history of Gout. He is currently using colchicine. Patient admits to his dietary intake for the flaring of gout. Educated patient on foods that increase uric acid causing him gout (handout given). The patients condition was discussed with the patient and they understand. The patient is to follow up with PCP. If signs and symptoms become worse the pt is to go to the ER. The patient is to take medications as prescribed. AVS given with patient instructions upon discharge.                   Procedures

## 2019-08-22 ENCOUNTER — OFFICE VISIT (OUTPATIENT)
Dept: INTERNAL MEDICINE CLINIC | Facility: CLINIC | Age: 79
End: 2019-08-22

## 2019-08-22 VITALS
HEIGHT: 72 IN | OXYGEN SATURATION: 94 % | BODY MASS INDEX: 26.14 KG/M2 | WEIGHT: 193 LBS | DIASTOLIC BLOOD PRESSURE: 70 MMHG | HEART RATE: 62 BPM | SYSTOLIC BLOOD PRESSURE: 126 MMHG | RESPIRATION RATE: 14 BRPM | TEMPERATURE: 97.8 F

## 2019-08-22 DIAGNOSIS — I10 ESSENTIAL HYPERTENSION: ICD-10-CM

## 2019-08-22 DIAGNOSIS — I73.9 PAD (PERIPHERAL ARTERY DISEASE) (HCC): ICD-10-CM

## 2019-08-22 DIAGNOSIS — J41.1 MUCOPURULENT CHRONIC BRONCHITIS (HCC): Primary | ICD-10-CM

## 2019-08-22 NOTE — PATIENT INSTRUCTIONS
Purine-Restricted Diet: Care Instructions  Your Care Instructions    Purines are substances that are found in some foods. Your body turns purines into uric acid. High levels of uric acid can cause gout, which is a form of arthritis that causes pain and inflammation in joints. You may be able to help control the amount of uric acid in your body by limiting high-purine foods in your diet. Follow-up care is a key part of your treatment and safety. Be sure to make and go to all appointments, and call your doctor if you are having problems. It's also a good idea to know your test results and keep a list of the medicines you take. How can you care for yourself at home? · Plan your meals and snacks around foods that are low in purines and are safe for you to eat. These foods include:  ? Green vegetables and tomatoes. ? Fruits. ? Whole-grain breads, rice, and cereals. ? Eggs, peanut butter, and nuts. ? Low-fat milk, cheese, and other milk products. ? Popcorn. ? Gelatin desserts, chocolate, cocoa, and cakes and sweets, in small amounts. · You can eat certain foods that are medium-high in purines, but eat them only once in a while. These foods include:  ? Legumes, such as dried beans and dried peas. You can have 1 cup cooked legumes each day. ? Asparagus, cauliflower, spinach, mushrooms, and green peas. ? Fish and seafood (other than very high-purine seafood). ? Oatmeal, wheat bran, and wheat germ. · Limit very high-purine foods, including:  ? Organ meats, such as liver, kidneys, sweetbreads, and brains. ? Meats, including joseph, beef, pork, and lamb. ? Game meats and any other meats in large amounts. ? Anchovies, sardines, herring, mackerel, and scallops. ? Gravy. ? Beer. Where can you learn more? Go to http://ria-matt.info/. Enter F448 in the search box to learn more about \"Purine-Restricted Diet: Care Instructions. \"  Current as of: November 7, 2018  Content Version: 12.1  © 9697-7979 Healthwise, Incorporated. Care instructions adapted under license by [employer] (which disclaims liability or warranty for this information). If you have questions about a medical condition or this instruction, always ask your healthcare professional. [address] any warranty or liability for your use of this information.

## 2019-08-22 NOTE — PROGRESS NOTES
Brandi Oquendo  Identified pt with two pt identifiers(name and ). Chief Complaint   Patient presents with    Blood Pressure Check     Room 2A// NON fasting     COPD       1. Have you been to the ER, urgent care clinic since your last visit? Hospitalized since your last visit? NO    2. Have you seen or consulted any other health care providers outside of the [address] [place] [address] since your last visit? Include any pap smears or colon screening. NO      Provider notified of reason for visit, vitals and flowsheets obtained on patients. Patient received paperwork for advance directive during previous visit but has not completed at this time     Reviewed record In preparation for visit, huddled with provider and have obtained necessary documentation      There are no preventive care reminders to display for this patient. Wt Readings from Last 3 Encounters:   19 193 lb (87.5 kg)   19 191 lb (86.6 kg)   19 191 lb (86.6 kg)     Temp Readings from Last 3 Encounters:   19 98.6 °F (37 °C)   19 97.9 °F (36.6 °C) (Oral)   19 97.4 °F (36.3 °C) (Oral)     BP Readings from Last 3 Encounters:   19 139/63   19 130/70   19 133/60     Pulse Readings from Last 3 Encounters:   19 66   19 66   19 68     Vitals:    19 1250   Weight: 193 lb (87.5 kg)   Height: 6' (1.829 m)   PainSc:   0 - No pain         Learning Assessment:  :     Learning Assessment 2014   PRIMARY LEARNER Patient   PRIMARY LANGUAGE ENGLISH   LEARNER PREFERENCE PRIMARY DEMONSTRATION   ANSWERED BY patient     self       Depression Screening:  :     3 most recent PHQ Screens 2019   Little interest or pleasure in doing things Not at all   Feeling down, depressed, irritable, or hopeless Not at all   Total Score PHQ 2 0       Fall Risk Assessment:  :     Fall Risk Assessment, last 12 mths 2019   Able to walk? Yes   Fall in past 12 months?  No       Abuse Screening:  : Abuse Screening Questionnaire 2/25/2019 2/18/2019 8/3/2017 10/13/2015   Do you ever feel afraid of your partner? N N N N   Are you in a relationship with someone who physically or mentally threatens you? N N N N   Is it safe for you to go home? Y Y Y Y       ADL Screening:  :     ADL Assessment 3/27/2019   Feeding yourself No Help Needed   Getting from bed to chair No Help Needed   Getting dressed No Help Needed   Bathing or showering No Help Needed   Walk across the room (includes cane/walker) No Help Needed   Using the telphone No Help Needed   Taking your medications No Help Needed   Preparing meals No Help Needed   Managing money (expenses/bills) Help Needed   Moderately strenuous housework (laundry) Help Needed   Shopping for personal items (toiletries/medicines) Help Needed   Shopping for groceries Help Needed   Driving No Help Needed   Climbing a flight of stairs No Help Needed   Getting to places beyond walking distances No Help Needed         Medication reconciliation up to date and corrected with patient at this time.

## 2019-09-11 ENCOUNTER — TELEPHONE (OUTPATIENT)
Dept: INTERNAL MEDICINE CLINIC | Facility: CLINIC | Age: 79
End: 2019-09-11

## 2019-09-11 NOTE — TELEPHONE ENCOUNTER
Spoke with Dalila at [address] and instructed him it is ok to dispense Ventolin brand inhaler.

## 2019-09-11 NOTE — TELEPHONE ENCOUNTER
Del Aldana (related to pt) came by because there is a rx at [facility] that [employer] will not cover. She stated that it is an inhaler and its for pt breathing. She stated that she was hoping doctor gonzalo would call in something else that the insurance company will cover. She also stated that she has no idea was the name of the medication was called. She also dropped a form off for the pt from [facility] that they need the doctor to sign (a copy has been scanned in along with given to the nurse.  Ms Lili Padilla asked to please be called once this is ready

## 2019-09-15 ENCOUNTER — OFFICE VISIT (OUTPATIENT)
Dept: URGENT CARE | Age: 79
End: 2019-09-15

## 2019-09-15 VITALS
RESPIRATION RATE: 20 BRPM | HEIGHT: 72 IN | HEART RATE: 58 BPM | SYSTOLIC BLOOD PRESSURE: 138 MMHG | WEIGHT: 192 LBS | OXYGEN SATURATION: 97 % | DIASTOLIC BLOOD PRESSURE: 64 MMHG | BODY MASS INDEX: 26.01 KG/M2 | TEMPERATURE: 97.8 F

## 2019-09-15 DIAGNOSIS — R04.0 RIGHT-SIDED EPISTAXIS: Primary | ICD-10-CM

## 2019-09-15 NOTE — PATIENT INSTRUCTIONS
Ask your oxygen provider to set you up with HUMIDIFIED OXYGEN. Use calcium alginate if bleed starts again. It is normal for this to come out looking slimy or like a clot. If the coughing up blood continues after a few days and seems to be coming from the lungs (rather than the nose or throat), follow-up with PCP. Nosebleeds: Care Instructions  Your Care Instructions    Nosebleeds are common, especially if you have colds or allergies. Many things can cause a nosebleed. Some nosebleeds stop on their own with pressure. Others need packing. Some get cauterized (sealed). If you have gauze or other packing materials in your nose, you will need to follow up with your doctor to have the packing removed. You may need more treatment if you get nosebleeds a lot. The doctor has checked you carefully, but problems can develop later. If you notice any problems or new symptoms, get medical treatment right away. Follow-up care is a key part of your treatment and safety. Be sure to make and go to all appointments, and call your doctor if you are having problems. It's also a good idea to know your test results and keep a list of the medicines you take. How can you care for yourself at home? · If you get another nosebleed:  ? Sit up and tilt your head slightly forward. This keeps blood from going down your throat. ? Use your thumb and index finger to pinch your nose shut for 10 minutes. Use a clock. Do not check to see if the bleeding has stopped before the 10 minutes are up. If the bleeding has not stopped, pinch your nose shut for another 10 minutes. ? When the bleeding has stopped, try not to pick, rub, or blow your nose for 12 hours. Avoiding these things helps keep your nose from bleeding again. To prevent nosebleeds  · Do not blow your nose too hard. · Try not to lift or strain after a nosebleed. · Raise your head on a pillow while you sleep.   · Put a thin layer of a saline- or water-based nasal gel, such as NasoGel, inside your nose. Put it on the septum, which divides your nostrils. This will prevent dryness that can cause nosebleeds. · Use a vaporizer or humidifier to add moisture to your bedroom. Follow the directions for cleaning the machine. · Do not use aspirin, ibuprofen (Advil, Motrin), or naproxen (Aleve) for 36 to 48 hours after a nosebleed unless your doctor tells you to. You can use acetaminophen (Tylenol) for pain relief. · Talk to your doctor about stopping any other medicines you are taking. Some medicines may make you more likely to get a nosebleed. · Do not use cold medicines or nasal sprays without first talking to your doctor. They can make your nose dry. When should you call for help? Call 911 anytime you think you may need emergency care. For example, call if:    · You passed out (lost consciousness).    Call your doctor now or seek immediate medical care if:    · You get another nosebleed and your nose is still bleeding after you have applied pressure 3 times for 10 minutes each time (30 minutes total).     · There is a lot of blood running down the back of your throat even after you pinch your nose and tilt your head forward.     · You have a fever.     · You have sinus pain.    Watch closely for changes in your health, and be sure to contact your doctor if:    · You get nosebleeds often, even if they stop.     · You do not get better as expected. Where can you learn more? Go to http://ria-matt.info/. Enter S156 in the search box to learn more about \"Nosebleeds: Care Instructions. \"  Current as of: September 23, 2018  Content Version: 12.1  © 0160-6576 [employer]. Care instructions adapted under license by [employer] (which disclaims liability or warranty for this information).  If you have questions about a medical condition or this instruction, always ask your healthcare professional. [facility] disclaims any warranty or liability for your use of this information.

## 2019-09-15 NOTE — PROGRESS NOTES
The patient presents with bleeding from his right nare after blowing his nose this morning. Patient is on Oxygen at all times. His home O2 is not humidified. Patient reports he does not get nose bleeds regularly. Patient reports tipping his head back to manage the bleeding and then coughed up a blood clot. The history is provided by the patient Iglesia Gifford daughter). Epistaxis   This is a new problem. The current episode started 1 to 2 hours ago. The problem has been resolved. Pertinent negatives include no shortness of breath. Exacerbated by: Increased intranasal pressure. Relieved by: Pressure. Treatments tried: Tissues. The treatment provided significant (Resolved) relief.         Past Medical History:   Diagnosis Date    CAD (coronary artery disease)     mi 10/19/2015 with stent    COPD (chronic obstructive pulmonary disease) (HCC)     Hypertension     [facility]/2012    Dr. Olga Lidia Prince - diagnosed 2/2012 - s/p EBRT on Lupron         Past Surgical History:   Procedure Laterality Date    HX ORTHOPAEDIC  1961    left knee surgery         Family History   Problem Relation Age of Onset    Heart Disease Mother         pacemaker    Diabetes Mother     Diabetes Sister     Heart Disease Brother         Social History     Socioeconomic History    Marital status:      Spouse name: Not on file    Number of children: Not on file    Years of education: Not on file    Highest education level: Not on file   Occupational History    Not on file   Social Needs    Financial resource strain: Not on file    Food insecurity:     Worry: Not on file     Inability: Not on file    Transportation needs:     Medical: Not on file     Non-medical: Not on file   Tobacco Use    Smoking status: Former Smoker     Packs/day: 0.50     Years: 40.00     Pack years: 20.00     Types: Cigarettes     Last attempt to quit: 10/19/2015     Years since quitting: 3.9    Smokeless tobacco: Never Used   [facility] Tobacco comment: trying quit 10/13/15 - 1/2 ppd   Substance and Sexual Activity    Alcohol use: No     Alcohol/week: 0.0 standard drinks    Drug use: No    Sexual activity: Not Currently   Lifestyle    Physical activity:     Days per week: Not on file     Minutes per session: Not on file    Stress: Not on file   Relationships    Social connections:     Talks on phone: Not on file     Gets together: Not on file     Attends Zoroastrianism service: Not on file     Active member of club or organization: Not on file     Attends meetings of clubs or organizations: Not on file     Relationship status: Not on file    Intimate partner violence:     Fear of current or ex partner: Not on file     Emotionally abused: Not on file     Physically abused: Not on file     Forced sexual activity: Not on file   Other Topics Concern    Not on file   Social History Narrative    Not on file                ALLERGIES: Patient has no known allergies. Review of Systems   Constitutional: Negative for activity change and appetite change. HENT: Positive for postnasal drip (Bloody). Bleeding from right nare. Respiratory: Negative for shortness of breath. Vitals:    09/15/19 0918 09/15/19 0931   BP: 174/76    Pulse: 66    Resp: 20    Temp: 97.8 °F (36.6 °C)    SpO2: (!) 87% 97%   Weight: 192 lb (87.1 kg)    Height: 6' (1.829 m)        Physical Exam   Constitutional: Vital signs are normal. He appears well-developed and well-nourished. He is cooperative. He does not appear ill. No distress. HENT:   Nose: Epistaxis is observed. Residual clot noted in the right nare. Neurological: He is alert. Skin: He is not diaphoretic. MDM    ICD-10-CM ICD-9-CM    1. Right-sided epistaxis R04.0 784.7 sodium chloride-aloe vera (AYR) topical gel     Medications Ordered Today   Medications    sodium chloride-aloe vera (AYR) topical gel     Sig: Apply  to affected area once for 1 dose.      Dispense:  1 Tube     Refill:  1 No results found for any visits on 09/15/19. The patients condition was discussed with the patient and they understand. The patient is to follow up with primary care doctor. If signs and symptoms become worse the pt is to go to the ER. The patient is to take medications as prescribed. Educated patient on humidified oxygen and management of nose bleed. Provided with calcium alginate and educated on its use.      Procedures

## 2019-09-20 DIAGNOSIS — E78.5 HYPERLIPIDEMIA, UNSPECIFIED HYPERLIPIDEMIA TYPE: ICD-10-CM

## 2019-09-20 RX ORDER — ATORVASTATIN CALCIUM 40 MG/1
TABLET, FILM COATED ORAL
Qty: 90 TAB | Refills: 4 | Status: SHIPPED | OUTPATIENT
Start: 2019-09-20 | End: 2019-11-07 | Stop reason: SDUPTHER

## 2019-10-19 ENCOUNTER — OFFICE VISIT (OUTPATIENT)
Dept: URGENT CARE | Age: 79
End: 2019-10-19

## 2019-10-19 VITALS
HEART RATE: 65 BPM | SYSTOLIC BLOOD PRESSURE: 159 MMHG | BODY MASS INDEX: 26.41 KG/M2 | OXYGEN SATURATION: 91 % | RESPIRATION RATE: 18 BRPM | WEIGHT: 195 LBS | HEIGHT: 72 IN | TEMPERATURE: 97.5 F | DIASTOLIC BLOOD PRESSURE: 72 MMHG

## 2019-10-19 DIAGNOSIS — R06.02 SOB (SHORTNESS OF BREATH): Primary | ICD-10-CM

## 2019-10-19 DIAGNOSIS — R05.9 COUGH: ICD-10-CM

## 2019-10-19 RX ORDER — IPRATROPIUM BROMIDE AND ALBUTEROL SULFATE 2.5; .5 MG/3ML; MG/3ML
3 SOLUTION RESPIRATORY (INHALATION)
Qty: 30 NEBULE | Refills: 4 | Status: SHIPPED | OUTPATIENT
Start: 2019-10-19

## 2019-10-19 RX ORDER — GUAIFENESIN 600 MG/1
600 TABLET, EXTENDED RELEASE ORAL 2 TIMES DAILY
Qty: 28 TAB | Refills: 0 | Status: SHIPPED | OUTPATIENT
Start: 2019-10-19 | End: 2019-11-02

## 2019-10-19 RX ORDER — IPRATROPIUM BROMIDE AND ALBUTEROL SULFATE 2.5; .5 MG/3ML; MG/3ML
3 SOLUTION RESPIRATORY (INHALATION)
Status: COMPLETED | OUTPATIENT
Start: 2019-10-19 | End: 2019-10-19

## 2019-10-19 RX ADMIN — IPRATROPIUM BROMIDE AND ALBUTEROL SULFATE 3 ML: 2.5; .5 SOLUTION RESPIRATORY (INHALATION) at 11:37

## 2019-10-19 NOTE — PROGRESS NOTES
The patient presents with SOB, worsening this AM. Also right sided chest pain. Complex cardiac and respiratory history. Coughing recently. Using scheduled and PRN inhalers at home. Running out of nebulizer medication. The history is provided by the patient. Shortness of Breath   The history is provided by the patient and relative. This is a chronic problem. The problem occurs frequently. The current episode started 6 to 12 hours ago. The problem has not changed since onset. Associated symptoms include cough and sputum production. Pertinent negatives include no fever, no chest pain and no leg swelling. Associated symptoms comments: Flushing per patient during coughing episodes. . The problem's precipitants include exercise. He has tried ipratropium inhalers, beta-agonist inhalers and inhaled steroids for the symptoms. The treatment provided mild relief. Associated medical issues include COPD, chronic lung disease and CAD.         Past Medical History:   Diagnosis Date    CAD (coronary artery disease)     mi 10/19/2015 with stent    [facility]     Hypertension     Prostate cancer ([facility] 5/8/2012    Dr. Nica Mandujano - diagnosed 2/2012 - s/p EBRT on Lupron         Past Surgical History:   Procedure Laterality Date    HX ORTHOPAEDIC  1961    left knee surgery         Family History   Problem Relation Age of Onset    Heart Disease Mother         pacemaker    Diabetes Mother     Diabetes Sister     Heart Disease Brother         Social History     Socioeconomic History    Marital status:      Spouse name: Not on file    Number of children: Not on file    Years of education: Not on file    Highest education level: Not on file   Occupational History    Not on file   Social Needs    Financial resource strain: Not on file    Food insecurity:     Worry: Not on file     Inability: Not on file    Transportation needs:     Medical: Not on file Non-medical: Not on file   Tobacco Use    Smoking status: Former Smoker     Packs/day: 0.50     Years: 40.00     Pack years: 20.00     Types: Cigarettes     Last attempt to quit: 10/19/2015     Years since quittin.0    Smokeless tobacco: Never Used    Tobacco comment: trying quit 10/13/15 -  ppd   Substance and Sexual Activity    Alcohol use: No     Alcohol/week: 0.0 standard drinks    Drug use: No    Sexual activity: Not Currently   Lifestyle    Physical activity:     Days per week: Not on file     Minutes per session: Not on file    Stress: Not on file   Relationships    Social connections:     Talks on phone: Not on file     Gets together: Not on file     Attends Pentecostal service: Not on file     Active member of club or organization: Not on file     Attends meetings of clubs or organizations: Not on file     Relationship status: Not on file    Intimate partner violence:     Fear of current or ex partner: Not on file     Emotionally abused: Not on file     Physically abused: Not on file     Forced sexual activity: Not on file   Other Topics Concern    Not on file   Social History Narrative    Not on file                ALLERGIES: Patient has no known allergies. Review of Systems   Constitutional: Negative for fever. Respiratory: Positive for cough, sputum production and shortness of breath. Cardiovascular: Negative for chest pain, palpitations and leg swelling. Vitals:    10/19/19 1100   BP: 175/75   Pulse: 65   Resp: 18   Temp: 97.5 °F (36.4 °C)   SpO2: 91%   Weight: 195 lb (88.5 kg)   Height: 6' (1.829 m)       Physical Exam   Constitutional: He appears well-developed and well-nourished. He does not appear ill. No distress. HENT:   Head: Normocephalic. Right Ear: Ear canal normal. No drainage. Tympanic membrane is not erythematous and not bulging. Left Ear: Tympanic membrane and ear canal normal. No drainage. Tympanic membrane is not erythematous and not bulging.    Nose: Nose normal. No rhinorrhea. Mouth/Throat: No oropharyngeal exudate, posterior oropharyngeal edema or posterior oropharyngeal erythema. Cardiovascular: Normal rate, regular rhythm and normal heart sounds. Pulmonary/Chest: Effort normal. No respiratory distress. He has decreased breath sounds in the right upper field. He has no wheezes. He has rhonchi in the left upper field, the left middle field and the left lower field. Lymphadenopathy:     He has no cervical adenopathy. MDM    ICD-10-CM ICD-9-CM    1. SOB (shortness of breath) R06.02 786.05 XR CHEST PA LAT      albuterol-ipratropium (DUO-NEB) 2.5 MG-0.5 MG/3 ML      albuterol-ipratropium (DUO-NEB) 2.5 mg-0.5 mg/3 ml nebu   2. Cough R05 786.2 guaiFENesin ER (MUCINEX) 600 mg ER tablet     Medications Ordered Today   Medications    albuterol-ipratropium (DUO-NEB) 2.5 MG-0.5 MG/3 ML     Order Specific Question:   MODE OF DELIVERY     Answer:   Nebulizer    albuterol-ipratropium (DUO-NEB) 2.5 mg-0.5 mg/3 ml nebu     Sig: 3 mL by Nebulization route every six (6) hours as needed (sob). Dispense:  30 Nebule     Refill:  4    guaiFENesin ER (MUCINEX) 600 mg ER tablet     Sig: Take 1 Tab by mouth two (2) times a day for 14 days. Dispense:  28 Tab     Refill:  0     Results for orders placed or performed in visit on 10/19/19   XR CHEST PA LAT    Narrative    Exam:  2 view chest    Indication: Shortness of breath and right-sided chest pain    Comparison to 3/23/2019. PA and lateral views demonstrate normal heart size. Bilateral lower lobe  interstitial prominence is stable and likely represents the patient's baseline. There is no acute process in the lung fields. Degenerative changes are seen in  the thoracic spine. Impression    Impression: No acute process or change compared to the prior exam.       The patients condition was discussed with the patient and they understand. The patient is to follow up with primary care doctor.   If signs and symptoms become worse the pt is to go to the ER. The patient is to take medications as prescribed. Likely chronic COPD process. Patient in NAD. Was running out of nebs at home. Recommend f/u with pulmonologist. Immediate f/u to ED if worsening SOB.      Procedures

## 2019-11-07 DIAGNOSIS — E78.5 HYPERLIPIDEMIA, UNSPECIFIED HYPERLIPIDEMIA TYPE: ICD-10-CM

## 2019-11-07 RX ORDER — ATORVASTATIN CALCIUM 40 MG/1
TABLET, FILM COATED ORAL
Qty: 90 TAB | Refills: 0 | Status: SHIPPED | OUTPATIENT
Start: 2019-11-07 | End: 2020-01-01 | Stop reason: SDUPTHER

## 2020-01-01 ENCOUNTER — HOSPITAL ENCOUNTER (OUTPATIENT)
Dept: NUCLEAR MEDICINE | Age: 80
Discharge: HOME | End: 2020-03-13
Attending: UROLOGY
Payer: MEDICARE

## 2020-01-01 ENCOUNTER — TELEPHONE (OUTPATIENT)
Dept: INTERNAL MEDICINE CLINIC | Facility: CLINIC | Age: 80
End: 2020-01-01

## 2020-01-01 ENCOUNTER — HOSPITAL ENCOUNTER (EMERGENCY)
Age: 80
Discharge: HOME | End: 2020-08-29
Attending: EMERGENCY MEDICINE | Admitting: EMERGENCY MEDICINE
Payer: MEDICARE

## 2020-01-01 ENCOUNTER — APPOINTMENT (OUTPATIENT)
Dept: NON INVASIVE DIAGNOSTICS | Age: 80
DRG: 981 | End: 2020-01-01
Attending: NURSE PRACTITIONER
Payer: MEDICARE

## 2020-01-01 ENCOUNTER — APPOINTMENT (OUTPATIENT)
Dept: CT IMAGING | Age: 80
End: 2020-01-01
Attending: EMERGENCY MEDICINE
Payer: MEDICARE

## 2020-01-01 ENCOUNTER — PATIENT OUTREACH (OUTPATIENT)
Dept: CASE MANAGEMENT | Age: 80
End: 2020-01-01

## 2020-01-01 ENCOUNTER — HOSPITAL ENCOUNTER (INPATIENT)
Age: 80
LOS: 7 days | Discharge: HOME HEALTH | DRG: 981 | End: 2020-09-22
Attending: EMERGENCY MEDICINE | Admitting: HOSPITALIST
Payer: MEDICARE

## 2020-01-01 ENCOUNTER — HOSPITAL ENCOUNTER (EMERGENCY)
Age: 80
Discharge: HOME | DRG: 981 | End: 2020-09-14
Attending: EMERGENCY MEDICINE
Payer: MEDICARE

## 2020-01-01 ENCOUNTER — APPOINTMENT (OUTPATIENT)
Dept: CT IMAGING | Age: 80
DRG: 981 | End: 2020-01-01
Attending: EMERGENCY MEDICINE
Payer: MEDICARE

## 2020-01-01 ENCOUNTER — APPOINTMENT (OUTPATIENT)
Dept: GENERAL RADIOLOGY | Age: 80
DRG: 981 | End: 2020-01-01
Attending: EMERGENCY MEDICINE
Payer: MEDICARE

## 2020-01-01 ENCOUNTER — OFFICE VISIT (OUTPATIENT)
Dept: CARDIOLOGY CLINIC | Age: 80
End: 2020-01-01
Payer: MEDICARE

## 2020-01-01 ENCOUNTER — TELEPHONE (OUTPATIENT)
Dept: INTERNAL MEDICINE CLINIC | Age: 80
End: 2020-01-01

## 2020-01-01 ENCOUNTER — HOSPITAL ENCOUNTER (OUTPATIENT)
Dept: CT IMAGING | Age: 80
Discharge: HOME | End: 2020-03-13
Attending: UROLOGY
Payer: MEDICARE

## 2020-01-01 ENCOUNTER — OFFICE VISIT (OUTPATIENT)
Dept: CARDIOLOGY CLINIC | Age: 80
End: 2020-01-01

## 2020-01-01 ENCOUNTER — TELEPHONE (OUTPATIENT)
Dept: CARDIOLOGY CLINIC | Age: 80
End: 2020-01-01

## 2020-01-01 ENCOUNTER — CLINICAL SUPPORT (OUTPATIENT)
Dept: CARDIOLOGY CLINIC | Age: 80
End: 2020-01-01
Payer: MEDICARE

## 2020-01-01 ENCOUNTER — PATIENT OUTREACH (OUTPATIENT)
Dept: INTERNAL MEDICINE CLINIC | Age: 80
End: 2020-01-01

## 2020-01-01 ENCOUNTER — OFFICE VISIT (OUTPATIENT)
Dept: INTERNAL MEDICINE CLINIC | Age: 80
End: 2020-01-01
Payer: MEDICARE

## 2020-01-01 ENCOUNTER — APPOINTMENT (OUTPATIENT)
Dept: GENERAL RADIOLOGY | Age: 80
DRG: 981 | End: 2020-01-01
Attending: INTERNAL MEDICINE
Payer: MEDICARE

## 2020-01-01 ENCOUNTER — HOSPITAL ENCOUNTER (OUTPATIENT)
Dept: GENERAL RADIOLOGY | Age: 80
Discharge: HOME | End: 2020-03-09
Payer: MEDICARE

## 2020-01-01 ENCOUNTER — OFFICE VISIT (OUTPATIENT)
Dept: INTERNAL MEDICINE CLINIC | Facility: CLINIC | Age: 80
End: 2020-01-01

## 2020-01-01 VITALS
BODY MASS INDEX: 27.48 KG/M2 | HEART RATE: 80 BPM | RESPIRATION RATE: 20 BRPM | DIASTOLIC BLOOD PRESSURE: 70 MMHG | SYSTOLIC BLOOD PRESSURE: 130 MMHG | WEIGHT: 202.9 LBS | OXYGEN SATURATION: 85 % | HEIGHT: 72 IN

## 2020-01-01 VITALS
DIASTOLIC BLOOD PRESSURE: 69 MMHG | TEMPERATURE: 97.3 F | WEIGHT: 202.7 LBS | RESPIRATION RATE: 14 BRPM | HEIGHT: 72 IN | BODY MASS INDEX: 27.45 KG/M2 | SYSTOLIC BLOOD PRESSURE: 142 MMHG | HEART RATE: 87 BPM | OXYGEN SATURATION: 94 %

## 2020-01-01 VITALS
HEIGHT: 72 IN | HEART RATE: 80 BPM | WEIGHT: 194.5 LBS | RESPIRATION RATE: 18 BRPM | DIASTOLIC BLOOD PRESSURE: 60 MMHG | BODY MASS INDEX: 26.34 KG/M2 | SYSTOLIC BLOOD PRESSURE: 120 MMHG | OXYGEN SATURATION: 97 %

## 2020-01-01 VITALS
BODY MASS INDEX: 26.28 KG/M2 | DIASTOLIC BLOOD PRESSURE: 82 MMHG | RESPIRATION RATE: 22 BRPM | WEIGHT: 194 LBS | HEART RATE: 93 BPM | OXYGEN SATURATION: 89 % | HEIGHT: 72 IN | SYSTOLIC BLOOD PRESSURE: 180 MMHG | TEMPERATURE: 99 F

## 2020-01-01 VITALS
OXYGEN SATURATION: 87 % | TEMPERATURE: 97.5 F | SYSTOLIC BLOOD PRESSURE: 136 MMHG | HEIGHT: 72 IN | RESPIRATION RATE: 27 BRPM | DIASTOLIC BLOOD PRESSURE: 66 MMHG | HEART RATE: 78 BPM | WEIGHT: 194 LBS | BODY MASS INDEX: 26.28 KG/M2

## 2020-01-01 VITALS
WEIGHT: 194 LBS | BODY MASS INDEX: 26.28 KG/M2 | HEIGHT: 72 IN | HEART RATE: 91 BPM | RESPIRATION RATE: 16 BRPM | OXYGEN SATURATION: 95 % | SYSTOLIC BLOOD PRESSURE: 100 MMHG | DIASTOLIC BLOOD PRESSURE: 62 MMHG | TEMPERATURE: 97.4 F

## 2020-01-01 VITALS
TEMPERATURE: 97.5 F | OXYGEN SATURATION: 97 % | BODY MASS INDEX: 26.33 KG/M2 | HEART RATE: 65 BPM | DIASTOLIC BLOOD PRESSURE: 69 MMHG | SYSTOLIC BLOOD PRESSURE: 118 MMHG | HEIGHT: 72 IN | WEIGHT: 194.4 LBS | RESPIRATION RATE: 14 BRPM

## 2020-01-01 DIAGNOSIS — I10 ESSENTIAL HYPERTENSION: ICD-10-CM

## 2020-01-01 DIAGNOSIS — Z99.81 DEPENDENCE ON CONTINUOUS SUPPLEMENTAL OXYGEN: ICD-10-CM

## 2020-01-01 DIAGNOSIS — J44.1 ACUTE EXACERBATION OF CHRONIC OBSTRUCTIVE PULMONARY DISEASE (COPD) (HCC): ICD-10-CM

## 2020-01-01 DIAGNOSIS — E78.5 HYPERLIPIDEMIA, UNSPECIFIED HYPERLIPIDEMIA TYPE: ICD-10-CM

## 2020-01-01 DIAGNOSIS — I71.9 PENETRATING ATHEROSCLEROTIC ULCER OF AORTA (HCC): ICD-10-CM

## 2020-01-01 DIAGNOSIS — Z98.61 S/P PTCA (PERCUTANEOUS TRANSLUMINAL CORONARY ANGIOPLASTY): ICD-10-CM

## 2020-01-01 DIAGNOSIS — K21.9 GASTROESOPHAGEAL REFLUX DISEASE, UNSPECIFIED WHETHER ESOPHAGITIS PRESENT: ICD-10-CM

## 2020-01-01 DIAGNOSIS — E78.2 MIXED HYPERLIPIDEMIA: Chronic | ICD-10-CM

## 2020-01-01 DIAGNOSIS — E78.2 MIXED HYPERLIPIDEMIA: ICD-10-CM

## 2020-01-01 DIAGNOSIS — R09.02 HYPOXEMIA: ICD-10-CM

## 2020-01-01 DIAGNOSIS — J44.9 CHRONIC OBSTRUCTIVE PULMONARY DISEASE, UNSPECIFIED COPD TYPE (HCC): ICD-10-CM

## 2020-01-01 DIAGNOSIS — K21.9 GASTROESOPHAGEAL REFLUX DISEASE, ESOPHAGITIS PRESENCE NOT SPECIFIED: ICD-10-CM

## 2020-01-01 DIAGNOSIS — I49.5 SSS (SICK SINUS SYNDROME) (HCC): ICD-10-CM

## 2020-01-01 DIAGNOSIS — I49.5 SSS (SICK SINUS SYNDROME) (HCC): Chronic | ICD-10-CM

## 2020-01-01 DIAGNOSIS — I10 ESSENTIAL HYPERTENSION: Chronic | ICD-10-CM

## 2020-01-01 DIAGNOSIS — C61 PROSTATE CANCER (HCC): ICD-10-CM

## 2020-01-01 DIAGNOSIS — J84.9 CHRONIC INTERSTITIAL LUNG DISEASE (HCC): ICD-10-CM

## 2020-01-01 DIAGNOSIS — R06.02 SOB (SHORTNESS OF BREATH): ICD-10-CM

## 2020-01-01 DIAGNOSIS — I25.10 ASHD (ARTERIOSCLEROTIC HEART DISEASE): Primary | ICD-10-CM

## 2020-01-01 DIAGNOSIS — Z95.0 CARDIAC PACEMAKER IN SITU: Primary | ICD-10-CM

## 2020-01-01 DIAGNOSIS — M54.2 NECK PAIN: Primary | ICD-10-CM

## 2020-01-01 DIAGNOSIS — C61 CANCER OF PROSTATE (HCC): ICD-10-CM

## 2020-01-01 DIAGNOSIS — J44.9 CHRONIC OBSTRUCTIVE PULMONARY DISEASE, UNSPECIFIED COPD TYPE (HCC): Primary | ICD-10-CM

## 2020-01-01 DIAGNOSIS — J96.01 ACUTE HYPOXEMIC RESPIRATORY FAILURE (HCC): ICD-10-CM

## 2020-01-01 DIAGNOSIS — M54.50 BILATERAL LOW BACK PAIN WITHOUT SCIATICA, UNSPECIFIED CHRONICITY: ICD-10-CM

## 2020-01-01 DIAGNOSIS — I71.9 PENETRATING ATHEROSCLEROTIC ULCER OF AORTA (HCC): Primary | ICD-10-CM

## 2020-01-01 DIAGNOSIS — R10.84 ABDOMINAL PAIN, GENERALIZED: Primary | ICD-10-CM

## 2020-01-01 DIAGNOSIS — Z95.0 S/P PLACEMENT OF CARDIAC PACEMAKER: ICD-10-CM

## 2020-01-01 DIAGNOSIS — R94.31 T WAVE INVERSION IN EKG: ICD-10-CM

## 2020-01-01 DIAGNOSIS — Z20.822 SUSPECTED 2019 NOVEL CORONAVIRUS INFECTION: ICD-10-CM

## 2020-01-01 DIAGNOSIS — Z00.00 MEDICARE ANNUAL WELLNESS VISIT, SUBSEQUENT: Primary | ICD-10-CM

## 2020-01-01 DIAGNOSIS — J96.01 ACUTE RESPIRATORY FAILURE WITH HYPOXIA (HCC): Primary | ICD-10-CM

## 2020-01-01 DIAGNOSIS — I73.9 PAD (PERIPHERAL ARTERY DISEASE) (HCC): ICD-10-CM

## 2020-01-01 DIAGNOSIS — M62.838 MUSCLE SPASM: ICD-10-CM

## 2020-01-01 DIAGNOSIS — I25.10 CORONARY ARTERY DISEASE INVOLVING NATIVE CORONARY ARTERY OF NATIVE HEART WITHOUT ANGINA PECTORIS: Primary | ICD-10-CM

## 2020-01-01 LAB
ALBUMIN SERPL-MCNC: 3.1 G/DL (ref 3.5–5)
ALBUMIN SERPL-MCNC: 3.2 G/DL (ref 3.5–5)
ALBUMIN/GLOB SERPL: 0.7 {RATIO} (ref 1.1–2.2)
ALBUMIN/GLOB SERPL: 0.7 {RATIO} (ref 1.1–2.2)
ALP SERPL-CCNC: 127 U/L (ref 45–117)
ALP SERPL-CCNC: 173 U/L (ref 45–117)
ALT SERPL-CCNC: 13 U/L (ref 12–78)
ALT SERPL-CCNC: 15 U/L (ref 12–78)
ANION GAP SERPL CALC-SCNC: 2 MMOL/L (ref 5–15)
ANION GAP SERPL CALC-SCNC: 3 MMOL/L (ref 5–15)
ANION GAP SERPL CALC-SCNC: 4 MMOL/L (ref 5–15)
ANION GAP SERPL CALC-SCNC: 5 MMOL/L (ref 5–15)
ANION GAP SERPL CALC-SCNC: 5 MMOL/L (ref 5–15)
APPEARANCE UR: CLEAR
APPEARANCE UR: CLEAR
ARTERIAL PATENCY WRIST A: ABNORMAL
ARTERIAL PATENCY WRIST A: YES
AST SERPL-CCNC: 18 U/L (ref 15–37)
AST SERPL-CCNC: 42 U/L (ref 15–37)
ATRIAL RATE: 312 BPM
ATRIAL RATE: 59 BPM
ATRIAL RATE: 69 BPM
ATRIAL RATE: 83 BPM
BACTERIA SPEC CULT: NORMAL
BACTERIA SPEC CULT: NORMAL
BACTERIA URNS QL MICRO: NEGATIVE /HPF
BACTERIA URNS QL MICRO: NEGATIVE /HPF
BASE DEFICIT BLD-SCNC: 4 MMOL/L
BASE DEFICIT BLD-SCNC: 5 MMOL/L
BASE DEFICIT BLD-SCNC: 6 MMOL/L
BASE EXCESS BLD CALC-SCNC: 0 MMOL/L
BASOPHILS # BLD: 0 K/UL (ref 0–0.1)
BASOPHILS # BLD: 0 K/UL (ref 0–0.1)
BASOPHILS # BLD: 0.1 K/UL (ref 0–0.1)
BASOPHILS NFR BLD: 0 % (ref 0–1)
BASOPHILS NFR BLD: 1 % (ref 0–1)
BASOPHILS NFR BLD: 2 % (ref 0–1)
BDY SITE: ABNORMAL
BILIRUB SERPL-MCNC: 0.5 MG/DL (ref 0.2–1)
BILIRUB SERPL-MCNC: 0.5 MG/DL (ref 0.2–1)
BILIRUB UR QL: NEGATIVE
BILIRUB UR QL: NEGATIVE
BNP SERPL-MCNC: 821 PG/ML
BUN SERPL-MCNC: 14 MG/DL (ref 6–20)
BUN SERPL-MCNC: 18 MG/DL (ref 6–20)
BUN SERPL-MCNC: 19 MG/DL (ref 6–20)
BUN SERPL-MCNC: 21 MG/DL (ref 6–20)
BUN SERPL-MCNC: 29 MG/DL (ref 6–20)
BUN/CREAT SERPL: 10 (ref 12–20)
BUN/CREAT SERPL: 12 (ref 12–20)
BUN/CREAT SERPL: 13 (ref 12–20)
BUN/CREAT SERPL: 16 (ref 12–20)
BUN/CREAT SERPL: 20 (ref 12–20)
CA-I BLD-SCNC: 1.23 MMOL/L (ref 1.12–1.32)
CA-I BLD-SCNC: 1.27 MMOL/L (ref 1.12–1.32)
CA-I BLD-SCNC: 1.28 MMOL/L (ref 1.12–1.32)
CA-I BLD-SCNC: 1.32 MMOL/L (ref 1.12–1.32)
CALCIUM SERPL-MCNC: 7.8 MG/DL (ref 8.5–10.1)
CALCIUM SERPL-MCNC: 8.2 MG/DL (ref 8.5–10.1)
CALCIUM SERPL-MCNC: 8.3 MG/DL (ref 8.5–10.1)
CALCIUM SERPL-MCNC: 8.5 MG/DL (ref 8.5–10.1)
CALCIUM SERPL-MCNC: 9.1 MG/DL (ref 8.5–10.1)
CALCULATED P AXIS, ECG09: 116 DEGREES
CALCULATED P AXIS, ECG09: 56 DEGREES
CALCULATED P AXIS, ECG09: 57 DEGREES
CALCULATED P AXIS, ECG09: 73 DEGREES
CALCULATED R AXIS, ECG10: 168 DEGREES
CALCULATED R AXIS, ECG10: 25 DEGREES
CALCULATED R AXIS, ECG10: 29 DEGREES
CALCULATED R AXIS, ECG10: 40 DEGREES
CALCULATED T AXIS, ECG11: -166 DEGREES
CALCULATED T AXIS, ECG11: 131 DEGREES
CALCULATED T AXIS, ECG11: 151 DEGREES
CALCULATED T AXIS, ECG11: 62 DEGREES
CHLORIDE SERPL-SCNC: 109 MMOL/L (ref 97–108)
CHLORIDE SERPL-SCNC: 110 MMOL/L (ref 97–108)
CHLORIDE SERPL-SCNC: 111 MMOL/L (ref 97–108)
CHLORIDE SERPL-SCNC: 113 MMOL/L (ref 97–108)
CHLORIDE SERPL-SCNC: 114 MMOL/L (ref 97–108)
CO2 SERPL-SCNC: 23 MMOL/L (ref 21–32)
CO2 SERPL-SCNC: 24 MMOL/L (ref 21–32)
CO2 SERPL-SCNC: 25 MMOL/L (ref 21–32)
CO2 SERPL-SCNC: 27 MMOL/L (ref 21–32)
CO2 SERPL-SCNC: 28 MMOL/L (ref 21–32)
COLOR UR: ABNORMAL
COLOR UR: NORMAL
COVID-19 RAPID TEST, COVR: NOT DETECTED
COVID-19, XGCOVT: NOT DETECTED
CREAT SERPL-MCNC: 1.3 MG/DL (ref 0.7–1.3)
CREAT SERPL-MCNC: 1.44 MG/DL (ref 0.7–1.3)
CREAT SERPL-MCNC: 1.46 MG/DL (ref 0.7–1.3)
CREAT SERPL-MCNC: 1.49 MG/DL (ref 0.7–1.3)
CREAT SERPL-MCNC: 1.5 MG/DL (ref 0.7–1.3)
D DIMER PPP FEU-MCNC: 0.55 MG/L FEU (ref 0–0.65)
D DIMER PPP FEU-MCNC: 0.6 MG/L FEU (ref 0–0.65)
DIAGNOSIS, 93000: NORMAL
DIFFERENTIAL METHOD BLD: ABNORMAL
ECHO AO ROOT DIAM: 3.58 CM
ECHO AV AREA PEAK VELOCITY: 3.19 CM2
ECHO AV AREA PEAK VELOCITY: 3.45 CM2
ECHO AV PEAK GRADIENT: 7.71 MMHG
ECHO EST RA PRESSURE: 10 MMHG
ECHO LA AREA 4C: 19.85 CM2
ECHO LA MAJOR AXIS: 3.38 CM
ECHO LA MINOR AXIS: 1.61 CM
ECHO LA TO AORTIC ROOT RATIO: 1.37
ECHO LA VOL 4C: 50.26 ML (ref 18–58)
ECHO LA VOLUME INDEX A4C: 23.9 ML/M2 (ref 16–28)
ECHO LV E' LATERAL VELOCITY: 10.88 CM/S
ECHO LV E' SEPTAL VELOCITY: 6.12 CM/S
ECHO LV INTERNAL DIMENSION DIASTOLIC: 4.51 CM (ref 4.2–5.9)
ECHO LV INTERNAL DIMENSION SYSTOLIC: 3.6 CM
ECHO LV IVSD: 1.51 CM (ref 0.6–1)
ECHO LV MASS 2D: 162.4 G (ref 88–224)
ECHO LV MASS INDEX 2D: 77.2 G/M2 (ref 49–115)
ECHO LV POSTERIOR WALL DIASTOLIC: 0.57 CM (ref 0.6–1)
ECHO LV POSTERIOR WALL SYSTOLIC: 0.74 CM
ECHO LVOT CARDIAC OUTPUT: 6.85 LITER/MINUTE
ECHO LVOT DIAM: 2.07 CM
ECHO LVOT PEAK GRADIENT: 6.88 MMHG
ECHO LVOT PEAK GRADIENT: 8.03 MMHG
ECHO LVOT PEAK VELOCITY: 131.17 CM/S
ECHO LVOT PEAK VELOCITY: 141.7 CM/S
ECHO LVOT SV: 94.7 ML
ECHO LVOT VTI: 28.15 CM
ECHO MV A VELOCITY: 98.77 CM/S
ECHO MV AREA VTI: 3.3 CM2
ECHO MV E DECELERATION TIME (DT): 0.23 S
ECHO MV E VELOCITY: 78.65 CM/S
ECHO MV E/A RATIO: 0.8
ECHO MV E/E' LATERAL: 7.23
ECHO MV E/E' RATIO (AVERAGED): 10.04
ECHO MV E/E' SEPTAL: 12.85
ECHO MV MAX VELOCITY: 109.43 CM/S
ECHO MV MEAN GRADIENT: 1.62 MMHG
ECHO MV PEAK GRADIENT: 4.79 MMHG
ECHO MV VTI: 28.65 CM
ECHO PV REGURGITANT MAX VELOCITY: 113 CM/S
ECHO PV REGURGITANT MAX VELOCITY: 138.21 CM/S
ECHO RA AREA 4C: 12.89 CM2
ECHO RIGHT VENTRICULAR SYSTOLIC PRESSURE (RVSP): 61.79 MMHG
ECHO TV REGURGITANT MAX VELOCITY: 359.51 CM/S
ECHO TV REGURGITANT PEAK GRADIENT: 46.64 MMHG
ECHO TV REGURGITANT PEAK GRADIENT: 47.52 MMHG
ECHO TV REGURGITANT PEAK GRADIENT: 48.42 MMHG
ECHO TV REGURGITANT PEAK GRADIENT: 51.79 MMHG
ECHO TV REGURGITANT PEAK GRADIENT: 54.89 MMHG
ECHO TV REGURGITANT PEAK GRADIENT: 55.69 MMHG
ECHO TV REGURGITANT PEAK GRADIENT: 57.58 MMHG
EOSINOPHIL # BLD: 0 K/UL (ref 0–0.4)
EOSINOPHIL # BLD: 0.1 K/UL (ref 0–0.4)
EOSINOPHIL # BLD: 0.1 K/UL (ref 0–0.4)
EOSINOPHIL NFR BLD: 0 % (ref 0–7)
EOSINOPHIL NFR BLD: 1 % (ref 0–7)
EOSINOPHIL NFR BLD: 1 % (ref 0–7)
EPITH CASTS URNS QL MICRO: ABNORMAL /LPF
EPITH CASTS URNS QL MICRO: NORMAL /LPF
ERYTHROCYTE [DISTWIDTH] IN BLOOD BY AUTOMATED COUNT: 18.5 % (ref 11.5–14.5)
ERYTHROCYTE [DISTWIDTH] IN BLOOD BY AUTOMATED COUNT: 18.7 % (ref 11.5–14.5)
ERYTHROCYTE [DISTWIDTH] IN BLOOD BY AUTOMATED COUNT: 19.2 % (ref 11.5–14.5)
ERYTHROCYTE [DISTWIDTH] IN BLOOD BY AUTOMATED COUNT: 19.2 % (ref 11.5–14.5)
ERYTHROCYTE [DISTWIDTH] IN BLOOD BY AUTOMATED COUNT: 19.3 % (ref 11.5–14.5)
FERRITIN SERPL-MCNC: 33 NG/ML (ref 26–388)
GAS FLOW.O2 O2 DELIVERY SYS: ABNORMAL L/MIN
GAS FLOW.O2 SETTING OXYMISER: 10 L/M
GAS FLOW.O2 SETTING OXYMISER: 5 L/M
GAS FLOW.O2 SETTING OXYMISER: 5 L/M
GAS FLOW.O2 SETTING OXYMISER: 8 L/M
GLOBULIN SER CALC-MCNC: 4.2 G/DL (ref 2–4)
GLOBULIN SER CALC-MCNC: 4.7 G/DL (ref 2–4)
GLUCOSE SERPL-MCNC: 103 MG/DL (ref 65–100)
GLUCOSE SERPL-MCNC: 128 MG/DL (ref 65–100)
GLUCOSE SERPL-MCNC: 138 MG/DL (ref 65–100)
GLUCOSE SERPL-MCNC: 178 MG/DL (ref 65–100)
GLUCOSE SERPL-MCNC: 88 MG/DL (ref 65–100)
GLUCOSE UR STRIP.AUTO-MCNC: NEGATIVE MG/DL
GLUCOSE UR STRIP.AUTO-MCNC: NEGATIVE MG/DL
HCO3 BLD-SCNC: 19.8 MMOL/L (ref 22–26)
HCO3 BLD-SCNC: 20 MMOL/L (ref 22–26)
HCO3 BLD-SCNC: 20.2 MMOL/L (ref 22–26)
HCO3 BLD-SCNC: 25.9 MMOL/L (ref 22–26)
HCT VFR BLD AUTO: 34 % (ref 36.6–50.3)
HCT VFR BLD AUTO: 35.1 % (ref 36.6–50.3)
HCT VFR BLD AUTO: 36.4 % (ref 36.6–50.3)
HCT VFR BLD AUTO: 38 % (ref 36.6–50.3)
HCT VFR BLD AUTO: 40.9 % (ref 36.6–50.3)
HEALTH STATUS, XMCV2T: NORMAL
HEALTH STATUS, XMCV2T: NORMAL
HGB BLD-MCNC: 10.8 G/DL (ref 12.1–17)
HGB BLD-MCNC: 11.2 G/DL (ref 12.1–17)
HGB BLD-MCNC: 11.7 G/DL (ref 12.1–17)
HGB BLD-MCNC: 11.9 G/DL (ref 12.1–17)
HGB BLD-MCNC: 12.7 G/DL (ref 12.1–17)
HGB UR QL STRIP: NEGATIVE
HGB UR QL STRIP: NEGATIVE
HYALINE CASTS URNS QL MICRO: ABNORMAL /LPF (ref 0–5)
HYALINE CASTS URNS QL MICRO: NORMAL /LPF (ref 0–5)
IMM GRANULOCYTES # BLD AUTO: 0 K/UL (ref 0–0.04)
IMM GRANULOCYTES # BLD AUTO: 0 K/UL (ref 0–0.04)
IMM GRANULOCYTES # BLD AUTO: 0.1 K/UL (ref 0–0.04)
IMM GRANULOCYTES NFR BLD AUTO: 0 % (ref 0–0.5)
IMM GRANULOCYTES NFR BLD AUTO: 0 % (ref 0–0.5)
IMM GRANULOCYTES NFR BLD AUTO: 1 % (ref 0–0.5)
INR PPP: 0.9 (ref 0.9–1.1)
INR PPP: 1 (ref 0.9–1.1)
INR PPP: 1 (ref 0.9–1.1)
KETONES UR QL STRIP.AUTO: NEGATIVE MG/DL
KETONES UR QL STRIP.AUTO: NEGATIVE MG/DL
LACTATE SERPL-SCNC: 1.5 MMOL/L (ref 0.4–2)
LDH SERPL L TO P-CCNC: 296 U/L (ref 85–241)
LEUKOCYTE ESTERASE UR QL STRIP.AUTO: ABNORMAL
LEUKOCYTE ESTERASE UR QL STRIP.AUTO: NEGATIVE
LIPASE SERPL-CCNC: 84 U/L (ref 73–393)
LVOT MG: 2.59 MMHG
LYMPHOCYTES # BLD: 1 K/UL (ref 0.8–3.5)
LYMPHOCYTES # BLD: 1.2 K/UL (ref 0.8–3.5)
LYMPHOCYTES # BLD: 1.8 K/UL (ref 0.8–3.5)
LYMPHOCYTES NFR BLD: 13 % (ref 12–49)
LYMPHOCYTES NFR BLD: 19 % (ref 12–49)
LYMPHOCYTES NFR BLD: 21 % (ref 12–49)
MAGNESIUM SERPL-MCNC: 2.4 MG/DL (ref 1.6–2.4)
MAGNESIUM SERPL-MCNC: 2.6 MG/DL (ref 1.6–2.4)
MAGNESIUM SERPL-MCNC: 2.8 MG/DL (ref 1.6–2.4)
MCH RBC QN AUTO: 27.5 PG (ref 26–34)
MCH RBC QN AUTO: 27.6 PG (ref 26–34)
MCH RBC QN AUTO: 28 PG (ref 26–34)
MCH RBC QN AUTO: 28.1 PG (ref 26–34)
MCH RBC QN AUTO: 28.2 PG (ref 26–34)
MCHC RBC AUTO-ENTMCNC: 31.1 G/DL (ref 30–36.5)
MCHC RBC AUTO-ENTMCNC: 31.3 G/DL (ref 30–36.5)
MCHC RBC AUTO-ENTMCNC: 31.8 G/DL (ref 30–36.5)
MCHC RBC AUTO-ENTMCNC: 31.9 G/DL (ref 30–36.5)
MCHC RBC AUTO-ENTMCNC: 32.1 G/DL (ref 30–36.5)
MCV RBC AUTO: 87 FL (ref 80–99)
MCV RBC AUTO: 87.3 FL (ref 80–99)
MCV RBC AUTO: 87.8 FL (ref 80–99)
MCV RBC AUTO: 88.7 FL (ref 80–99)
MCV RBC AUTO: 90 FL (ref 80–99)
MONOCYTES # BLD: 0.2 K/UL (ref 0–1)
MONOCYTES # BLD: 0.2 K/UL (ref 0–1)
MONOCYTES # BLD: 0.9 K/UL (ref 0–1)
MONOCYTES NFR BLD: 11 % (ref 5–13)
MONOCYTES NFR BLD: 3 % (ref 5–13)
MONOCYTES NFR BLD: 3 % (ref 5–13)
NEUTS SEG # BLD: 4.7 K/UL (ref 1.8–8)
NEUTS SEG # BLD: 5.7 K/UL (ref 1.8–8)
NEUTS SEG # BLD: 6.6 K/UL (ref 1.8–8)
NEUTS SEG NFR BLD: 65 % (ref 32–75)
NEUTS SEG NFR BLD: 75 % (ref 32–75)
NEUTS SEG NFR BLD: 84 % (ref 32–75)
NITRITE UR QL STRIP.AUTO: NEGATIVE
NITRITE UR QL STRIP.AUTO: NEGATIVE
NRBC # BLD: 0 K/UL (ref 0–0.01)
NRBC # BLD: 0 K/UL (ref 0–0.01)
NRBC # BLD: 0.02 K/UL (ref 0–0.01)
NRBC # BLD: 0.02 K/UL (ref 0–0.01)
NRBC # BLD: 0.03 K/UL (ref 0–0.01)
NRBC BLD-RTO: 0 PER 100 WBC
NRBC BLD-RTO: 0 PER 100 WBC
NRBC BLD-RTO: 0.2 PER 100 WBC
NRBC BLD-RTO: 0.3 PER 100 WBC
NRBC BLD-RTO: 0.4 PER 100 WBC
P-R INTERVAL, ECG05: 134 MS
P-R INTERVAL, ECG05: 138 MS
P-R INTERVAL, ECG05: 142 MS
PCO2 BLD: 31.7 MMHG (ref 35–45)
PCO2 BLD: 32 MMHG (ref 35–45)
PCO2 BLD: 35.1 MMHG (ref 35–45)
PCO2 BLD: 48.7 MMHG (ref 35–45)
PH BLD: 7.33 [PH] (ref 7.35–7.45)
PH BLD: 7.36 [PH] (ref 7.35–7.45)
PH BLD: 7.41 [PH] (ref 7.35–7.45)
PH BLD: 7.41 [PH] (ref 7.35–7.45)
PH UR STRIP: 6 [PH] (ref 5–8)
PH UR STRIP: 7 [PH] (ref 5–8)
PHOSPHATE SERPL-MCNC: 2.6 MG/DL (ref 2.6–4.7)
PHOSPHATE SERPL-MCNC: 2.7 MG/DL (ref 2.6–4.7)
PLATELET # BLD AUTO: 161 K/UL (ref 150–400)
PLATELET # BLD AUTO: 171 K/UL (ref 150–400)
PLATELET # BLD AUTO: 187 K/UL (ref 150–400)
PLATELET # BLD AUTO: 199 K/UL (ref 150–400)
PLATELET # BLD AUTO: 219 K/UL (ref 150–400)
PMV BLD AUTO: 10.8 FL (ref 8.9–12.9)
PMV BLD AUTO: 11.4 FL (ref 8.9–12.9)
PMV BLD AUTO: 11.6 FL (ref 8.9–12.9)
PMV BLD AUTO: 11.8 FL (ref 8.9–12.9)
PMV BLD AUTO: 11.9 FL (ref 8.9–12.9)
PO2 BLD: 19 MMHG (ref 80–100)
PO2 BLD: 36 MMHG (ref 80–100)
PO2 BLD: 46 MMHG (ref 80–100)
PO2 BLD: 48 MMHG (ref 80–100)
POTASSIUM SERPL-SCNC: 4 MMOL/L (ref 3.5–5.1)
POTASSIUM SERPL-SCNC: 4.7 MMOL/L (ref 3.5–5.1)
POTASSIUM SERPL-SCNC: 4.9 MMOL/L (ref 3.5–5.1)
POTASSIUM SERPL-SCNC: 4.9 MMOL/L (ref 3.5–5.1)
POTASSIUM SERPL-SCNC: 5.2 MMOL/L (ref 3.5–5.1)
PROCALCITONIN SERPL-MCNC: <0.05 NG/ML
PROT SERPL-MCNC: 7.3 G/DL (ref 6.4–8.2)
PROT SERPL-MCNC: 7.9 G/DL (ref 6.4–8.2)
PROT UR STRIP-MCNC: NEGATIVE MG/DL
PROT UR STRIP-MCNC: NEGATIVE MG/DL
PROTHROMBIN TIME: 10.1 SEC (ref 9–11.1)
PROTHROMBIN TIME: 10.2 SEC (ref 9–11.1)
PROTHROMBIN TIME: 9.8 SEC (ref 9–11.1)
Q-T INTERVAL, ECG07: 380 MS
Q-T INTERVAL, ECG07: 408 MS
Q-T INTERVAL, ECG07: 436 MS
Q-T INTERVAL, ECG07: 444 MS
QRS DURATION, ECG06: 76 MS
QRS DURATION, ECG06: 82 MS
QRS DURATION, ECG06: 86 MS
QRS DURATION, ECG06: 92 MS
QTC CALCULATION (BEZET), ECG08: 439 MS
QTC CALCULATION (BEZET), ECG08: 452 MS
QTC CALCULATION (BEZET), ECG08: 467 MS
QTC CALCULATION (BEZET), ECG08: 479 MS
RBC # BLD AUTO: 3.91 M/UL (ref 4.1–5.7)
RBC # BLD AUTO: 4 M/UL (ref 4.1–5.7)
RBC # BLD AUTO: 4.17 M/UL (ref 4.1–5.7)
RBC # BLD AUTO: 4.22 M/UL (ref 4.1–5.7)
RBC # BLD AUTO: 4.61 M/UL (ref 4.1–5.7)
RBC #/AREA URNS HPF: ABNORMAL /HPF (ref 0–5)
RBC #/AREA URNS HPF: NORMAL /HPF (ref 0–5)
RBC MORPH BLD: ABNORMAL
SAO2 % BLD: 25 % (ref 92–97)
SAO2 % BLD: 70 % (ref 92–97)
SAO2 % BLD: 80 % (ref 92–97)
SAO2 % BLD: 85 % (ref 92–97)
SERVICE CMNT-IMP: NORMAL
SERVICE CMNT-IMP: NORMAL
SODIUM SERPL-SCNC: 138 MMOL/L (ref 136–145)
SODIUM SERPL-SCNC: 140 MMOL/L (ref 136–145)
SODIUM SERPL-SCNC: 141 MMOL/L (ref 136–145)
SODIUM SERPL-SCNC: 141 MMOL/L (ref 136–145)
SODIUM SERPL-SCNC: 143 MMOL/L (ref 136–145)
SOURCE, COVRS: NORMAL
SOURCE, COVRS: NORMAL
SP GR UR REFRACTOMETRY: 1.01 (ref 1–1.03)
SP GR UR REFRACTOMETRY: 1.03 (ref 1–1.03)
SPECIMEN SOURCE, FCOV2M: NORMAL
SPECIMEN SOURCE, FCOV2M: NORMAL
SPECIMEN TYPE, XMCV1T: NORMAL
SPECIMEN TYPE, XMCV1T: NORMAL
SPECIMEN TYPE: ABNORMAL
TOTAL RESP. RATE, ITRR: 16
TOTAL RESP. RATE, ITRR: 28
TROPONIN I SERPL-MCNC: <0.05 NG/ML
UA: UC IF INDICATED,UAUC: ABNORMAL
UA: UC IF INDICATED,UAUC: NORMAL
UROBILINOGEN UR QL STRIP.AUTO: 0.2 EU/DL (ref 0.2–1)
UROBILINOGEN UR QL STRIP.AUTO: 0.2 EU/DL (ref 0.2–1)
VENTRICULAR RATE, ECG03: 59 BPM
VENTRICULAR RATE, ECG03: 69 BPM
VENTRICULAR RATE, ECG03: 83 BPM
VENTRICULAR RATE, ECG03: 85 BPM
WBC # BLD AUTO: 6.3 K/UL (ref 4.1–11.1)
WBC # BLD AUTO: 6.5 K/UL (ref 4.1–11.1)
WBC # BLD AUTO: 7.4 K/UL (ref 4.1–11.1)
WBC # BLD AUTO: 7.8 K/UL (ref 4.1–11.1)
WBC # BLD AUTO: 8.6 K/UL (ref 4.1–11.1)
WBC URNS QL MICRO: ABNORMAL /HPF (ref 0–4)
WBC URNS QL MICRO: NORMAL /HPF (ref 0–4)

## 2020-01-01 PROCEDURE — 74011636637 HC RX REV CODE- 636/637: Performed by: INTERNAL MEDICINE

## 2020-01-01 PROCEDURE — 74011000258 HC RX REV CODE- 258: Performed by: HOSPITALIST

## 2020-01-01 PROCEDURE — 74011000250 HC RX REV CODE- 250: Performed by: INTERNAL MEDICINE

## 2020-01-01 PROCEDURE — 74177 CT ABD & PELVIS W/CONTRAST: CPT

## 2020-01-01 PROCEDURE — 94660 CPAP INITIATION&MGMT: CPT

## 2020-01-01 PROCEDURE — 65660000000 HC RM CCU STEPDOWN

## 2020-01-01 PROCEDURE — 74011250637 HC RX REV CODE- 250/637: Performed by: EMERGENCY MEDICINE

## 2020-01-01 PROCEDURE — 80048 BASIC METABOLIC PNL TOTAL CA: CPT

## 2020-01-01 PROCEDURE — 74011250637 HC RX REV CODE- 250/637: Performed by: HOSPITALIST

## 2020-01-01 PROCEDURE — G8536 NO DOC ELDER MAL SCRN: HCPCS | Performed by: INTERNAL MEDICINE

## 2020-01-01 PROCEDURE — 85027 COMPLETE CBC AUTOMATED: CPT

## 2020-01-01 PROCEDURE — 2709999900 HC NON-CHARGEABLE SUPPLY: Performed by: INTERNAL MEDICINE

## 2020-01-01 PROCEDURE — 93280 PM DEVICE PROGR EVAL DUAL: CPT | Performed by: INTERNAL MEDICINE

## 2020-01-01 PROCEDURE — G8536 NO DOC ELDER MAL SCRN: HCPCS | Performed by: NURSE PRACTITIONER

## 2020-01-01 PROCEDURE — 96375 TX/PRO/DX INJ NEW DRUG ADDON: CPT

## 2020-01-01 PROCEDURE — 77010033678 HC OXYGEN DAILY

## 2020-01-01 PROCEDURE — 74011000250 HC RX REV CODE- 250: Performed by: HOSPITALIST

## 2020-01-01 PROCEDURE — 85025 COMPLETE CBC W/AUTO DIFF WBC: CPT

## 2020-01-01 PROCEDURE — 36415 COLL VENOUS BLD VENIPUNCTURE: CPT

## 2020-01-01 PROCEDURE — 94640 AIRWAY INHALATION TREATMENT: CPT

## 2020-01-01 PROCEDURE — 93005 ELECTROCARDIOGRAM TRACING: CPT

## 2020-01-01 PROCEDURE — 87086 URINE CULTURE/COLONY COUNT: CPT

## 2020-01-01 PROCEDURE — 74011250637 HC RX REV CODE- 250/637: Performed by: INTERNAL MEDICINE

## 2020-01-01 PROCEDURE — 81001 URINALYSIS AUTO W/SCOPE: CPT

## 2020-01-01 PROCEDURE — 87635 SARS-COV-2 COVID-19 AMP PRB: CPT

## 2020-01-01 PROCEDURE — 77010033711 HC HIGH FLOW OXYGEN

## 2020-01-01 PROCEDURE — 96374 THER/PROPH/DIAG INJ IV PUSH: CPT

## 2020-01-01 PROCEDURE — 74011250636 HC RX REV CODE- 250/636: Performed by: HOSPITALIST

## 2020-01-01 PROCEDURE — 74011250636 HC RX REV CODE- 250/636: Performed by: INTERNAL MEDICINE

## 2020-01-01 PROCEDURE — 90686 IIV4 VACC NO PRSV 0.5 ML IM: CPT | Performed by: HOSPITALIST

## 2020-01-01 PROCEDURE — G8754 DIAS BP LESS 90: HCPCS | Performed by: INTERNAL MEDICINE

## 2020-01-01 PROCEDURE — A9503 TC99M MEDRONATE: HCPCS

## 2020-01-01 PROCEDURE — C1785 PMKR, DUAL, RATE-RESP: HCPCS | Performed by: INTERNAL MEDICINE

## 2020-01-01 PROCEDURE — 74011000258 HC RX REV CODE- 258: Performed by: EMERGENCY MEDICINE

## 2020-01-01 PROCEDURE — 83605 ASSAY OF LACTIC ACID: CPT

## 2020-01-01 PROCEDURE — 94760 N-INVAS EAR/PLS OXIMETRY 1: CPT

## 2020-01-01 PROCEDURE — 1111F DSCHRG MED/CURRENT MED MERGE: CPT | Performed by: INTERNAL MEDICINE

## 2020-01-01 PROCEDURE — 83690 ASSAY OF LIPASE: CPT

## 2020-01-01 PROCEDURE — 2709999900 HC NON-CHARGEABLE SUPPLY

## 2020-01-01 PROCEDURE — 71045 X-RAY EXAM CHEST 1 VIEW: CPT

## 2020-01-01 PROCEDURE — G8752 SYS BP LESS 140: HCPCS | Performed by: NURSE PRACTITIONER

## 2020-01-01 PROCEDURE — 84484 ASSAY OF TROPONIN QUANT: CPT

## 2020-01-01 PROCEDURE — 90471 IMMUNIZATION ADMIN: CPT

## 2020-01-01 PROCEDURE — 82803 BLOOD GASES ANY COMBINATION: CPT

## 2020-01-01 PROCEDURE — 77030002996 HC SUT SLK J&J -A: Performed by: INTERNAL MEDICINE

## 2020-01-01 PROCEDURE — 74011000272 HC RX REV CODE- 272: Performed by: INTERNAL MEDICINE

## 2020-01-01 PROCEDURE — 97165 OT EVAL LOW COMPLEX 30 MIN: CPT

## 2020-01-01 PROCEDURE — G8432 DEP SCR NOT DOC, RNG: HCPCS | Performed by: INTERNAL MEDICINE

## 2020-01-01 PROCEDURE — 74011250637 HC RX REV CODE- 250/637: Performed by: PHYSICIAN ASSISTANT

## 2020-01-01 PROCEDURE — 77030018729 HC ELECTRD DEFIB PAD CARD -B: Performed by: INTERNAL MEDICINE

## 2020-01-01 PROCEDURE — G8427 DOCREV CUR MEDS BY ELIG CLIN: HCPCS | Performed by: INTERNAL MEDICINE

## 2020-01-01 PROCEDURE — 65610000006 HC RM INTENSIVE CARE

## 2020-01-01 PROCEDURE — 74011250636 HC RX REV CODE- 250/636: Performed by: EMERGENCY MEDICINE

## 2020-01-01 PROCEDURE — 02H63JZ INSERTION OF PACEMAKER LEAD INTO RIGHT ATRIUM, PERCUTANEOUS APPROACH: ICD-10-PCS | Performed by: INTERNAL MEDICINE

## 2020-01-01 PROCEDURE — 83735 ASSAY OF MAGNESIUM: CPT

## 2020-01-01 PROCEDURE — 77030037713 HC CLOSR DEV INCIS ZIP STRY -B: Performed by: INTERNAL MEDICINE

## 2020-01-01 PROCEDURE — G8432 DEP SCR NOT DOC, RNG: HCPCS | Performed by: NURSE PRACTITIONER

## 2020-01-01 PROCEDURE — 99153 MOD SED SAME PHYS/QHP EA: CPT | Performed by: INTERNAL MEDICINE

## 2020-01-01 PROCEDURE — 97535 SELF CARE MNGMENT TRAINING: CPT

## 2020-01-01 PROCEDURE — G8419 CALC BMI OUT NRM PARAM NOF/U: HCPCS | Performed by: INTERNAL MEDICINE

## 2020-01-01 PROCEDURE — 85379 FIBRIN DEGRADATION QUANT: CPT

## 2020-01-01 PROCEDURE — 85610 PROTHROMBIN TIME: CPT

## 2020-01-01 PROCEDURE — 33208 INSRT HEART PM ATRIAL & VENT: CPT | Performed by: INTERNAL MEDICINE

## 2020-01-01 PROCEDURE — 84100 ASSAY OF PHOSPHORUS: CPT

## 2020-01-01 PROCEDURE — 74011000250 HC RX REV CODE- 250: Performed by: EMERGENCY MEDICINE

## 2020-01-01 PROCEDURE — 77030013140 HC MSK NEB VYRM -A

## 2020-01-01 PROCEDURE — 99223 1ST HOSP IP/OBS HIGH 75: CPT | Performed by: INTERNAL MEDICINE

## 2020-01-01 PROCEDURE — C9113 INJ PANTOPRAZOLE SODIUM, VIA: HCPCS | Performed by: HOSPITALIST

## 2020-01-01 PROCEDURE — 99285 EMERGENCY DEPT VISIT HI MDM: CPT

## 2020-01-01 PROCEDURE — 87040 BLOOD CULTURE FOR BACTERIA: CPT

## 2020-01-01 PROCEDURE — 78306 BONE IMAGING WHOLE BODY: CPT

## 2020-01-01 PROCEDURE — 74011000636 HC RX REV CODE- 636: Performed by: EMERGENCY MEDICINE

## 2020-01-01 PROCEDURE — 99024 POSTOP FOLLOW-UP VISIT: CPT | Performed by: INTERNAL MEDICINE

## 2020-01-01 PROCEDURE — 77030031139 HC SUT VCRL2 J&J -A: Performed by: INTERNAL MEDICINE

## 2020-01-01 PROCEDURE — 71275 CT ANGIOGRAPHY CHEST: CPT

## 2020-01-01 PROCEDURE — 1101F PT FALLS ASSESS-DOCD LE1/YR: CPT | Performed by: NURSE PRACTITIONER

## 2020-01-01 PROCEDURE — C1893 INTRO/SHEATH, FIXED,NON-PEEL: HCPCS | Performed by: INTERNAL MEDICINE

## 2020-01-01 PROCEDURE — 0JH606Z INSERTION OF PACEMAKER, DUAL CHAMBER INTO CHEST SUBCUTANEOUS TISSUE AND FASCIA, OPEN APPROACH: ICD-10-PCS | Performed by: INTERNAL MEDICINE

## 2020-01-01 PROCEDURE — 99223 1ST HOSP IP/OBS HIGH 75: CPT | Performed by: THORACIC SURGERY (CARDIOTHORACIC VASCULAR SURGERY)

## 2020-01-01 PROCEDURE — 99214 OFFICE O/P EST MOD 30 MIN: CPT | Performed by: NURSE PRACTITIONER

## 2020-01-01 PROCEDURE — C1898 LEAD, PMKR, OTHER THAN TRANS: HCPCS | Performed by: INTERNAL MEDICINE

## 2020-01-01 PROCEDURE — G8427 DOCREV CUR MEDS BY ELIG CLIN: HCPCS | Performed by: NURSE PRACTITIONER

## 2020-01-01 PROCEDURE — 93306 TTE W/DOPPLER COMPLETE: CPT

## 2020-01-01 PROCEDURE — 84145 PROCALCITONIN (PCT): CPT

## 2020-01-01 PROCEDURE — C1894 INTRO/SHEATH, NON-LASER: HCPCS | Performed by: INTERNAL MEDICINE

## 2020-01-01 PROCEDURE — 82728 ASSAY OF FERRITIN: CPT

## 2020-01-01 PROCEDURE — 97162 PT EVAL MOD COMPLEX 30 MIN: CPT

## 2020-01-01 PROCEDURE — 97116 GAIT TRAINING THERAPY: CPT

## 2020-01-01 PROCEDURE — 80053 COMPREHEN METABOLIC PANEL: CPT

## 2020-01-01 PROCEDURE — 02HK3JZ INSERTION OF PACEMAKER LEAD INTO RIGHT VENTRICLE, PERCUTANEOUS APPROACH: ICD-10-PCS | Performed by: INTERNAL MEDICINE

## 2020-01-01 PROCEDURE — G8752 SYS BP LESS 140: HCPCS | Performed by: INTERNAL MEDICINE

## 2020-01-01 PROCEDURE — 99152 MOD SED SAME PHYS/QHP 5/>YRS: CPT | Performed by: INTERNAL MEDICINE

## 2020-01-01 PROCEDURE — 1111F DSCHRG MED/CURRENT MED MERGE: CPT | Performed by: NURSE PRACTITIONER

## 2020-01-01 PROCEDURE — 74011636320 HC RX REV CODE- 636/320: Performed by: UROLOGY

## 2020-01-01 PROCEDURE — 77030037875 HC DRSG MEPILEX <16IN BORD MOLN -A: Performed by: INTERNAL MEDICINE

## 2020-01-01 PROCEDURE — 97530 THERAPEUTIC ACTIVITIES: CPT

## 2020-01-01 PROCEDURE — G8419 CALC BMI OUT NRM PARAM NOF/U: HCPCS | Performed by: NURSE PRACTITIONER

## 2020-01-01 PROCEDURE — G8754 DIAS BP LESS 90: HCPCS | Performed by: NURSE PRACTITIONER

## 2020-01-01 PROCEDURE — 36600 WITHDRAWAL OF ARTERIAL BLOOD: CPT

## 2020-01-01 PROCEDURE — 99214 OFFICE O/P EST MOD 30 MIN: CPT | Performed by: INTERNAL MEDICINE

## 2020-01-01 PROCEDURE — 72125 CT NECK SPINE W/O DYE: CPT

## 2020-01-01 PROCEDURE — 83615 LACTATE (LD) (LDH) ENZYME: CPT

## 2020-01-01 PROCEDURE — 1101F PT FALLS ASSESS-DOCD LE1/YR: CPT | Performed by: INTERNAL MEDICINE

## 2020-01-01 PROCEDURE — 99284 EMERGENCY DEPT VISIT MOD MDM: CPT

## 2020-01-01 PROCEDURE — 83880 ASSAY OF NATRIURETIC PEPTIDE: CPT

## 2020-01-01 PROCEDURE — 71046 X-RAY EXAM CHEST 2 VIEWS: CPT

## 2020-01-01 PROCEDURE — 5A09357 ASSISTANCE WITH RESPIRATORY VENTILATION, LESS THAN 24 CONSECUTIVE HOURS, CONTINUOUS POSITIVE AIRWAY PRESSURE: ICD-10-PCS | Performed by: INTERNAL MEDICINE

## 2020-01-01 DEVICE — ENDOCARDIAL STEROID-ELUTING MR CONDITIONAL STYLET DELIVERED PACE/SENSE LEAD
Type: IMPLANTABLE DEVICE | Status: FUNCTIONAL
Brand: INGEVITY™ MRI

## 2020-01-01 DEVICE — PACE/SENSE LEAD
Type: IMPLANTABLE DEVICE | Status: FUNCTIONAL
Brand: INGEVITY™ MRI

## 2020-01-01 DEVICE — PACEMAKER
Type: IMPLANTABLE DEVICE | Status: FUNCTIONAL
Brand: ACCOLADE™ MRI EL DR

## 2020-01-01 RX ORDER — LISINOPRIL 5 MG/1
5 TABLET ORAL DAILY
Status: DISCONTINUED | OUTPATIENT
Start: 2020-01-01 | End: 2020-01-01 | Stop reason: HOSPADM

## 2020-01-01 RX ORDER — DIAZEPAM 5 MG/1
5 TABLET ORAL
Qty: 20 TAB | Refills: 0 | Status: SHIPPED | OUTPATIENT
Start: 2020-01-01 | End: 2020-01-01

## 2020-01-01 RX ORDER — DOCUSATE SODIUM 100 MG/1
100 CAPSULE, LIQUID FILLED ORAL 2 TIMES DAILY
Status: DISCONTINUED | OUTPATIENT
Start: 2020-01-01 | End: 2020-01-01

## 2020-01-01 RX ORDER — LIDOCAINE HYDROCHLORIDE 10 MG/ML
INJECTION INFILTRATION; PERINEURAL AS NEEDED
Status: DISCONTINUED | OUTPATIENT
Start: 2020-01-01 | End: 2020-01-01 | Stop reason: HOSPADM

## 2020-01-01 RX ORDER — AZITHROMYCIN 500 MG/1
500 TABLET, FILM COATED ORAL EVERY EVENING
Status: COMPLETED | OUTPATIENT
Start: 2020-01-01 | End: 2020-01-01

## 2020-01-01 RX ORDER — OMEPRAZOLE 20 MG/1
20 CAPSULE, DELAYED RELEASE ORAL DAILY
Qty: 90 CAP | Refills: 3 | Status: SHIPPED | OUTPATIENT
Start: 2020-01-01

## 2020-01-01 RX ORDER — MORPHINE SULFATE 4 MG/ML
4 INJECTION INTRAVENOUS ONCE
Status: DISCONTINUED | OUTPATIENT
Start: 2020-01-01 | End: 2020-01-01 | Stop reason: HOSPADM

## 2020-01-01 RX ORDER — IPRATROPIUM BROMIDE AND ALBUTEROL SULFATE 2.5; .5 MG/3ML; MG/3ML
3 SOLUTION RESPIRATORY (INHALATION)
Status: DISCONTINUED | OUTPATIENT
Start: 2020-01-01 | End: 2020-01-01 | Stop reason: HOSPADM

## 2020-01-01 RX ORDER — CYCLOBENZAPRINE HCL 5 MG
5 TABLET ORAL
Qty: 20 TAB | Refills: 0 | Status: SHIPPED | OUTPATIENT
Start: 2020-01-01 | End: 2020-01-01

## 2020-01-01 RX ORDER — DOCUSATE SODIUM 100 MG/1
100 CAPSULE, LIQUID FILLED ORAL 2 TIMES DAILY
Qty: 60 CAP | Refills: 2 | Status: SHIPPED | OUTPATIENT
Start: 2020-01-01 | End: 2020-01-01

## 2020-01-01 RX ORDER — METOPROLOL TARTRATE 25 MG/1
12.5 TABLET, FILM COATED ORAL EVERY 12 HOURS
Status: DISCONTINUED | OUTPATIENT
Start: 2020-01-01 | End: 2020-01-01 | Stop reason: HOSPADM

## 2020-01-01 RX ORDER — DOCUSATE SODIUM 100 MG/1
100 CAPSULE, LIQUID FILLED ORAL
Status: DISCONTINUED | OUTPATIENT
Start: 2020-01-01 | End: 2020-01-01 | Stop reason: HOSPADM

## 2020-01-01 RX ORDER — NALOXONE HYDROCHLORIDE 0.4 MG/ML
0.4 INJECTION, SOLUTION INTRAMUSCULAR; INTRAVENOUS; SUBCUTANEOUS AS NEEDED
Status: DISCONTINUED | OUTPATIENT
Start: 2020-01-01 | End: 2020-01-01 | Stop reason: HOSPADM

## 2020-01-01 RX ORDER — TROSPIUM CHLORIDE 20 MG/1
TABLET, FILM COATED ORAL
COMMUNITY
Start: 2020-01-01 | End: 2021-01-01

## 2020-01-01 RX ORDER — ALBUTEROL SULFATE 90 UG/1
2 AEROSOL, METERED RESPIRATORY (INHALATION)
Status: DISCONTINUED | OUTPATIENT
Start: 2020-01-01 | End: 2020-01-01

## 2020-01-01 RX ORDER — LIDOCAINE 4 G/100G
1 PATCH TOPICAL EVERY 12 HOURS
Qty: 60 PATCH | Refills: 0 | Status: SHIPPED | OUTPATIENT
Start: 2020-01-01 | End: 2020-01-01

## 2020-01-01 RX ORDER — FENTANYL CITRATE 50 UG/ML
50 INJECTION, SOLUTION INTRAMUSCULAR; INTRAVENOUS
Status: DISCONTINUED | OUTPATIENT
Start: 2020-01-01 | End: 2020-01-01 | Stop reason: SDUPTHER

## 2020-01-01 RX ORDER — METOPROLOL TARTRATE 50 MG/1
TABLET ORAL
Qty: 180 TAB | Refills: 3 | Status: SHIPPED | OUTPATIENT
Start: 2020-01-01

## 2020-01-01 RX ORDER — CYCLOBENZAPRINE HCL 10 MG
5 TABLET ORAL
Status: COMPLETED | OUTPATIENT
Start: 2020-01-01 | End: 2020-01-01

## 2020-01-01 RX ORDER — BENZONATATE 100 MG/1
100 CAPSULE ORAL
Status: DISCONTINUED | OUTPATIENT
Start: 2020-01-01 | End: 2020-01-01 | Stop reason: HOSPADM

## 2020-01-01 RX ORDER — METOPROLOL TARTRATE 50 MG/1
50 TABLET ORAL 2 TIMES DAILY
Status: DISCONTINUED | OUTPATIENT
Start: 2020-01-01 | End: 2020-01-01

## 2020-01-01 RX ORDER — ABIRATERONE ACETATE 250 MG/1
250 TABLET ORAL DAILY
Status: DISCONTINUED | OUTPATIENT
Start: 2020-01-01 | End: 2020-01-01 | Stop reason: HOSPADM

## 2020-01-01 RX ORDER — ARFORMOTEROL TARTRATE 15 UG/2ML
15 SOLUTION RESPIRATORY (INHALATION)
Status: DISCONTINUED | OUTPATIENT
Start: 2020-01-01 | End: 2020-01-01

## 2020-01-01 RX ORDER — OXYCODONE HYDROCHLORIDE 5 MG/1
5 TABLET ORAL
Status: COMPLETED | OUTPATIENT
Start: 2020-01-01 | End: 2020-01-01

## 2020-01-01 RX ORDER — FENTANYL CITRATE 50 UG/ML
50 INJECTION, SOLUTION INTRAMUSCULAR; INTRAVENOUS
Status: DISCONTINUED | OUTPATIENT
Start: 2020-01-01 | End: 2020-01-01 | Stop reason: HOSPADM

## 2020-01-01 RX ORDER — LISINOPRIL 5 MG/1
2.5 TABLET ORAL DAILY
Status: DISCONTINUED | OUTPATIENT
Start: 2020-01-01 | End: 2020-01-01

## 2020-01-01 RX ORDER — LIDOCAINE 4 G/100G
1 PATCH TOPICAL EVERY 24 HOURS
Status: DISCONTINUED | OUTPATIENT
Start: 2020-01-01 | End: 2020-01-01 | Stop reason: HOSPADM

## 2020-01-01 RX ORDER — ACETAMINOPHEN 500 MG
1000 TABLET ORAL ONCE
Status: COMPLETED | OUTPATIENT
Start: 2020-01-01 | End: 2020-01-01

## 2020-01-01 RX ORDER — ATORVASTATIN CALCIUM 40 MG/1
40 TABLET, FILM COATED ORAL
Status: DISCONTINUED | OUTPATIENT
Start: 2020-01-01 | End: 2020-01-01 | Stop reason: HOSPADM

## 2020-01-01 RX ORDER — PANTOPRAZOLE SODIUM 40 MG/1
40 TABLET, DELAYED RELEASE ORAL
Status: DISCONTINUED | OUTPATIENT
Start: 2020-01-01 | End: 2020-01-01 | Stop reason: HOSPADM

## 2020-01-01 RX ORDER — SODIUM CHLORIDE 0.9 % (FLUSH) 0.9 %
10 SYRINGE (ML) INJECTION
Status: COMPLETED | OUTPATIENT
Start: 2020-01-01 | End: 2020-01-01

## 2020-01-01 RX ORDER — IPRATROPIUM BROMIDE AND ALBUTEROL SULFATE 2.5; .5 MG/3ML; MG/3ML
3 SOLUTION RESPIRATORY (INHALATION)
Status: DISCONTINUED | OUTPATIENT
Start: 2020-01-01 | End: 2020-01-01

## 2020-01-01 RX ORDER — FENTANYL CITRATE 50 UG/ML
INJECTION, SOLUTION INTRAMUSCULAR; INTRAVENOUS AS NEEDED
Status: DISCONTINUED | OUTPATIENT
Start: 2020-01-01 | End: 2020-01-01 | Stop reason: HOSPADM

## 2020-01-01 RX ORDER — ALBUTEROL SULFATE 0.83 MG/ML
7.5 SOLUTION RESPIRATORY (INHALATION)
Status: COMPLETED | OUTPATIENT
Start: 2020-01-01 | End: 2020-01-01

## 2020-01-01 RX ORDER — GUAIFENESIN 600 MG/1
600 TABLET, EXTENDED RELEASE ORAL 2 TIMES DAILY
Status: DISCONTINUED | OUTPATIENT
Start: 2020-01-01 | End: 2020-01-01 | Stop reason: HOSPADM

## 2020-01-01 RX ORDER — SODIUM CHLORIDE 0.9 % (FLUSH) 0.9 %
5-40 SYRINGE (ML) INJECTION AS NEEDED
Status: DISCONTINUED | OUTPATIENT
Start: 2020-01-01 | End: 2020-01-01 | Stop reason: HOSPADM

## 2020-01-01 RX ORDER — GUAIFENESIN 100 MG/5ML
81 LIQUID (ML) ORAL DAILY
Status: DISCONTINUED | OUTPATIENT
Start: 2020-01-01 | End: 2020-01-01 | Stop reason: HOSPADM

## 2020-01-01 RX ORDER — BUDESONIDE 0.5 MG/2ML
1000 INHALANT ORAL
Status: DISCONTINUED | OUTPATIENT
Start: 2020-01-01 | End: 2020-01-01

## 2020-01-01 RX ORDER — FUROSEMIDE 10 MG/ML
20 INJECTION INTRAMUSCULAR; INTRAVENOUS ONCE
Status: COMPLETED | OUTPATIENT
Start: 2020-01-01 | End: 2020-01-01

## 2020-01-01 RX ORDER — ARFORMOTEROL TARTRATE 15 UG/2ML
15 SOLUTION RESPIRATORY (INHALATION)
Status: DISCONTINUED | OUTPATIENT
Start: 2020-01-01 | End: 2020-01-01 | Stop reason: HOSPADM

## 2020-01-01 RX ORDER — MIDAZOLAM HYDROCHLORIDE 1 MG/ML
INJECTION, SOLUTION INTRAMUSCULAR; INTRAVENOUS AS NEEDED
Status: DISCONTINUED | OUTPATIENT
Start: 2020-01-01 | End: 2020-01-01 | Stop reason: HOSPADM

## 2020-01-01 RX ORDER — ONDANSETRON 2 MG/ML
4 INJECTION INTRAMUSCULAR; INTRAVENOUS
Status: DISCONTINUED | OUTPATIENT
Start: 2020-01-01 | End: 2020-01-01 | Stop reason: HOSPADM

## 2020-01-01 RX ORDER — CALC/MAG/B COMPLEX/D3/HERB 61
15 TABLET ORAL
COMMUNITY
End: 2020-01-01

## 2020-01-01 RX ORDER — ONDANSETRON 2 MG/ML
4 INJECTION INTRAMUSCULAR; INTRAVENOUS
Status: COMPLETED | OUTPATIENT
Start: 2020-01-01 | End: 2020-01-01

## 2020-01-01 RX ORDER — SODIUM CHLORIDE 0.9 % (FLUSH) 0.9 %
5-40 SYRINGE (ML) INJECTION EVERY 8 HOURS
Status: DISCONTINUED | OUTPATIENT
Start: 2020-01-01 | End: 2020-01-01 | Stop reason: HOSPADM

## 2020-01-01 RX ORDER — FENTANYL CITRATE 50 UG/ML
50 INJECTION, SOLUTION INTRAMUSCULAR; INTRAVENOUS
Status: COMPLETED | OUTPATIENT
Start: 2020-01-01 | End: 2020-01-01

## 2020-01-01 RX ORDER — PREDNISONE 20 MG/1
40 TABLET ORAL
Status: DISCONTINUED | OUTPATIENT
Start: 2020-01-01 | End: 2020-01-01 | Stop reason: HOSPADM

## 2020-01-01 RX ORDER — SODIUM CHLORIDE 9 MG/ML
100 INJECTION, SOLUTION INTRAVENOUS CONTINUOUS
Status: DISCONTINUED | OUTPATIENT
Start: 2020-01-01 | End: 2020-01-01

## 2020-01-01 RX ORDER — ATORVASTATIN CALCIUM 40 MG/1
TABLET, FILM COATED ORAL
Qty: 90 TAB | Refills: 0 | Status: SHIPPED | OUTPATIENT
Start: 2020-01-01

## 2020-01-01 RX ORDER — ACETAMINOPHEN 325 MG/1
650 TABLET ORAL
Status: DISCONTINUED | OUTPATIENT
Start: 2020-01-01 | End: 2020-01-01 | Stop reason: HOSPADM

## 2020-01-01 RX ORDER — OXYBUTYNIN CHLORIDE 10 MG/1
10 TABLET, EXTENDED RELEASE ORAL
COMMUNITY

## 2020-01-01 RX ORDER — ABIRATERONE ACETATE 250 MG/1
250 TABLET ORAL
COMMUNITY

## 2020-01-01 RX ORDER — PREDNISONE 5 MG/1
5 TABLET ORAL 2 TIMES DAILY WITH MEALS
Status: DISCONTINUED | OUTPATIENT
Start: 2020-01-01 | End: 2020-01-01

## 2020-01-01 RX ORDER — LISINOPRIL 5 MG/1
2.5 TABLET ORAL DAILY
Status: COMPLETED | OUTPATIENT
Start: 2020-01-01 | End: 2020-01-01

## 2020-01-01 RX ORDER — OXYBUTYNIN CHLORIDE 5 MG/1
10 TABLET, EXTENDED RELEASE ORAL DAILY
Status: DISCONTINUED | OUTPATIENT
Start: 2020-01-01 | End: 2020-01-01 | Stop reason: HOSPADM

## 2020-01-01 RX ORDER — CYCLOBENZAPRINE HCL 5 MG
TABLET ORAL
Qty: 20 TAB | Refills: 0 | Status: SHIPPED | OUTPATIENT
Start: 2020-01-01 | End: 2020-01-01

## 2020-01-01 RX ORDER — ALBUTEROL SULFATE 90 UG/1
AEROSOL, METERED RESPIRATORY (INHALATION)
Qty: 1 INHALER | Refills: 1 | Status: SHIPPED | OUTPATIENT
Start: 2020-01-01

## 2020-01-01 RX ORDER — BUDESONIDE AND FORMOTEROL FUMARATE DIHYDRATE 160; 4.5 UG/1; UG/1
2 AEROSOL RESPIRATORY (INHALATION)
Status: DISCONTINUED | OUTPATIENT
Start: 2020-01-01 | End: 2020-01-01

## 2020-01-01 RX ORDER — BUDESONIDE 0.5 MG/2ML
500 INHALANT ORAL
Status: DISCONTINUED | OUTPATIENT
Start: 2020-01-01 | End: 2020-01-01 | Stop reason: HOSPADM

## 2020-01-01 RX ORDER — POLYETHYLENE GLYCOL 3350 17 G/17G
17 POWDER, FOR SOLUTION ORAL DAILY
Status: DISCONTINUED | OUTPATIENT
Start: 2020-01-01 | End: 2020-01-01

## 2020-01-01 RX ORDER — IPRATROPIUM BROMIDE 0.5 MG/2.5ML
0.5 SOLUTION RESPIRATORY (INHALATION)
Status: COMPLETED | OUTPATIENT
Start: 2020-01-01 | End: 2020-01-01

## 2020-01-01 RX ORDER — PREDNISONE 2.5 MG/1
5 TABLET ORAL 2 TIMES DAILY
COMMUNITY
End: 2021-01-01

## 2020-01-01 RX ORDER — POLYETHYLENE GLYCOL 3350 17 G/17G
17 POWDER, FOR SOLUTION ORAL DAILY PRN
Status: DISCONTINUED | OUTPATIENT
Start: 2020-01-01 | End: 2020-01-01 | Stop reason: HOSPADM

## 2020-01-01 RX ORDER — HYDRALAZINE HYDROCHLORIDE 20 MG/ML
10 INJECTION INTRAMUSCULAR; INTRAVENOUS
Status: DISCONTINUED | OUTPATIENT
Start: 2020-01-01 | End: 2020-01-01 | Stop reason: HOSPADM

## 2020-01-01 RX ORDER — SODIUM CHLORIDE 0.9 % (FLUSH) 0.9 %
SYRINGE (ML) INJECTION
Status: COMPLETED
Start: 2020-01-01 | End: 2020-01-01

## 2020-01-01 RX ORDER — FAMOTIDINE 20 MG/1
20 TABLET, FILM COATED ORAL AS NEEDED
Qty: 90 TAB | Refills: 3 | Status: SHIPPED | OUTPATIENT
Start: 2020-01-01 | End: 2020-01-01

## 2020-01-01 RX ORDER — POLYETHYLENE GLYCOL 3350 17 G/17G
17 POWDER, FOR SOLUTION ORAL DAILY
Qty: 507 G | Refills: 0 | Status: SHIPPED | OUTPATIENT
Start: 2020-01-01 | End: 2021-01-01

## 2020-01-01 RX ADMIN — IOPAMIDOL 80 ML: 755 INJECTION, SOLUTION INTRAVENOUS at 16:26

## 2020-01-01 RX ADMIN — ABIRATERONE ACETATE 250 MG: 250 TABLET ORAL at 09:07

## 2020-01-01 RX ADMIN — IOPAMIDOL 100 ML: 755 INJECTION, SOLUTION INTRAVENOUS at 12:36

## 2020-01-01 RX ADMIN — IOPAMIDOL 100 ML: 755 INJECTION, SOLUTION INTRAVENOUS at 12:49

## 2020-01-01 RX ADMIN — GUAIFENESIN 600 MG: 600 TABLET, EXTENDED RELEASE ORAL at 10:16

## 2020-01-01 RX ADMIN — SODIUM CHLORIDE 100 ML/HR: 900 INJECTION, SOLUTION INTRAVENOUS at 08:00

## 2020-01-01 RX ADMIN — OXYBUTYNIN CHLORIDE 10 MG: 5 TABLET, EXTENDED RELEASE ORAL at 09:01

## 2020-01-01 RX ADMIN — IPRATROPIUM BROMIDE AND ALBUTEROL 1 PUFF: 20; 100 SPRAY, METERED RESPIRATORY (INHALATION) at 07:21

## 2020-01-01 RX ADMIN — GUAIFENESIN 600 MG: 600 TABLET, EXTENDED RELEASE ORAL at 18:37

## 2020-01-01 RX ADMIN — PREDNISONE 40 MG: 20 TABLET ORAL at 09:01

## 2020-01-01 RX ADMIN — IOHEXOL 20 ML: 240 INJECTION, SOLUTION INTRATHECAL; INTRAVASCULAR; INTRAVENOUS; ORAL at 12:50

## 2020-01-01 RX ADMIN — LISINOPRIL 2.5 MG: 5 TABLET ORAL at 08:51

## 2020-01-01 RX ADMIN — CEFTRIAXONE SODIUM 2 G: 2 INJECTION, POWDER, FOR SOLUTION INTRAMUSCULAR; INTRAVENOUS at 17:51

## 2020-01-01 RX ADMIN — PREDNISONE 5 MG: 5 TABLET ORAL at 08:45

## 2020-01-01 RX ADMIN — BUDESONIDE 500 MCG: 0.5 INHALANT RESPIRATORY (INHALATION) at 08:03

## 2020-01-01 RX ADMIN — IPRATROPIUM BROMIDE AND ALBUTEROL 1 PUFF: 20; 100 SPRAY, METERED RESPIRATORY (INHALATION) at 23:39

## 2020-01-01 RX ADMIN — IPRATROPIUM BROMIDE AND ALBUTEROL 1 PUFF: 20; 100 SPRAY, METERED RESPIRATORY (INHALATION) at 15:46

## 2020-01-01 RX ADMIN — DOCUSATE SODIUM 100 MG: 100 CAPSULE, LIQUID FILLED ORAL at 20:03

## 2020-01-01 RX ADMIN — BUDESONIDE 500 MCG: 0.5 INHALANT RESPIRATORY (INHALATION) at 08:37

## 2020-01-01 RX ADMIN — ABIRATERONE ACETATE 250 MG: 250 TABLET ORAL at 08:45

## 2020-01-01 RX ADMIN — OXYBUTYNIN CHLORIDE 10 MG: 5 TABLET, EXTENDED RELEASE ORAL at 08:57

## 2020-01-01 RX ADMIN — IPRATROPIUM BROMIDE AND ALBUTEROL SULFATE 3 ML: .5; 3 SOLUTION RESPIRATORY (INHALATION) at 14:21

## 2020-01-01 RX ADMIN — ARFORMOTEROL TARTRATE 15 MCG: 15 SOLUTION RESPIRATORY (INHALATION) at 20:10

## 2020-01-01 RX ADMIN — ARFORMOTEROL TARTRATE 15 MCG: 15 SOLUTION RESPIRATORY (INHALATION) at 08:37

## 2020-01-01 RX ADMIN — ATORVASTATIN CALCIUM 40 MG: 40 TABLET, FILM COATED ORAL at 21:52

## 2020-01-01 RX ADMIN — ALBUTEROL SULFATE 2 PUFF: 90 AEROSOL, METERED RESPIRATORY (INHALATION) at 03:53

## 2020-01-01 RX ADMIN — FUROSEMIDE 20 MG: 10 INJECTION, SOLUTION INTRAMUSCULAR; INTRAVENOUS at 06:26

## 2020-01-01 RX ADMIN — DOCUSATE SODIUM 100 MG: 100 CAPSULE, LIQUID FILLED ORAL at 17:17

## 2020-01-01 RX ADMIN — PREDNISONE 40 MG: 20 TABLET ORAL at 10:16

## 2020-01-01 RX ADMIN — IPRATROPIUM BROMIDE AND ALBUTEROL 1 PUFF: 20; 100 SPRAY, METERED RESPIRATORY (INHALATION) at 23:37

## 2020-01-01 RX ADMIN — METOPROLOL TARTRATE 50 MG: 50 TABLET, FILM COATED ORAL at 20:03

## 2020-01-01 RX ADMIN — Medication 10 ML: at 14:00

## 2020-01-01 RX ADMIN — CYCLOBENZAPRINE 5 MG: 10 TABLET, FILM COATED ORAL at 23:51

## 2020-01-01 RX ADMIN — AZITHROMYCIN 500 MG: 500 TABLET, FILM COATED ORAL at 17:20

## 2020-01-01 RX ADMIN — IPRATROPIUM BROMIDE AND ALBUTEROL SULFATE 3 ML: .5; 3 SOLUTION RESPIRATORY (INHALATION) at 08:30

## 2020-01-01 RX ADMIN — IPRATROPIUM BROMIDE AND ALBUTEROL SULFATE 3 ML: .5; 3 SOLUTION RESPIRATORY (INHALATION) at 15:35

## 2020-01-01 RX ADMIN — Medication 5 ML: at 22:42

## 2020-01-01 RX ADMIN — BUDESONIDE AND FORMOTEROL FUMARATE DIHYDRATE 2 PUFF: 160; 4.5 AEROSOL RESPIRATORY (INHALATION) at 09:00

## 2020-01-01 RX ADMIN — IPRATROPIUM BROMIDE AND ALBUTEROL SULFATE 3 ML: .5; 3 SOLUTION RESPIRATORY (INHALATION) at 19:00

## 2020-01-01 RX ADMIN — PANTOPRAZOLE SODIUM 40 MG: 40 TABLET, DELAYED RELEASE ORAL at 09:01

## 2020-01-01 RX ADMIN — ARFORMOTEROL TARTRATE 15 MCG: 15 SOLUTION RESPIRATORY (INHALATION) at 20:21

## 2020-01-01 RX ADMIN — DOCUSATE SODIUM 100 MG: 100 CAPSULE, LIQUID FILLED ORAL at 08:57

## 2020-01-01 RX ADMIN — LISINOPRIL 5 MG: 5 TABLET ORAL at 09:01

## 2020-01-01 RX ADMIN — LISINOPRIL 5 MG: 5 TABLET ORAL at 11:45

## 2020-01-01 RX ADMIN — BUDESONIDE 500 MCG: 0.5 INHALANT RESPIRATORY (INHALATION) at 20:04

## 2020-01-01 RX ADMIN — ALBUTEROL SULFATE 2 PUFF: 90 AEROSOL, METERED RESPIRATORY (INHALATION) at 07:11

## 2020-01-01 RX ADMIN — ACETAMINOPHEN 1000 MG: 500 TABLET ORAL at 23:51

## 2020-01-01 RX ADMIN — LISINOPRIL 2.5 MG: 5 TABLET ORAL at 13:29

## 2020-01-01 RX ADMIN — BUDESONIDE 500 MCG: 0.5 INHALANT RESPIRATORY (INHALATION) at 08:30

## 2020-01-01 RX ADMIN — IPRATROPIUM BROMIDE AND ALBUTEROL: 20; 100 SPRAY, METERED RESPIRATORY (INHALATION) at 07:26

## 2020-01-01 RX ADMIN — BUDESONIDE AND FORMOTEROL FUMARATE DIHYDRATE 2 PUFF: 160; 4.5 AEROSOL RESPIRATORY (INHALATION) at 19:49

## 2020-01-01 RX ADMIN — LISINOPRIL 5 MG: 5 TABLET ORAL at 08:40

## 2020-01-01 RX ADMIN — ASPIRIN 81 MG CHEWABLE TABLET 81 MG: 81 TABLET CHEWABLE at 08:40

## 2020-01-01 RX ADMIN — METHYLPREDNISOLONE SODIUM SUCCINATE 40 MG: 40 INJECTION, POWDER, FOR SOLUTION INTRAMUSCULAR; INTRAVENOUS at 05:31

## 2020-01-01 RX ADMIN — DOCUSATE SODIUM 100 MG: 100 CAPSULE, LIQUID FILLED ORAL at 08:40

## 2020-01-01 RX ADMIN — CEFTRIAXONE 1 G: 1 INJECTION, POWDER, FOR SOLUTION INTRAMUSCULAR; INTRAVENOUS at 15:22

## 2020-01-01 RX ADMIN — AZITHROMYCIN 500 MG: 500 TABLET, FILM COATED ORAL at 18:45

## 2020-01-01 RX ADMIN — OXYBUTYNIN CHLORIDE 10 MG: 5 TABLET, EXTENDED RELEASE ORAL at 08:40

## 2020-01-01 RX ADMIN — CEFTRIAXONE 1 G: 1 INJECTION, POWDER, FOR SOLUTION INTRAMUSCULAR; INTRAVENOUS at 15:43

## 2020-01-01 RX ADMIN — ARFORMOTEROL TARTRATE 15 MCG: 15 SOLUTION RESPIRATORY (INHALATION) at 19:05

## 2020-01-01 RX ADMIN — METHYLPREDNISOLONE SODIUM SUCCINATE 80 MG: 40 INJECTION, POWDER, FOR SOLUTION INTRAMUSCULAR; INTRAVENOUS at 22:28

## 2020-01-01 RX ADMIN — DOCUSATE SODIUM 100 MG: 100 CAPSULE, LIQUID FILLED ORAL at 08:50

## 2020-01-01 RX ADMIN — PREDNISONE 40 MG: 20 TABLET ORAL at 09:03

## 2020-01-01 RX ADMIN — PANTOPRAZOLE SODIUM 40 MG: 40 TABLET, DELAYED RELEASE ORAL at 08:57

## 2020-01-01 RX ADMIN — METHYLPREDNISOLONE SODIUM SUCCINATE 40 MG: 40 INJECTION, POWDER, FOR SOLUTION INTRAMUSCULAR; INTRAVENOUS at 21:28

## 2020-01-01 RX ADMIN — GUAIFENESIN 600 MG: 600 TABLET, EXTENDED RELEASE ORAL at 18:02

## 2020-01-01 RX ADMIN — BUDESONIDE 500 MCG: 0.5 INHALANT RESPIRATORY (INHALATION) at 07:59

## 2020-01-01 RX ADMIN — Medication 10 ML: at 12:50

## 2020-01-01 RX ADMIN — PREDNISONE 40 MG: 20 TABLET ORAL at 08:55

## 2020-01-01 RX ADMIN — INFLUENZA VIRUS VACCINE 0.5 ML: 15; 15; 15; 15 SUSPENSION INTRAMUSCULAR at 13:00

## 2020-01-01 RX ADMIN — ACETAMINOPHEN 1000 MG: 500 TABLET ORAL at 13:52

## 2020-01-01 RX ADMIN — ABIRATERONE ACETATE 250 MG: 250 TABLET ORAL at 10:20

## 2020-01-01 RX ADMIN — CEFTRIAXONE 1 G: 1 INJECTION, POWDER, FOR SOLUTION INTRAMUSCULAR; INTRAVENOUS at 14:46

## 2020-01-01 RX ADMIN — IPRATROPIUM BROMIDE AND ALBUTEROL SULFATE 3 ML: .5; 3 SOLUTION RESPIRATORY (INHALATION) at 20:15

## 2020-01-01 RX ADMIN — PREDNISONE 40 MG: 20 TABLET ORAL at 08:35

## 2020-01-01 RX ADMIN — METHYLPREDNISOLONE SODIUM SUCCINATE 40 MG: 40 INJECTION, POWDER, FOR SOLUTION INTRAMUSCULAR; INTRAVENOUS at 21:40

## 2020-01-01 RX ADMIN — ARFORMOTEROL TARTRATE 15 MCG: 15 SOLUTION RESPIRATORY (INHALATION) at 07:59

## 2020-01-01 RX ADMIN — SODIUM CHLORIDE 100 ML/HR: 900 INJECTION, SOLUTION INTRAVENOUS at 20:34

## 2020-01-01 RX ADMIN — METOPROLOL TARTRATE 12.5 MG: 25 TABLET, FILM COATED ORAL at 08:35

## 2020-01-01 RX ADMIN — ALBUTEROL SULFATE 7.5 MG: 2.5 SOLUTION RESPIRATORY (INHALATION) at 13:42

## 2020-01-01 RX ADMIN — GUAIFENESIN 600 MG: 600 TABLET, EXTENDED RELEASE ORAL at 17:17

## 2020-01-01 RX ADMIN — IPRATROPIUM BROMIDE AND ALBUTEROL SULFATE 3 ML: .5; 3 SOLUTION RESPIRATORY (INHALATION) at 14:51

## 2020-01-01 RX ADMIN — CEFTRIAXONE 1 G: 1 INJECTION, POWDER, FOR SOLUTION INTRAMUSCULAR; INTRAVENOUS at 15:52

## 2020-01-01 RX ADMIN — OXYBUTYNIN CHLORIDE 10 MG: 5 TABLET, EXTENDED RELEASE ORAL at 09:00

## 2020-01-01 RX ADMIN — ASPIRIN 81 MG CHEWABLE TABLET 81 MG: 81 TABLET CHEWABLE at 08:56

## 2020-01-01 RX ADMIN — PANTOPRAZOLE SODIUM 40 MG: 40 TABLET, DELAYED RELEASE ORAL at 10:16

## 2020-01-01 RX ADMIN — ACETAMINOPHEN 650 MG: 325 TABLET ORAL at 16:40

## 2020-01-01 RX ADMIN — ATORVASTATIN CALCIUM 40 MG: 40 TABLET, FILM COATED ORAL at 20:45

## 2020-01-01 RX ADMIN — METOPROLOL TARTRATE 50 MG: 50 TABLET, FILM COATED ORAL at 08:49

## 2020-01-01 RX ADMIN — ATORVASTATIN CALCIUM 40 MG: 40 TABLET, FILM COATED ORAL at 21:20

## 2020-01-01 RX ADMIN — DOCUSATE SODIUM 100 MG: 100 CAPSULE, LIQUID FILLED ORAL at 09:01

## 2020-01-01 RX ADMIN — ONDANSETRON 4 MG: 2 INJECTION INTRAMUSCULAR; INTRAVENOUS at 11:05

## 2020-01-01 RX ADMIN — GUAIFENESIN 600 MG: 600 TABLET, EXTENDED RELEASE ORAL at 08:39

## 2020-01-01 RX ADMIN — ATORVASTATIN CALCIUM 40 MG: 40 TABLET, FILM COATED ORAL at 21:34

## 2020-01-01 RX ADMIN — GUAIFENESIN 600 MG: 600 TABLET, EXTENDED RELEASE ORAL at 09:01

## 2020-01-01 RX ADMIN — BUDESONIDE AND FORMOTEROL FUMARATE DIHYDRATE 2 PUFF: 160; 4.5 AEROSOL RESPIRATORY (INHALATION) at 20:17

## 2020-01-01 RX ADMIN — ARFORMOTEROL TARTRATE 15 MCG: 15 SOLUTION RESPIRATORY (INHALATION) at 08:03

## 2020-01-01 RX ADMIN — Medication 10 ML: at 16:26

## 2020-01-01 RX ADMIN — DOCUSATE SODIUM 100 MG: 100 CAPSULE, LIQUID FILLED ORAL at 18:45

## 2020-01-01 RX ADMIN — METOPROLOL TARTRATE 12.5 MG: 25 TABLET, FILM COATED ORAL at 20:46

## 2020-01-01 RX ADMIN — ATORVASTATIN CALCIUM 40 MG: 40 TABLET, FILM COATED ORAL at 20:02

## 2020-01-01 RX ADMIN — ASPIRIN 81 MG CHEWABLE TABLET 81 MG: 81 TABLET CHEWABLE at 08:48

## 2020-01-01 RX ADMIN — DOCUSATE SODIUM 100 MG: 100 CAPSULE, LIQUID FILLED ORAL at 17:22

## 2020-01-01 RX ADMIN — ALBUTEROL SULFATE 2 PUFF: 90 AEROSOL, METERED RESPIRATORY (INHALATION) at 00:05

## 2020-01-01 RX ADMIN — ALBUTEROL SULFATE 2 PUFF: 90 AEROSOL, METERED RESPIRATORY (INHALATION) at 20:05

## 2020-01-01 RX ADMIN — METOPROLOL TARTRATE 12.5 MG: 25 TABLET, FILM COATED ORAL at 09:02

## 2020-01-01 RX ADMIN — OXYBUTYNIN CHLORIDE 10 MG: 5 TABLET, EXTENDED RELEASE ORAL at 08:34

## 2020-01-01 RX ADMIN — ALUMINUM HYDROXIDE AND MAGNESIUM HYDROXIDE 30 ML: 200; 200 SUSPENSION ORAL at 01:25

## 2020-01-01 RX ADMIN — IPRATROPIUM BROMIDE 0.5 MG: 0.5 SOLUTION RESPIRATORY (INHALATION) at 13:42

## 2020-01-01 RX ADMIN — IPRATROPIUM BROMIDE AND ALBUTEROL SULFATE 3 ML: .5; 3 SOLUTION RESPIRATORY (INHALATION) at 01:07

## 2020-01-01 RX ADMIN — GUAIFENESIN 600 MG: 600 TABLET, EXTENDED RELEASE ORAL at 17:22

## 2020-01-01 RX ADMIN — GUAIFENESIN 600 MG: 600 TABLET, EXTENDED RELEASE ORAL at 08:52

## 2020-01-01 RX ADMIN — ASPIRIN 81 MG CHEWABLE TABLET 81 MG: 81 TABLET CHEWABLE at 10:16

## 2020-01-01 RX ADMIN — ASPIRIN 81 MG CHEWABLE TABLET 81 MG: 81 TABLET CHEWABLE at 09:01

## 2020-01-01 RX ADMIN — PANTOPRAZOLE SODIUM 40 MG: 40 TABLET, DELAYED RELEASE ORAL at 08:40

## 2020-01-01 RX ADMIN — OXYCODONE 5 MG: 5 TABLET ORAL at 23:52

## 2020-01-01 RX ADMIN — GUAIFENESIN 600 MG: 600 TABLET, EXTENDED RELEASE ORAL at 08:57

## 2020-01-01 RX ADMIN — OXYBUTYNIN CHLORIDE 10 MG: 5 TABLET, EXTENDED RELEASE ORAL at 10:42

## 2020-01-01 RX ADMIN — PANTOPRAZOLE SODIUM 40 MG: 40 TABLET, DELAYED RELEASE ORAL at 08:50

## 2020-01-01 RX ADMIN — ABIRATERONE ACETATE 250 MG: 250 TABLET ORAL at 08:36

## 2020-01-01 RX ADMIN — DOCUSATE SODIUM 100 MG: 100 CAPSULE, LIQUID FILLED ORAL at 18:02

## 2020-01-01 RX ADMIN — SODIUM CHLORIDE 500 ML: 900 INJECTION, SOLUTION INTRAVENOUS at 01:03

## 2020-01-01 RX ADMIN — METHYLPREDNISOLONE SODIUM SUCCINATE 40 MG: 40 INJECTION, POWDER, FOR SOLUTION INTRAMUSCULAR; INTRAVENOUS at 14:00

## 2020-01-01 RX ADMIN — ATORVASTATIN CALCIUM 40 MG: 40 TABLET, FILM COATED ORAL at 21:28

## 2020-01-01 RX ADMIN — METHYLPREDNISOLONE SODIUM SUCCINATE 80 MG: 40 INJECTION, POWDER, FOR SOLUTION INTRAMUSCULAR; INTRAVENOUS at 06:26

## 2020-01-01 RX ADMIN — IPRATROPIUM BROMIDE AND ALBUTEROL SULFATE 3 ML: .5; 3 SOLUTION RESPIRATORY (INHALATION) at 08:37

## 2020-01-01 RX ADMIN — METOPROLOL TARTRATE 50 MG: 50 TABLET, FILM COATED ORAL at 08:39

## 2020-01-01 RX ADMIN — ATORVASTATIN CALCIUM 40 MG: 40 TABLET, FILM COATED ORAL at 22:28

## 2020-01-01 RX ADMIN — Medication 10 ML: at 12:36

## 2020-01-01 RX ADMIN — GUAIFENESIN 600 MG: 600 TABLET, EXTENDED RELEASE ORAL at 08:34

## 2020-01-01 RX ADMIN — IPRATROPIUM BROMIDE AND ALBUTEROL SULFATE 3 ML: .5; 3 SOLUTION RESPIRATORY (INHALATION) at 20:02

## 2020-01-01 RX ADMIN — ACETAMINOPHEN 650 MG: 325 TABLET ORAL at 19:29

## 2020-01-01 RX ADMIN — IPRATROPIUM BROMIDE AND ALBUTEROL 1 PUFF: 20; 100 SPRAY, METERED RESPIRATORY (INHALATION) at 20:15

## 2020-01-01 RX ADMIN — BUDESONIDE 500 MCG: 0.5 INHALANT RESPIRATORY (INHALATION) at 19:05

## 2020-01-01 RX ADMIN — BUDESONIDE AND FORMOTEROL FUMARATE DIHYDRATE 2 PUFF: 160; 4.5 AEROSOL RESPIRATORY (INHALATION) at 07:27

## 2020-01-01 RX ADMIN — OXYBUTYNIN CHLORIDE 10 MG: 5 TABLET, EXTENDED RELEASE ORAL at 10:16

## 2020-01-01 RX ADMIN — METOPROLOL TARTRATE 50 MG: 50 TABLET, FILM COATED ORAL at 18:02

## 2020-01-01 RX ADMIN — IPRATROPIUM BROMIDE AND ALBUTEROL 1 PUFF: 20; 100 SPRAY, METERED RESPIRATORY (INHALATION) at 15:48

## 2020-01-01 RX ADMIN — AZITHROMYCIN 500 MG: 500 INJECTION, POWDER, LYOPHILIZED, FOR SOLUTION INTRAVENOUS at 17:20

## 2020-01-01 RX ADMIN — Medication 10 ML: at 18:26

## 2020-01-01 RX ADMIN — IPRATROPIUM BROMIDE AND ALBUTEROL 1 PUFF: 20; 100 SPRAY, METERED RESPIRATORY (INHALATION) at 12:00

## 2020-01-01 RX ADMIN — IPRATROPIUM BROMIDE AND ALBUTEROL SULFATE 3 ML: .5; 3 SOLUTION RESPIRATORY (INHALATION) at 20:00

## 2020-01-01 RX ADMIN — ABIRATERONE ACETATE 250 MG: 250 TABLET ORAL at 08:54

## 2020-01-01 RX ADMIN — GUAIFENESIN 600 MG: 600 TABLET, EXTENDED RELEASE ORAL at 18:45

## 2020-01-01 RX ADMIN — SODIUM CHLORIDE 40 MG: 9 INJECTION, SOLUTION INTRAMUSCULAR; INTRAVENOUS; SUBCUTANEOUS at 01:23

## 2020-01-01 RX ADMIN — IPRATROPIUM BROMIDE AND ALBUTEROL 1 PUFF: 20; 100 SPRAY, METERED RESPIRATORY (INHALATION) at 19:47

## 2020-01-01 RX ADMIN — METHYLPREDNISOLONE SODIUM SUCCINATE 125 MG: 125 INJECTION, POWDER, FOR SOLUTION INTRAMUSCULAR; INTRAVENOUS at 13:57

## 2020-01-01 RX ADMIN — BUDESONIDE AND FORMOTEROL FUMARATE DIHYDRATE 2 PUFF: 160; 4.5 AEROSOL RESPIRATORY (INHALATION) at 07:21

## 2020-01-01 RX ADMIN — IPRATROPIUM BROMIDE AND ALBUTEROL 1 PUFF: 20; 100 SPRAY, METERED RESPIRATORY (INHALATION) at 04:02

## 2020-01-01 RX ADMIN — AZITHROMYCIN MONOHYDRATE 500 MG: 500 INJECTION, POWDER, LYOPHILIZED, FOR SOLUTION INTRAVENOUS at 20:07

## 2020-01-01 RX ADMIN — IPRATROPIUM BROMIDE AND ALBUTEROL SULFATE 3 ML: .5; 3 SOLUTION RESPIRATORY (INHALATION) at 07:40

## 2020-01-01 RX ADMIN — METOPROLOL TARTRATE 12.5 MG: 25 TABLET, FILM COATED ORAL at 21:34

## 2020-01-01 RX ADMIN — ARFORMOTEROL TARTRATE 15 MCG: 15 SOLUTION RESPIRATORY (INHALATION) at 08:30

## 2020-01-01 RX ADMIN — ASPIRIN 81 MG CHEWABLE TABLET 81 MG: 81 TABLET CHEWABLE at 08:34

## 2020-01-01 RX ADMIN — METOPROLOL TARTRATE 12.5 MG: 25 TABLET, FILM COATED ORAL at 13:28

## 2020-01-01 RX ADMIN — FENTANYL CITRATE 50 MCG: 50 INJECTION, SOLUTION INTRAMUSCULAR; INTRAVENOUS at 13:57

## 2020-01-01 RX ADMIN — GUAIFENESIN 600 MG: 600 TABLET, EXTENDED RELEASE ORAL at 20:03

## 2020-01-01 RX ADMIN — Medication 5 ML: at 06:21

## 2020-01-01 RX ADMIN — BUDESONIDE 500 MCG: 0.5 INHALANT RESPIRATORY (INHALATION) at 20:21

## 2020-01-01 RX ADMIN — BUDESONIDE 500 MCG: 0.5 INHALANT RESPIRATORY (INHALATION) at 20:10

## 2020-01-01 RX ADMIN — ACETAMINOPHEN 650 MG: 325 TABLET ORAL at 14:42

## 2020-01-01 RX ADMIN — AZITHROMYCIN 500 MG: 500 INJECTION, POWDER, LYOPHILIZED, FOR SOLUTION INTRAVENOUS at 16:37

## 2020-01-01 RX ADMIN — CEFTRIAXONE 1 G: 1 INJECTION, POWDER, FOR SOLUTION INTRAMUSCULAR; INTRAVENOUS at 16:29

## 2020-01-01 RX ADMIN — CEFTRIAXONE 1 G: 1 INJECTION, POWDER, FOR SOLUTION INTRAMUSCULAR; INTRAVENOUS at 14:01

## 2020-01-01 RX ADMIN — GUAIFENESIN 600 MG: 600 TABLET, EXTENDED RELEASE ORAL at 18:25

## 2020-01-01 RX ADMIN — METHYLPREDNISOLONE SODIUM SUCCINATE 40 MG: 40 INJECTION, POWDER, FOR SOLUTION INTRAMUSCULAR; INTRAVENOUS at 14:38

## 2020-01-01 RX ADMIN — PREDNISONE 5 MG: 5 TABLET ORAL at 19:53

## 2020-01-01 RX ADMIN — IPRATROPIUM BROMIDE AND ALBUTEROL 1 PUFF: 20; 100 SPRAY, METERED RESPIRATORY (INHALATION) at 11:56

## 2020-01-01 RX ADMIN — LISINOPRIL 5 MG: 5 TABLET ORAL at 08:56

## 2020-01-01 RX ADMIN — IPRATROPIUM BROMIDE AND ALBUTEROL SULFATE 3 ML: .5; 3 SOLUTION RESPIRATORY (INHALATION) at 07:56

## 2020-03-05 PROBLEM — J44.9 COPD (CHRONIC OBSTRUCTIVE PULMONARY DISEASE) (HCC): Status: ACTIVE | Noted: 2019-03-23

## 2020-03-05 NOTE — LETTER
3/5/20 Patient: Pranay Denise YOB: 1940 Date of Visit: 3/5/2020 Hernandez Larsen MD 
[address] [address] VIA In Basket Dear Hernandez Larsen MD, Thank you for referring Mr. Zaida Jaquez to [facility] for evaluation. My notes for this consultation are attached. If you have questions, please do not hesitate to call me. I look forward to following your patient along with you.  
 
 
Sincerely, 
 
Stephy Dwyer MD

## 2020-03-05 NOTE — PROGRESS NOTES
Joshua Khanna, [facility] Subjective/HPI:  
 
Mr. Ronnie Tiwari is a 78 y.o. male is here for routine f/u. He has a PMHx of CAD with PTCA/Stent in 2015, HTN, Hyperlipidemia, and COPD. He has had worsening WHEAT despite O2 use at 3L/min. He recently realized he never saw his pulmonologist last year. He has an upcoming appt with plans for CXR first. He has a productive cough with clear to yellow sputum. His SOB is improved after rescue inhaler/neb tx. He denies CP, orthopnea, PND, edema, palpitations or worsening fatigue. PCP Provider Peri Jonhson MD 
 
Patient Active Problem List  
Diagnosis Code  ED (erectile dysfunction) N52.9  
 HTN (hypertension) I10  
 [facility] C[facility]  
 PAD (peripheral artery disease) ([facility]) I73.9  
 STEMI (ST elevation myocardial infarction) ([facility]) I21.3  Chronic interstitial lung disease ([facility]) J84.9  
 S/P PTCA (percutaneous transluminal coronary angioplasty) Z98.61  
 Mucopurulent chronic bronchitis ([facility]) J41.1  Acute myocardial infarction of anterior wall, subsequent episode of care ([facility] I21.09  
 Penetrating atherosclerotic ulcer of aorta ([facility]) I70.0  Hyperlipidemia E78.5  Advance directive discussed with patient Z70.80  Coronary artery disease involving native coronary artery of native heart without angina pectoris I25.10  Hypoxemia R09.02  
 COPD (chronic obstructive pulmonary disease) ([facility]) J44.9  Mixed simple and mucopurulent chronic bronchitis ([facility]) J41.8 Social History Tobacco Use  Smoking status: Former Smoker Packs/day: 0.50 Years: 40.00 Pack years: 20.00 Types: Cigarettes Last attempt to quit: 10/19/2015 Years since quittin.3  Smokeless tobacco: Never Used  Tobacco comment: trying quit 10/13/15 -  ppd Substance Use Topics  Alcohol use: No  
  Alcohol/week: 0.0 standard drinks Current Outpatient Medications Medication Sig  
  albuterol-ipratropium (DUO-NEB) 2.5 mg-0.5 mg/3 ml nebu 3 mL by Nebulization route every six (6) hours as needed (sob).  lisinopril (PRINIVIL, ZESTRIL) 2.5 mg tablet TAKE 1 TABLET BY MOUTH ONCE DAILY  Oxygen 3L continouus  albuterol (PROVENTIL HFA, VENTOLIN HFA, PROAIR HFA) 90 mcg/actuation inhaler Use 2 puffs very 4 hours prn  fluticasone (FLONASE) 50 mcg/actuation nasal spray 2 Sprays by Both Nostrils route daily.  atorvastatin (LIPITOR) 40 mg tablet TAKE 1 TABLET EVERY DAY  famotidine (PEPCID) 20 mg tablet TAKE 1 TABLET EVERY DAY AS NEEDED  FOR  HEARTBURN  
 metoprolol tartrate (LOPRESSOR) 50 mg tablet TAKE ONE TABLET BY MOUTH TWICE DAILY  XTANDI 40 mg capsule Take 160 mg by mouth daily.  aspirin 81 mg chewable tablet Take 1 Tab by mouth daily. RISK OF HEART ATTACK IF ANY MISSED DOSES  tiotropium (SPIRIVA) 18 mcg inhalation capsule Take 1 Cap by inhalation daily.  colchicine (COLCRYS) 0.6 mg tablet 0.6mg po tid day one then 0.6mg bid until gout resolved (Patient not taking: Reported on 3/5/2020)  fluticasone furoate-vilanterol (BREO ELLIPTA) 100-25 mcg/dose inhaler Take 1 Puff by inhalation daily. (Patient not taking: Reported on 3/5/2020) No current facility-administered medications for this visit. No Known Allergies I have reviewed the problem list, allergy list, medical history, family, social history and medications. Review of Symptoms: 
 
Review of Systems Constitutional: Negative for chills, fever and weight loss. HENT: Negative for nosebleeds. Eyes: Negative for blurred vision and double vision. Respiratory: Positive for cough, sputum production and shortness of breath. Negative for wheezing. Cardiovascular: Negative for chest pain, palpitations, orthopnea, leg swelling and PND. Gastrointestinal: Negative for abdominal pain, blood in stool, diarrhea, nausea and vomiting. Musculoskeletal: Negative for joint pain. Skin: Negative for rash. Neurological: Negative for dizziness, tingling and loss of consciousness. Endo/Heme/Allergies: Does not bruise/bleed easily. Physical Exam:   
 
General: Well developed, in no acute distress, cooperative and alert HEENT: No carotid bruits, no JVD, trach is midline. Neck Supple, PEERL, EOM intact. Heart:  reg rate and rhythm; normal S1/S2; no murmurs, gallops or rubs. Respiratory: reduced breath sounds bilaterally x 4, no wheezing or rales Abdomen:   Soft, non-tender, no distention, no masses. + BS. Extremities:  Normal cap refill, no cyanosis, atraumatic. No edema. Neuro: A&Ox3, speech clear, in w/c Skin: Skin color is normal. No rashes or lesions. Non diaphoretic Vascular: 2+ pulses symmetric in all extremities Visit Vitals /70 (BP 1 Location: Right arm, BP Patient Position: Sitting) Pulse 80 Resp 20 Ht 6' (1.829 m) Wt 202 lb 14.4 oz (92 kg) SpO2 (!) 85% BMI 27.52 kg/m² Cardiographics ECG: SR 
 
Cardiology Labs: 
 
Lab Results Component Value Date/Time Cholesterol, total 73 (L) 04/25/2017 02:38 PM  
 HDL Cholesterol 31 (L) 04/25/2017 02:38 PM  
 LDL, calculated 19 04/25/2017 02:38 PM  
 LDL, calculated 39.6 10/20/2015 04:06 AM  
 LDL, calculated 69 05/14/2015 11:24 AM  
 VLDL, calculated 23 04/25/2017 02:38 PM  
 CHOL/HDL Ratio 2.9 10/20/2015 04:06 AM  
 
 
Lab Results Component Value Date/Time Hemoglobin A1c 6.0 (H) 12/12/2011 03:22 PM  
 
 
Lab Results Component Value Date/Time  Sodium 142 03/25/2019 04:05 AM  
 Potassium 3.7 03/25/2019 04:05 AM  
 Chloride 110 (H) 03/25/2019 04:05 AM  
 CO2 22 03/25/2019 04:05 AM  
 Glucose 90 03/25/2019 04:05 AM  
 BUN 23 (H) 03/25/2019 04:05 AM  
 Creatinine 1.14 03/25/2019 04:05 AM  
 BUN/Creatinine ratio 20 03/25/2019 04:05 AM  
 GFR est AA >60 03/25/2019 04:05 AM  
 GFR est non-AA >60 03/25/2019 04:05 AM  
 Calcium 8.9 03/25/2019 04:05 AM  
 Anion gap 10 03/25/2019 04:05 AM  
 Bilirubin, total 0.5 03/23/2019 06:56 AM  
 ALT (SGPT) 12 03/23/2019 06:56 AM  
 AST (SGOT) 13 (L) 03/23/2019 06:56 AM  
 Alk. phosphatase 62 03/23/2019 06:56 AM  
 Protein, total 7.6 03/23/2019 06:56 AM  
 Albumin 3.6 03/23/2019 06:56 AM  
 Globulin 4.0 03/23/2019 06:56 AM  
 A-G Ratio 0.9 (L) 03/23/2019 06:56 AM  
 
 
Orders Placed This Encounter  AMB POC EKG ROUTINE W/ 12 LEADS, INTER & REP Order Specific Question:   Reason for Exam: Answer:   routine Assessment: 
 
 Assessment: ICD-10-CM ICD-9-CM 1. Coronary artery disease involving native coronary artery of native heart without angina pectoris I25.10 414.01 ECHO ADULT COMPLETE 2. Essential hypertension I10 401.9 AMB POC EKG ROUTINE W/ 12 LEADS, INTER & REP  
   ECHO ADULT COMPLETE 3. Mixed hyperlipidemia E78.2 272.2 AMB POC EKG ROUTINE W/ 12 LEADS, INTER & REP  
   ECHO ADULT COMPLETE 4. [facility] C61 185 ECHO ADULT COMPLETE  
5. S/P PTCA (percutaneous transluminal coronary angioplasty) Z98.61 V45.82 ECHO ADULT COMPLETE 6. Chronic obstructive pulmonary disease, unspecified COPD type ([facility] J44.9 496 ECHO ADULT COMPLETE Plan:  
 
Patient presents for f/u and reports worsening WHEAT and productive cough improved with neb/inhaler.  
  
Atherosclerotic heart disease History of PTCA stenting 2015, no ischemia on nuclear stress test 2017. Denies CP. Does report worsening WHEAT. Continue ASA, BB, statin 
  
Ejection fraction 55%, mild AR per echo in January 2017 Repeat echo now to rule out any cardiac pathology contributing to shortness of breathlow suspicion 
  
Hypertension Controlled  
  
Hyperlipidemia: On statin. Lipids and labs followed by PCP 
  
COPD, ILD On oxygen therapy continuously Appt scheduled. Plans for CXR 
  
CT chest/neck 1/19: Abnormal right vocal cord. Left lung base nodule. Severe COPD 
ENT: Dr Duc Contreras 
  
Prostate cancer Followed by Dr Anette Traylor On [facility] 
  
 Counseled on diet and exercise- eventual goal of 30-60 minutes 5-7 times a week as per AHA guidelines. 
  
  
Continue current care and f/u in 1 yr, sooner PRN.

## 2020-03-05 NOTE — PROGRESS NOTES
1. Have you been to the ER, urgent care clinic since your last visit? Hospitalized since your last visit? Yes X3 
 
2. Have you seen or consulted any other health care providers outside of the [address] [place] [address] since your last visit? Include any pap smears or colon screening. No 
 
Chief Complaint Patient presents with  Follow-up  Shortness of Breath Patient requesting an order for wheelchair.

## 2020-03-09 NOTE — TELEPHONE ENCOUNTER
Patients sister Joya Sweet came in to say that they had been to a heart dr and lung spec and they said Mr. Ratna Coyne needs a wheelchair ordered and they said that Dr Nicholas Bagley needs to write the order.   [phone]

## 2020-04-09 NOTE — TELEPHONE ENCOUNTER
Called pt to schedule Medicare AWV, was told that they still have not received a call to schedule the pt's wheelchair evaluation (ordered by Dr. Bandar Asencio in early March). Pt is requesting that Dr. Bandar Asencio follow up with [facility] to see what is holding up the wheelchair order.

## 2020-05-15 NOTE — TELEPHONE ENCOUNTER
----- Message from Drea Armendariz sent at 5/15/2020  9:28 AM EDT -----  Regarding: Young/telephone  Pts granddaughter Quinten Floyd is requesting to know if you received a fax last week from [employer] for a wheel chair. Ms Michael Montes number is [phone].

## 2020-06-04 NOTE — PATIENT INSTRUCTIONS
Chronic Obstructive Pulmonary Disease (COPD): Care Instructions  Your Care Instructions    Chronic obstructive pulmonary disease (COPD) is a general term for a group of lung diseases, including emphysema and chronic bronchitis. People with COPD have decreased airflow in and out of the lungs, which makes it hard to breathe. The airways also can get clogged with thick mucus. Cigarette smoking is a major cause of COPD. Although there is no cure for COPD, you can slow its progress. Following your treatment plan and taking care of yourself can help you feel better and live longer. Follow-up care is a key part of your treatment and safety. Be sure to make and go to all appointments, and call your doctor if you are having problems. It's also a good idea to know your test results and keep a list of the medicines you take. How can you care for yourself at home?   Staying healthy    · Do not smoke. This is the most important step you can take to prevent more damage to your lungs. If you need help quitting, talk to your doctor about stop-smoking programs and medicines. These can increase your chances of quitting for good.     · Avoid colds and flu. Get a pneumococcal vaccine shot. If you have had one before, ask your doctor whether you need a second dose. Get the flu vaccine every fall. If you must be around people with colds or the flu, wash your hands often.     · Avoid secondhand smoke, air pollution, and high altitudes. Also avoid cold, dry air and hot, humid air. Stay at home with your windows closed when air pollution is bad.    Medicines and oxygen therapy    · Take your medicines exactly as prescribed. Call your doctor if you think you are having a problem with your medicine.     · You may be taking medicines such as:  ? Bronchodilators. These help open your airways and make breathing easier. Bronchodilators are either short-acting (work for 6 to 9 hours) or long-acting (work for 24 hours).  You inhale most bronchodilators, so they start to act quickly. Always carry your quick-relief inhaler with you in case you need it while you are away from home. ? Corticosteroids (prednisone, budesonide). These reduce airway inflammation. They come in pill or inhaled form. You must take these medicines every day for them to work well.     · A spacer may help you get more inhaled medicine to your lungs. Ask your doctor or pharmacist if a spacer is right for you. If it is, ask how to use it properly.     · Do not take any vitamins, over-the-counter medicine, or herbal products without talking to your doctor first.     · If your doctor prescribed antibiotics, take them as directed. Do not stop taking them just because you feel better. You need to take the full course of antibiotics.     · Oxygen therapy boosts the amount of oxygen in your blood and helps you breathe easier. Use the flow rate your doctor has recommended, and do not change it without talking to your doctor first.   Activity    · Get regular exercise. Walking is an easy way to get exercise. Start out slowly, and walk a little more each day.     · Pay attention to your breathing. You are exercising too hard if you cannot talk while you are exercising.     · Take short rest breaks when doing household chores and other activities.     · Learn breathing methods--such as breathing through pursed lips--to help you become less short of breath.     · If your doctor has not set you up with a pulmonary rehabilitation program, talk to him or her about whether rehab is right for you. Rehab includes exercise programs, education about your disease and how to manage it, help with diet and other changes, and emotional support. Diet    · Eat regular, healthy meals. Use bronchodilators about 1 hour before you eat to make it easier to eat. Eat several small meals instead of three large ones.  Drink beverages at the end of the meal. Avoid foods that are hard to chew.     · Eat foods that contain protein so that you do not lose muscle mass.     · Talk with your doctor if you gain too much weight or if you lose weight without trying.    Mental health    · Talk to your family, friends, or a therapist about your feelings. It is normal to feel frightened, angry, hopeless, helpless, and even guilty. Talking openly about bad feelings can help you cope. If these feelings last, talk to your doctor. When should you call for help? Call 911 anytime you think you may need emergency care. For example, call if:    · You have severe trouble breathing.    Call your doctor now or seek immediate medical care if:    · You have new or worse trouble breathing.     · You cough up blood.     · You have a fever.    Watch closely for changes in your health, and be sure to contact your doctor if:    · You cough more deeply or more often, especially if you notice more mucus or a change in the color of your mucus.     · You have new or worse swelling in your legs or belly.     · You are not getting better as expected. Where can you learn more? Go to http://ria-matt.info/. Kaylee Ng in the search box to learn more about \"Chronic Obstructive Pulmonary Disease (COPD): Care Instructions. \"  Current as of: June 9, 2019  Content Version: 12.2  © 8212-7404 [employer]. Care instructions adapted under license by [employer] (which disclaims liability or warranty for this information). If you have questions about a medical condition or this instruction, always ask your healthcare professional. [place] [address] any warranty or liability for your use of this information. Breathing Techniques for COPD: Care Instructions  Your Care Instructions    Breathing is hard when you have chronic obstructive pulmonary disease (COPD). You may take quick, short breaths. Breathing this way makes it harder to get air into your lungs.  Learning new ways to control your breathing may help. You may feel better and be able to do more. You can try three basic ways to control your breathing. They are pursed-lip breathing, diaphragmatic breathing, and breathing while bending. Use these methods when you are more short of breath than normal. Practice them often so you can do them well. Follow-up care is a key part of your treatment and safety. Be sure to make and go to all appointments, and call your doctor if you are having problems. It's also a good idea to know your test results and keep a list of the medicines you take. How can you care for yourself at home? · Pursed-lip breathing helps you breathe more air out so that your next breath can be deeper. For this type of breathing, you breathe in through your nose and out through your mouth while almost closing your lips. Breathe in for about 2 seconds, and breathe out for 4 to 6 seconds. Pursed-lip breathing decreases shortness of breath and improves your ability to exercise. · Diaphragmatic breathing helps your lungs expand so that they take in more air. ? Lie on your back, or prop yourself up on several pillows. ? Put one hand on your belly and the other on your chest. When you breathe in, push your belly out as far as possible. You should feel the hand on your belly move out, while the hand on your chest does not move. ? When you breathe out, you should feel the hand on your belly move in. When you can do this type of breathing well while lying down, learn to do it while sitting or standing. Many people with COPD find this breathing method helpful. ? Practice diaphragmatic breathing for 20 minutes, 2 or 3 times a day. · Breathing while bending forward at the waist may make breathing easier. It can reduce shortness of breath while you exercise or rest. It helps the diaphragm move more easily. The diaphragm is a large muscle that separates your lungs from your belly. It helps draw air into your lungs as you breathe.   · Do not smoke. Smoking makes COPD worse. If you need help quitting, talk to your doctor about stop-smoking programs and medicines. These can increase your chances of quitting for good. When should you call for help? Call your doctor now or seek immediate medical care if:    · Your breathing methods do not help.     · Your shortness of breath gets worse.     · You cough up blood.     · You have swelling in your belly and legs.     · You have severe chest pain.    Watch closely for changes in your health, and be sure to contact your doctor if you have any problems. Where can you learn more? Go to http://ria-matt.info/. Enter K981 in the search box to learn more about \"Breathing Techniques for COPD: Care Instructions. \"  Current as of: June 9, 2019  Content Version: 12.2  © 9203-1638 [employer]. Care instructions adapted under license by [employer] (which disclaims liability or warranty for this information). If you have questions about a medical condition or this instruction, always ask your healthcare professional. [address] any warranty or liability for your use of this information. No

## 2020-06-10 NOTE — TELEPHONE ENCOUNTER
Pt's sister, Debra Ellis, called to follow up on the wheelchair that was ordered for her brother \"months ago\" that he still hasn't received. Please return call at [phone].

## 2020-06-10 NOTE — TELEPHONE ENCOUNTER
Spoke to Mr Jorge Llamas. He gave the phone to his daughter Naveed Javed and I relayed information to her from [facility] that a representative will be contacting patient to schedule delivery of wheelchair. Patient stated understanding.

## 2020-07-13 NOTE — TELEPHONE ENCOUNTER
metoprolol tartrate (LOPRESSOR) 50 mg tablet    Please send to [address] in [place]    thanks

## 2020-07-15 NOTE — PROGRESS NOTES
Malinda Gonzalez  Identified pt with two pt identifiers(name and ). Chief Complaint   Patient presents with    Annual Wellness Visit    Medication Evaluation     Currently taking Martina Cape // Wants to know Dr. Magen Cortez opinion on taking Zytiga with prednisone on an empty stomach everyday        Reviewed record In preparation for visit and have obtained necessary documentation. 1. Have you been to the ER, urgent care clinic or hospitalized since your last visit? No     2. Have you seen or consulted any other health care providers outside of the [address] [place] [address] since your last visit? Include any pap smears or colon screening. No    Patient does not have an advance directive. Vitals reviewed with provider.     Health Maintenance reviewed:     Health Maintenance Due   Topic    Shingrix Vaccine Age 49> (1 of 2)    Lipid Screen     Medicare Yearly Exam           Wt Readings from Last 3 Encounters:   07/15/20 194 lb 6.4 oz (88.2 kg)   20 202 lb 14.4 oz (92 kg)   10/19/19 195 lb (88.5 kg)        Temp Readings from Last 3 Encounters:   07/15/20 97.5 °F (36.4 °C) (Oral)   10/19/19 97.5 °F (36.4 °C)   09/15/19 97.8 °F (36.6 °C)        BP Readings from Last 3 Encounters:   07/15/20 118/69   20 130/70   10/19/19 159/72        Pulse Readings from Last 3 Encounters:   07/15/20 65   20 80   10/19/19 65        Vitals:    07/15/20 1333   BP: 118/69   Pulse: 65   Resp: 14   Temp: 97.5 °F (36.4 °C)   TempSrc: Oral   SpO2: 97%   Weight: 194 lb 6.4 oz (88.2 kg)   Height: 6' (1.829 m)   PainSc:   0 - No pain          Learning Assessment:   :       Learning Assessment 2014   PRIMARY LEARNER Patient   PRIMARY LANGUAGE ENGLISH   LEARNER PREFERENCE PRIMARY DEMONSTRATION   ANSWERED BY patient     self        Depression Screening:   :       3 most recent PHQ Screens 3/5/2020   Little interest or pleasure in doing things Not at all   Feeling down, depressed, irritable, or hopeless Not at all   Total Score PHQ 2 0        Fall Risk Assessment:   :       Fall Risk Assessment, last 12 mths 3/5/2020   Able to walk? Yes   Fall in past 12 months? No        Abuse Screening:   :       Abuse Screening Questionnaire 3/5/2020 2/25/2019 2/18/2019 8/3/2017 10/13/2015   Do you ever feel afraid of your partner? N N N N N   Are you in a relationship with someone who physically or mentally threatens you? - N N N N   Is it safe for you to go home?  Y Y Y Y Y        ADL Screening:   :       ADL Assessment 3/5/2020   Feeding yourself No Help Needed   Getting from bed to chair No Help Needed   Getting dressed No Help Needed   Bathing or showering Help Needed   Walk across the room (includes cane/walker) Help Needed   Using the telphone No Help Needed   Taking your medications No Help Needed   Preparing meals Help Needed   Managing money (expenses/bills) -   Moderately strenuous housework (laundry) Help Needed   Shopping for personal items (toiletries/medicines) Help Needed   Shopping for groceries Help Needed   Driving Help Needed   Climbing a flight of stairs Help Needed   Getting to places beyond walking distances Help Needed

## 2020-07-15 NOTE — PATIENT INSTRUCTIONS
Medicare Wellness Visit, Male    The best way to live healthy is to have a lifestyle where you eat a well-balanced diet, exercise regularly, limit alcohol use, and quit all forms of tobacco/nicotine, if applicable. Regular preventive services are another way to keep healthy. Preventive services (vaccines, screening tests, monitoring & exams) can help personalize your care plan, which helps you manage your own care. Screening tests can find health problems at the earliest stages, when they are easiest to treat. [facility] follows the current, evidence-based guidelines published by the [facility] ([facility]) when recommending preventive services for our patients. Because we follow these guidelines, sometimes recommendations change over time as research supports it. (For example, a prostate screening blood test is no longer routinely recommended for men with no symptoms). Of course, you and your doctor may decide to screen more often for some diseases, based on your risk and co-morbidities (chronic disease you are already diagnosed with). Preventive services for you include:  - Medicare offers their members a free annual wellness visit, which is time for you and your primary care provider to discuss and plan for your preventive service needs. Take advantage of this benefit every year!  -All adults over age 72 should receive the recommended pneumonia vaccines. Current USPSTF guidelines recommend a series of two vaccines for the best pneumonia protection.   -All adults should have a flu vaccine yearly and tetanus vaccine every 10 years.  -All adults age 48 and older should receive the shingles vaccines (series of two vaccines).        -All adults age 38-68 who are overweight should have a diabetes screening test once every three years.   -Other screening tests & preventive services for persons with diabetes include: an eye exam to screen for diabetic retinopathy, a kidney function test, a foot exam, and stricter control over your cholesterol.   -Cardiovascular screening for adults with routine risk involves an electrocardiogram (ECG) at intervals determined by the provider.   -Colorectal cancer screening should be done for adults age 54-65 with no increased risk factors for colorectal cancer. There are a number of acceptable methods of screening for this type of cancer. Each test has its own benefits and drawbacks. Discuss with your provider what is most appropriate for you during your annual wellness visit. The different tests include: colonoscopy (considered the best screening method), a fecal occult blood test, a fecal DNA test, and sigmoidoscopy.  -All adults born between [facility] should be screened once for Hepatitis C.  -An Abdominal Aortic Aneurysm (AAA) Screening is recommended for men age 73-68 who has ever smoked in their lifetime.      Here is a list of your current Health Maintenance items (your personalized list of preventive services) with a due date:  Health Maintenance Due   Topic Date Due    Shingles Vaccine (1 of 2) 02[birth year]28/1988    Cholesterol Test   04/25/2018    Annual Well Visit  04/17/2020

## 2020-07-30 NOTE — TELEPHONE ENCOUNTER
PCP: Macario Vivar MD     Last appt: Visit date not found   No future appointments.      Requested Prescriptions     Pending Prescriptions Disp Refills    albuterol (PROVENTIL HFA, VENTOLIN HFA, PROAIR HFA) 90 mcg/actuation inhaler 1 Inhaler 1     Sig: Use 2 puffs very 4 hours prn

## 2020-08-28 NOTE — TELEPHONE ENCOUNTER
Called spoke to pt daughters   Two pt id confirmed. Informed her of Dr. Cinthya Jones recommendations. Pt states she has tried all 3 and her dad still doesn't have any relief and would like a muscle relaxer prescribed. Informed pt I will relay this to  Dr. Cinthya Jones. Pt daughter  verbalized understanding of information discussed w/ no further questions at this time.

## 2020-08-28 NOTE — TELEPHONE ENCOUNTER
Called spoke to Elena informed her that DR. Hector prescribed flexeril 5mg as needed and it was sent to the [address] [place] [address] warned her that can cause sedation. Pt verbalized understanding of information discussed w/ no further questions at this time.

## 2020-08-28 NOTE — TELEPHONE ENCOUNTER
Pt has had a \"crook in his neck\" for 3-4 days. They are just wondering if there is anything he can take to help relieve the discomfort. Please return call from pt's daughter, Elena, at [phone].

## 2020-08-29 NOTE — DISCHARGE INSTRUCTIONS
PAIN CONTROL  Tylenol 1000 mg every 6 hours  Ibuprofen 600mg every 6 hours  Valium 5mg every 8 hours as needed for breakthrough pain  Lidocaine Patch 4% (Over the counter - called Salonpas)  Heat, ice, massage, icyhot, aspercreme

## 2020-08-29 NOTE — ED NOTES
Discharge instructions written & verbal given to sister & verbalizes understanding, home with family

## 2020-08-29 NOTE — ED PROVIDER NOTES
EMERGENCY DEPARTMENT HISTORY AND PHYSICAL EXAM 
 
 
Date: 8/28/2020 Patient Name: Beverly Garcia History of Presenting Illness Chief Complaint Patient presents with  Neck Pain Hx of cancer & now having pain to right side of neck History Provided By: Patient HPI: Beverly Garcia, 78 y.o. male  With past medical history of prostate cancer metastatic to bone presenting today with severe neck pain. The patient states that he started having severe neck pain today, he called his primary care provider and was prescribed cyclobenzaprine but this did not improve his symptoms. The patient has had metastases to the posterior ribs, he notes that he takes aspirin for pain control but no other medications. He states the pain is 10/10 and nonradiating. No chest pain or shortness of breath associated with this. He is typically on 2 to 3 L nasal cannula and has a baseline O2 sat of around 88%. There are no other complaints, changes, or physical findings at this time. PCP: Yudy Hart MD 
 
No current facility-administered medications on file prior to encounter. Current Outpatient Medications on File Prior to Encounter Medication Sig Dispense Refill  abiraterone 250 mg tab Take  by mouth. 4 tabs daily  oxybutynin chloride XL (DITROPAN XL) 10 mg CR tablet Take 10 mg by mouth daily.  lansoprazole (PREVACID) 15 mg capsule Take 15 mg by mouth Daily (before breakfast).  predniSONE (DELTASONE) 2.5 mg tablet Take 5 mg by mouth two (2) times a day.     
 albuterol (PROVENTIL HFA, VENTOLIN HFA, PROAIR HFA) 90 mcg/actuation inhaler Use 2 puffs very 4 hours prn 1 Inhaler 1  
 metoprolol tartrate (LOPRESSOR) 50 mg tablet TAKE ONE TABLET BY MOUTH TWICE DAILY 180 Tab 3  
 lisinopriL (PRINIVIL, ZESTRIL) 2.5 mg tablet Take 1 tablet by mouth once daily 90 Tab 3  
 albuterol-ipratropium (DUO-NEB) 2.5 mg-0.5 mg/3 ml nebu 3 mL by Nebulization route every six (6) hours as needed (sob). 30 Nebule 4  
 fluticasone furoate-vilanterol (BREO ELLIPTA) 100-25 mcg/dose inhaler Take 1 Puff by inhalation daily. 1 Inhaler 4  
 Oxygen 3L continouus  fluticasone (FLONASE) 50 mcg/actuation nasal spray 2 Sprays by Both Nostrils route daily. 1 Bottle 1  
 atorvastatin (LIPITOR) 40 mg tablet TAKE 1 TABLET EVERY DAY 90 Tab 4  
 aspirin 81 mg chewable tablet Take 1 Tab by mouth daily. RISK OF HEART ATTACK IF ANY MISSED DOSES 90 Tab 3  cyclobenzaprine (FLEXERIL) 5 mg tablet Take 1 Tab by mouth two (2) times daily as needed for Muscle Spasm(s). 20 Tab 0  
 famotidine (PEPCID) 20 mg tablet Take 1 Tab by mouth as needed for Heartburn. 90 Tab 3  XTANDI 40 mg capsule Take 160 mg by mouth daily. Past History Past Medical History: 
Past Medical History:  
Diagnosis Date  CAD (coronary artery disease)   
 mi 10/19/2015 with stent  COPD (chronic obstructive pulmonary disease) (HCC)  Hypertension  Prostate cancer ([facility] [address] 2012 Dr. Blanco Hurst - diagnosed 2012 - s/p EBRT on Lupron Past Surgical History: 
Past Surgical History:  
Procedure Laterality Date  HX ORTHOPAEDIC    
 left knee surgery Family History: 
Family History Problem Relation Age of Onset  Heart Disease Mother   
     pacemaker  Diabetes Mother  Diabetes Sister  Heart Disease Brother Social History: 
Social History Tobacco Use  Smoking status: Former Smoker Packs/day: 0.50 Years: 40.00 Pack years: 20.00 Types: Cigarettes Last attempt to quit: 10/19/2015 Years since quittin.8  Smokeless tobacco: Never Used  Tobacco comment: trying quit 10/13/15 -  ppd Substance Use Topics  Alcohol use: No  
  Alcohol/week: 0.0 standard drinks  Drug use: No  
 
 
Allergies: 
No Known Allergies Review of Systems Constitutional: No  fever Skin: No  rash HEENT: No  nasal congestion Resp: No cough CV: No chest pain GI: No vomiting : No dysuria MSK: + joint pain Neuro: No numbness Psych: No suicidal 
 
 
Physical Exam  
 
Patient Vitals for the past 12 hrs: 
 Temp Pulse Resp BP SpO2  
08/29/20 0130  93 22 180/82 (!) 89 % 08/29/20 0115  87 22 191/76 90 % 08/29/20 0100  81 25 174/86 (!) 88 % 08/29/20 0045  81 24 175/77 (!) 89 % 08/29/20 0041  83 27 177/71 (!) 88 % 08/29/20 0004     90 % 08/28/20 2345  82 27 164/71 (!) 89 % 08/28/20 2330    165/67 91 % 08/28/20 2318     (!) 88 % 08/28/20 2314 99 °F (37.2 °C) 89 20 159/76 (!) 88 % General: alert, No acute distress, on nasal cannula oxygen Eyes: EOMI, normal conjunctiva ENT: moist mucous membranes. Neck: Active, full ROM of neck. Tender to palpation in the posterior cervical spine without crepitus or step-offs, tender on the right paraspinal area as well Skin: No rashes. no jaundice Lungs: Equal chest expansion. no respiratory distress. clear to auscultation bilaterally No accessory muscle usage Heart: regular rate     no peripheral edema   2+ radial pulses and DPs bilaterally Abd:  non distended soft, nontender. No rebound tenderness. No guarding Back: Full ROM 
MSK: Full, active ROM in all 4 extremities. Neuro: Person, Place, Time and Situation; normal speech;  
Psych: Cooperative with exam; Appropriate mood and affect Diagnostic Study Results Labs - Recent Results (from the past 12 hour(s)) EKG, 12 LEAD, INITIAL Collection Time: 08/28/20 11:33 PM  
Result Value Ref Range Ventricular Rate 83 BPM  
 Atrial Rate 83 BPM  
 P-R Interval 142 ms QRS Duration 82 ms Q-T Interval 408 ms QTC Calculation (Bezet) 479 ms Calculated P Axis 56 degrees Calculated R Axis 25 degrees Calculated T Axis 131 degrees Diagnosis Normal sinus rhythm Anterior infarct (cited on or before 23-MAR-2019) T wave abnormality, consider lateral ischemia When compared with ECG of 23-MAR-2019 06:43, 
T wave inversion more evident in Anterior leads CBC WITH AUTOMATED DIFF Collection Time: 08/28/20 11:46 PM  
Result Value Ref Range WBC 6.3 4.1 - 11.1 K/uL  
 RBC 4.17 4.10 - 5.70 M/uL  
 HGB 11.7 (L) 12.1 - 17.0 g/dL HCT 36.4 (L) 36.6 - 50.3 % MCV 87.3 80.0 - 99.0 FL  
 MCH 28.1 26.0 - 34.0 PG  
 MCHC 32.1 30.0 - 36.5 g/dL  
 RDW 19.2 (H) 11.5 - 14.5 % PLATELET 989 007 - 270 K/uL MPV 11.8 8.9 - 12.9 FL  
 NRBC 0.0 0  WBC ABSOLUTE NRBC 0.00 0.00 - 0.01 K/uL NEUTROPHILS 75 32 - 75 % LYMPHOCYTES 19 12 - 49 % MONOCYTES 3 (L) 5 - 13 % EOSINOPHILS 1 0 - 7 % BASOPHILS 2 (H) 0 - 1 % IMMATURE GRANULOCYTES 0 0.0 - 0.5 % ABS. NEUTROPHILS 4.7 1.8 - 8.0 K/UL  
 ABS. LYMPHOCYTES 1.2 0.8 - 3.5 K/UL  
 ABS. MONOCYTES 0.2 0.0 - 1.0 K/UL  
 ABS. EOSINOPHILS 0.1 0.0 - 0.4 K/UL  
 ABS. BASOPHILS 0.1 0.0 - 0.1 K/UL  
 ABS. IMM. GRANS. 0.0 0.00 - 0.04 K/UL  
 DF MANUAL    
 RBC COMMENTS ANISOCYTOSIS 2+ 
    
 RBC COMMENTS SCHISTOCYTES 1+ TROPONIN I Collection Time: 08/28/20 11:46 PM  
Result Value Ref Range Troponin-I, Qt. <0.05 <0.05 ng/mL METABOLIC PANEL, BASIC Collection Time: 08/28/20 11:46 PM  
Result Value Ref Range Sodium 138 136 - 145 mmol/L Potassium 4.0 3.5 - 5.1 mmol/L Chloride 109 (H) 97 - 108 mmol/L  
 CO2 27 21 - 32 mmol/L Anion gap 2 (L) 5 - 15 mmol/L Glucose 138 (H) 65 - 100 mg/dL BUN 19 6 - 20 MG/DL Creatinine 1.50 (H) 0.70 - 1.30 MG/DL  
 BUN/Creatinine ratio 13 12 - 20 GFR est AA 54 (L) >60 ml/min/1.73m2 GFR est non-AA 45 (L) >60 ml/min/1.73m2 Calcium 8.5 8.5 - 10.1 MG/DL Radiologic Studies -  
CT SPINE CERV WO CONT Final Result IMPRESSION:  
  
1. No fracture. 2. Severe central spinal canal stenosis at C3-C4. 3. Severe foraminal stenoses at C4-C5 and C5-C6. CT Results  (Last 48 hours) 08/29/20 0016  CT SPINE CERV WO CONT Final result Impression:  IMPRESSION:  
   
1. No fracture. 2. Severe central spinal canal stenosis at C3-C4. 3. Severe foraminal stenoses at C4-C5 and C5-C6. Narrative:  EXAM:  CT CERVICAL SPINE WITHOUT CONTRAST INDICATION: Neck pain and prostate carcinoma. Ulises Rook COMPARISON: CT neck on 1/14/2019. CONTRAST:  None. TECHNIQUE: Multislice helical CT of the cervical spine was performed without  
intravenous contrast administration. Sagittal and coronal reformats were  
generated. CT dose reduction was achieved through use of a standardized  
protocol tailored for this examination and automatic exposure control for dose  
modulation. FINDINGS:  
   
Cervical kyphosis is unchanged. No subluxation. There is no fracture or  
compression deformity. The odontoid process is intact. The craniocervical  
junction is within normal limits. Disc desiccation is greatest at C3-C4 and  
C4-C5. In the paraspinal soft tissues, carotid bifurcation atherosclerosis is visible. C2-C3: There is no spinal canal or neural foraminal stenosis. C3-C4: Severe central spinal canal stenosis and moderate left foraminal  
stenosis. Ulises Rook C4-C5: Moderate central spinal canal stenosis. Severe left and mild right  
foraminal stenosis. Ulises Rook C5-C6: Moderate central spinal canal stenosis. Severe bilateral foraminal  
stenosis. C6-C7: There is no spinal canal or neural foraminal stenosis. C7-T1: There is no spinal canal or neural foraminal stenosis. CXR Results  (Last 48 hours) None Medical Decision Making I am the first provider for this patient. I reviewed the vital signs, available nursing notes, past medical history, past surgical history, family history and social history. Records Reviewed: Patient has had a PET scan in March of this year that showed metastatic lesions to the posterior ribs Vital Signs-Reviewed the patient's vital signs. Patient Vitals for the past 12 hrs: 
 Temp Pulse Resp BP SpO2  
08/29/20 0130  93 22 180/82 (!) 89 % 08/29/20 0115  87 22 191/76 90 % 08/29/20 0100  81 25 174/86 (!) 88 % 08/29/20 0045  81 24 175/77 (!) 89 % 08/29/20 0041  83 27 177/71 (!) 88 % 08/29/20 0004     90 % 08/28/20 2345  82 27 164/71 (!) 89 % 08/28/20 2330    165/67 91 % 08/28/20 2318     (!) 88 % 08/28/20 2314 99 °F (37.2 °C) 89 20 159/76 (!) 88 % Pulse Oximetry Analysis - 89% on 2 liters/min via nasal prongs Cardiac Monitor:  
Rate: 89 bpm 
Rhythm: Normal Sinus Rhythm Provider Notes (Medical Decision Making):  
 
Differential Diagnosis: Metastatic lesion to the neck, muscle spasm, fracture, contusion, ACS Initial Plan: Will obtain CT imaging of the neck, treat the patient with topical and oral analgesics and reassess. He denies any chest pain or shortness of breath, but his initial EKG did show some T wave inversions that are new from his previous, so we will evaluate with troponin to rule out ACS. ED Course:  
Initial assessment performed. The patients presenting problems have been discussed, and they are in agreement with the care plan formulated and outlined with them. I have encouraged them to ask questions as they arise throughout their visit. ED Course as of Aug 29 0237 Sat Aug 29, 2020  
0026 On my interpretation the patient's EKG normal sinus rhythm at a rate of 83, QTC is 479, T wave inversion in leads I, aVL, V4 through V6 as well as V3. Some of these are new including the precordial leads. [NW]  
[id number] On my interpretation the patient's laboratory studies creatinine is mildly elevated at 1.5. CBC is largely unremarkable, troponin is negative. [NW]  
0104 On my interpretation the patient CT no evidence of fracture, he does have degenerative disc disease.  
 [NW] ED Course User Index 
[NW] Lea Adames MD  
 
 Patient presenting today with neck pain, history of prostate cancer and metastases to bone, eval with CT which showed no evidence of metastases, or fracture. Likely muscle spasm related to the patient's degenerative disc disease. He was treated with oral and topical analgesics and had some relief of his pain. He did have T wave inversions on his EKG, but no chest pain or shortness of breath, and the length of symptoms that he has been having these may doubt ACS. Negative initial troponin, nonspecific changes on his EKG unlikely representative of ACS. Will continue at home treatment with oral and topical analgesics and have him follow-up with his primary care provider. Yadi Bingham MD, am the attending of record for this patient encounter. Dispo: Discharged. The patient has been re-evaluated and is ready for discharge. Reviewed available results with patient. Counseled patient on diagnosis and care plan. Patient has expressed understanding, and all questions have been answered. Patient agrees with plan and agrees to follow up as recommended, or to return to the ED if their symptoms worsen. Discharge instructions have been provided and explained to the patient, along with reasons to return to the ED. PLAN: 
Discharge Medication List as of 8/29/2020  1:17 AM  
  
START taking these medications Details  
diazePAM (Valium) 5 mg tablet Take 1 Tab by mouth every eight (8) hours as needed for Muscle Spasm(s). Max Daily Amount: 15 mg., Normal, Disp-20 Tab,R-0  
  
lidocaine 4 % patch 1 Patch by TransDERmal route every twelve (12) hours every twelve (12) hours for 30 days. , Normal, Disp-60 Patch,R-0  
  
  
CONTINUE these medications which have NOT CHANGED Details  
abiraterone 250 mg tab Take  by mouth. 4 tabs daily, Historical Med  
  
oxybutynin chloride XL (DITROPAN XL) 10 mg CR tablet Take 10 mg by mouth daily. , Historical Med  
  
 lansoprazole (PREVACID) 15 mg capsule Take 15 mg by mouth Daily (before breakfast). , Historical Med  
  
predniSONE (DELTASONE) 2.5 mg tablet Take 5 mg by mouth two (2) times a day., Historical Med  
  
albuterol (PROVENTIL HFA, VENTOLIN HFA, PROAIR HFA) 90 mcg/actuation inhaler Use 2 puffs very 4 hours prn, Normal, Disp-1 Inhaler,R-1  
  
metoprolol tartrate (LOPRESSOR) 50 mg tablet TAKE ONE TABLET BY MOUTH TWICE DAILY, Normal, Disp-180 Tab,R-3Please consider 90 day supplies to promote better adherence  
  
lisinopriL (PRINIVIL, ZESTRIL) 2.5 mg tablet Take 1 tablet by mouth once daily, Normal, Disp-90 Tab,R-3  
  
albuterol-ipratropium (DUO-NEB) 2.5 mg-0.5 mg/3 ml nebu 3 mL by Nebulization route every six (6) hours as needed (sob). , Normal, Disp-30 Nebule, R-4  
  
fluticasone furoate-vilanterol (BREO ELLIPTA) 100-25 mcg/dose inhaler Take 1 Puff by inhalation daily. , Normal, Disp-1 Inhaler, R-4 Oxygen 3L continouus, Historical Med  
  
fluticasone (FLONASE) 50 mcg/actuation nasal spray 2 Sprays by Both Nostrils route daily. , Normal, Disp-1 Bottle, R-1  
  
atorvastatin (LIPITOR) 40 mg tablet TAKE 1 TABLET EVERY DAY, Normal, Disp-90 Tab, R-4  
  
aspirin 81 mg chewable tablet Take 1 Tab by mouth daily. RISK OF HEART ATTACK IF ANY MISSED DOSES, Normal, Disp-90 Tab, R-3  
  
cyclobenzaprine (FLEXERIL) 5 mg tablet Take 1 Tab by mouth two (2) times daily as needed for Muscle Spasm(s). , Normal, Disp-20 Tab,R-0  
  
famotidine (PEPCID) 20 mg tablet Take 1 Tab by mouth as needed for Heartburn., Normal, Disp-90 Tab,R-3 XTANDI 40 mg capsule Take 160 mg by mouth daily. , Historical Med, LESLI  
  
  
 
2. Follow-up Information Follow up With Specialties Details Why Contact Info Monika Josue MD Internal Medicine  As needed [facility] [address] [address] [phone] 3. Return to ED if worse Diagnosis Clinical Impression: 1. Neck pain 2. Muscle spasm 3. T wave inversion in EKG 4. Prostate cancer ([facility] [address] Attestations: 
 
Sarah Parada MD 
 
Please note that this dictation was completed with [employer], the computer voice recognition software. Quite often unanticipated grammatical, syntax, homophones, and other interpretive errors are inadvertently transcribed by the computer software. Please disregard these errors. Please excuse any errors that have escaped final proofreading. Thank you.

## 2020-08-31 NOTE — PROGRESS NOTES
Patient contacted regarding recent discharge and COVID-19 risk. Did not discuss COVID-19 test, which was not done. Ambulatory Care Manager contacted the patient by telephone to perform post discharge assessment. Verified name and  with patient as identifiers. Patient has following risk factors of: immunocompromised and ED visit 20. ACM reviewed discharge instructions, medical action plan and red flags related to discharge diagnosis. Patient denied questions or concerns regarding discharge instructions or medications. Advance Care Planning:  
Does patient have an Advance Directive: not on file Patient was traveling in car with son. ACM unable to discuss ACP. Patient stated he does have appointment with PCP but can't remember the date right now. Patient states his neck feels much better today. Education provided regarding infection prevention, and signs and symptoms of COVID-19 and when to seek medical attention with patient who verbalized understanding. Discussed exposure protocols and quarantine from [address] you at higher risk for severe illness  and given an opportunity for questions and concerns. The patient was supplied the COVID-19 hotline [phone] and [facility] hotline [phone]. ACM recommended patient follow up with PCP and provided ACM contact information for future reference. From CDC: Are you at higher risk for severe illness?  Wash your hands often and avoid touching eyes, nose and mouth.  Avoid close contact (6 feet, which is about two arm lengths) with people who are sick.  Put distance between yourself and other people if COVID-19 is spreading in your community.  Clean and disinfect frequently touched surfaces.  Avoid all cruise travel and non-essential air travel.  Call your healthcare professional if you have concerns about COVID-19 and your underlying condition or if you are sick. For more information on steps you can take to protect yourself, see CDC's How to Protect Yourself Patient/family/caregiver given information for [employer] and agrees to enroll no Plan for follow-up call in 7-14 days based on severity of symptoms and risk factors. Emily Tinsley, RN Ambulatory Care Manager

## 2020-08-31 NOTE — ACP (ADVANCE CARE PLANNING)
Advance Care Planning:  
Does patient have an Advance Directive: not on file Patient was traveling in car with son. ACM unable to discuss ACP. Amari Mercedes, RN Ambulatory Care Manager

## 2020-09-14 NOTE — ED TRIAGE NOTES
Generalized abdominal pain and back pain since yesterday. Last bowel movement this morning. No nausea, vomiting, or diarrhea. On home oxygen at 3 liters 88-89%

## 2020-09-14 NOTE — ED PROVIDER NOTES
EMERGENCY DEPARTMENT HISTORY AND PHYSICAL EXAM 
 
 
Date: 9/14/2020 Patient Name: Stephen Lopez Please note that this dictation was completed with [employer], the computer voice recognition software. Quite often unanticipated grammatical, syntax, homophones, and other interpretive errors are inadvertently transcribed by the computer software. Please disregard these errors. Please excuse any errors that have escaped final proofreading. History of Presenting Illness Chief Complaint Patient presents with  Abdominal Pain  Back Pain History Provided By: Patient HPI: Stephen Lopez, 78 y.o. male, with a past medical history significant for hypertension, pancreatic cancer with metastasis, coronary artery disease presenting the emergency department complaining of abdominal pain and back pain. Patient reports 7 out of 10 diffuse abdominal pain. And some pain in his back mostly on the lower right-sided back. Pain is intermittent. No associated nausea or vomiting. Reports normal stool. No urinary symptoms. No fevers or chills. Does complain of some shortness of breath, but this is chronic. He is on 3 L oxygen chronically. He has not taken anything for the pain. Nothing makes it better, nothing makes it worse. PCP: Zuleyma Dinero MD 
 
No current facility-administered medications on file prior to encounter. Current Outpatient Medications on File Prior to Encounter Medication Sig Dispense Refill  cyclobenzaprine (FLEXERIL) 5 mg tablet TAKE 1 TABLET BY MOUTH TWICE DAILY AS NEEDED FOR MUSCLE SPASM 20 Tab 0  
 abiraterone 250 mg tab Take  by mouth. 4 tabs daily  oxybutynin chloride XL (DITROPAN XL) 10 mg CR tablet Take 10 mg by mouth daily.  lansoprazole (PREVACID) 15 mg capsule Take 15 mg by mouth Daily (before breakfast).  predniSONE (DELTASONE) 2.5 mg tablet Take 5 mg by mouth two (2) times a day.  diazePAM (Valium) 5 mg tablet Take 1 Tab by mouth every eight (8) hours as needed for Muscle Spasm(s). Max Daily Amount: 15 mg. 20 Tab 0  
 lidocaine 4 % patch 1 Patch by TransDERmal route every twelve (12) hours every twelve (12) hours for 30 days. 60 Patch 0  
 albuterol (PROVENTIL HFA, VENTOLIN HFA, PROAIR HFA) 90 mcg/actuation inhaler Use 2 puffs very 4 hours prn 1 Inhaler 1  
 famotidine (PEPCID) 20 mg tablet Take 1 Tab by mouth as needed for Heartburn. 90 Tab 3  
 metoprolol tartrate (LOPRESSOR) 50 mg tablet TAKE ONE TABLET BY MOUTH TWICE DAILY 180 Tab 3  
 lisinopriL (PRINIVIL, ZESTRIL) 2.5 mg tablet Take 1 tablet by mouth once daily 90 Tab 3  
 albuterol-ipratropium (DUO-NEB) 2.5 mg-0.5 mg/3 ml nebu 3 mL by Nebulization route every six (6) hours as needed (sob). 30 Nebule 4  
 fluticasone furoate-vilanterol (BREO ELLIPTA) 100-25 mcg/dose inhaler Take 1 Puff by inhalation daily. 1 Inhaler 4  
 Oxygen 3L continouus  fluticasone (FLONASE) 50 mcg/actuation nasal spray 2 Sprays by Both Nostrils route daily. 1 Bottle 1  
 atorvastatin (LIPITOR) 40 mg tablet TAKE 1 TABLET EVERY DAY 90 Tab 4  XTANDI 40 mg capsule Take 160 mg by mouth daily.  aspirin 81 mg chewable tablet Take 1 Tab by mouth daily. RISK OF HEART ATTACK IF ANY MISSED DOSES 90 Tab 3 Past History Past Medical History: 
Past Medical History:  
Diagnosis Date  CAD (coronary artery disease)   
 mi 10/19/2015 with stent  COPD (chronic obstructive pulmonary disease) (HCC)  Hypertension  Prostate cancer ([facility] [address] 5/8/2012 Dr. Ela Waterman - diagnosed 2/2012 - s/p EBRT on Lupron Past Surgical History: 
Past Surgical History:  
Procedure Laterality Date  HX ORTHOPAEDIC  1961  
 left knee surgery Family History: 
Family History Problem Relation Age of Onset  Heart Disease Mother   
     pacemaker  Diabetes Mother  Diabetes Sister  Heart Disease Brother Social History: Social History Tobacco Use  Smoking status: Former Smoker Packs/day: 0.50 Years: 40.00 Pack years: 20.00 Types: Cigarettes Last attempt to quit: 10/19/2015 Years since quittin.9  Smokeless tobacco: Never Used  Tobacco comment: trying quit 10/13/15 - /2 ppd Substance Use Topics  Alcohol use: No  
  Alcohol/week: 0.0 standard drinks  Drug use: No  
 
 
Allergies: 
No Known Allergies Review of Systems Review of Systems Constitutional: Negative for chills and fever. HENT: Negative for congestion and sore throat. Eyes: Negative for visual disturbance. Respiratory: Positive for shortness of breath. Negative for cough. Cardiovascular: Negative for chest pain and leg swelling. Gastrointestinal: Positive for abdominal pain. Negative for blood in stool, diarrhea and nausea. Endocrine: Negative for polyuria. Genitourinary: Negative for dysuria and testicular pain. Musculoskeletal: Negative for arthralgias, joint swelling and myalgias. Skin: Negative for rash. Allergic/Immunologic: Negative for immunocompromised state. Neurological: Negative for weakness and headaches. Hematological: Does not bruise/bleed easily. Psychiatric/Behavioral: Negative for confusion. Physical Exam  
Physical Exam 
Vitals signs and nursing note reviewed. Constitutional:   
   Appearance: He is well-developed. HENT:  
   Head: Normocephalic and atraumatic. Eyes:  
   General:     
   Right eye: No discharge. Left eye: No discharge. Conjunctiva/sclera: Conjunctivae normal.  
   Pupils: Pupils are equal, round, and reactive to light. Neck: Musculoskeletal: Normal range of motion and neck supple. Trachea: No tracheal deviation. Cardiovascular:  
   Rate and Rhythm: Normal rate and regular rhythm. Heart sounds: Normal heart sounds. No murmur. Pulmonary:  
   Effort: Pulmonary effort is normal. No respiratory distress. Breath sounds: Normal breath sounds. No wheezing or rales. Abdominal:  
   General: Bowel sounds are normal.  
   Palpations: Abdomen is soft. Tenderness: There is abdominal tenderness (mild diffuse ttp, no guarding or rebound). There is no guarding or rebound. Musculoskeletal: Normal range of motion. General: No tenderness or deformity. Comments: Equal strength in the bilateral lower extremities Skin: 
   General: Skin is warm and dry. Findings: No erythema or rash. Neurological:  
   Mental Status: He is alert and oriented to person, place, and time. Psychiatric:     
   Behavior: Behavior normal.  
 
 
 
Diagnostic Study Results Labs - Recent Results (from the past 12 hour(s)) EKG, 12 LEAD, INITIAL Collection Time: 09/14/20 10:54 AM  
Result Value Ref Range Ventricular Rate 69 BPM  
 Atrial Rate 69 BPM  
 P-R Interval 134 ms QRS Duration 76 ms  
 Q-T Interval 436 ms  
 QTC Calculation (Bezet) 467 ms Calculated P Axis 73 degrees Calculated R Axis 29 degrees Calculated T Axis 151 degrees Diagnosis Normal sinus rhythm Anterior infarct (cited on or before 23-MAR-2019) Nonspecific T wave abnormality When compared with ECG of 28-AUG-2020 23:33, 
T wave inversion no longer evident in Anterior leads Confirmed by Elly Teague ([id number]) on 9/14/2020 11:28:36 AM 
  
CBC WITH AUTOMATED DIFF Collection Time: 09/14/20 11:17 AM  
Result Value Ref Range WBC 7.8 4.1 - 11.1 K/uL  
 RBC 4.22 4.10 - 5.70 M/uL  
 HGB 11.9 (L) 12.1 - 17.0 g/dL HCT 38.0 36.6 - 50.3 % MCV 90.0 80.0 - 99.0 FL  
 MCH 28.2 26.0 - 34.0 PG  
 MCHC 31.3 30.0 - 36.5 g/dL  
 RDW 19.2 (H) 11.5 - 14.5 % PLATELET 649 321 - 030 K/uL MPV 11.9 8.9 - 12.9 FL  
 NRBC 0.4 (H) 0  WBC ABSOLUTE NRBC 0.03 (H) 0.00 - 0.01 K/uL NEUTROPHILS 84 (H) 32 - 75 % LYMPHOCYTES 13 12 - 49 % MONOCYTES 3 (L) 5 - 13 % EOSINOPHILS 0 0 - 7 % BASOPHILS 0 0 - 1 % IMMATURE GRANULOCYTES 0 0.0 - 0.5 % ABS. NEUTROPHILS 6.6 1.8 - 8.0 K/UL  
 ABS. LYMPHOCYTES 1.0 0.8 - 3.5 K/UL  
 ABS. MONOCYTES 0.2 0.0 - 1.0 K/UL  
 ABS. EOSINOPHILS 0.0 0.0 - 0.4 K/UL  
 ABS. BASOPHILS 0.0 0.0 - 0.1 K/UL  
 ABS. IMM. GRANS. 0.0 0.00 - 0.04 K/UL  
 DF MANUAL    
 RBC COMMENTS ANISOCYTOSIS 
1+ PROTHROMBIN TIME + INR Collection Time: 09/14/20 11:17 AM  
Result Value Ref Range INR 0.9 0.9 - 1.1 Prothrombin time 9.8 9.0 - 11.1 sec METABOLIC PANEL, COMPREHENSIVE Collection Time: 09/14/20 11:17 AM  
Result Value Ref Range Sodium 143 136 - 145 mmol/L Potassium 4.7 3.5 - 5.1 mmol/L Chloride 114 (H) 97 - 108 mmol/L  
 CO2 25 21 - 32 mmol/L Anion gap 4 (L) 5 - 15 mmol/L Glucose 103 (H) 65 - 100 mg/dL BUN 14 6 - 20 MG/DL Creatinine 1.44 (H) 0.70 - 1.30 MG/DL  
 BUN/Creatinine ratio 10 (L) 12 - 20 GFR est AA 57 (L) >60 ml/min/1.73m2 GFR est non-AA 47 (L) >60 ml/min/1.73m2 Calcium 8.2 (L) 8.5 - 10.1 MG/DL Bilirubin, total 0.5 0.2 - 1.0 MG/DL  
 ALT (SGPT) 13 12 - 78 U/L  
 AST (SGOT) 18 15 - 37 U/L Alk. phosphatase 127 (H) 45 - 117 U/L Protein, total 7.3 6.4 - 8.2 g/dL Albumin 3.1 (L) 3.5 - 5.0 g/dL Globulin 4.2 (H) 2.0 - 4.0 g/dL A-G Ratio 0.7 (L) 1.1 - 2.2    
TROPONIN I Collection Time: 09/14/20 11:17 AM  
Result Value Ref Range Troponin-I, Qt. <0.05 <0.05 ng/mL LIPASE Collection Time: 09/14/20 11:17 AM  
Result Value Ref Range Lipase 84 73 - 393 U/L  
LACTIC ACID Collection Time: 09/14/20 11:17 AM  
Result Value Ref Range Lactic acid 1.5 0.4 - 2.0 MMOL/L  
URINALYSIS W/ REFLEX CULTURE Collection Time: 09/14/20 11:50 AM  
 Specimen: Urine Result Value Ref Range Color YELLOW/STRAW Appearance CLEAR CLEAR Specific gravity 1.012 1.003 - 1.030    
 pH (UA) 7.0 5.0 - 8.0 Protein Negative NEG mg/dL Glucose Negative NEG mg/dL Ketone Negative NEG mg/dL  Bilirubin Negative NEG    
 Blood Negative NEG Urobilinogen 0.2 0.2 - 1.0 EU/dL Nitrites Negative NEG Leukocyte Esterase MODERATE (A) NEG    
 WBC 20-50 0 - 4 /hpf  
 RBC 0-5 0 - 5 /hpf Epithelial cells FEW FEW /lpf Bacteria Negative NEG /hpf  
 UA:UC IF INDICATED URINE CULTURE ORDERED (A) CNI Hyaline cast 2-5 0 - 5 /lpf Radiologic Studies -  
CT ABD PELV W CONT Final Result IMPRESSION:  
No acute findings Incidentals as above XR CHEST PORT Final Result Impression: No acute process. CT Results  (Last 48 hours) 09/14/20 1236  CT ABD PELV W CONT Final result Impression:  IMPRESSION:  
No acute findings Incidentals as above Narrative:  EXAM: CT ABD PELV W CONT INDICATION: Abdominal pain COMPARISON: 6/13/2020 CONTRAST: 100 mL of Isovue-370. TECHNIQUE:   
Following the uneventful intravenous administration of contrast, thin axial  
images were obtained through the abdomen and pelvis. Coronal and sagittal  
reconstructions were generated. Oral contrast was not administered. CT dose  
reduction was achieved through use of a standardized protocol tailored for this  
examination and automatic exposure control for dose modulation. FINDINGS:   
LOWER THORAX: No significant abnormality in the incidentally imaged lower chest.  
LIVER: No mass. BILIARY TREE: Small cyst CBD is not dilated. SPLEEN: within normal limits. PANCREAS: No mass or ductal dilatation. ADRENALS: Unremarkable. KIDNEYS: No mass, bilateral calcifications likely vascular  
or hydronephrosis. Simple right renal cysts the largest one measures 4 cm STOMACH: Unremarkable. SMALL BOWEL: No dilatation or wall thickening. COLON: No dilatation or wall thickening. Mild sigmoid diverticulosis. APPENDIX: Appears normal  
PERITONEUM: No ascites or pneumoperitoneum.   
RETROPERITONEUM: No lymphadenopathy ectasia of the distal abdominal aorta with a  
 maximal AP diameter of 23 mm. Diffuse calcifications. Yunier Antu Prostate implants URINARY BLADDER: No mass or calculus. BONES: No destructive bone lesion. ABDOMINAL WALL: Fat-containing right inguinal hernia. ADDITIONAL COMMENTS: N/A  
   
  
  
 
CXR Results  (Last 48 hours) 09/14/20 1131  XR CHEST PORT Final result Impression:  Impression: No acute process. Narrative: Indication: Shortness of breath Comparison: 3/9/2020 Portable exam of the chest obtained at 1128 demonstrates normal heart size. There is no acute process in the lung fields. The osseous structures are  
unremarkable. Medical Decision Making I am the first provider for this patient. I reviewed the vital signs, available nursing notes, past medical history, past surgical history, family history and social history. Vital Signs-Reviewed the patient's vital signs. Patient Vitals for the past 12 hrs: 
 Temp Pulse Resp BP SpO2  
09/14/20 1421 97.5 °F (36.4 °C) 78 27  (!) 87 % 09/14/20 1157 97.9 °F (36.6 °C) 70 28 136/66 96 % 09/14/20 1154     97 % EKG interpretation: (Preliminary) EKG interpretation shows sinus rhythm, rate 69. Normal axis. Normal intervals. Lateral T wave inversions are present on other EKGs. Prior EKG reviewed. Interpreted by me Records Reviewed:  
Nursing notes, Prior visits, prior ECGs, 
 
 
Prior emergency department note from 8/29 reviewed Provider Notes (Medical Decision Making):  
Patient appears well, nontoxic, has mild diffuse abdominal tenderness, but no guarding or rebound no acute abdomen. There is no tympany to percussion. He has no signs or symptoms concerning for cauda equina. Chest x-ray is clear. Labs are reassuring, lactic not elevated. Doubt ischemic colitis. Will obtain CT of the abdomen and pelvis to assess for causes of the patient's abdominal pain. Disposition pending laboratory work-up and imaging. ED Course:  
Initial assessment performed. The patients presenting problems have been discussed, and they are in agreement with the care plan formulated and outlined with them. I have encouraged them to ask questions as they arise throughout their visit. Reassessment: 
Patient was re-evaluated at 1500. Patient feeling better, breathing improved, chest x-ray clear, abdominal exam is benign, labs are reassuring, imaging reassuring, safe for discharge to home at this time Critical Care Time:  
none Disposition: 
DISCHARGE NOTE Patients results have been reviewed with them. Patient and/or family have verbally conveyed their understanding and agreement of the patient's signs, symptoms, diagnosis, treatment and prognosis and additionally agree to follow up as recommended or return to the Emergency Room should their condition change or have any new concerns prior to their follow-up appointment. Patient verbally agrees with the care-plan and verbally conveys that all of their questions have been answered. Discharge instructions have also been provided to the patient with some educational information regarding their diagnosis as well a list of reasons why they would want to return to the ER prior to their follow-up appointment should their condition change. PLAN: 
1. Discharge Medication List as of 9/14/2020  2:17 PM  
  
START taking these medications Details  
docusate sodium (COLACE) 100 mg capsule Take 1 Cap by mouth two (2) times a day for 90 days. , Normal, Disp-60 Cap,R-2  
  
polyethylene glycol (Miralax) 17 gram/dose powder Take 17 g by mouth daily. 1 tablespoon with 8 oz of water daily, Normal, Disp-507 g,R-0 CONTINUE these medications which have NOT CHANGED Details  
cyclobenzaprine (FLEXERIL) 5 mg tablet TAKE 1 TABLET BY MOUTH TWICE DAILY AS NEEDED FOR MUSCLE SPASM, Normal, Disp-20 Tab,R-0  
  
abiraterone 250 mg tab Take  by mouth.  4 tabs daily, Historical Med  
  
 oxybutynin chloride XL (DITROPAN XL) 10 mg CR tablet Take 10 mg by mouth daily. , Historical Med  
  
lansoprazole (PREVACID) 15 mg capsule Take 15 mg by mouth Daily (before breakfast). , Historical Med  
  
predniSONE (DELTASONE) 2.5 mg tablet Take 5 mg by mouth two (2) times a day., Historical Med  
  
diazePAM (Valium) 5 mg tablet Take 1 Tab by mouth every eight (8) hours as needed for Muscle Spasm(s). Max Daily Amount: 15 mg., Normal, Disp-20 Tab,R-0  
  
lidocaine 4 % patch 1 Patch by TransDERmal route every twelve (12) hours every twelve (12) hours for 30 days. , Normal, Disp-60 Patch,R-0  
  
albuterol (PROVENTIL HFA, VENTOLIN HFA, PROAIR HFA) 90 mcg/actuation inhaler Use 2 puffs very 4 hours prn, Normal, Disp-1 Inhaler,R-1  
  
famotidine (PEPCID) 20 mg tablet Take 1 Tab by mouth as needed for Heartburn., Normal, Disp-90 Tab,R-3  
  
metoprolol tartrate (LOPRESSOR) 50 mg tablet TAKE ONE TABLET BY MOUTH TWICE DAILY, Normal, Disp-180 Tab,R-3Please consider 90 day supplies to promote better adherence  
  
lisinopriL (PRINIVIL, ZESTRIL) 2.5 mg tablet Take 1 tablet by mouth once daily, Normal, Disp-90 Tab,R-3  
  
albuterol-ipratropium (DUO-NEB) 2.5 mg-0.5 mg/3 ml nebu 3 mL by Nebulization route every six (6) hours as needed (sob). , Normal, Disp-30 Nebule, R-4  
  
fluticasone furoate-vilanterol (BREO ELLIPTA) 100-25 mcg/dose inhaler Take 1 Puff by inhalation daily. , Normal, Disp-1 Inhaler, R-4 Oxygen 3L continouus, Historical Med  
  
fluticasone (FLONASE) 50 mcg/actuation nasal spray 2 Sprays by Both Nostrils route daily. , Normal, Disp-1 Bottle, R-1  
  
atorvastatin (LIPITOR) 40 mg tablet TAKE 1 TABLET EVERY DAY, Normal, Disp-90 Tab, R-4  
  
XTANDI 40 mg capsule Take 160 mg by mouth daily. , Historical Med, LESLI  
  
aspirin 81 mg chewable tablet Take 1 Tab by mouth daily. RISK OF HEART ATTACK IF ANY MISSED DOSES, Normal, Disp-90 Tab, R-3  
  
  
 
2. Follow-up Information Follow up With Specialties Details Why Contact Info Jose J Luo MD Internal Medicine Schedule an appointment as soon as possible for a visit  [place]  [id number] [phone] \[facility]\"" EMERGENCY DEPT Emergency Medicine  If symptoms worsen [address] [facility] 
[phone] Return to ED if worse Diagnosis Clinical Impression: 1. Abdominal pain, generalized 2. Bilateral low back pain without sciatica, unspecified chronicity Attestations:  
This note was completed by Charmayne Chary, DO

## 2020-09-14 NOTE — ED NOTES
Pt in Ed from home c/o 7/10 abdominal, side, and lower back pain. Pt reports a hx of prostate cancer, HTN, and STEMI. Pt is AOX4 and monitored X3. Pt declined morphine order, Nica Byrd was notified and advised to change order to PRN.

## 2020-09-14 NOTE — DISCHARGE INSTRUCTIONS
Patient Education        Back Pain: Care Instructions  Your Care Instructions     Back pain has many possible causes. It is often related to problems with muscles and ligaments of the back. It may also be related to problems with the nerves, discs, or bones of the back. Moving, lifting, standing, sitting, or sleeping in an awkward way can strain the back. Sometimes you don't notice the injury until later. Arthritis is another common cause of back pain. Although it may hurt a lot, back pain usually improves on its own within several weeks. Most people recover in 12 weeks or less. Using good home treatment and being careful not to stress your back can help you feel better sooner. Follow-up care is a key part of your treatment and safety. Be sure to make and go to all appointments, and call your doctor if you are having problems. It's also a good idea to know your test results and keep a list of the medicines you take. How can you care for yourself at home? · Sit or lie in positions that are most comfortable and reduce your pain. Try one of these positions when you lie down:  ? Lie on your back with your knees bent and supported by large pillows. ? Lie on the floor with your legs on the seat of a sofa or chair. ? Lie on your side with your knees and hips bent and a pillow between your legs. ? Lie on your stomach if it does not make pain worse. · Do not sit up in bed, and avoid soft couches and twisted positions. Bed rest can help relieve pain at first, but it delays healing. Avoid bed rest after the first day of back pain. · Change positions every 30 minutes. If you must sit for long periods of time, take breaks from sitting. Get up and walk around, or lie in a comfortable position. · Try using a heating pad on a low or medium setting for 15 to 20 minutes every 2 or 3 hours. Try a warm shower in place of one session with the heating pad. · You can also try an ice pack for 10 to 15 minutes every 2 to 3 hours. Put a thin cloth between the ice pack and your skin. · Take pain medicines exactly as directed. ? If the doctor gave you a prescription medicine for pain, take it as prescribed. ? If you are not taking a prescription pain medicine, ask your doctor if you can take an over-the-counter medicine. · Take short walks several times a day. You can start with 5 to 10 minutes, 3 or 4 times a day, and work up to longer walks. Walk on level surfaces and avoid hills and stairs until your back is better. · Return to work and other activities as soon as you can. Continued rest without activity is usually not good for your back. · To prevent future back pain, do exercises to stretch and strengthen your back and stomach. Learn how to use good posture, safe lifting techniques, and proper body mechanics. When should you call for help? Call your doctor now or seek immediate medical care if:    · You have new or worsening numbness in your legs.     · You have new or worsening weakness in your legs. (This could make it hard to stand up.)     · You lose control of your bladder or bowels. Watch closely for changes in your health, and be sure to contact your doctor if:    · You have a fever, lose weight, or don't feel well.     · You do not get better as expected. Where can you learn more? Go to http://ria-matt.info/  Enter I594 in the search box to learn more about \"Back Pain: Care Instructions. \"  Current as of: March 2, 2020               Content Version: 12.6  © 8907-4823 [employer]. Care instructions adapted under license by [employer] (which disclaims liability or warranty for this information). If you have questions about a medical condition or this instruction, always ask your healthcare professional. [place] [address] any warranty or liability for your use of this information.          Patient Education        Abdominal Pain: Care Instructions  Your Care Instructions     Abdominal pain has many possible causes. Some aren't serious and get better on their own in a few days. Others need more testing and treatment. If your pain continues or gets worse, you need to be rechecked and may need more tests to find out what is wrong. You may need surgery to correct the problem. Don't ignore new symptoms, such as fever, nausea and vomiting, urination problems, pain that gets worse, and dizziness. These may be signs of a more serious problem. Your doctor may have recommended a follow-up visit in the next 8 to 12 hours. If you are not getting better, you may need more tests or treatment. The doctor has checked you carefully, but problems can develop later. If you notice any problems or new symptoms, get medical treatment right away. Follow-up care is a key part of your treatment and safety. Be sure to make and go to all appointments, and call your doctor if you are having problems. It's also a good idea to know your test results and keep a list of the medicines you take. How can you care for yourself at home? · Rest until you feel better. · To prevent dehydration, drink plenty of fluids, enough so that your urine is light yellow or clear like water. Choose water and other caffeine-free clear liquids until you feel better. If you have kidney, heart, or liver disease and have to limit fluids, talk with your doctor before you increase the amount of fluids you drink. · If your stomach is upset, eat mild foods, such as rice, dry toast or crackers, bananas, and applesauce. Try eating several small meals instead of two or three large ones. · Wait until 48 hours after all symptoms have gone away before you have spicy foods, alcohol, and drinks that contain caffeine. · Do not eat foods that are high in fat. · Avoid anti-inflammatory medicines such as aspirin, ibuprofen (Advil, Motrin), and naproxen (Aleve). These can cause stomach upset.  Talk to your doctor if you take daily aspirin for another health problem. When should you call for help? Call 911 anytime you think you may need emergency care. For example, call if:    · You passed out (lost consciousness).     · You pass maroon or very bloody stools.     · You vomit blood or what looks like coffee grounds.     · You have new, severe belly pain. Call your doctor now or seek immediate medical care if:    · Your pain gets worse, especially if it becomes focused in one area of your belly.     · You have a new or higher fever.     · Your stools are black and look like tar, or they have streaks of blood.     · You have unexpected vaginal bleeding.     · You have symptoms of a urinary tract infection. These may include:  ? Pain when you urinate. ? Urinating more often than usual.  ? Blood in your urine.     · You are dizzy or lightheaded, or you feel like you may faint. Watch closely for changes in your health, and be sure to contact your doctor if:    · You are not getting better after 1 day (24 hours). Where can you learn more? Go to http://ria-matt.info/  Enter H417 in the search box to learn more about \"Abdominal Pain: Care Instructions. \"  Current as of: June 26, 2019               Content Version: 12.6  © 2265-9938 [employer]. Care instructions adapted under license by [employer] (which disclaims liability or warranty for this information). If you have questions about a medical condition or this instruction, always ask your healthcare professional. [place] [address] any warranty or liability for your use of this information. Thank you! Thank you for allowing me to care for you in the emergency department. I sincerely hope that you are satisfied with your visit today. It is my goal to provide you with excellent care. Below you will find a list of your labs and imaging from your visit today.   Should you have any questions regarding these results please do not hesitate to call the emergency department. 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 23-MAR-2019)  Nonspecific T wave abnormality  When compared with ECG of 28-AUG-2020 23:33,  T wave inversion no longer evident in Anterior leads  Confirmed by Jt Rosenberg ([id number]) on 9/14/2020 11:28:36 AM     CBC WITH AUTOMATED DIFF    Collection Time: 09/14/20 11:17 AM   Result Value Ref Range    WBC 7.8 4.1 - 11.1 K/uL    RBC 4.22 4.10 - 5.70 M/uL    HGB 11.9 (L) 12.1 - 17.0 g/dL    HCT 38.0 36.6 - 50.3 %    MCV 90.0 80.0 - 99.0 FL    MCH 28.2 26.0 - 34.0 PG    MCHC 31.3 30.0 - 36.5 g/dL    RDW 19.2 (H) 11.5 - 14.5 %    PLATELET 561 117 - 834 K/uL    MPV 11.9 8.9 - 12.9 FL    NRBC 0.4 (H) 0  WBC    ABSOLUTE NRBC 0.03 (H) 0.00 - 0.01 K/uL    NEUTROPHILS 84 (H) 32 - 75 %    LYMPHOCYTES 13 12 - 49 %    MONOCYTES 3 (L) 5 - 13 %    EOSINOPHILS 0 0 - 7 %    BASOPHILS 0 0 - 1 %    IMMATURE GRANULOCYTES 0 0.0 - 0.5 %    ABS. NEUTROPHILS 6.6 1.8 - 8.0 K/UL    ABS. LYMPHOCYTES 1.0 0.8 - 3.5 K/UL    ABS. MONOCYTES 0.2 0.0 - 1.0 K/UL    ABS. EOSINOPHILS 0.0 0.0 - 0.4 K/UL    ABS. BASOPHILS 0.0 0.0 - 0.1 K/UL    ABS. IMM.  GRANS. 0.0 0.00 - 0.04 K/UL    DF MANUAL      RBC COMMENTS ANISOCYTOSIS  1+       PROTHROMBIN TIME + INR    Collection Time: 09/14/20 11:17 AM   Result Value Ref Range    INR 0.9 0.9 - 1.1      Prothrombin time 9.8 9.0 - 40.6 sec   METABOLIC PANEL, COMPREHENSIVE    Collection Time: 09/14/20 11:17 AM   Result Value Ref Range    Sodium 143 136 - 145 mmol/L    Potassium 4.7 3.5 - 5.1 mmol/L    Chloride 114 (H) 97 - 108 mmol/L    CO2 25 21 - 32 mmol/L    Anion gap 4 (L) 5 - 15 mmol/L    Glucose 103 (H) 65 - 100 mg/dL    BUN 14 6 - 20 MG/DL    Creatinine 1.44 (H) 0.70 - 1.30 MG/DL    BUN/Creatinine ratio 10 (L) 12 - 20      GFR est AA 57 (L) >60 ml/min/1.73m2    GFR est non-AA 47 (L) >60 ml/min/1.73m2    Calcium 8.2 (L) 8.5 - 10.1 MG/DL    Bilirubin, total 0.5 0.2 - 1.0 MG/DL    ALT (SGPT) 13 12 - 78 U/L    AST (SGOT) 18 15 - 37 U/L    Alk. phosphatase 127 (H) 45 - 117 U/L    Protein, total 7.3 6.4 - 8.2 g/dL    Albumin 3.1 (L) 3.5 - 5.0 g/dL    Globulin 4.2 (H) 2.0 - 4.0 g/dL    A-G Ratio 0.7 (L) 1.1 - 2.2     TROPONIN I    Collection Time: 09/14/20 11:17 AM   Result Value Ref Range    Troponin-I, Qt. <0.05 <0.05 ng/mL   LIPASE    Collection Time: 09/14/20 11:17 AM   Result Value Ref Range    Lipase 84 73 - 393 U/L   LACTIC ACID    Collection Time: 09/14/20 11:17 AM   Result Value Ref Range    Lactic acid 1.5 0.4 - 2.0 MMOL/L   URINALYSIS W/ REFLEX CULTURE    Collection Time: 09/14/20 11:50 AM    Specimen: Urine   Result Value Ref Range    Color YELLOW/STRAW      Appearance CLEAR CLEAR      Specific gravity 1.012 1.003 - 1.030      pH (UA) 7.0 5.0 - 8.0      Protein Negative NEG mg/dL    Glucose Negative NEG mg/dL    Ketone Negative NEG mg/dL    Bilirubin Negative NEG      Blood Negative NEG      Urobilinogen 0.2 0.2 - 1.0 EU/dL    Nitrites Negative NEG      Leukocyte Esterase MODERATE (A) NEG      WBC 20-50 0 - 4 /hpf    RBC 0-5 0 - 5 /hpf    Epithelial cells FEW FEW /lpf    Bacteria Negative NEG /hpf    UA:UC IF INDICATED URINE CULTURE ORDERED (A) CNI      Hyaline cast 2-5 0 - 5 /lpf       Radiologic Studies -   CT ABD PELV W CONT   Final Result   IMPRESSION:   No acute findings   Incidentals as above      XR CHEST PORT   Final Result   Impression: No acute process.            CT Results  (Last 48 hours)               09/14/20 1236  CT ABD PELV W CONT Final result    Impression:  IMPRESSION: No acute findings   Incidentals as above       Narrative:  EXAM: CT ABD PELV W CONT       INDICATION: Abdominal pain       COMPARISON: 6/13/2020        CONTRAST: 100 mL of Isovue-370. TECHNIQUE:    Following the uneventful intravenous administration of contrast, thin axial   images were obtained through the abdomen and pelvis. Coronal and sagittal   reconstructions were generated. Oral contrast was not administered. CT dose   reduction was achieved through use of a standardized protocol tailored for this   examination and automatic exposure control for dose modulation. FINDINGS:    LOWER THORAX: No significant abnormality in the incidentally imaged lower chest.   LIVER: No mass. BILIARY TREE: Small cyst CBD is not dilated. SPLEEN: within normal limits. PANCREAS: No mass or ductal dilatation. ADRENALS: Unremarkable. KIDNEYS: No mass, bilateral calcifications likely vascular   or hydronephrosis. Simple right renal cysts the largest one measures 4 cm   STOMACH: Unremarkable. SMALL BOWEL: No dilatation or wall thickening. COLON: No dilatation or wall thickening. Mild sigmoid diverticulosis. APPENDIX: Appears normal   PERITONEUM: No ascites or pneumoperitoneum. RETROPERITONEUM: No lymphadenopathy ectasia of the distal abdominal aorta with a   maximal AP diameter of 23 mm. Diffuse calcifications. .   Prostate implants   URINARY BLADDER: No mass or calculus. BONES: No destructive bone lesion. ABDOMINAL WALL: Fat-containing right inguinal hernia. ADDITIONAL COMMENTS: N/A               CXR Results  (Last 48 hours)               09/14/20 1131  XR CHEST PORT Final result    Impression:  Impression: No acute process. Narrative: Indication: Shortness of breath       Comparison: 3/9/2020       Portable exam of the chest obtained at 1128 demonstrates normal heart size. There is no acute process in the lung fields. The osseous structures are   unremarkable.                     If you feel that you have not received excellent quality care or timely care, please ask to speak to the nurse manager. Please choose us in the future for your continued health care needs. ------------------------------------------------------------------------------------------------------------  The exam and treatment you received in the Emergency Department were for an urgent problem and are not intended as complete care. It is important that you follow up with a doctor, nurse practitioner, or physician assistant for ongoing care. If your symptoms become worse or you do not improve as expected and you are unable to reach your usual health care provider, you should return to the Emergency Department. We are available 24 hours a day. Please take your discharge instructions with you when you go to your follow-up appointment. If you have any problem arranging a follow-up appointment, contact the Emergency Department immediately. If a prescription has been provided, please have it filled as soon as possible to prevent a delay in treatment. Read the entire medication instruction sheet provided to you by the pharmacy. If you have any questions or reservations about taking the medication due to side effects or interactions with other medications, please call your primary care physician or contact the ER to speak with the charge nurse. Make an appointment with your family doctor or the physician you were referred to for follow-up of this visit as instructed on your discharge paperwork, as this is mandatory follow-up. Return to the ER if you are unable to be seen or if you are unable to be seen in a timely manner. If you have any problem arranging the follow-up visit, contact the Emergency Department immediately.

## 2020-09-15 PROBLEM — J44.1 COPD EXACERBATION (HCC): Status: ACTIVE | Noted: 2020-01-01

## 2020-09-15 PROBLEM — J96.01 ACUTE HYPOXEMIC RESPIRATORY FAILURE (HCC): Status: ACTIVE | Noted: 2020-01-01

## 2020-09-15 NOTE — Clinical Note
Right atrium lead inserted into generator. Additional Anesthesia Volume In Cc (Will Not Render If 0): 0 Electrodesiccation And Curettage Text: The wound bed was treated with electrodesiccation and curettage after the biopsy was performed. Was A Bandage Applied: Yes X Size Of Lesion In Cm: 0.4 Billing Type: Third-Party Bill Silver Nitrate Text: The wound bed was treated with silver nitrate after the biopsy was performed. Post-Care Instructions: I reviewed with the patient in detail post-care instructions. Patient is to keep the biopsy site dry overnight, and then apply bacitracin twice daily until healed. Patient may apply hydrogen peroxide soaks to remove any crusting. Type Of Destruction Used: Curettage Lab Facility: 1 Biopsy Type: H and E Hemostasis: Aluminum Chloride Bill [id number] For Specimen Handling/Conveyance To Laboratory?: no Biopsy Method: double edge Personna blade Consent: Written consent was obtained and risks were reviewed including but not limited to scarring, infection, bleeding, scabbing, incomplete removal, nerve damage and allergy to anesthesia. Depth Of Biopsy: dermis Lab: 3 Anesthesia Volume In Cc (Will Not Render If 0): 0.5 Anesthesia Type: 1% lidocaine with epinephrine Electrodesiccation Text: The wound bed was treated with electrodesiccation after the biopsy was performed. Notification Instructions: Patient will be notified of biopsy results. However, patient instructed to call the office if not contacted within 2 weeks. Wound Care: Petrolatum Detail Level: Detailed Curettage Text: The wound bed was treated with curettage after the biopsy was performed. Dressing: bandage Cryotherapy Text: The wound bed was treated with cryotherapy after the biopsy was performed.

## 2020-09-15 NOTE — ED PROVIDER NOTES
EMERGENCY DEPARTMENT HISTORY AND PHYSICAL EXAM 
 
 
Date: 9/15/2020 Patient Name: Valeria Cortez History of Presenting Illness Chief Complaint Patient presents with  Abdominal Pain Patient was seen here yesterday for the same symptoms. Patient called his PCP because pain became worse, they referred him. History Provided By: Patient and Patient's Sister HPI: Valeria Cortez, 78 y.o. male with a past medical history significant for metastatic prostate cancer, COPD, CAD, medical problems as stated below presents to the ED with cc of severe shortness of breath, nonproductive cough, wheezing, and lower abdominal pain. Patient reports he was seen yesterday for similar symptoms in the ER. He had lab work, urinalysis, chest x-ray, and a CT scan of the abdomen. No acute surgical pathology was found on a CT or lab work. Patient was discharged home. Since going home he reports that his pain is worse and he is more short of breath. The pain is located in the suprapubic region and bilateral lower quadrants. It radiates to his flanks and into his back. He denies any diarrhea, bloody stools, mucoid stools, hematuria, vomiting, fevers, or any other associated symptoms. This is been discussed with oncology team, to include his urologist Dr. Sher Pepper, who feels that this pain may be secondary to his treatment for his cancer or the cancer itself. He is having no lower extremity weakness or radicular symptoms. He reports difficulty breathing feels similar to his COPD exacerbations in the past. 
 
There are no other complaints, changes, or physical findings at this time. PCP: Mica Prajapati MD 
 
Current Facility-Administered Medications on File Prior to Encounter Medication Dose Route Frequency Provider Last Rate Last Dose  [DISCONTINUED] morphine injection 4 mg  4 mg IntraVENous Luis Brazil, DO   Stopped at 09/14/20 1102 Current Outpatient Medications on File Prior to Encounter Medication Sig Dispense Refill  docusate sodium (COLACE) 100 mg capsule Take 1 Cap by mouth two (2) times a day for 90 days. 60 Cap 2  polyethylene glycol (Miralax) 17 gram/dose powder Take 17 g by mouth daily. 1 tablespoon with 8 oz of water daily 507 g 0  
 cyclobenzaprine (FLEXERIL) 5 mg tablet TAKE 1 TABLET BY MOUTH TWICE DAILY AS NEEDED FOR MUSCLE SPASM 20 Tab 0  
 abiraterone 250 mg tab Take  by mouth. 4 tabs daily  oxybutynin chloride XL (DITROPAN XL) 10 mg CR tablet Take 10 mg by mouth daily.  lansoprazole (PREVACID) 15 mg capsule Take 15 mg by mouth Daily (before breakfast).  predniSONE (DELTASONE) 2.5 mg tablet Take 5 mg by mouth two (2) times a day.  diazePAM (Valium) 5 mg tablet Take 1 Tab by mouth every eight (8) hours as needed for Muscle Spasm(s). Max Daily Amount: 15 mg. 20 Tab 0  
 lidocaine 4 % patch 1 Patch by TransDERmal route every twelve (12) hours every twelve (12) hours for 30 days. 60 Patch 0  
 albuterol (PROVENTIL HFA, VENTOLIN HFA, PROAIR HFA) 90 mcg/actuation inhaler Use 2 puffs very 4 hours prn 1 Inhaler 1  
 famotidine (PEPCID) 20 mg tablet Take 1 Tab by mouth as needed for Heartburn. 90 Tab 3  
 metoprolol tartrate (LOPRESSOR) 50 mg tablet TAKE ONE TABLET BY MOUTH TWICE DAILY 180 Tab 3  
 lisinopriL (PRINIVIL, ZESTRIL) 2.5 mg tablet Take 1 tablet by mouth once daily 90 Tab 3  
 albuterol-ipratropium (DUO-NEB) 2.5 mg-0.5 mg/3 ml nebu 3 mL by Nebulization route every six (6) hours as needed (sob). 30 Nebule 4  
 fluticasone furoate-vilanterol (BREO ELLIPTA) 100-25 mcg/dose inhaler Take 1 Puff by inhalation daily. 1 Inhaler 4  
 Oxygen 3L continouus  fluticasone (FLONASE) 50 mcg/actuation nasal spray 2 Sprays by Both Nostrils route daily. 1 Bottle 1  
 atorvastatin (LIPITOR) 40 mg tablet TAKE 1 TABLET EVERY DAY 90 Tab 4  XTANDI 40 mg capsule Take 160 mg by mouth daily.  aspirin 81 mg chewable tablet Take 1 Tab by mouth daily. RISK OF HEART ATTACK IF ANY MISSED DOSES 90 Tab 3 Past History Past Medical History: 
Past Medical History:  
Diagnosis Date  CAD (coronary artery disease)   
 mi 10/19/2015 with stent  COPD (chronic obstructive pulmonary disease) (HCC)  Hypertension  Prostate cancer ([facility] [address] 2012 Dr. Nica Mandujano - diagnosed 2012 - s/p EBRT on Lupron Past Surgical History: 
Past Surgical History:  
Procedure Laterality Date  HX ORTHOPAEDIC    
 left knee surgery Family History: 
Family History Problem Relation Age of Onset  Heart Disease Mother   
     pacemaker  Diabetes Mother  Diabetes Sister  Heart Disease Brother Social History: 
Social History Tobacco Use  Smoking status: Former Smoker Packs/day: 0.50 Years: 40.00 Pack years: 20.00 Types: Cigarettes Last attempt to quit: 10/19/2015 Years since quittin.9  Smokeless tobacco: Never Used  Tobacco comment: trying quit 10/13/15 -  ppd Substance Use Topics  Alcohol use: No  
  Alcohol/week: 0.0 standard drinks  Drug use: No  
 
 
Allergies: 
No Known Allergies Review of Systems Review of Systems Constitutional: Negative for chills, diaphoresis, fatigue and fever. HENT: Negative for ear pain and sore throat. Eyes: Negative for pain and redness. Respiratory: Positive for cough and shortness of breath. Cardiovascular: Negative for chest pain and leg swelling. Gastrointestinal: Positive for abdominal pain. Negative for diarrhea, nausea and vomiting. Endocrine: Negative for cold intolerance and heat intolerance. Genitourinary: Negative for flank pain and hematuria. Musculoskeletal: Negative for back pain and neck stiffness. Skin: Negative for rash and wound. Neurological: Negative for dizziness, syncope and headaches. All other systems reviewed and are negative. Physical Exam  
Physical Exam 
Vitals signs and nursing note reviewed. Constitutional:   
   General: He is in acute distress. Appearance: He is well-developed. He is ill-appearing. Comments: Patient is tachypneic and appears very uncomfortable secondary to pain. Chronically ill-appearing. Alert and appropriate during exam.  
HENT:  
   Head: Normocephalic and atraumatic. Mouth/Throat:  
   Pharynx: No oropharyngeal exudate. Eyes:  
   Conjunctiva/sclera: Conjunctivae normal.  
   Pupils: Pupils are equal, round, and reactive to light. Neck: Musculoskeletal: Normal range of motion. Cardiovascular:  
   Rate and Rhythm: Normal rate and regular rhythm. Heart sounds: No murmur. Pulmonary:  
   Effort: Tachypnea, accessory muscle usage and respiratory distress present. Breath sounds: Examination of the right-upper field reveals wheezing. Examination of the left-upper field reveals wheezing. Examination of the right-middle field reveals wheezing. Examination of the left-middle field reveals wheezing. Examination of the right-lower field reveals wheezing. Examination of the left-lower field reveals wheezing. Wheezing present. Abdominal:  
   General: Abdomen is flat. Bowel sounds are normal. There is no distension or abdominal bruit. Palpations: Abdomen is soft. Tenderness: There is abdominal tenderness in the right lower quadrant, suprapubic area and left lower quadrant. There is guarding. There is no right CVA tenderness, left CVA tenderness or rebound. Musculoskeletal: Normal range of motion. General: No deformity. Skin: 
   General: Skin is warm and dry. Findings: No rash. Neurological:  
   Mental Status: He is alert and oriented to person, place, and time. Coordination: Coordination normal.  
Psychiatric:     
   Behavior: Behavior normal.  
 
 
 
Diagnostic Study Results Labs - Recent Results (from the past 24 hour(s)) CBC WITH AUTOMATED DIFF Collection Time: 09/15/20 12:45 PM  
Result Value Ref Range WBC 8.6 4.1 - 11.1 K/uL  
 RBC 4.61 4.10 - 5.70 M/uL  
 HGB 12.7 12.1 - 17.0 g/dL HCT 40.9 36.6 - 50.3 % MCV 88.7 80.0 - 99.0 FL  
 MCH 27.5 26.0 - 34.0 PG  
 MCHC 31.1 30.0 - 36.5 g/dL  
 RDW 19.3 (H) 11.5 - 14.5 % PLATELET 161 628 - 322 K/uL MPV 10.8 8.9 - 12.9 FL  
 NRBC 0.2 (H) 0  WBC ABSOLUTE NRBC 0.02 (H) 0.00 - 0.01 K/uL NEUTROPHILS 65 32 - 75 % LYMPHOCYTES 21 12 - 49 % MONOCYTES 11 5 - 13 % EOSINOPHILS 1 0 - 7 % BASOPHILS 1 0 - 1 % IMMATURE GRANULOCYTES 1 (H) 0.0 - 0.5 % ABS. NEUTROPHILS 5.7 1.8 - 8.0 K/UL  
 ABS. LYMPHOCYTES 1.8 0.8 - 3.5 K/UL  
 ABS. MONOCYTES 0.9 0.0 - 1.0 K/UL  
 ABS. EOSINOPHILS 0.1 0.0 - 0.4 K/UL  
 ABS. BASOPHILS 0.0 0.0 - 0.1 K/UL  
 ABS. IMM. GRANS. 0.1 (H) 0.00 - 0.04 K/UL  
 DF AUTOMATED METABOLIC PANEL, COMPREHENSIVE Collection Time: 09/15/20 12:45 PM  
Result Value Ref Range Sodium 141 136 - 145 mmol/L Potassium 5.2 (H) 3.5 - 5.1 mmol/L Chloride 110 (H) 97 - 108 mmol/L  
 CO2 28 21 - 32 mmol/L Anion gap 3 (L) 5 - 15 mmol/L Glucose 88 65 - 100 mg/dL BUN 18 6 - 20 MG/DL Creatinine 1.49 (H) 0.70 - 1.30 MG/DL  
 BUN/Creatinine ratio 12 12 - 20 GFR est AA 55 (L) >60 ml/min/1.73m2 GFR est non-AA 45 (L) >60 ml/min/1.73m2 Calcium 9.1 8.5 - 10.1 MG/DL Bilirubin, total 0.5 0.2 - 1.0 MG/DL  
 ALT (SGPT) 15 12 - 78 U/L  
 AST (SGOT) 42 (H) 15 - 37 U/L Alk. phosphatase 173 (H) 45 - 117 U/L Protein, total 7.9 6.4 - 8.2 g/dL Albumin 3.2 (L) 3.5 - 5.0 g/dL Globulin 4.7 (H) 2.0 - 4.0 g/dL A-G Ratio 0.7 (L) 1.1 - 2.2    
TROPONIN I Collection Time: 09/15/20 12:45 PM  
Result Value Ref Range Troponin-I, Qt. <0.05 <0.05 ng/mL PROCALCITONIN Collection Time: 09/15/20  1:36 PM  
Result Value Ref Range Procalcitonin <0.05 ng/mL MAGNESIUM  Collection Time: 09/15/20  1:36 PM  
 Result Value Ref Range Magnesium 2.4 1.6 - 2.4 mg/dL PROTHROMBIN TIME + INR Collection Time: 09/15/20  1:36 PM  
Result Value Ref Range INR 1.0 0.9 - 1.1 Prothrombin time 10.1 9.0 - 11.1 sec LD Collection Time: 09/15/20  1:36 PM  
Result Value Ref Range  (H) 85 - 241 U/L FERRITIN Collection Time: 09/15/20  1:36 PM  
Result Value Ref Range Ferritin 33 26 - 388 NG/ML  
NT-PRO BNP Collection Time: 09/15/20  1:36 PM  
Result Value Ref Range NT pro- (H) <450 PG/ML  
D DIMER Collection Time: 09/15/20  1:36 PM  
Result Value Ref Range D-dimer 0.55 0.00 - 0.65 mg/L FEU  
POC EG7 Collection Time: 09/15/20  1:57 PM  
Result Value Ref Range Calcium, ionized (POC) 1.27 1.12 - 1.32 mmol/L  
 pH (POC) 7.33 (L) 7.35 - 7.45    
 pCO2 (POC) 48.7 (H) 35.0 - 45.0 MMHG  
 pO2 (POC) 19 (LL) 80 - 100 MMHG  
 HCO3 (POC) 25.9 22 - 26 MMOL/L Base excess (POC) 0 mmol/L  
 sO2 (POC) 25 (L) 92 - 97 % Site OTHER Device: NASAL CANNULA Flow rate (POC) 5 L/M Allens test (POC) N/A Specimen type (POC) VENOUS BLOOD Total resp. rate 16 POC EG7 Collection Time: 09/15/20  3:34 PM  
Result Value Ref Range Calcium, ionized (POC) 1.32 1.12 - 1.32 mmol/L  
 pH (POC) 7.41 7.35 - 7.45    
 pCO2 (POC) 31.7 (L) 35.0 - 45.0 MMHG  
 pO2 (POC) 36 (LL) 80 - 100 MMHG  
 HCO3 (POC) 20.2 (L) 22 - 26 MMOL/L Base deficit (POC) 4 mmol/L  
 sO2 (POC) 70 (L) 92 - 97 % Site LEFT RADIAL Device: NASAL CANNULA Flow rate (POC) 5 L/M Allens test (POC) YES Specimen type (POC) ARTERIAL    
SARS-COV-2 Collection Time: 09/15/20  3:49 PM  
Result Value Ref Range Specimen source Nasopharyngeal    
 SARS-CoV-2 PENDING   
 SARS-CoV-2 PENDING Specimen source Nasopharyngeal    
 COVID-19 rapid test PENDING Specimen type NP Swab Health status PENDING   
 COVID-19 PENDING   
POC EG7 Collection Time: 09/15/20  3:57 PM  
Result Value Ref Range Calcium, ionized (POC) 1.28 1.12 - 1.32 mmol/L  
 pH (POC) 7.41 7.35 - 7.45    
 pCO2 (POC) 32.0 (L) 35.0 - 45.0 MMHG  
 pO2 (POC) 48 (LL) 80 - 100 MMHG  
 HCO3 (POC) 20.0 (L) 22 - 26 MMOL/L Base deficit (POC) 5 mmol/L  
 sO2 (POC) 85 (L) 92 - 97 % Site RIGHT RADIAL Device: High Flow Nasal Cannula Flow rate (POC) 8 L/M Allens test (POC) YES Specimen type (POC) ARTERIAL Total resp. rate 28 Radiologic Studies -  
XR CHEST PORT Final Result IMPRESSION: Unchanged AP chest x-ray appearance. CTA CHEST W OR W WO CONT    (Results Pending) CT Results  (Last 48 hours) 09/14/20 1236  CT ABD PELV W CONT Final result Impression:  IMPRESSION:  
No acute findings Incidentals as above Narrative:  EXAM: CT ABD PELV W CONT INDICATION: Abdominal pain COMPARISON: 6/13/2020 CONTRAST: 100 mL of Isovue-370. TECHNIQUE:   
Following the uneventful intravenous administration of contrast, thin axial  
images were obtained through the abdomen and pelvis. Coronal and sagittal  
reconstructions were generated. Oral contrast was not administered. CT dose  
reduction was achieved through use of a standardized protocol tailored for this  
examination and automatic exposure control for dose modulation. FINDINGS:   
LOWER THORAX: No significant abnormality in the incidentally imaged lower chest.  
LIVER: No mass. BILIARY TREE: Small cyst CBD is not dilated. SPLEEN: within normal limits. PANCREAS: No mass or ductal dilatation. ADRENALS: Unremarkable. KIDNEYS: No mass, bilateral calcifications likely vascular  
or hydronephrosis. Simple right renal cysts the largest one measures 4 cm STOMACH: Unremarkable. SMALL BOWEL: No dilatation or wall thickening. COLON: No dilatation or wall thickening. Mild sigmoid diverticulosis. APPENDIX: Appears normal  
PERITONEUM: No ascites or pneumoperitoneum. RETROPERITONEUM: No lymphadenopathy ectasia of the distal abdominal aorta with a  
maximal AP diameter of 23 mm. Diffuse calcifications. [facility] Prostate implants URINARY BLADDER: No mass or calculus. BONES: No destructive bone lesion. ABDOMINAL WALL: Fat-containing right inguinal hernia. ADDITIONAL COMMENTS: N/A  
   
  
  
 
CXR Results  (Last 48 hours) 09/15/20 1322  XR CHEST PORT Final result Impression:  IMPRESSION: Unchanged AP chest x-ray appearance. Narrative:  EXAM: XR CHEST PORT INDICATION: SOB  
   
COMPARISON: 9/14/2020 FINDINGS: A portable AP radiograph of the chest was obtained at 1313 hours. Ill-defined patchy retrocardiac opacification at the left pulmonary base is  
unchanged. The lungs are otherwise clear. There is no pneumothorax or pleural  
effusion. Cardiac, mediastinal and hilar contours are stable. Eliza Mann 09/14/20 1131  XR CHEST PORT Final result Impression:  Impression: No acute process. Narrative: Indication: Shortness of breath Comparison: 3/9/2020 Portable exam of the chest obtained at 1128 demonstrates normal heart size. There is no acute process in the lung fields. The osseous structures are  
unremarkable. Medical Decision Making I am the first provider for this patient. I reviewed the vital signs, available nursing notes, past medical history, past surgical history, family history and social history. Vital Signs-Reviewed the patient's vital signs. Patient Vitals for the past 12 hrs: 
 Temp Pulse Resp BP SpO2  
09/15/20 1540     90 % 09/15/20 1227 97.8 °F (36.6 °C) 85 20 (!) 154/71 (!) 80 % Records Reviewed: Nursing records and medical records reviewed MDM: 
Pt presents with acute abdominal pain; vital signs stable with currently a non-peritoneal exam; DDx includes: Gastroenteritis, hepatitis, pancreatitis, obstruction, appendicitis, viral illness, IBD, diverticulitis, mesenteric ischemia, AAA or descending dissection, ACS, kidney stone. Will get labs, treat symptomatically and obtain serial abdominal exams to determine if a CT is warranted. Will reassess and monitor closely. Patient presents with acute dyspnea. DDx: asthma, copd, pna, pulmonary edema, acute bronchitis, ACS, ptx, pna. Will obtain EKG, labs, CXR, provide O2 as needed for hypoxia, treat symptomatically and reassess. Will continue to monitor closely in ED. Provider Notes (Medical Decision Making):  
Patient is a 57-year-old male with history of COPD, metastatic prostate cancer presenting with wheezing, nonproductive cough, and worsening hypoxia. Given that he had a CT scan of the abdomen yesterday this was not repeated today. His d-dimer was normal which would argue against PE and his chest x-ray showed no infiltrate. However, patient is normally on 3 L of oxygen by nasal cannula and is requiring 8 L today to keep him above 90%. He was given steroids, nebulizers, and IV antibiotics after cultures were sent. At this time we will admit the patient for profound hypoxia. His COVID-19 test is pending. Additionally, his CTA of the chest is also pending. These results will be followed up by the oncoming physician, Dr. Cotto Friday. ED Course:  
Initial assessment performed. The patients presenting problems have been discussed, and they are in agreement with the care plan formulated and outlined with them. I have encouraged them to ask questions as they arise throughout their visit. ED Course as of Sep 15 1641 Tue Sep 15, 2020 1517 Patient on 5 L nasal cannula has now become hypoxic. Patient's oxygen saturations were 78% with good waveform. Will place him on nonrebreather.:  Respiratory to determine if high flow O2 is more indicated. I will obtain a CT of the chest, obtain COVID testing, and admit for further management. Consult pending to his urologist.  
 [facility] ED Course User Index 
[CC] Renee Pozo MD  
 
 
Medications Administered   
 albuterol (PROVENTIL VENTOLIN) nebulizer solution 7.5 mg   
 Admin Date 
09/15/2020 Action Given Dose 7.5 mg Route Nebulization Administered By Janice Deleon, RT  
  
  
 fentaNYL citrate (PF) injection 50 mcg Admin Date 
09/15/2020 Action Given Dose 50 mcg Route IntraVENous Administered By Mahesh Olivares RN  
  
  
 ipratropium (ATROVENT) 0.02 % nebulizer solution 0.5 mg   
 Admin Date 
09/15/2020 Action Given Dose 0.5 mg Route Nebulization Administered By Janice Deleon, RT  
  
  
 methylPREDNISolone (PF) (Solu-MEDROL) injection 125 mg   
 Admin Date 
09/15/2020 Action Given Dose 
125 mg Route IntraVENous Administered By Mahesh Olivares RN Critical Care: 
I have spent 85 minutes of critical care time in evaluating and treating this patient. This includes time spent at bedside, time with family and decision makers, documentation, review of labs and imaging, and/or consultation with specialists. It does not include time spent on separately billed procedures. This patient presents with a critical illness or injury that acutely impairs one or more vital organ systems such that there is a high probability of imminent or life threatening deterioration in the patient's condition. This case involved decision making of high complexity to assess, manipulate, and support vital organ system failure and/or to prevent further life threatening deterioration of the patient's condition. Failure to initiate these interventions on an urgent basis would likely result in sudden, clinically significant or life threatening deterioration in the patient's condition. Abnormal findings supporting critical care: Profound hypoxia, respiratory distress Interventions to support critical care: High flow nasal cannula, serial lung assessments Failure to intervene may result in: Progression to her acute respiratory failure and/or death Disposition: 
Admit Note: 
4:41 PM 
Pt is being admitted by Dr Tesfaye Solis. The results of their tests and reason(s) for their admission have been discussed with pt and/or available family. They convey agreement and understanding for the need to be admitted and for admission diagnosis. DISCHARGE PLAN: 
1. Current Discharge Medication List  
  
 
2. Follow-up Information None 3. Return to ED if worse Diagnosis Clinical Impression: 1. Acute respiratory failure with hypoxia ([facility] [address] 2. Acute exacerbation of chronic obstructive pulmonary disease (COPD) ([facility] [address] 3. Suspected 2019 novel coronavirus infection Attestations: 
 
Lizandro Rivera MD 
 
Please note that this dictation was completed with [employer], the computer voice recognition software. Quite often unanticipated grammatical, syntax, homophones, and other interpretive errors are inadvertently transcribed by the computer software. Please disregard these errors. Please excuse any errors that have escaped final proofreading. Thank you.

## 2020-09-15 NOTE — PROGRESS NOTES
Pt of Dr. Sabrina Hwang with advanced prostate cancer Admitted for respiratory issues Covid rule out Will see tomorrow

## 2020-09-15 NOTE — Clinical Note
TRANSFER - IN REPORT:     Verbal report received from: PCU RN. Report consisted of patient's Situation, Background, Assessment and   Recommendations(SBAR). Opportunity for questions and clarification was provided. Assessment completed upon patient's arrival to unit and care assumed. Patient transported with a Registered Nurse and Oxygen. Oxygen used for patient = nasal cannula, @ 2 - 6 Liters.

## 2020-09-15 NOTE — Clinical Note
TRANSFER - OUT REPORT:     Verbal report given to: Carina Nino RN. Report consisted of patient's Situation, Background, Assessment and   Recommendations(SBAR). Opportunity for questions and clarification was provided. Patient transported with a Registered Nurse. Patient transported to: 2269.

## 2020-09-15 NOTE — H&P
Hospitalist Admission Note NAME: Betty Aparicio :  1940 MRN:  [id number] Date/Time:  9/15/2020 4:40 PM 
 
Patient PCP: Jose J Luo MD 
______________________________________________________________________ Given the patient's current clinical presentation, I have a high level of concern for decompensation if discharged from the emergency department. Complex decision making was performed, which includes reviewing the patient's available past medical records, laboratory results, and x-ray films. My assessment of this patient's clinical condition and my plan of care is as follows. Assessment / Plan: 
Acute hypoxic respiratory failure due to acute COPD exacerbation due to acute bronchitis, POA 
CAP 
COVID pending  
-Pt with worsening dyspnea despite using nebs at home and not responded to initial aggressive ED treatment. Will admit to the hospital for aggressive IV steroids 
- albuterol MDR pending covid test  
- C/x empiric CTx/zithromax  
-Hydrate with IVF to loosen sputum. 
- Mucolytic with scheduled mucinex and tessalon perls prn for cough. - O2 to keep sats > 90%, reassess for home O2 on DC. Low threshold to intubate Addendum: 
CTA:. 
1. No evidence for embolism. 2. 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 3. Nonspecific consolidative opacifications in the posterior medial basilar 
segments of the left lower lobe and the inferior lingula 
----- old finding since 2016, was evaluated by CTS at that time, recommended conservative management at that time. CTS was consulted. Control BP < 120 goal  
 
Abdominal pain in settings of metastatic prostate cancer  
- ua yesterday with Mod LE and wbc 20-50, will repeat to r/o underlying UTI Currently on CTX for bronchitis, will cover his uti as well  
-urology consulted, will be seen in am  
-pain management : fentanyl was effective in ED, will continue -cont Zytega, needs to be give together with prednisone ( holding while on solumedrol IV). Cont [employer] CT scan findings d/w pt and sister at bedside, informed then on high risk of rupture CKD stage III / hyperkalemia Cr at baseline ~ 1.4, stable Mild hyperkalemia 5.2 
-IVF  
-monitor closely. Avoid nephrotoxic drugs, adjust all meds to GFR.  
 
HTN/ CAD  
-BP stable  
-cont home BB/ lisinopril / ASA Watch K, if remain elevated stop lisinopril Hyperlipidemia, cont statin Code Status: Full code d/w pt and sister at bedside Surrogate Decision Maker: dtr DVT Prophylaxis: holding pending eval by cardiothoracic surgeon GI Prophylaxis: not indicated Baseline: lives with his dtr; home O2 3 L Subjective: CHIEF COMPLAINT: SOB, abdominal pain HISTORY OF PRESENT ILLNESS:    
Margie Kraus is a 78 y.o.  male who presents with above complaint. Pt started with dyspnea couple of days ago. Dyspnea was progressively getting worse. He admits to nonproductive cough and wheezing. He was using jet nebs at home with minimal relive of his Sx. Denies fever/chills/CP. He also c/o lower abdominal pain. It radiates to his flanks and into his back. No dysuria/ diarrhea. He was seen yesterday in ED for the same Sx. He had lab work, urinalysis, chest x-ray, and a CT scan of the abdomen. No acute surgical pathology was found on a CT or lab work. he felt better after being treated in ED and was discharged home. On presentation to the ED today he was found to be hypoxic and in respiratory distress. He is on 3 L NC at home. He is currently requiring 8 L HFO2 with sats 90-92% at rest. ED provider discussed pts abdominal pain with oncology team and urologist Dr. Grace Romero, who feels that this pain may be secondary to his treatment for his cancer or the cancer itself. We were asked to admit for work up and evaluation of the above problems. Past Medical History:  
Diagnosis Date  CAD (coronary artery disease)   
 mi 10/19/2015 with stent  COPD (chronic obstructive pulmonary disease) (HCC)  Hypertension  Prostate cancer ([facility] [address] 2012 Dr. Simeon Montoya - diagnosed 2012 - s/p EBRT on Lupron Past Surgical History:  
Procedure Laterality Date  HX ORTHOPAEDIC    
 left knee surgery Social History Tobacco Use  Smoking status: Former Smoker Packs/day: 0.50 Years: 40.00 Pack years: 20.00 Types: Cigarettes Last attempt to quit: 10/19/2015 Years since quittin.9  Smokeless tobacco: Never Used  Tobacco comment: trying quit 10/13/15 -  ppd Substance Use Topics  Alcohol use: No  
  Alcohol/week: 0.0 standard drinks Family History Problem Relation Age of Onset  Heart Disease Mother   
     pacemaker  Diabetes Mother  Diabetes Sister  Heart Disease Brother No Known Allergies Prior to Admission medications Medication Sig Start Date End Date Taking? Authorizing Provider  
docusate sodium (COLACE) 100 mg capsule Take 1 Cap by mouth two (2) times a day for 90 days. 20  Jo Ann Banerjee,   
polyethylene glycol (Miralax) 17 gram/dose powder Take 17 g by mouth daily. 1 tablespoon with 8 oz of water daily 20   Jo Ann Banerjee, DO  
cyclobenzaprine (FLEXERIL) 5 mg tablet TAKE 1 TABLET BY MOUTH TWICE DAILY AS NEEDED FOR MUSCLE SPASM 20   Jan Malik MD  
abiraterone 250 mg tab Take  by mouth. 4 tabs daily    Nancy Chung MD  
oxybutynin chloride XL (DITROPAN XL) 10 mg CR tablet Take 10 mg by mouth daily. Nancy Chung MD  
lansoprazole (PREVACID) 15 mg capsule Take 15 mg by mouth Daily (before breakfast). Nancy Chung MD  
predniSONE (DELTASONE) 2.5 mg tablet Take 5 mg by mouth two (2) times a day. Nancy Chung MD  
diazePAM (Valium) 5 mg tablet Take 1 Tab by mouth every eight (8) hours as needed for Muscle Spasm(s).  Max Daily Amount: 15 mg. 20   Rhona Self, Renée Mcqueen MD  
lidocaine 4 % patch 1 Patch by TransDERmal route every twelve (12) hours every twelve (12) hours for 30 days. 8/29/20 9/28/20  Shahid Sevilla MD  
albuterol (PROVENTIL HFA, VENTOLIN HFA, PROAIR HFA) 90 mcg/actuation inhaler Use 2 puffs very 4 hours prn 7/30/20   Kaur Flood MD  
famotidine (PEPCID) 20 mg tablet Take 1 Tab by mouth as needed for Heartburn. 7/15/20   Kaur Flood MD  
metoprolol tartrate (LOPRESSOR) 50 mg tablet TAKE ONE TABLET BY MOUTH TWICE DAILY 7/13/20   Zoë Parson MD  
lisinopriL (PRINIVIL, ZESTRIL) 2.5 mg tablet Take 1 tablet by mouth once daily 3/21/20   Zoë Parson MD  
albuterol-ipratropium (DUO-NEB) 2.5 mg-0.5 mg/3 ml nebu 3 mL by Nebulization route every six (6) hours as needed (sob). 10/19/19   Cristian Whiteside NP  
fluticasone furoate-vilanterol (BREO ELLIPTA) 100-25 mcg/dose inhaler Take 1 Puff by inhalation daily. 3/25/19   Long OLIVIER, DO Oxygen 3L continouus    Provider, Historical  
fluticasone (FLONASE) 50 mcg/actuation nasal spray 2 Sprays by Both Nostrils route daily. 2/18/19   Willie Stover MD  
atorvastatin (LIPITOR) 40 mg tablet TAKE 1 TABLET EVERY DAY 9/20/18   Kaur Flood MD  
XTANDI 40 mg capsule Take 160 mg by mouth daily. 1/11/18   Provider, Historical  
aspirin 81 mg chewable tablet Take 1 Tab by mouth daily. RISK OF HEART ATTACK IF ANY MISSED DOSES 10/21/15   Zoë Parson MD  
 
 
REVIEW OF SYSTEMS:    
I am not able to complete the review of systems because: The patient is intubated and sedated The patient has altered mental status due to his acute medical problems The patient has baseline aphasia from prior stroke(s) The patient has baseline dementia and is not reliable historian The patient is in acute medical distress and unable to provide information Total of 12 systems reviewed as follows:   
   POSITIVE= underlined text  Negative = text not underlined General:  fever, chills, sweats, generalized weakness, weight loss/gain,  
   loss of appetite Eyes:    blurred vision, eye pain, loss of vision, double vision ENT:    rhinorrhea, pharyngitis Respiratory:   cough, sputum production, SOB, WHEAT, wheezing, pleuritic pain  
Cardiology:   chest pain, palpitations, orthopnea, PND, edema, syncope Gastrointestinal:  abdominal pain , N/V, diarrhea, dysphagia, constipation, bleeding Genitourinary:  frequency, urgency, dysuria, hematuria, incontinence Muskuloskeletal :  arthralgia, myalgia, back pain Hematology:  easy bruising, nose or gum bleeding, lymphadenopathy Dermatological: rash, ulceration, pruritis, color change / jaundice Endocrine:   hot flashes or polydipsia Neurological:  headache, dizziness, confusion, focal weakness, paresthesia, Speech difficulties, memory loss, gait difficulty Psychological: Feelings of anxiety, depression, agitation Objective: VITALS:   
Visit Vitals BP (!) 154/71 (BP 1 Location: Right arm, BP Patient Position: At rest) Pulse 85 Temp 97.8 °F (36.6 °C) Resp 20 Ht 6' (1.829 m) Wt 88 kg (194 lb 0.1 oz) SpO2 90% BMI 26.31 kg/m² PHYSICAL EXAM: 
 
General:    Alert, cooperative, no distress, appears stated age. Appears chronically ill, + dyspnea with conversation. HEENT: Atraumatic, anicteric sclerae, pink conjunctivae No oral ulcers, mucosa moist, throat clear, dentition fair Neck:  Supple, symmetrical,  thyroid: non tender Lungs:   Decreased air entry  bilaterally. + Wheezing. No Rhonchi. No rales. Chest wall:  No tenderness  No Accessory muscle use. Heart:   Regular  rhythm,  No  murmur   No edema Abdomen:   Soft, non-tender. Not distended. Bowel sounds normal 
Extremities: No cyanosis. No clubbing,   
  Skin turgor normal, Capillary refill normal, Radial dial pulse 2+ Skin:     Not pale. Not Jaundiced  No rashes Psych:  Good insight. Not depressed. Not anxious or agitated. Neurologic: EOMs intact. No facial asymmetry. No aphasia or slurred speech. Symmetrical strength, Sensation grossly intact. Alert and oriented X 4.  
 
_______________________________________________________________________ Care Plan discussed with: 
  Comments Patient y Family  y Sister bedside RN y   
Care Manager Consultant:  tapan [facility] provider   
_______________________________________________________________________ Expected  Disposition:  
Home with Family y HH/PT/OT/RN   
SNF/LTC   
NAEL   
________________________________________________________________________ TOTAL TIME:  65 Minutes Critical Care Provided     Minutes non procedure based Comments  
 y Reviewed previous records  
>50% of visit spent in counseling and coordination of care y Discussion with patient and/or family and questions answered 
  
 
________________________________________________________________________ Signed: Jovan Vega MD 
 
Procedures: see electronic medical records for all procedures/Xrays and details which were not copied into this note but were reviewed prior to creation of Plan. LAB DATA REVIEWED:   
Recent Results (from the past 24 hour(s)) CBC WITH AUTOMATED DIFF Collection Time: 09/15/20 12:45 PM  
Result Value Ref Range WBC 8.6 4.1 - 11.1 K/uL  
 RBC 4.61 4.10 - 5.70 M/uL  
 HGB 12.7 12.1 - 17.0 g/dL HCT 40.9 36.6 - 50.3 % MCV 88.7 80.0 - 99.0 FL  
 MCH 27.5 26.0 - 34.0 PG  
 MCHC 31.1 30.0 - 36.5 g/dL  
 RDW 19.3 (H) 11.5 - 14.5 % PLATELET 670 058 - 975 K/uL MPV 10.8 8.9 - 12.9 FL  
 NRBC 0.2 (H) 0  WBC ABSOLUTE NRBC 0.02 (H) 0.00 - 0.01 K/uL NEUTROPHILS 65 32 - 75 % LYMPHOCYTES 21 12 - 49 % MONOCYTES 11 5 - 13 % EOSINOPHILS 1 0 - 7 % BASOPHILS 1 0 - 1 % IMMATURE GRANULOCYTES 1 (H) 0.0 - 0.5 % ABS. NEUTROPHILS 5.7 1.8 - 8.0 K/UL  
 ABS. LYMPHOCYTES 1.8 0.8 - 3.5 K/UL  
 ABS. MONOCYTES 0.9 0.0 - 1.0 K/UL ABS. EOSINOPHILS 0.1 0.0 - 0.4 K/UL  
 ABS. BASOPHILS 0.0 0.0 - 0.1 K/UL  
 ABS. IMM. GRANS. 0.1 (H) 0.00 - 0.04 K/UL  
 DF AUTOMATED METABOLIC PANEL, COMPREHENSIVE Collection Time: 09/15/20 12:45 PM  
Result Value Ref Range Sodium 141 136 - 145 mmol/L Potassium 5.2 (H) 3.5 - 5.1 mmol/L Chloride 110 (H) 97 - 108 mmol/L  
 CO2 28 21 - 32 mmol/L Anion gap 3 (L) 5 - 15 mmol/L Glucose 88 65 - 100 mg/dL BUN 18 6 - 20 MG/DL Creatinine 1.49 (H) 0.70 - 1.30 MG/DL  
 BUN/Creatinine ratio 12 12 - 20 GFR est AA 55 (L) >60 ml/min/1.73m2 GFR est non-AA 45 (L) >60 ml/min/1.73m2 Calcium 9.1 8.5 - 10.1 MG/DL Bilirubin, total 0.5 0.2 - 1.0 MG/DL  
 ALT (SGPT) 15 12 - 78 U/L  
 AST (SGOT) 42 (H) 15 - 37 U/L Alk. phosphatase 173 (H) 45 - 117 U/L Protein, total 7.9 6.4 - 8.2 g/dL Albumin 3.2 (L) 3.5 - 5.0 g/dL Globulin 4.7 (H) 2.0 - 4.0 g/dL A-G Ratio 0.7 (L) 1.1 - 2.2    
TROPONIN I Collection Time: 09/15/20 12:45 PM  
Result Value Ref Range Troponin-I, Qt. <0.05 <0.05 ng/mL PROCALCITONIN Collection Time: 09/15/20  1:36 PM  
Result Value Ref Range Procalcitonin <0.05 ng/mL MAGNESIUM Collection Time: 09/15/20  1:36 PM  
Result Value Ref Range Magnesium 2.4 1.6 - 2.4 mg/dL PROTHROMBIN TIME + INR Collection Time: 09/15/20  1:36 PM  
Result Value Ref Range INR 1.0 0.9 - 1.1 Prothrombin time 10.1 9.0 - 11.1 sec LD Collection Time: 09/15/20  1:36 PM  
Result Value Ref Range  (H) 85 - 241 U/L FERRITIN Collection Time: 09/15/20  1:36 PM  
Result Value Ref Range Ferritin 33 26 - 388 NG/ML  
NT-PRO BNP Collection Time: 09/15/20  1:36 PM  
Result Value Ref Range NT pro- (H) <450 PG/ML  
D DIMER Collection Time: 09/15/20  1:36 PM  
Result Value Ref Range D-dimer 0.55 0.00 - 0.65 mg/L FEU  
POC EG7 Collection Time: 09/15/20  1:57 PM  
Result Value Ref Range  Calcium, ionized (POC) 1.27 1.12 - 1.32 mmol/L  
 pH (POC) 7.33 (L) 7.35 - 7.45    
 pCO2 (POC) 48.7 (H) 35.0 - 45.0 MMHG  
 pO2 (POC) 19 (LL) 80 - 100 MMHG  
 HCO3 (POC) 25.9 22 - 26 MMOL/L Base excess (POC) 0 mmol/L  
 sO2 (POC) 25 (L) 92 - 97 % Site OTHER Device: NASAL CANNULA Flow rate (POC) 5 L/M Allens test (POC) N/A Specimen type (POC) VENOUS BLOOD Total resp. rate 16 POC EG7 Collection Time: 09/15/20  3:34 PM  
Result Value Ref Range Calcium, ionized (POC) 1.32 1.12 - 1.32 mmol/L  
 pH (POC) 7.41 7.35 - 7.45    
 pCO2 (POC) 31.7 (L) 35.0 - 45.0 MMHG  
 pO2 (POC) 36 (LL) 80 - 100 MMHG  
 HCO3 (POC) 20.2 (L) 22 - 26 MMOL/L Base deficit (POC) 4 mmol/L  
 sO2 (POC) 70 (L) 92 - 97 % Site LEFT RADIAL Device: NASAL CANNULA Flow rate (POC) 5 L/M Allens test (POC) YES Specimen type (POC) ARTERIAL    
SARS-COV-2 Collection Time: 09/15/20  3:49 PM  
Result Value Ref Range Specimen source Nasopharyngeal    
 SARS-CoV-2 PENDING   
 SARS-CoV-2 PENDING Specimen source Nasopharyngeal    
 COVID-19 rapid test PENDING Specimen type NP Swab Health status PENDING   
 COVID-19 PENDING   
POC EG7 Collection Time: 09/15/20  3:57 PM  
Result Value Ref Range Calcium, ionized (POC) 1.28 1.12 - 1.32 mmol/L  
 pH (POC) 7.41 7.35 - 7.45    
 pCO2 (POC) 32.0 (L) 35.0 - 45.0 MMHG  
 pO2 (POC) 48 (LL) 80 - 100 MMHG  
 HCO3 (POC) 20.0 (L) 22 - 26 MMOL/L Base deficit (POC) 5 mmol/L  
 sO2 (POC) 85 (L) 92 - 97 % Site RIGHT RADIAL Device: High Flow Nasal Cannula Flow rate (POC) 8 L/M Allens test (POC) YES Specimen type (POC) ARTERIAL Total resp. rate 28

## 2020-09-15 NOTE — PROGRESS NOTES
ED 9/14/2020: Abdominal/Back Pain Patient on Cov19 D/C [facility] Enrollment Report/fu Contact. Left message on voice mail with my contact information for return call. Need to assess for d/c needs post ED or Inpatient Admission.

## 2020-09-16 NOTE — INTERDISCIPLINARY ROUNDS
Critical care interdisciplinary rounds held on 09/16/220. Following members present, Pharmacy, Diabetes Treatment, Case Management, Respiratory , Physical Therapy and Nutrition. Led by Mary Landeros RN and Dr. Audrey Mcfarlane. Plan of care discussed. See clinical pathway for plan of care and interventions and desired outcomes.

## 2020-09-16 NOTE — PROGRESS NOTES
Received patient from ED staff via stretcher at [place] [id number]. Assisted to bed, bath given after CCU monitoring initiated. Respirations easy entire time. Skin checked with Bisi Gutiérrez RN- orienting to CCU. Placed in negative pressure room for Rule Out Covid 19. Daughter called and states Chemotherapy was brought from home. We received his socks and his cell phone only. ED does not have any medications, Pharmacy called and does not have it. Will speak to daughter about if the family accidentally took them home with his clothes. Agreed with assessment by Bisi Gutiérrez RN. 
1200  No changes noted. 1400  Spoke to daughter, Arnie De La Torre. To bring chemo drug from home after discussion. Patient received Cell phone  and plugged into phone. 36  Still waiting for arrival of Chemotherapy from daughter. Patient remains on isolation as a rule out Covid 19 patient. Resting well in room, temperature adjusted as it is hot. [place] removed. Remains using urinal without difficulty. 1915  Report given to DEMETRICE Glaser RN.

## 2020-09-16 NOTE — PROGRESS NOTES
ED 9/14/2020: Abdominal/Back Pain Patient on Cov19 D/C [facility] Enrollment Report/fu Contact List as Next day f/u due to unable to reach. Left message on voice mail with my contact information for return call as initial 9/15 contact to assess for d/c needs post ED or Inpatient Admission. Patient resolved from Transition of Care episode on 9/16. ACM/CTN was unsuccessful at contacting this patient today as well as yesterday for initial ED f/u d/c assessment. Patient/family was not provided the following resources and education related to COVID-19 during the initial call:  
           
          Signs, symptoms and red flags related to COVID-19 CDC exposure and quarantine guidelines Conduit exposure contact - [phone] Contact for their local Department of Health Patient is presently admitted as Inpatient as of 9/15/2020. No further outreach scheduled with this CTN/ACM. Episode of Care resolved.

## 2020-09-16 NOTE — ED NOTES
TRANSFER - OUT REPORT: 
 
Verbal report given to Star Marquis RN(name) on Malinda Gonzalez  being transferred to 2538(unit) for routine progression of care Report consisted of patients Situation, Background, Assessment and  
Recommendations(SBAR). Information from the following report(s) ED Summary was reviewed with the receiving nurse. Opportunity for questions and clarification was provided. Patient transported with: 
 Monitor O2 @ 15 liters Registered Nurse and RT

## 2020-09-16 NOTE — CONSULTS
CSS 
 Consult Subjective: Chief complaint: Shortness of breath Mari Arvizu is a 78 y.o. 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 The patient  presented with SOB and abdominal pain. Pt started with dyspnea couple of days ago which has been getting worse. He has had a nonproductive cough and some wheezing. Denies fever/chills/CP. He also c/o lower abdominal pain. It radiates to his flanks and into his back. No dysuria/ diarrhea. On presentation to the ED he was hypoxic and in respiratory distress. He is on 3 L NC at home. ED provider discussed pts abdominal pain with oncology team and urologist Dr. Tristan Sheriff, who feels that this pain may be secondary to his treatment for his cancer or the cancer itself. 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 Of note, Dr Chato Valladares has seen this patient in the past for the same aortic ulcer. Since the last time the patient was seen the ulcer has enlarged with results below. Cardiac Testing Chest CTA: 
FINDINGS: No demonstration of pulmonary embolism. There is no pleural or 
pericardial effusion. Cardiac size is within normal limits with atherosclerotic 
calcifications again noted in numerous coronary territories and what appears to 
be a stent in the proximal-mid LAD. 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 
arch is again demonstrated with hematoma in the aorticopulmonary region 
appearing larger in the interval. This currently measures up to 4 cm transverse 
compared with 2.6 cm in 2017 and 3.2 cm in 2019. No mediastinal adenopathy or 
other mass is shown though there are numerous small lymph nodes especially in 
the prevascular space. Moderate to severe centrilobular emphysema is again 
demonstrated. Consolidative opacification is shown in the interval in the 
posterior medial basilar segments of the left lower lobe and the inferior lingula. Previous seen noted subpleural nodule in the posterior base of the left 
lower lobe is obscured by consolidation. 
  
 
Past Medical History:  
Diagnosis Date  CAD (coronary artery disease)   
 mi 10/19/2015 with stent  COPD (chronic obstructive pulmonary disease) (HCC)  Hypertension  Prostate cancer ([address] 2012 Dr. Bella Roblero - diagnosed 2012 - s/p EBRT on Lupron Past Surgical History:  
Procedure Laterality Date  HX ORTHOPAEDIC    
 left knee surgery Social History Tobacco Use  Smoking status: Former Smoker Packs/day: 0.50 Years: 40.00 Pack years: 20.00 Types: Cigarettes Last attempt to quit: 10/19/2015 Years since quittin.9  Smokeless tobacco: Never Used  Tobacco comment: trying quit 10/13/15 -  ppd Substance Use Topics  Alcohol use: No  
  Alcohol/week: 0.0 standard drinks Family History Problem Relation Age of Onset  Heart Disease Mother   
     pacemaker  Diabetes Mother  Diabetes Sister  Heart Disease Brother Prior to Admission medications Medication Sig Start Date End Date Taking? Authorizing Provider  
docusate sodium (COLACE) 100 mg capsule Take 1 Cap by mouth two (2) times a day for 90 days. 20  Danny Greer DO  
polyethylene glycol (Miralax) 17 gram/dose powder Take 17 g by mouth daily. 1 tablespoon with 8 oz of water daily 20   Danny Greer DO  
cyclobenzaprine (FLEXERIL) 5 mg tablet TAKE 1 TABLET BY MOUTH TWICE DAILY AS NEEDED FOR MUSCLE SPASM 20   Augustine Ribera MD  
abiraterone 250 mg tab Take  by mouth. 4 tabs daily    Nancy Chung MD  
oxybutynin chloride XL (DITROPAN XL) 10 mg CR tablet Take 10 mg by mouth daily. Nancy Chung MD  
lansoprazole (PREVACID) 15 mg capsule Take 15 mg by mouth Daily (before breakfast). Nancy Chung MD  
predniSONE (DELTASONE) 2.5 mg tablet Take 5 mg by mouth two (2) times a day.     Nancy Chung MD  
 diazePAM (Valium) 5 mg tablet Take 1 Tab by mouth every eight (8) hours as needed for Muscle Spasm(s). Max Daily Amount: 15 mg. 8/29/20   Ashlyn Molina MD  
lidocaine 4 % patch 1 Patch by TransDERmal route every twelve (12) hours every twelve (12) hours for 30 days. 8/29/20 9/28/20  Ashlyn Molina MD  
albuterol (PROVENTIL HFA, VENTOLIN HFA, PROAIR HFA) 90 mcg/actuation inhaler Use 2 puffs very 4 hours prn 7/30/20   Eliezer Pruitt MD  
famotidine (PEPCID) 20 mg tablet Take 1 Tab by mouth as needed for Heartburn. 7/15/20   Eliezer Pruitt MD  
metoprolol tartrate (LOPRESSOR) 50 mg tablet TAKE ONE TABLET BY MOUTH TWICE DAILY 7/13/20   Rick Ferreira MD  
lisinopriL (PRINIVIL, ZESTRIL) 2.5 mg tablet Take 1 tablet by mouth once daily 3/21/20   Rick Ferreira MD  
albuterol-ipratropium (DUO-NEB) 2.5 mg-0.5 mg/3 ml nebu 3 mL by Nebulization route every six (6) hours as needed (sob). 10/19/19   Tayla Davis NP  
fluticasone furoate-vilanterol (BREO ELLIPTA) 100-25 mcg/dose inhaler Take 1 Puff by inhalation daily. 3/25/19   Radha OLIVIER, DO Oxygen 3L continouus    Provider, Historical  
fluticasone (FLONASE) 50 mcg/actuation nasal spray 2 Sprays by Both Nostrils route daily. 2/18/19   Poonam Armstrong MD  
atorvastatin (LIPITOR) 40 mg tablet TAKE 1 TABLET EVERY DAY 9/20/18   Eliezer Pruitt MD  
XTANDI 40 mg capsule Take 160 mg by mouth daily. 1/11/18   Provider, Historical  
aspirin 81 mg chewable tablet Take 1 Tab by mouth daily. RISK OF HEART ATTACK IF ANY MISSED DOSES 10/21/15   Rick Ferreira MD  
   
No Known Allergies Review of Systems:  
Consititutional: Denies fever or chills. Eyes:  Denies use of glasses or vision problems(cataracts). ENT:  Denies hearing or swallowing difficulty. CV: Denies CP, claudication, HTN. Resp: Denies dyspnea, productive cough. : Denies dialysis or kidney problems. GI: Denies ulcers, esophageal strictures, liver problems. M/S: Denies joint or bone problems, or implanted artificial hardware. Skin: Denies varicose veins, edema. Neuro: Denies strokes, or TIAs. Psych: Denies anxiety or depression. Endocrine: Denies thyroid problems or diabetes. Heme/Lymphatic: Denies easy bruising or lymphedema. Objective:  
 
VS:  
 
Physical Exam:   
General appearance: alert, cooperative, no distress Head: normocephalic, without obvious abnormality; atraumatic Eyes: conjunctivae/corneas clear; EOM's intact. Nose: nares normal; no drainage. Neck: no carotid bruit and no JVD Lungs: clear to auscultation bilaterally Heart: regular rate and rhythm; no murmur Abdomen: soft, non-tender; bowel sounds normal 
Extremities: moves all extremities; no weakness. Skin: Skin color normal; No varicose veins or edema. Neurologic: Grossly normal   
 
Labs:  
Recent Labs  
  09/16/20 
0351 WBC 7.4 HGB 11.2* HCT 35.1*  
   
K 4.9 BUN 21* CREA 1.30 * INR 1.0 Diagnostics:  
FINDINGS: A portable AP radiograph of the chest was obtained at 1313 hours. Ill-defined patchy retrocardiac opacification at the left pulmonary base is 
unchanged. The lungs are otherwise clear. There is no pneumothorax or pleural 
effusion. Cardiac, mediastinal and hilar contours are stable. .  
  
IMPRESSION IMPRESSION: Unchanged AP chest x-ray appearance. Assessment:  
 
Active Problems: COPD exacerbation ([address] (9/15/2020) Acute hypoxemic respiratory failure ([address] (9/15/2020) Plan: Pt with known aortic arch ulcer now enlarged since last scan. Dr Rose Herron will evaluate patient. Pt needs better BP control, will increase lisinopril Signed By: Michelle Dawson PA-C September 16, 2020

## 2020-09-16 NOTE — CONSULTS
Requesting Provider: Owen Coats MD - Reason for Consultation: \"Hx of prostate cancer, lower abdominal pain\" Pre-existing [facility] Patient:   Yes Patient: Feroz Knox MRN: [id number]  SSN: [id number] YOB: 1940  Age: 78 y.o. Sex: male Location: [address] [place] [address] Code Status: Full Code PCP: Augustine Ribera MD  - [phone] Emergency Contact:  Primary Emergency Contact: [address], [place] Phone: [phone] Race/Jehovah's witness/Language: BLACK OR  / Gnosticist / Speaks ENGLISH Payor: Payor: Joycelyn Donis / Plan: [address] [place] [address] HMO / Product Type: Managed Care Medicare /   
Prior Admission Data: 9/14/20 [facility] EMERGENCY DEPT Hospitalized:  Hospital Day: 2 - Admitted 9/15/2020 12:33 PM  
 
CONSULTANTS 
IP CONSULT TO UROLOGY 
IP CONSULT TO HOSPITALIST 
IP CONSULT TO PULMONOLOGY 
IP CONSULT TO [address] [place] [address] DIAGNOSES 
  ICD-10-CM ICD-9-CM 1. Acute respiratory failure with hypoxia (HCC)  J96.01 518.81  
2. Acute exacerbation of chronic obstructive pulmonary disease (COPD) ([facility]  J44.1 491.21  
3. Suspected 2019 novel coronavirus infection  Z20.828 V01.79 Assessment/Plan: 
  
 
· Hx of prostate cancer · Rule out [place] 
 
-See Dr. Tonya Gonzalez. Julio's recommendations/plan CC: SOB, wheezing, lower abdominal pain HPI: He is a/P  78 y.o. male w/ PMHx of metastatic prostate cancer, COPD, CAD, who presented to ER w/ cc of severe SOB, nonproductive cough, wheezing, and lower abdominal pain. He was seen the day before in the ER w/ similar s/s. No acute abnormalities seen on CT or lab work and was [facility] home. Pain worsened once @ home. He is a pt of Dr. Ugo Singh and  The pain is believed to be secondary to cancer tx or the cancer itself. 
AF//86  P 85  WBC 7.4  Cr 1.49-->1.30 On Zithromax/Rocephin CT abd/pelvis: 9/14/2020 KIDNEYS: No mass, bilateral calcifications likely vascular or hydronephrosis. Simple right renal cysts the largest one measures 4 cm No acute findings. Problem: pain; Location: lower abdomen; Severity: moderate; Timin+ days, Context: as above in HPI; Better/Worse: pain med, Associated s/s:b/l flank pain Temp (24hrs), Av.8 °F (36.6 °C), Min:97.5 °F (36.4 °C), Max:98.2 °F (36.8 °C) Creatinine Date/Time Value Ref Range Status 2020 03:51 AM 1.30 0.70 - 1.30 MG/DL Final  
09/15/2020 12:45 PM 1.49 (H) 0.70 - 1.30 MG/DL Final  
2020 11:17 AM 1.44 (H) 0.70 - 1.30 MG/DL Final  
2020 11:46 PM 1.50 (H) 0.70 - 1.30 MG/DL Final  
2019 04:05 AM 1.14 0.70 - 1.30 MG/DL Final  
 
Current Antimicrobial Therapy (168h ago, onward) Ordered     Start Stop  
 09/15/20 1835  azithromycin (ZITHROMAX) 500 mg in 0.9% sodium chloride (MBP/ADV) 250 mL  500 mg,   IntraVENous,   EVERY 24 HOURS    
 20 1700 20 1659  
 09/15/20 1835  cefTRIAXone (ROCEPHIN) 1 g in 0.9% sodium chloride (MBP/ADV) 50 mL  1 g,   IntraVENous,   EVERY 24 HOURS    
 20 1500 20 1459 Key Anti-Platelet Anticoagulant Meds   
    
  
 aspirin 81 mg chewable tablet Take 1 Tab by mouth daily. RISK OF HEART ATTACK IF ANY MISSED DOSES Diet: DIET CARDIAC Regular - Labs Lab Results Component Value Date/Time Lactic acid 1.5 2020 11:17 AM  
 WBC 7.4 2020 03:51 AM  
 HCT 35.1 (L) 2020 03:51 AM  
 PLATELET 795  03:51 AM  
 Sodium 141 2020 03:51 AM  
 Potassium 4.9 2020 03:51 AM  
 Chloride 113 (H) 2020 03:51 AM  
 CO2 23 2020 03:51 AM  
 BUN 21 (H) 2020 03:51 AM  
 Creatinine 1.30 2020 03:51 AM  
 Glucose 128 (H) 2020 03:51 AM  
 Calcium 8.3 (L) 2020 03:51 AM  
 Magnesium 2.6 (H) 2020 03:51 AM  
 INR 1.0 2020 03:51 AM  
 
UA:  
Lab Results Component Value Date/Time  Color YELLOW/STRAW 09/15/2020 11:25 PM  
 Appearance CLEAR 09/15/2020 11:25 PM  
 Specific gravity 1.030 09/15/2020 11:25 PM  
 pH (UA) 6.0 09/15/2020 11:25 PM  
 Protein Negative 09/15/2020 11:25 PM  
 Glucose Negative 09/15/2020 11:25 PM  
 Ketone Negative 09/15/2020 11:25 PM  
 Bilirubin Negative 09/15/2020 11:25 PM  
 Urobilinogen 0.2 09/15/2020 11:25 PM  
 Nitrites Negative 09/15/2020 11:25 PM  
 Leukocyte Esterase Negative 09/15/2020 11:25 PM  
 Epithelial cells FEW 09/15/2020 11:25 PM  
 Bacteria Negative 09/15/2020 11:25 PM  
 WBC 0-4 09/15/2020 11:25 PM  
 RBC 0-5 09/15/2020 11:25 PM  
 
Imaging Results for orders placed during the hospital encounter of 09/15/20 CTA CHEST W OR W WO CONT Narrative INDICATION: Hypoxic COMPARISON: Earlier chest x-ray, CTA 10/20/2017, CT 1/14/2019. EXAM: Precontrast localizer was first performed to verify the levels of the 
pulmonary arteries. Subsequently, contiguous axial images were obtained after 
the rapid IV bolus administration of 80 cc Isovue-370, followed by thin section 
axial reconstructions with multiplanar reformations. Three-dimensional post - 
processing was performed. CT dose reduction was achieved through use of a 
standardized protocol tailored for this examination and automatic exposure 
control for dose modulation. FINDINGS: No demonstration of pulmonary embolism. There is no pleural or 
pericardial effusion. Cardiac size is within normal limits with atherosclerotic 
calcifications again noted in numerous coronary territories and what appears to 
be a stent in the proximal-mid LAD. 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 
arch is again demonstrated with hematoma in the aorticopulmonary region 
appearing larger in the interval. This currently measures up to 4 cm transverse 
compared with 2.6 cm in 2017 and 3.2 cm in 2019. No mediastinal adenopathy or 
other mass is shown though there are numerous small lymph nodes especially in 
the prevascular space. Moderate to severe centrilobular emphysema is again demonstrated. Consolidative opacification is shown in the interval in the 
posterior medial basilar segments of the left lower lobe and the inferior 
lingula. Previous seen noted subpleural nodule in the posterior base of the left 
lower lobe is obscured by consolidation. Impression IMPRESSION:  
1. No evidence for embolism. 2. 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 3. Nonspecific consolidative opacifications in the posterior medial basilar 
segments of the left lower lobe and the inferior lingula. US Results (most recent): No results found for this or any previous visit. Cultures All Micro Results Procedure Component Value Units Date/Time CULTURE, BLOOD, PAIRED [[id number]] Collected:  09/15/20 1403 Order Status:  Completed Specimen:  Blood Updated:  09/15/20 1431 Past History: (Complete 2+/3 areas) No Known Allergies Current Facility-Administered Medications Medication Dose Route Frequency  aluminum-magnesium hydroxide (MAALOX) oral suspension 30 mL  30 mL Oral Q6H PRN  pantoprazole (PROTONIX) 40 mg in 0.9% sodium chloride 10 mL injection  40 mg IntraVENous DAILY  ipratropium-albuterol (COMBIVENT RESPIMAT) 20 mcg-100 mcg inhalation spray  1 Puff Inhalation Q4H RT  
 budesonide-formoteroL (SYMBICORT) 160-4.5 mcg/actuation HFA inhaler 2 Puff  2 Puff Inhalation BID RT  
 ondansetron (ZOFRAN) injection 4 mg  4 mg IntraVENous Q4H PRN  
 methylPREDNISolone (PF) (SOLU-MEDROL) injection 80 mg  80 mg IntraVENous Q8H  
 albuterol (PROVENTIL HFA, VENTOLIN HFA, PROAIR HFA) inhaler 2 Puff  2 Puff Inhalation Q4H RT  
 fentaNYL citrate (PF) injection 50 mcg  50 mcg IntraVENous Q4H PRN  
 . PHARMACY TO SUBSTITUTE PER PROTOCOL (Reordered from: abiraterone 250 mg tab)    Per Protocol  aspirin chewable tablet 81 mg  81 mg Oral DAILY  atorvastatin (LIPITOR) tablet 40 mg  40 mg Oral QHS  docusate sodium (COLACE) capsule 100 mg  100 mg Oral BID  
  pantoprazole (PROTONIX) tablet 40 mg  40 mg Oral ACB  lisinopriL (PRINIVIL, ZESTRIL) tablet 2.5 mg  2.5 mg Oral DAILY  metoprolol tartrate (LOPRESSOR) tablet 50 mg  50 mg Oral BID  
 oxybutynin chloride XL (DITROPAN XL) tablet 10 mg  10 mg Oral DAILY  polyethylene glycol (MIRALAX) packet 17 g  17 g Oral DAILY  . PHARMACY TO SUBSTITUTE PER PROTOCOL (Reordered from: XTANDI 40 mg capsule)    Per Protocol  guaiFENesin ER (MUCINEX) tablet 600 mg  600 mg Oral BID  
 benzonatate (TESSALON) capsule 100 mg  100 mg Oral TID PRN  
 azithromycin (ZITHROMAX) 500 mg in 0.9% sodium chloride (MBP/ADV) 250 mL  500 mg IntraVENous Q24H  cefTRIAXone (ROCEPHIN) 1 g in 0.9% sodium chloride (MBP/ADV) 50 mL  1 g IntraVENous Q24H  hydrALAZINE (APRESOLINE) 20 mg/mL injection 10 mg  10 mg IntraVENous Q6H PRN  
 acetaminophen (TYLENOL) tablet 650 mg  650 mg Oral Q6H PRN Prior to Admission medications Medication Sig Start Date End Date Taking? Authorizing Provider  
docusate sodium (COLACE) 100 mg capsule Take 1 Cap by mouth two (2) times a day for 90 days. 9/14/20 12/13/20  Tariq Monson DO  
polyethylene glycol (Miralax) 17 gram/dose powder Take 17 g by mouth daily. 1 tablespoon with 8 oz of water daily 9/14/20   Tariq Monson DO  
cyclobenzaprine (FLEXERIL) 5 mg tablet TAKE 1 TABLET BY MOUTH TWICE DAILY AS NEEDED FOR MUSCLE SPASM 9/11/20   Lawrence Romero MD  
abiraterone 250 mg tab Take  by mouth. 4 tabs daily    Nancy Chung MD  
oxybutynin chloride XL (DITROPAN XL) 10 mg CR tablet Take 10 mg by mouth daily. Nancy Chung MD  
lansoprazole (PREVACID) 15 mg capsule Take 15 mg by mouth Daily (before breakfast). Nancy Chung MD  
predniSONE (DELTASONE) 2.5 mg tablet Take 5 mg by mouth two (2) times a day. Nancy Chung MD  
diazePAM (Valium) 5 mg tablet Take 1 Tab by mouth every eight (8) hours as needed for Muscle Spasm(s).  Max Daily Amount: 15 mg. 8/29/20   Nora Cano MD  
 lidocaine 4 % patch 1 Patch by TransDERmal route every twelve (12) hours every twelve (12) hours for 30 days. 8/29/20 9/28/20  Yanira Sheldon MD  
albuterol (PROVENTIL HFA, VENTOLIN HFA, PROAIR HFA) 90 mcg/actuation inhaler Use 2 puffs very 4 hours prn 7/30/20   Lenard Aquino MD  
famotidine (PEPCID) 20 mg tablet Take 1 Tab by mouth as needed for Heartburn. 7/15/20   Lenard Aquino MD  
metoprolol tartrate (LOPRESSOR) 50 mg tablet TAKE ONE TABLET BY MOUTH TWICE DAILY 7/13/20   Symone Whipple MD  
lisinopriL (PRINIVIL, ZESTRIL) 2.5 mg tablet Take 1 tablet by mouth once daily 3/21/20   Symone Whipple MD  
albuterol-ipratropium (DUO-NEB) 2.5 mg-0.5 mg/3 ml nebu 3 mL by Nebulization route every six (6) hours as needed (sob). 10/19/19   Mary Landeros NP  
fluticasone furoate-vilanterol (BREO ELLIPTA) 100-25 mcg/dose inhaler Take 1 Puff by inhalation daily. 3/25/19   Petros OLIVIER, DO Oxygen 3L continouus    Provider, Historical  
fluticasone (FLONASE) 50 mcg/actuation nasal spray 2 Sprays by Both Nostrils route daily. 2/18/19   Jaylene Danielle MD  
atorvastatin (LIPITOR) 40 mg tablet TAKE 1 TABLET EVERY DAY 9/20/18   Lenard Aquino MD  
XTANDI 40 mg capsule Take 160 mg by mouth daily. 1/11/18   Provider, Historical  
aspirin 81 mg chewable tablet Take 1 Tab by mouth daily. RISK OF HEART ATTACK IF ANY MISSED DOSES 10/21/15   Symone Whipple MD  
  
 
PMHx:  has a past medical history of CAD (coronary artery disease), COPD (chronic obstructive pulmonary disease) ([facility] Hypertension, and Prostate cancer ([facility] (5/8/2012). PSurgHx:  has a past surgical history that includes hx orthopaedic (1961). PSocHx:  reports that he quit smoking about 4 years ago. His smoking use included cigarettes. He has a 20.00 pack-year smoking history. He has never used smokeless tobacco. He reports that he does not drink alcohol or use drugs. ROS:  (Complete - 10 systems) - DENIES: Weightloss (Constitutional), Dry mouth (ENMT), Chest pain (CV), SOB (Respiratory), Constipation (GI), Weakness (MS), Pallor (Skin), TIA Sx (Neuro), Confusion (Psych), Easy bruising (Heme) Physical Exam: (Comprehesive - 8+ 1995 Systems)  
 
(1) Constitutional:  FIO2: FIO2 (%): 100 % on SpO2: O2 Sat (%): 92 % O2 Device: Hi flow nasal cannula(mid flow) O2 Flow Rate (L/min): 15 l/min Patient Vitals for the past 24 hrs: 
 BP Temp Pulse Resp SpO2 Height Weight  
09/16/20 0837 (!) 165/73  85 21 92 %    
09/16/20 0800 (!) 188/86 98.2 °F (36.8 °C) 85 20 94 %    
09/16/20 0747 (!) 165/70  84 24 95 %    
09/16/20 0711     99 %    
09/16/20 0515 136/68  68 21 100 %    
09/16/20 0507     96 %    
09/16/20 0500 (!) 142/71 97.5 °F (36.4 °C) 77 27 (!) 87 %    
09/16/20 0445 (!) 141/70  76 28 (!) 89 %    
09/16/20 0430 (!) 144/72  73 29 (!) 89 %    
09/16/20 0415 133/64  79 29 (!) 88 %    
09/16/20 0330 126/63  76 25 90 %    
09/16/20 0315 110/62  80 27 (!) 89 %    
09/16/20 0300 105/78  77 25 92 %    
09/16/20 0230 (!) 125/55  75 24 92 %    
09/16/20 0215 126/63  78 23 94 %    
09/16/20 0200 (!) 145/75  84 (!) 31 93 %    
09/16/20 0130 (!) 153/72  88 26 94 %    
09/16/20 0122 (!) 113/93        
09/16/20 0003   79      
09/15/20 2330 (!) 113/93  78 26 93 %    
09/15/20 2300 115/61  74 25 95 %    
09/15/20 2245 (!) 112/57  81 25 93 %    
09/15/20 2215 106/71  80 26 91 %    
09/15/20 2145 113/60  83 25 (!) 88 %    
09/15/20 2130 (!) 102/58  82 23 90 %    
09/15/20 2115 (!) 114/59  86 25 92 %    
09/15/20 2100 (!) 110/57  89 22 90 %    
09/15/20 2003 (!) 124/57  (!) 103      
09/15/20 1930 139/71  91 26 90 %    
09/15/20 1540     90 %    
09/15/20 1227 (!) 154/71 97.8 °F (36.6 °C) 85 20 (!) 80 % 6' (1.829 m) 88 kg (194 lb 0.1 oz) Date 09/15/20 0700 - 09/16/20 2018 09/16/20 0700 - 09/17/20 0644 Shift 0700-1859 1900-0659 24 Hour Total 4634-5487 6246-9259 24 Hour Total  
INTAKE  
I.V.(mL/kg/hr) 50(0) 250(0.2) 300(0.1) Volume (cefTRIAXone (ROCEPHIN) 2 g in 0.9% sodium chloride (MBP/ADV) 50 mL) 50  50 Volume (azithromycin (ZITHROMAX) 500 mg in 0.9% sodium chloride (MBP/ADV) 250 mL)  250 250 Shift Total(mL/kg) 50(0.6) 250(2.8) 300(3.4) OUTPUT Urine(mL/kg/hr)  1000(0.9) 1000(0.5) Urine Voided  1000 1000 Shift Total(mL/kg)  1000(11.4) 1000(11.4) NET 50 -750 -700 Weight (kg) 88 88 88 88 88 88  
  
(2) ENMT:   moist mucous membranes, normal sinuses (3) Respiratory:  breathing easily, no distress (4) GI:  no abdominal masses, tenderness  
(5) :   Normal, no GH  
(6) Lymphatic:  no adenopathy, neck supple  
(7) Muscloskeletal:  no gross deformity, normal ROM  
(8) Skin:  no rash, warm & dry  
(9) Neuro:  no focal deficits, normal speech Signed By: Tomeka Colby NP  - September 16, 2020

## 2020-09-16 NOTE — ED NOTES
Pt's O2 continued to drop to 86-88 over the last hour. Attempted to change position of pulse-ox probe, but remained unchanged w/ good waveform. Dr. Billy Irizarry called. Verbal orders received for ABG/Bipap. Respiratory notified.

## 2020-09-16 NOTE — CONSULTS
[facility] Pulmonary, Critical Care, and Sleep Medicine Initial Patient Consult Name: Yoav Plata MRN: [id number] : 1940 Hospital: [facility]  Date: 2020 IMPRESSION:  
· Acute/chronic hypoxic respiratory failure - note baseline 3 LPM with SpO2 typically in 80s per his report · COPD with possible exacerbation - patient of Dr. 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  
  
RECOMMENDATIONS:  
· O2 - wean - note he probably was not on enough O2 at home · Bronchodilators · Steroids - taper · Empiric antibiotics · Follow up COVID testing, rapid negative · Cardiac surgery consult pending for aortic arch ulcer · DVT prophylaxis · No current indication for ICU care. Transfer to floor Subjective: This patient has been seen and evaluated at the request of Dr. Zell Lombard for respiratory failure. Patient is a 78 y.o. male with COPD, follows with Dr. Princess Pruitt. He presented to the ER with abdominal pain which led to shortness of breath. CT was negative. He was hypoxic in the ER so he was started on BiPAP. Now on nasal cannula. He says all of his symptoms have resolved. No dyspnea, no abdominal pain. He notes he is typically on 3 LPM O2 with SpO2 in the 80s at baseline. Past Medical History:  
Diagnosis Date  CAD (coronary artery disease)   
 mi 10/19/2015 with stent  COPD (chronic obstructive pulmonary disease) (HCC)  Hypertension  Prostate cancer ([facility] [address] 2012 Dr. Andujar Prom - diagnosed 2012 - s/p EBRT on Lupron Past Surgical History:  
Procedure Laterality Date  HX ORTHOPAEDIC    
 left knee surgery Prior to Admission medications Medication Sig Start Date End Date Taking? Authorizing Provider docusate sodium (COLACE) 100 mg capsule Take 1 Cap by mouth two (2) times a day for 90 days. 9/14/20 12/13/20  Aviva Casiano DO  
polyethylene glycol (Miralax) 17 gram/dose powder Take 17 g by mouth daily. 1 tablespoon with 8 oz of water daily 9/14/20   Aviva Casiano DO  
cyclobenzaprine (FLEXERIL) 5 mg tablet TAKE 1 TABLET BY MOUTH TWICE DAILY AS NEEDED FOR MUSCLE SPASM 9/11/20   Carmel Allred MD  
abiraterone 250 mg tab Take  by mouth. 4 tabs daily    Nancy Chung MD  
oxybutynin chloride XL (DITROPAN XL) 10 mg CR tablet Take 10 mg by mouth daily. Nancy Chung MD  
lansoprazole (PREVACID) 15 mg capsule Take 15 mg by mouth Daily (before breakfast). Nancy Chung MD  
predniSONE (DELTASONE) 2.5 mg tablet Take 5 mg by mouth two (2) times a day. Nancy Chung MD  
diazePAM (Valium) 5 mg tablet Take 1 Tab by mouth every eight (8) hours as needed for Muscle Spasm(s). Max Daily Amount: 15 mg. 8/29/20   Blayne Vallejo MD  
lidocaine 4 % patch 1 Patch by TransDERmal route every twelve (12) hours every twelve (12) hours for 30 days. 8/29/20 9/28/20  Blayne Vallejo MD  
albuterol (PROVENTIL HFA, VENTOLIN HFA, PROAIR HFA) 90 mcg/actuation inhaler Use 2 puffs very 4 hours prn 7/30/20   Carmel Allred MD  
famotidine (PEPCID) 20 mg tablet Take 1 Tab by mouth as needed for Heartburn. 7/15/20   Carmel Allred MD  
metoprolol tartrate (LOPRESSOR) 50 mg tablet TAKE ONE TABLET BY MOUTH TWICE DAILY 7/13/20   Windy Cancino MD  
lisinopriL (PRINIVIL, ZESTRIL) 2.5 mg tablet Take 1 tablet by mouth once daily 3/21/20   Windy Cancino MD  
albuterol-ipratropium (DUO-NEB) 2.5 mg-0.5 mg/3 ml nebu 3 mL by Nebulization route every six (6) hours as needed (sob). 10/19/19   Rashi Cramer, TANJA  
fluticasone furoate-vilanterol (BREO ELLIPTA) 100-25 mcg/dose inhaler Take 1 Puff by inhalation daily. 3/25/19   Karma OLIVIER, DO Oxygen 3L continouus    Provider, Historical  
 fluticasone (FLONASE) 50 mcg/actuation nasal spray 2 Sprays by Both Nostrils route daily. 19   Edda Montes MD  
atorvastatin (LIPITOR) 40 mg tablet TAKE 1 TABLET EVERY DAY 18   Danilo Hernandez MD  
XTANDI 40 mg capsule Take 160 mg by mouth daily. 18   Provider, Historical  
aspirin 81 mg chewable tablet Take 1 Tab by mouth daily. RISK OF HEART ATTACK IF ANY MISSED DOSES 10/21/15   Sly Mayorga MD  
 
No Known Allergies Social History Tobacco Use  Smoking status: Former Smoker Packs/day: 0.50 Years: 40.00 Pack years: 20.00 Types: Cigarettes Last attempt to quit: 10/19/2015 Years since quittin.9  Smokeless tobacco: Never Used  Tobacco comment: trying quit 10/13/15 -  ppd Substance Use Topics  Alcohol use: No  
  Alcohol/week: 0.0 standard drinks Family History Problem Relation Age of Onset  Heart Disease Mother   
     pacemaker  Diabetes Mother  Diabetes Sister  Heart Disease Brother Current Facility-Administered Medications Medication Dose Route Frequency  pantoprazole (PROTONIX) 40 mg in 0.9% sodium chloride 10 mL injection  40 mg IntraVENous DAILY  ipratropium-albuterol (COMBIVENT RESPIMAT) 20 mcg-100 mcg inhalation spray  1 Puff Inhalation Q4H RT  
 budesonide-formoteroL (SYMBICORT) 160-4.5 mcg/actuation HFA inhaler 2 Puff  2 Puff Inhalation BID RT  
 influenza vaccine 2020- (6 mos+)(PF) (FLUARIX/FLULAVAL/FLUZONE QUAD) injection 0.5 mL  0.5 mL IntraMUSCular PRIOR TO DISCHARGE  methylPREDNISolone (PF) (SOLU-MEDROL) injection 40 mg  40 mg IntraVENous Q8H  
 albuterol (PROVENTIL HFA, VENTOLIN HFA, PROAIR HFA) inhaler 2 Puff  2 Puff Inhalation Q4H RT  
 aspirin chewable tablet 81 mg  81 mg Oral DAILY  atorvastatin (LIPITOR) tablet 40 mg  40 mg Oral QHS  docusate sodium (COLACE) capsule 100 mg  100 mg Oral BID  pantoprazole (PROTONIX) tablet 40 mg  40 mg Oral ACB  lisinopriL (PRINIVIL, ZESTRIL) tablet 2.5 mg  2.5 mg Oral DAILY  metoprolol tartrate (LOPRESSOR) tablet 50 mg  50 mg Oral BID  
 oxybutynin chloride XL (DITROPAN XL) tablet 10 mg  10 mg Oral DAILY  polyethylene glycol (MIRALAX) packet 17 g  17 g Oral DAILY  guaiFENesin ER (MUCINEX) tablet 600 mg  600 mg Oral BID  
 azithromycin (ZITHROMAX) 500 mg in 0.9% sodium chloride (MBP/ADV) 250 mL  500 mg IntraVENous Q24H  cefTRIAXone (ROCEPHIN) 1 g in 0.9% sodium chloride (MBP/ADV) 50 mL  1 g IntraVENous Q24H Review of Systems: A comprehensive review of systems was negative except for that written in the HPI. Objective:  
Vital Signs:   
Visit Vitals BP (!) 152/74 Pulse 84 Temp 98.2 °F (36.8 °C) Resp 20 Ht 6' (1.829 m) Wt 88 kg (194 lb 0.1 oz) SpO2 94% BMI 26.31 kg/m² O2 Device: Hi flow nasal cannula O2 Flow Rate (L/min): 10 l/min Temp (24hrs), Av.8 °F (36.6 °C), Min:97.5 °F (36.4 °C), Max:98.2 °F (36.8 °C) Intake/Output:  
Last shift:       07 - 1900 In: 1303.3 [I.V.:1303.3] Out: 250 [Urine:250] Last 3 shifts:  190 -  0700 In: 300 [I.V.:300] Out: 1000 [Urine:1000] Intake/Output Summary (Last 24 hours) at 2020 1038 Last data filed at 2020 0900 Gross per 24 hour Intake 1603.33 ml Output 1250 ml Net 353.33 ml Physical Exam:  
General:  Alert, cooperative, no distress, appears stated age. Head:  Normocephalic, without obvious abnormality, atraumatic. Eyes:  Conjunctivae/corneas clear. Nose: Nares normal. Septum midline. Throat: Lips, mucosa, and tongue normal. Teeth and gums normal.  
Neck: Supple, symmetrical, trachea midline Back:   Symmetric, no curvature. ROM normal.  
Lungs:   Clear to auscultation bilaterally. Chest wall:  No tenderness or deformity. Heart:  Regular rate and rhythm Abdomen:   Soft, non-tender.  Bowel sounds normal.    
 Extremities: Extremities normal, atraumatic, no cyanosis or clubbing Skin: Skin color, texture, turgor normal. No rashes or lesions Lymph nodes: Cervical, supraclavicular nodes normal.  
Neurologic: Grossly nonfocal  
 
Data review:  
 
Recent Results (from the past 24 hour(s)) EKG, 12 LEAD, INITIAL Collection Time: 09/15/20 12:39 PM  
Result Value Ref Range Ventricular Rate 85 BPM  
 Atrial Rate 312 BPM  
 QRS Duration 86 ms  
 Q-T Interval 380 ms QTC Calculation (Bezet) 452 ms Calculated P Axis 57 degrees Calculated R Axis 40 degrees Calculated T Axis -166 degrees Diagnosis Normal sinus rhythm Cannot rule out Anteroseptal infarct (cited on or before 23-MAR-2019) T wave abnormality, consider lateral ischemia When compared with ECG of 14-SEP-2020 10:54, No significant change was found Confirmed by Coleen Malik ([id number]) on 9/15/2020 6:08:55 PM 
  
CBC WITH AUTOMATED DIFF Collection Time: 09/15/20 12:45 PM  
Result Value Ref Range WBC 8.6 4.1 - 11.1 K/uL  
 RBC 4.61 4.10 - 5.70 M/uL  
 HGB 12.7 12.1 - 17.0 g/dL HCT 40.9 36.6 - 50.3 % MCV 88.7 80.0 - 99.0 FL  
 MCH 27.5 26.0 - 34.0 PG  
 MCHC 31.1 30.0 - 36.5 g/dL  
 RDW 19.3 (H) 11.5 - 14.5 % PLATELET 750 037 - 573 K/uL MPV 10.8 8.9 - 12.9 FL  
 NRBC 0.2 (H) 0  WBC ABSOLUTE NRBC 0.02 (H) 0.00 - 0.01 K/uL NEUTROPHILS 65 32 - 75 % LYMPHOCYTES 21 12 - 49 % MONOCYTES 11 5 - 13 % EOSINOPHILS 1 0 - 7 % BASOPHILS 1 0 - 1 % IMMATURE GRANULOCYTES 1 (H) 0.0 - 0.5 % ABS. NEUTROPHILS 5.7 1.8 - 8.0 K/UL  
 ABS. LYMPHOCYTES 1.8 0.8 - 3.5 K/UL  
 ABS. MONOCYTES 0.9 0.0 - 1.0 K/UL  
 ABS. EOSINOPHILS 0.1 0.0 - 0.4 K/UL  
 ABS. BASOPHILS 0.0 0.0 - 0.1 K/UL  
 ABS. IMM. GRANS. 0.1 (H) 0.00 - 0.04 K/UL  
 DF AUTOMATED METABOLIC PANEL, COMPREHENSIVE Collection Time: 09/15/20 12:45 PM  
Result Value Ref Range Sodium 141 136 - 145 mmol/L  Potassium 5.2 (H) 3.5 - 5.1 mmol/L  
 Chloride 110 (H) 97 - 108 mmol/L  
 CO2 28 21 - 32 mmol/L Anion gap 3 (L) 5 - 15 mmol/L Glucose 88 65 - 100 mg/dL BUN 18 6 - 20 MG/DL Creatinine 1.49 (H) 0.70 - 1.30 MG/DL  
 BUN/Creatinine ratio 12 12 - 20 GFR est AA 55 (L) >60 ml/min/1.73m2 GFR est non-AA 45 (L) >60 ml/min/1.73m2 Calcium 9.1 8.5 - 10.1 MG/DL Bilirubin, total 0.5 0.2 - 1.0 MG/DL  
 ALT (SGPT) 15 12 - 78 U/L  
 AST (SGOT) 42 (H) 15 - 37 U/L Alk. phosphatase 173 (H) 45 - 117 U/L Protein, total 7.9 6.4 - 8.2 g/dL Albumin 3.2 (L) 3.5 - 5.0 g/dL Globulin 4.7 (H) 2.0 - 4.0 g/dL A-G Ratio 0.7 (L) 1.1 - 2.2    
TROPONIN I Collection Time: 09/15/20 12:45 PM  
Result Value Ref Range Troponin-I, Qt. <0.05 <0.05 ng/mL PROCALCITONIN Collection Time: 09/15/20  1:36 PM  
Result Value Ref Range Procalcitonin <0.05 ng/mL MAGNESIUM Collection Time: 09/15/20  1:36 PM  
Result Value Ref Range Magnesium 2.4 1.6 - 2.4 mg/dL PROTHROMBIN TIME + INR Collection Time: 09/15/20  1:36 PM  
Result Value Ref Range INR 1.0 0.9 - 1.1 Prothrombin time 10.1 9.0 - 11.1 sec LD Collection Time: 09/15/20  1:36 PM  
Result Value Ref Range  (H) 85 - 241 U/L FERRITIN Collection Time: 09/15/20  1:36 PM  
Result Value Ref Range Ferritin 33 26 - 388 NG/ML  
NT-PRO BNP Collection Time: 09/15/20  1:36 PM  
Result Value Ref Range NT pro- (H) <450 PG/ML  
D DIMER Collection Time: 09/15/20  1:36 PM  
Result Value Ref Range D-dimer 0.55 0.00 - 0.65 mg/L FEU  
POC EG7 Collection Time: 09/15/20  1:57 PM  
Result Value Ref Range Calcium, ionized (POC) 1.27 1.12 - 1.32 mmol/L  
 pH (POC) 7.33 (L) 7.35 - 7.45    
 pCO2 (POC) 48.7 (H) 35.0 - 45.0 MMHG  
 pO2 (POC) 19 (LL) 80 - 100 MMHG  
 HCO3 (POC) 25.9 22 - 26 MMOL/L Base excess (POC) 0 mmol/L  
 sO2 (POC) 25 (L) 92 - 97 % Site OTHER Device: NASAL CANNULA Flow rate (POC) 5 L/M  Allens test (POC) N/A    
 Specimen type (POC) VENOUS BLOOD Total resp. rate 16 POC EG7 Collection Time: 09/15/20  3:34 PM  
Result Value Ref Range Calcium, ionized (POC) 1.32 1.12 - 1.32 mmol/L  
 pH (POC) 7.41 7.35 - 7.45    
 pCO2 (POC) 31.7 (L) 35.0 - 45.0 MMHG  
 pO2 (POC) 36 (LL) 80 - 100 MMHG  
 HCO3 (POC) 20.2 (L) 22 - 26 MMOL/L Base deficit (POC) 4 mmol/L  
 sO2 (POC) 70 (L) 92 - 97 % Site LEFT RADIAL Device: NASAL CANNULA Flow rate (POC) 5 L/M Allens test (POC) YES Specimen type (POC) ARTERIAL    
SARS-COV-2 Collection Time: 09/15/20  3:49 PM  
Result Value Ref Range Specimen source Nasopharyngeal    
 Specimen source Nasopharyngeal    
 COVID-19 rapid test Not detected NOTD Specimen type NP Swab Health status Symptomatic Testing POC EG7 Collection Time: 09/15/20  3:57 PM  
Result Value Ref Range Calcium, ionized (POC) 1.28 1.12 - 1.32 mmol/L  
 pH (POC) 7.41 7.35 - 7.45    
 pCO2 (POC) 32.0 (L) 35.0 - 45.0 MMHG  
 pO2 (POC) 48 (LL) 80 - 100 MMHG  
 HCO3 (POC) 20.0 (L) 22 - 26 MMOL/L Base deficit (POC) 5 mmol/L  
 sO2 (POC) 85 (L) 92 - 97 % Site RIGHT RADIAL Device: High Flow Nasal Cannula Flow rate (POC) 8 L/M Allens test (POC) YES Specimen type (POC) ARTERIAL Total resp. rate 28 SARS-COV-2 Collection Time: 09/15/20 11:15 PM  
Result Value Ref Range Specimen source Nasopharyngeal    
 Specimen source Nasopharyngeal    
 Specimen type NP Swab Health status Symptomatic Testing COVID-19 PENDING   
URINALYSIS W/ REFLEX CULTURE Collection Time: 09/15/20 11:25 PM  
 Specimen: Urine Result Value Ref Range Color YELLOW/STRAW Appearance CLEAR CLEAR Specific gravity 1.030 1.003 - 1.030    
 pH (UA) 6.0 5.0 - 8.0 Protein Negative NEG mg/dL Glucose Negative NEG mg/dL Ketone Negative NEG mg/dL Bilirubin Negative NEG Blood Negative NEG Urobilinogen 0.2 0.2 - 1.0 EU/dL Nitrites Negative NEG Leukocyte Esterase Negative NEG    
 UA:UC IF INDICATED CULTURE NOT INDICATED BY UA RESULT CNI    
 WBC 0-4 0 - 4 /hpf  
 RBC 0-5 0 - 5 /hpf Epithelial cells FEW FEW /lpf Bacteria Negative NEG /hpf Hyaline cast 0-2 0 - 5 /lpf METABOLIC PANEL, BASIC Collection Time: 09/16/20  3:51 AM  
Result Value Ref Range Sodium 141 136 - 145 mmol/L Potassium 4.9 3.5 - 5.1 mmol/L Chloride 113 (H) 97 - 108 mmol/L  
 CO2 23 21 - 32 mmol/L Anion gap 5 5 - 15 mmol/L Glucose 128 (H) 65 - 100 mg/dL BUN 21 (H) 6 - 20 MG/DL Creatinine 1.30 0.70 - 1.30 MG/DL  
 BUN/Creatinine ratio 16 12 - 20 GFR est AA >60 >60 ml/min/1.73m2 GFR est non-AA 53 (L) >60 ml/min/1.73m2 Calcium 8.3 (L) 8.5 - 10.1 MG/DL  
CBC W/O DIFF Collection Time: 09/16/20  3:51 AM  
Result Value Ref Range WBC 7.4 4.1 - 11.1 K/uL  
 RBC 4.00 (L) 4.10 - 5.70 M/uL  
 HGB 11.2 (L) 12.1 - 17.0 g/dL HCT 35.1 (L) 36.6 - 50.3 % MCV 87.8 80.0 - 99.0 FL  
 MCH 28.0 26.0 - 34.0 PG  
 MCHC 31.9 30.0 - 36.5 g/dL  
 RDW 18.7 (H) 11.5 - 14.5 % PLATELET 279 282 - 287 K/uL MPV 11.6 8.9 - 12.9 FL  
 NRBC 0.3 (H) 0  WBC ABSOLUTE NRBC 0.02 (H) 0.00 - 0.01 K/uL MAGNESIUM Collection Time: 09/16/20  3:51 AM  
Result Value Ref Range Magnesium 2.6 (H) 1.6 - 2.4 mg/dL PHOSPHORUS Collection Time: 09/16/20  3:51 AM  
Result Value Ref Range Phosphorus 2.6 2.6 - 4.7 MG/DL  
D DIMER Collection Time: 09/16/20  3:51 AM  
Result Value Ref Range D-dimer 0.60 0.00 - 0.65 mg/L FEU PROTHROMBIN TIME + INR Collection Time: 09/16/20  3:51 AM  
Result Value Ref Range INR 1.0 0.9 - 1.1 Prothrombin time 10.2 9.0 - 11.1 sec POC EG7 Collection Time: 09/16/20  4:53 AM  
Result Value Ref Range  Calcium, ionized (POC) 1.23 1.12 - 1.32 mmol/L  
 pH (POC) 7.36 7.35 - 7.45    
 pCO2 (POC) 35.1 35.0 - 45.0 MMHG  
 pO2 (POC) 46 (LL) 80 - 100 MMHG  
 HCO3 (POC) 19.8 (L) 22 - 26 MMOL/L  
 Base deficit (POC) 6 mmol/L  
 sO2 (POC) 80 (L) 92 - 97 % Site RIGHT RADIAL Device: High Flow Nasal Cannula Flow rate (POC) 10 L/M Allens test (POC) YES Specimen type (POC) ARTERIAL Imaging: 
I have personally reviewed the patients radiographs and have reviewed the reports: 
Minimal vague infiltrate on CT  
 
  
Burngris Perez MD

## 2020-09-16 NOTE — PROGRESS NOTES
0800: Admission assessment completed. Dual skin assessment with Rex Ugarte RN - no pressure ulcer noted. Patient placed in negative pressure room for COVID r/o. No distress noted, IV infusing  mL/hr, hi flow O2 in place at 15L.  
 
1000: Patient resting comfortably in bed. O2 reduced to 8L by . O2 sats holding at 92%. Patient requesting breakfast. IDR completed, orders for transfer received. 1050: Patient set up with breakfast, meds administered. Sats down to 86%, O2 increased to 10L.  
 
1100: Urology at bedside to evaluate. 1200: Patient remains on 10L, sats stable low 90s. No

## 2020-09-17 PROBLEM — I45.5 SINUS PAUSE: Status: ACTIVE | Noted: 2020-01-01

## 2020-09-17 NOTE — ROUTINE PROCESS
0730 - Bedside verbal report received from off going shift. Resting quietly, no acute distress noted. 0800 - Assessment complete, see summary for details. Up to bed side commode with stand by assistance, mild WHEAT with Sa02 84-91% with 8L min Midflow. Alert and Oriented x4, follows all commands. Denies discomfort/pain. Re-positions self in bed, call bell within reach. No acute distress noted. 8484 - Up to chair for breakfast.  Tolerated transfer with mild WHEAT, SOB. 0900 - Sa02 sustained <90 increased 02 = 10L/midflow. 1116 - >4 second pause noted, Dr. Marcelina Gonzalez updated on condition. Verbal order noted, will hold in current bed for now, cardiology consult pending. 12 lead EKG ordered. Will continue to closely monitor. 1200 - Assessment complete, no changes noted from previous assessment. Re-positions self for comfort, call bell within reach. Up to chair with standby assistance, denies discomfort. No acute distress noted. 1600 - Assessment complete, see summary for details. Dr. Stephon Lopez at bedside, updated on condition, orders noted. 1755 - Infiltrated Left FA IV (Zithromax infusing). Pharm D called for needed F/U care? Remainder of IV Zithromax infusing via right AC site. 1815 - Assisted up to chair for dinner tray. Applied ice to infiltration x20 min for discomfort. 1845 - Applied moist heat to infiltration site, elevated on pillow, returned to bed with standby assistance. Call bell within reach. Will continue to closely monitor. 1900 - Bedside verbal report given to oncoming shift.

## 2020-09-17 NOTE — PROGRESS NOTES
[facility] Name: Miriam Livingston MRN: [id number] : 1940 Hospital: [facility] Date: 2020 IMPRESSION:  
· Acute/chronic hypoxic respiratory failure - note baseline 3 LPM with SpO2 typically in 80s per his report · COPD with possible exacerbation - patient of Dr. 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  
  
RECOMMENDATIONS:  
· O2 - wean - note he probably was not on enough O2 at home · Bronchodilators · Steroids - taper again tomorrow · Empiric antibiotics · Vascular surgery consult pending for aortic arch ulcer · DVT prophylaxis · Day #2 awaiting transfer to floor Subjective:  
 
20: no events. Sleeping comfortably this morning. Still on 8-10 LPM O2. Initial history: This patient has been seen and evaluated at the request of Dr. Namrata Sorto for respiratory failure. Patient is a 78 y.o. male with COPD, follows with Dr. Kuldeep Mata. He presented to the ER with abdominal pain which led to shortness of breath. CT was negative. He was hypoxic in the ER so he was started on BiPAP. Now on nasal cannula. He says all of his symptoms have resolved. No dyspnea, no abdominal pain. He notes he is typically on 3 LPM O2 with SpO2 in the 80s at baseline. Past Medical History:  
Diagnosis Date  CAD (coronary artery disease)   
 mi 10/19/2015 with stent  COPD (chronic obstructive pulmonary disease) (HCC)  Hypertension  Prostate cancer ([facility] [address] 2012 Dr. Simeon Montoya - diagnosed 2012 - s/p EBRT on Lupron Past Surgical History:  
Procedure Laterality Date  HX ORTHOPAEDIC    
 left knee surgery Prior to Admission medications Medication Sig Start Date End Date Taking? Authorizing Provider docusate sodium (COLACE) 100 mg capsule Take 1 Cap by mouth two (2) times a day for 90 days. 9/14/20 12/13/20  Nadir Rodriguez DO  
polyethylene glycol (Miralax) 17 gram/dose powder Take 17 g by mouth daily. 1 tablespoon with 8 oz of water daily 9/14/20   Roxflor Rodriguez, DO  
cyclobenzaprine (FLEXERIL) 5 mg tablet TAKE 1 TABLET BY MOUTH TWICE DAILY AS NEEDED FOR MUSCLE SPASM 9/11/20   Magalys Dinh MD  
abiraterone 250 mg tab Take  by mouth. 4 tabs daily    Nancy Chung MD  
oxybutynin chloride XL (DITROPAN XL) 10 mg CR tablet Take 10 mg by mouth daily. Nancy Chung MD  
lansoprazole (PREVACID) 15 mg capsule Take 15 mg by mouth Daily (before breakfast). Nancy Chung MD  
predniSONE (DELTASONE) 2.5 mg tablet Take 5 mg by mouth two (2) times a day. Nancy Chung MD  
diazePAM (Valium) 5 mg tablet Take 1 Tab by mouth every eight (8) hours as needed for Muscle Spasm(s). Max Daily Amount: 15 mg. 8/29/20   Smiley Vega MD  
lidocaine 4 % patch 1 Patch by TransDERmal route every twelve (12) hours every twelve (12) hours for 30 days. 8/29/20 9/28/20  Smiley Vega MD  
albuterol (PROVENTIL HFA, VENTOLIN HFA, PROAIR HFA) 90 mcg/actuation inhaler Use 2 puffs very 4 hours prn 7/30/20   Magalys Dinh MD  
famotidine (PEPCID) 20 mg tablet Take 1 Tab by mouth as needed for Heartburn. 7/15/20   Magalys Dinh MD  
metoprolol tartrate (LOPRESSOR) 50 mg tablet TAKE ONE TABLET BY MOUTH TWICE DAILY 7/13/20   Donald Colvin MD  
lisinopriL (PRINIVIL, ZESTRIL) 2.5 mg tablet Take 1 tablet by mouth once daily 3/21/20   Donald Colvin MD  
albuterol-ipratropium (DUO-NEB) 2.5 mg-0.5 mg/3 ml nebu 3 mL by Nebulization route every six (6) hours as needed (sob). 10/19/19   Mohan Graham, TANJA  
fluticasone furoate-vilanterol (BREO ELLIPTA) 100-25 mcg/dose inhaler Take 1 Puff by inhalation daily. 3/25/19   Yovanny OLIVIER, DO Oxygen 3L continouus    Provider, Historical  
 fluticasone (FLONASE) 50 mcg/actuation nasal spray 2 Sprays by Both Nostrils route daily. 19   Marsha Sigala MD  
atorvastatin (LIPITOR) 40 mg tablet TAKE 1 TABLET EVERY DAY 18   Jorge Luis Mcgill MD  
XTANDI 40 mg capsule Take 160 mg by mouth daily. 18   Provider, Historical  
aspirin 81 mg chewable tablet Take 1 Tab by mouth daily. RISK OF HEART ATTACK IF ANY MISSED DOSES 10/21/15   Reji Eng MD  
 
No Known Allergies Social History Tobacco Use  Smoking status: Former Smoker Packs/day: 0.50 Years: 40.00 Pack years: 20.00 Types: Cigarettes Last attempt to quit: 10/19/2015 Years since quittin.9  Smokeless tobacco: Never Used  Tobacco comment: trying quit 10/13/15 -  ppd Substance Use Topics  Alcohol use: No  
  Alcohol/week: 0.0 standard drinks Family History Problem Relation Age of Onset  Heart Disease Mother   
     pacemaker  Diabetes Mother  Diabetes Sister  Heart Disease Brother Current Facility-Administered Medications Medication Dose Route Frequency  ipratropium-albuterol (COMBIVENT RESPIMAT) 20 mcg-100 mcg inhalation spray  1 Puff Inhalation Q4H RT  
 budesonide-formoteroL (SYMBICORT) 160-4.5 mcg/actuation HFA inhaler 2 Puff  2 Puff Inhalation BID RT  
 influenza vaccine - (6 mos+)(PF) (FLUARIX/FLULAVAL/FLUZONE QUAD) injection 0.5 mL  0.5 mL IntraMUSCular PRIOR TO DISCHARGE  methylPREDNISolone (PF) (SOLU-MEDROL) injection 40 mg  40 mg IntraVENous Q8H  
 abiraterone tab 250 mg (Patient Supplied)  250 mg Oral DAILY  lisinopriL (PRINIVIL, ZESTRIL) tablet 5 mg  5 mg Oral DAILY  predniSONE (DELTASONE) tablet 5 mg (Patient Supplied)  5 mg Oral BID WITH MEALS  
 aspirin chewable tablet 81 mg  81 mg Oral DAILY  atorvastatin (LIPITOR) tablet 40 mg  40 mg Oral QHS  docusate sodium (COLACE) capsule 100 mg  100 mg Oral BID  
  pantoprazole (PROTONIX) tablet 40 mg  40 mg Oral ACB  metoprolol tartrate (LOPRESSOR) tablet 50 mg  50 mg Oral BID  
 oxybutynin chloride XL (DITROPAN XL) tablet 10 mg  10 mg Oral DAILY  polyethylene glycol (MIRALAX) packet 17 g  17 g Oral DAILY  guaiFENesin ER (MUCINEX) tablet 600 mg  600 mg Oral BID  
 azithromycin (ZITHROMAX) 500 mg in 0.9% sodium chloride (MBP/ADV) 250 mL  500 mg IntraVENous Q24H  cefTRIAXone (ROCEPHIN) 1 g in 0.9% sodium chloride (MBP/ADV) 50 mL  1 g IntraVENous Q24H Review of Systems: A comprehensive review of systems was negative except for that written in the HPI. Objective:  
Vital Signs:   
Visit Vitals /61 Pulse 67 Temp 97.4 °F (36.3 °C) Resp 17 Ht 6' (1.829 m) Wt 88 kg (194 lb 0.1 oz) SpO2 97% BMI 26.31 kg/m² O2 Device: Hi flow nasal cannula O2 Flow Rate (L/min): 10 l/min Temp (24hrs), Av °F (36.7 °C), Min:97.4 °F (36.3 °C), Max:98.3 °F (36.8 °C) Intake/Output:  
Last shift:      No intake/output data recorded. Last 3 shifts: 09/15 1901 -  0700 In: 1978.3 [P.O.:100; I.V.:1878.3] Out:  [Urine:] Intake/Output Summary (Last 24 hours) at 2020 0715 Last data filed at 2020 0500 Gross per 24 hour Intake 1728.33 ml Output 1045 ml Net 683.33 ml Physical Exam:  
General:  Alert, cooperative, no distress, appears stated age. Head:  Normocephalic, without obvious abnormality, atraumatic. Eyes:  Conjunctivae/corneas clear. Nose: Nares normal. Septum midline. Throat: Lips, mucosa, and tongue normal. Teeth and gums normal.  
Neck: Supple, symmetrical, trachea midline Lungs:   Clear to auscultation bilaterally. Chest wall:  No tenderness or deformity. Heart:  Regular rate and rhythm Abdomen:   Soft, non-tender. Bowel sounds normal.    
Extremities: Extremities normal, atraumatic, no cyanosis or clubbing Skin: Skin color, texture, turgor normal. No rashes or lesions Neurologic: Grossly nonfocal  
 
Data:  
Labs: 
Recent Labs  
  09/16/20 
0351 09/15/20 
1245 09/14/20 
1117 WBC 7.4 8.6 7.8 HGB 11.2* 12.7 11.9*  
HCT 35.1* 40.9 38.0  
 199 219 Recent Labs  
  09/16/20 
0351 09/15/20 
1336 09/15/20 
1245 09/14/20 
1117   --  141 143  
K 4.9  --  5.2* 4.7 *  --  110* 114* CO2 23  --  28 25 *  --  88 103* BUN 21*  --  18 14 CREA 1.30  --  1.49* 1.44* CA 8.3*  --  9.1 8.2* MG 2.6* 2.4  --   --   
PHOS 2.6  --   --   --   
ALB  --   --  3.2* 3.1* TBILI  --   --  0.5 0.5 ALT  --   --  15 13 INR 1.0 1.0  --  0.9 Recent Labs  
  09/16/20 
0453 09/15/20 
1557 09/15/20 
1534 PHI 7.36 7.41 7.41  
PCO2I 35.1 32.0* 31.7* PO2I 46* 48* 36* HCO3I 19.8* 20.0* 20.2* Imaging: 
I have personally reviewed the patients radiographs: 
None today Narciso Bosworth, MD

## 2020-09-17 NOTE — CONSULTS
Vascular Surgery Consult Note 2020 Subjective:  
 
Betty Aparicio is a 78 y.o.  male with a pmhx significant for ASHD, HTN, CKD, COPD, and prostate cancer. He is admitted to the hospital with acute hypoxic respiratory failure due to community acquired pneumonia and COPD exacerbation. He complained of abdominal pain (suprapubic region and bilateral lower quadrants). CT of the abd and pelvis was unremarkable for etiology. A CTA of the chest during admission was significant for an enlarging penetrating ulcer of distal aortic arch. .  We have been asked to evaluate. He is COVID neg. Past Medical History ASHD 
-stenting 10/2015 Hypertension Hyperlipidemia CKD stage III 
COPD Chronic hypercapnic respiratory failure  
-oxygen (3L) and steroid dependent 
-former smoker Metastatic prostate cancer 
-s/p EBRT on Lupron  
-Dr. Aleksandar Fox Chronic pain 
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  
-severe central spinal canal stenosis at C3-C4. 
-severe foraminal stenoses at C4-C5 and C5-C6 Constipation Past Surgical History in addition to above Left knee surgery Family History Problem Relation Age of Onset  Heart Disease Mother   
     pacemaker  Diabetes Mother  Diabetes Sister  Heart Disease Brother Social History Tobacco Use  Smoking status: Former Smoker Packs/day: 0.50 Years: 40.00 Pack years: 20.00 Types: Cigarettes Last attempt to quit: 10/19/2015 Years since quittin.9  Smokeless tobacco: Never Used Substance Use Topics  Alcohol use: No  
  Alcohol/week: 0.0 standard drinks Prior to Admission medications Medication Sig Start Date End Date Taking? Authorizing Provider  
docusate sodium (COLACE) 100 mg capsule Take 1 Cap by mouth two (2) times a day for 90 days.  20  Arnaud Gibson,   
 polyethylene glycol (Miralax) 17 gram/dose powder Take 17 g by mouth daily. 1 tablespoon with 8 oz of water daily 9/14/20   Danial Platt DO  
cyclobenzaprine (FLEXERIL) 5 mg tablet TAKE 1 TABLET BY MOUTH TWICE DAILY AS NEEDED FOR MUSCLE SPASM 9/11/20   Angel Hankins MD  
abiraterone 250 mg tab Take  by mouth. 4 tabs daily    Nancy Chung MD  
oxybutynin chloride XL (DITROPAN XL) 10 mg CR tablet Take 10 mg by mouth daily. Nancy Chung MD  
lansoprazole (PREVACID) 15 mg capsule Take 15 mg by mouth Daily (before breakfast). Nancy Chung MD  
predniSONE (DELTASONE) 2.5 mg tablet Take 5 mg by mouth two (2) times a day. Nancy Chung MD  
diazePAM (Valium) 5 mg tablet Take 1 Tab by mouth every eight (8) hours as needed for Muscle Spasm(s). Max Daily Amount: 15 mg. 8/29/20   Chan Suárez MD  
lidocaine 4 % patch 1 Patch by TransDERmal route every twelve (12) hours every twelve (12) hours for 30 days. 8/29/20 9/28/20  Chan Suárez MD  
albuterol (PROVENTIL HFA, VENTOLIN HFA, PROAIR HFA) 90 mcg/actuation inhaler Use 2 puffs very 4 hours prn 7/30/20   Angel Hankins MD  
famotidine (PEPCID) 20 mg tablet Take 1 Tab by mouth as needed for Heartburn. 7/15/20   Angel Hankins MD  
metoprolol tartrate (LOPRESSOR) 50 mg tablet TAKE ONE TABLET BY MOUTH TWICE DAILY 7/13/20   Armando Hwang MD  
lisinopriL (PRINIVIL, ZESTRIL) 2.5 mg tablet Take 1 tablet by mouth once daily 3/21/20   Armando Hwang MD  
albuterol-ipratropium (DUO-NEB) 2.5 mg-0.5 mg/3 ml nebu 3 mL by Nebulization route every six (6) hours as needed (sob). 10/19/19   Helga Malin NP  
fluticasone furoate-vilanterol (BREO ELLIPTA) 100-25 mcg/dose inhaler Take 1 Puff by inhalation daily. 3/25/19   Erika Dayhoff B, DO Oxygen 3L continouus    Provider, Historical  
fluticasone (FLONASE) 50 mcg/actuation nasal spray 2 Sprays by Both Nostrils route daily.  2/18/19   Shivani Cortés MD  
 atorvastatin (LIPITOR) 40 mg tablet TAKE 1 TABLET EVERY DAY 9/20/18   Alma Rosa Barnes MD  
XTANDI 40 mg capsule Take 160 mg by mouth daily. 1/11/18   Provider, Historical  
aspirin 81 mg chewable tablet Take 1 Tab by mouth daily. RISK OF HEART ATTACK IF ANY MISSED DOSES 10/21/15   Courtney Haines MD  
 
No Known Allergies Review of Systems Constitutional: Positive for activity change and fatigue. Negative for chills. HENT: Negative for congestion. Eyes: Negative for visual disturbance. Respiratory: Positive for shortness of breath. Negative for cough. Cardiovascular: Negative for chest pain and leg swelling. Gastrointestinal: Positive for abdominal pain and constipation. Negative for nausea and vomiting. Endocrine: Negative for polydipsia and polyuria. Genitourinary: Negative. Musculoskeletal: Positive for arthralgias, back pain and neck pain. Skin: Negative. Allergic/Immunologic: Negative for immunocompromised state. Neurological: Positive for weakness. Hematological: Negative. Psychiatric/Behavioral: Negative. Objective:  
 
 
Patient Vitals for the past 24 hrs: 
 BP Temp Pulse Resp SpO2  
09/17/20 0800 (!) 142/69 98.2 °F (36.8 °C) 70 25 (!) 88 % 09/17/20 0722     96 % 09/17/20 0700 132/61  67 17 97 % 09/17/20 0600 124/65  78 20 95 % 09/17/20 0500 (!) 94/44  67 19 98 % 09/17/20 0415 (!) 96/38 97.4 °F (36.3 °C) 63 20 96 % 09/17/20 0400   63 20 95 % 09/17/20 0344     95 % 09/17/20 0000 (!) 183/55 97.9 °F (36.6 °C) 69 20 91 % 09/16/20 2337     94 % 09/16/20 2200 118/70  71 19 97 % 09/16/20 2100 (!) 128/55  72 20 96 % 09/16/20 2017     96 % 09/16/20 2015     94 % 09/16/20 2000 118/63 98.3 °F (36.8 °C) 84 19 94 % 09/16/20 1900   89 21 92 % 09/16/20 1800 (!) 127/57  85 27 (!) 89 % 09/16/20 1630  98.3 °F (36.8 °C) 80 28 93 % 09/16/20 1600 (!) 105/46 98.3 °F (36.8 °C) 76 28 93 % 09/16/20 1546     92 % 09/16/20 1300 (!) 138/56  77 26 92 % 09/16/20 1246     93 % 09/16/20 1200 (!) 140/56 97.8 °F (36.6 °C) 80 (!) 32 97 % 09/16/20 1100 134/67  79 18 90 % 09/16/20 1000 130/65  76 20 94 % 09/16/20 0902 (!) 152/74      
09/16/20 0901   84 20 94 % 09/16/20 0859     93 % Physical Exam 
Constitutional:   
   Appearance: He is normal weight. HENT:  
   Head: Normocephalic. Nose: Nose normal.  
   Mouth/Throat:  
   Mouth: Mucous membranes are moist.  
Eyes:  
   Pupils: Pupils are equal, round, and reactive to light. Neck: Musculoskeletal: Normal range of motion. Cardiovascular:  
   Rate and Rhythm: Normal rate and regular rhythm. Pulmonary:  
   Effort: Pulmonary effort is normal. Tachypnea present. No accessory muscle usage. Abdominal:  
   General: Abdomen is flat. Palpations: Abdomen is soft. Musculoskeletal: Normal range of motion. Skin: 
   General: Skin is warm. Neurological:  
   General: No focal deficit present. Mental Status: He is alert. Mental status is at baseline. Psychiatric:     
   Mood and Affect: Mood normal.     
   Behavior: Behavior normal.  
 
 
Pertinent Test Results: No results found for this or any previous visit (from the past 24 hour(s)). Assessmen/Plan:  
 
Consult problem MEAGHAN presenting with abdominal pain 
 -underlying hx of metastatic prostate cancer on EBRT on Lupron with Dr. Sindy Abdul Chronic pain  
 -due to DJD and hx of muscle spasm w/ benzodiazepine & cyclobenzaprine use MEAGHAN is amendable to TEVAR. Will need to discuss overall goals of care considering his comorbid conditions to determine his plan of care. Dr. Aaliyah Thurston to discuss with patient later today. Acute problems Community acquired PNA 
 -failed outpatient tx for bronchitis  
 -IV antibxs Acute on chronic hypoxic/hypercapnic respiratory failure  
 -oxygen (3L) and steroid dependent COPD exacerbation -inhaled corticosteroids  
 -IV & oral steroids Plan per pulmonary/critical care Active problems ASHD 
 -ASA Hypertension 
 -labile Hyperlipidemia  
 -statin CKD stage III 
 -baseline creatinine 1.5 Hyperkalemia 
 -resolved Constipation -bowel regimen Management of comorbid conditions by primary team. 
 
VTE Prophylaxis: SCDs Disposition: 
TBD Signed By: Radha Dumont NP September 17, 2020

## 2020-09-17 NOTE — PROGRESS NOTES
CALLED TO SCHEDULE ER FOLLOW UP APPOINTMENT.    11/27/2018 10:04 AM, PHONE NOT ACCEPTING CALLS AT THIS TIME.    11/28/2018 11:13 AM, PHONE NOT ACCEPTING CALLS AT THIS TIME.    12/03/2018 4:45 PM, PHONE NOT ACCEPTING CALLS AT THIS TIME.   OUr nurse navigator for prostate cancer has spoken to Mr Jake's daughter- he is now taking his zytiga as he was at home Watch for recurrent abd pain which starts again with the restart of zytiga- if so lets try it on empty stomach. Thanks

## 2020-09-17 NOTE — PROGRESS NOTES
Care Management: 
 
BRADLEY; Home with DTR and family will transport. If HH indicated they would like to use [facility]. Second IM letter. Follow up appointments with physicians. Reason for Admission:   COPD exacerbation and on 8-10 liters of 02 in the ICU. Hx of COPD and metastatic prostate cancer. Dr Kuldeep Mata is pulmonologist and Dr Grace Romero urologist.  
            
Charisse Begum Score:     27 PCP: First and Last name: Dr Lexii Knutson Name of Practice: 
 Are you a current patient: Yes Approximate date of last visit:  
 Can you do a virtual visit with your PCP:  YES Resources/supports as identified by patient/family:   Family is supportive. Patient has his DTR and two sisters. Top Challenges facing patient (as identified by patient/family and CM): Finances/Medication cost?       
          Have insurance and no issues with medications at this time. Transportation? Family assists. Living arrangements? Lives with his DTR in a two story home. He is independent with ADL's in the home. He has 02 though [employer] . 02 3l nc at baseline. Self-care/ADL's/Cognition? Independent in the home. Current Advanced Directive/Advance Care Plan:  Full Code and DTR is NOK. Plan for utilizing home health:     
             If [facility] indicated DTR would like [address] again. Care Management Interventions PCP Verified by CM: Yes Mode of Transport at Discharge: Other (see comment)(Family) Current Support Network: Relative's Home(Lives wit is DTR. Home is two stories. He has 02 and awheelchair and nebulizer. He is independent with his ADL's. ) Confirm Follow Up Transport: Family I spoke with DTR and confirmed demographics. They use [address] in [place]. Goal is for patient to return home with assist of family. Chart reviewed and we will cont to follow for discharge needs as appropriate. Emily Elder RN ACM [id number]

## 2020-09-17 NOTE — PROGRESS NOTES
1900: Report received from [place], [facility]: Assessment as noted. Pt AOx4, no c/o pain at this time. Repositioned in bed. Midflow NC at 12L. Updated pt on plan of care. 2045: 2nd Covid swab test is now negative. Notified Tele-hospitalist to discontinue isolation precautions. 0700: Midflow weaned to 10L. Sats up to 98% when side lying. Has dyspnea on exertion and can desat into mid 80s with movement. Report given to Dilcia/Callie

## 2020-09-17 NOTE — CONSULTS
[facility] Date of  Admission: 9/15/2020 12:33 PM  
 
Admission type:Emergency Consult for: pauses Consult by: intensivist  
 
 Subjective:  
 
Betty Aparicio is a 78 y.o. male with PMH CAD s/p stenting, HTN, prostate CA, HLD, COPD who was admitted for COPD exacerbation ([facility] [J44.1] Acute hypoxemic respiratory failure ([facility] [J96.01]. Per ED provider note Betty Aparicio presented to the ED with c/o abd pain, SOB, wheezing, cough. Cardiology consulted for pauses. On assessment, Betty Aparicio states he's feeling well now. He denies pain or SOB. He did not have any dizziness or syncope with the pauses. Betty Aparicio  follows with Dr. Viviana Askew for cardiology. Last ECHO 01/17 with EF 55%; hypokinesis of apical wall; mod concentric hypertrophy. Cath 10/15 with stenting. Stress with no change in infarction from prior; no ischemia Patient Active Problem List  
 Diagnosis Date Noted  Sinus pause 09/17/2020  COPD exacerbation ([facility] 09/15/2020  Acute hypoxemic respiratory failure ([facility] 09/15/2020  Mixed simple and mucopurulent chronic bronchitis ([facility] 04/17/2019  Hypoxemia 03/23/2019  COPD (chronic obstructive pulmonary disease) ([facility] 03/23/2019  Coronary artery disease involving native coronary artery of native heart without angina pectoris 04/25/2017  Advance directive discussed with patient 07/22/2016  Hyperlipidemia 06/27/2016  Acute myocardial infarction of anterior wall, subsequent episode of care ([address] 06/06/2016  Penetrating atherosclerotic ulcer of aorta ([facility] 06/06/2016  Mucopurulent chronic bronchitis ([facility] 06/05/2016  S/P PTCA (percutaneous transluminal coronary angioplasty) 10/21/2015  Chronic interstitial lung disease ([address] 10/20/2015  
 STEMI (ST elevation myocardial infarction) ([address] 10/19/2015  PAD (peripheral artery disease) ([address] 12/31/2013  Prostate cancer ([facility] 05/08/2012  HTN (hypertension) 2011  ED (erectile dysfunction) 2010 Jan Malik MD 
Past Medical History:  
Diagnosis Date  CAD (coronary artery disease)   
 mi 10/19/2015 with stent  COPD (chronic obstructive pulmonary disease) (HCC)  Hypertension  Prostate cancer ([facility] 2012 Dr. Simeon Montoya - diagnosed 2012 - s/p EBRT on Lupron Social History Socioeconomic History  Marital status:  Spouse name: Not on file  Number of children: Not on file  Years of education: Not on file  Highest education level: Not on file Tobacco Use  Smoking status: Former Smoker Packs/day: 0.50 Years: 40.00 Pack years: 20.00 Types: Cigarettes Last attempt to quit: 10/19/2015 Years since quittin.9  Smokeless tobacco: Never Used  Tobacco comment: trying quit 10/13/15 -  ppd Substance and Sexual Activity  Alcohol use: No  
  Alcohol/week: 0.0 standard drinks  Drug use: No  
 Sexual activity: Not Currently No Known Allergies Family History Problem Relation Age of Onset  Heart Disease Mother   
     pacemaker  Diabetes Mother  Diabetes Sister  Heart Disease Brother Current Facility-Administered Medications Medication Dose Route Frequency  aluminum-magnesium hydroxide (MAALOX) oral suspension 30 mL  30 mL Oral Q6H PRN  
 ipratropium-albuterol (COMBIVENT RESPIMAT) 20 mcg-100 mcg inhalation spray  1 Puff Inhalation Q4H RT  
 budesonide-formoteroL (SYMBICORT) 160-4.5 mcg/actuation HFA inhaler 2 Puff  2 Puff Inhalation BID RT  
 influenza vaccine 2020- (6 mos+)(PF) (FLUARIX/FLULAVAL/FLUZONE QUAD) injection 0.5 mL  0.5 mL IntraMUSCular PRIOR TO DISCHARGE  methylPREDNISolone (PF) (SOLU-MEDROL) injection 40 mg  40 mg IntraVENous Q8H  
 abiraterone tab 250 mg (Patient Supplied)  250 mg Oral DAILY  lisinopriL (PRINIVIL, ZESTRIL) tablet 5 mg  5 mg Oral DAILY  [Held by provider] predniSONE (DELTASONE) tablet 5 mg (Patient Supplied)  5 mg Oral BID WITH MEALS  ondansetron (ZOFRAN) injection 4 mg  4 mg IntraVENous Q4H PRN  
 fentaNYL citrate (PF) injection 50 mcg  50 mcg IntraVENous Q4H PRN  
 aspirin chewable tablet 81 mg  81 mg Oral DAILY  atorvastatin (LIPITOR) tablet 40 mg  40 mg Oral QHS  docusate sodium (COLACE) capsule 100 mg  100 mg Oral BID  pantoprazole (PROTONIX) tablet 40 mg  40 mg Oral ACB  [Held by provider] metoprolol tartrate (LOPRESSOR) tablet 50 mg  50 mg Oral BID  
 oxybutynin chloride XL (DITROPAN XL) tablet 10 mg  10 mg Oral DAILY  polyethylene glycol (MIRALAX) packet 17 g  17 g Oral DAILY  guaiFENesin ER (MUCINEX) tablet 600 mg  600 mg Oral BID  
 benzonatate (TESSALON) capsule 100 mg  100 mg Oral TID PRN  
 azithromycin (ZITHROMAX) 500 mg in 0.9% sodium chloride (MBP/ADV) 250 mL  500 mg IntraVENous Q24H  cefTRIAXone (ROCEPHIN) 1 g in 0.9% sodium chloride (MBP/ADV) 50 mL  1 g IntraVENous Q24H  hydrALAZINE (APRESOLINE) 20 mg/mL injection 10 mg  10 mg IntraVENous Q6H PRN  
 acetaminophen (TYLENOL) tablet 650 mg  650 mg Oral Q6H PRN Review of Symptoms:  
11 systems reviewed, negative other than as stated in the HPI Objective:  
  
Visit Vitals BP (!) 117/59 (BP 1 Location: Right arm, BP Patient Position: At rest) Pulse (!) 58 Temp 98.4 °F (36.9 °C) Resp 23 Ht 6' (1.829 m) Wt 88 kg (194 lb 0.1 oz) SpO2 96% BMI 26.31 kg/m² Physical:  
General: pleasant, elderly AAM resting in bed in NAD Heart: RRR, no m/S3/JVD Lungs: clear Abdomen: Soft, +BS, slightly distended Extremities: LE emma +DP/PT, no edema Neurologic: Grossly normal 
 
Data Review:  
Recent Labs  
  09/16/20 
0351 09/15/20 
1245 WBC 7.4 8.6 HGB 11.2* 12.7 HCT 35.1* 40.9  199 Recent Labs  
  09/16/20 
0351 09/15/20 
1336 09/15/20 
1245   --  141  
K 4.9  --  5.2*  
*  --  110* CO2 23  --  28  
*  --  88  
BUN 21*  --  18  
CREA 1.30  --  1.49* CA 8.3*  --  9.1 MG 2.6* 2.4  --   
PHOS 2.6  --   --   
ALB  --   --  3.2* TBILI  --   --  0.5 ALT  --   --  15 INR 1.0 1.0  --   
 
 
Recent Labs  
  09/15/20 
1245 TROIQ <0.05 Intake/Output Summary (Last 24 hours) at 9/17/2020 1323 Last data filed at 9/17/2020 1200 Gross per 24 hour Intake 845 ml Output 625 ml Net 220 ml  
  
 
Cardiographics Telemetry: SR/SB. Two pauses. One around 4.4 sec. One ~3.2 sec 
ECG: unable to view in system. Echocardiogram: last as above;repeat ordered CTA chest: \"1. No evidence for embolism. 2. 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 3. Nonspecific consolidative opacifications in the posterior medial basilar 
segments of the left lower lobe and the inferior lingula. \" Assessment:  
  
 Active Problems: 
  Penetrating atherosclerotic ulcer of aorta ([facility] (6/6/2016) Overview: Seen by Dr. Marcos Alamo in 7/2016 - plan for recheck in 6 mos COPD exacerbation ([address] (9/15/2020) Acute hypoxemic respiratory failure ([address] (9/15/2020) Sinus pause (9/17/2020) Plan:  
 
S[facility] Rush is a 78 y.o. male who presented to the ED with abd pain and SOB. Cardiology consulted for significant pauses on telemetry. One pause greater than 3 seconds and one greater than 4 seconds. Patient on a home BB. Electrolytes reviewed and grossly normal.  covid swab negative. CTA identified enlarging aortic arch ulcer. · Hold BB for significant pauses · Obtain ECHO · Continue other CAD/HTN/HLD home meds. · Vascular note reviewed: potential TEVAR pending discussion with patient about overal goals of care for his aortic arch ulcer Thank you for consulting [address]  [facility] Lucious Lesch, NP 
DNP, RN, AGACNP-BC [address]  [facility] 9/17/2020 Patient seen, examined by me personally. Plan discussed as detailed.  Agree with note as outlined by  NP. I confirm findings in history and physical exam. No additional findings noted. Agree with plan as outlined above. Hold betablocker. No indication for pacemaker at this time.   
 
Frank Caicedo MD

## 2020-09-17 NOTE — PROGRESS NOTES
Hospitalist Progress Note NAME: Evon MerrillB:  1940 MRN:  [id number] Assessment / Plan: 
Acute hypoxic respiratory failure due to acute COPD exacerbation due to acute bronchitis, POA 
CAP 
COVID negative  
-Pt with worsening dyspnea despite using nebs at home and not responded to initial aggressive ED treatment. - C/x empiric CTx/zithromax C/w nebs and steroids Currently on mid flow at [facility] 2. 
-- Mucolytic with scheduled mucinex and tessalon perls prn for cough. - O2 to keep sats > 90%, reassess for home O2 on DC. 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  
 vascular sx recommended TAVR , patient not sure if he will go for the surgery Vascular surgeon to call daughter to discuss about TAVR Did explained to him that without surgery , aortic artery can rupture CTA:. 
1. No evidence for embolism. 2. 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 3. Nonspecific consolidative opacifications in the posterior medial basilar 
segments of the left lower lobe and the inferior lingula 
----- old finding since 2016, was evaluated by CTS at that time, recommended conservative management at that time. CTS was consulted. Control BP < 120 goal  
Consult palliative for goals of care Sinus pauses  
 hold BB , cardio on board Monitor HR Abdominal pain in settings of metastatic prostate cancer  
- ua yesterday with Mod LE and wbc 20-50, will repeat to r/o underlying UTI No UTI , Ucx neg Currently on CTX for bronchitis,  
-urology consulted 
-pain management : fentanyl was effective in ED, will continue  
-cont Zytega, needs to be give together with prednisone ( holding while on solumedrol IV). Cont [employer] CT scan findings d/w pt and sister at bedside, informed then on high risk of rupture  
  
CKD stage III /  
hyperkalemia resolved Cr at baseline ~ 1.4, stable Mild hyperkalemia 5.2 
k 4.9 [place] [employer]  Avoid nephrotoxic drugs, adjust all meds to GFR.  
  
HTN/ CAD  
 -BP soft  
-cont home  lisinopril / ASA Hold BB Hyperlipidemia, cont statin  
  
   
Code Status: Full code d/w pt and sister at bedside Surrogate Decision Maker: dtr  
  
DVT Prophylaxis: scd GI Prophylaxis: not indicated 
  
Baseline: lives with his dtr; home O2 3 L  
 
25.0 - 29.9 Overweight / Body mass index is 26.31 kg/m². Subjective: Chief Complaint / Reason for Physician Visit FU respiratory failure/copd / . On mid flow NC . No acute complaints  Discussed with RN events overnight. Review of Systems: 
Symptom Y/N Comments  Symptom Y/N Comments Fever/Chills n   Chest Pain n   
Poor Appetite    Edema Cough    Abdominal Pain n   
Sputum    Joint Pain SOB/WHEAT n   Pruritis/Rash Nausea/vomit n   Tolerating PT/OT Diarrhea    Tolerating Diet y Constipation    Other Could NOT obtain due to:   
 
Objective: VITALS:  
Last 24hrs VS reviewed since prior progress note. Most recent are: 
Patient Vitals for the past 24 hrs: 
 Temp Pulse Resp BP SpO2  
09/17/20 0900  74 17 118/67 (!) 89 % 09/17/20 0800 98.2 °F (36.8 °C) 70 25 (!) 142/69 (!) 88 % 09/17/20 0722     96 % 09/17/20 0700  67 17 132/61 97 % 09/17/20 0600  78 20 124/65 95 % 09/17/20 0500  67 19 (!) 94/44 98 % 09/17/20 0415 97.4 °F (36.3 °C) 63 20 (!) 96/38 96 % 09/17/20 0400  63 20  95 % 09/17/20 0344     95 % 09/17/20 0000 97.9 °F (36.6 °C) 69 20 (!) 183/55 91 % 09/16/20 2337     94 % 09/16/20 2200  71 19 118/70 97 % 09/16/20 2100  72 20 (!) 128/55 96 % 09/16/20 2017     96 % 09/16/20 2015     94 % 09/16/20 2000 98.3 °F (36.8 °C) 84 19 118/63 94 % 09/16/20 1900  89 21  92 % 09/16/20 1800  85 27 (!) 127/57 (!) 89 % 09/16/20 1630 98.3 °F (36.8 °C) 80 28  93 % 09/16/20 1600 98.3 °F (36.8 °C) 76 28 (!) 105/46 93 % 09/16/20 1546     92 % 09/16/20 1300  77 26 (!) 138/56 92 % 09/16/20 1246     93 % 09/16/20 1200 97.8 °F (36.6 °C) 80 (!) 32 (!) 140/56 97 % 09/16/20 1100  79 18 134/67 90 % 09/16/20 1000  76 20 130/65 94 % Intake/Output Summary (Last 24 hours) at 9/17/2020 9776 Last data filed at 9/17/2020 0500 Gross per 24 hour Intake 425 ml Output 795 ml Net -370 ml PHYSICAL EXAM: 
General: WD, WN. Alert, cooperative, no acute distress   
EENT:  EOMI. Anicteric sclerae. MMM Resp:  CTA bilaterally, no wheezing or rales. No accessory muscle use CV:  Regular  rhythm,  No edema GI:  Soft, Non distended, Non tender.  +Bowel sounds Neurologic:  Alert and oriented X 3, normal speech, Psych:   Good insight. Not anxious nor agitated Skin:  No rashes. No jaundice Reviewed most current lab test results and cultures  YES Reviewed most current radiology test results   YES Review and summation of old records today    NO Reviewed patient's current orders and MAR    YES 
PMH/SH reviewed - no change compared to H&P 
________________________________________________________________________ Care Plan discussed with: 
  Comments Patient x Family RN x Care Manager Consultant Multidiciplinary team rounds were held today with , nursing, pharmacist and clinical coordinator. Patient's plan of care was discussed; medications were reviewed and discharge planning was addressed. ________________________________________________________________________ Total NON critical care TIME: 35   Minutes Total CRITICAL CARE TIME Spent:   Minutes non procedure based Comments >50% of visit spent in counseling and coordination of care    
________________________________________________________________________ Stepan Lau MD  
 
Procedures: see electronic medical records for all procedures/Xrays and details which were not copied into this note but were reviewed prior to creation of Plan.    
 
LABS: 
 I reviewed today's most current labs and imaging studies. Pertinent labs include: 
Recent Labs  
  09/16/20 
0351 09/15/20 
1245 09/14/20 
1117 WBC 7.4 8.6 7.8 HGB 11.2* 12.7 11.9*  
HCT 35.1* 40.9 38.0  
 199 219 Recent Labs  
  09/16/20 
0351 09/15/20 
1336 09/15/20 
1245 09/14/20 
1117   --  141 143  
K 4.9  --  5.2* 4.7 *  --  110* 114* CO2 23  --  28 25 *  --  88 103* BUN 21*  --  18 14 CREA 1.30  --  1.49* 1.44* CA 8.3*  --  9.1 8.2* MG 2.6* 2.4  --   --   
PHOS 2.6  --   --   --   
ALB  --   --  3.2* 3.1* TBILI  --   --  0.5 0.5 ALT  --   --  15 13 INR 1.0 1.0  --  0.9 Signed: Marita Ng MD

## 2020-09-18 NOTE — PROGRESS NOTES
[address] [facility]  [phone] Cardiology Progress Note 9/18/2020 1110AM 
 
Admit Date: 9/15/2020 Admit Diagnosis: COPD exacerbation ([facility] [J44.1] Acute hypoxemic respiratory failure ([facility] [J96.01] Interval History/Subjective:  
 
[facility] Rush is a 78 y.o. male with PMH CAD s/p stenting, HTN, prostate CA, HLD, COPD who was admitted for COPD exacerbation ([facility] [J44.1] Acute hypoxemic respiratory failure ([facility] [J96.01].   
-sats low 
-creat up to 1.46 
-Mr. Perry Bravo is feeling well. Some slight WHEAT. No c/o pain. Visit Vitals BP (!) 141/57 (BP 1 Location: Left arm, BP Patient Position: Sitting) Pulse 90 Temp 97.5 °F (36.4 °C) Resp 18 Ht 6' (1.829 m) Wt 91 kg (200 lb 9.6 oz) SpO2 93% BMI 27.21 kg/m² Current Facility-Administered Medications Medication Dose Route Frequency  predniSONE (DELTASONE) tablet 40 mg  40 mg Oral DAILY WITH BREAKFAST  budesonide (PULMICORT) 500 mcg/2 ml nebulizer suspension  500 mcg Nebulization BID RT  
 arformoteroL (BROVANA) neb solution 15 mcg  15 mcg Nebulization BID RT  
 albuterol-ipratropium (DUO-NEB) 2.5 MG-0.5 MG/3 ML  3 mL Nebulization Q1H PRN  
 albuterol-ipratropium (DUO-NEB) 2.5 MG-0.5 MG/3 ML  3 mL Nebulization Q6H RT  
 azithromycin (ZITHROMAX) tablet 500 mg  500 mg Oral QPM  
 aluminum-magnesium hydroxide (MAALOX) oral suspension 30 mL  30 mL Oral Q6H PRN  
 influenza vaccine 2020-21 (6 mos+)(PF) (FLUARIX/FLULAVAL/FLUZONE QUAD) injection 0.5 mL  0.5 mL IntraMUSCular PRIOR TO DISCHARGE  abiraterone tab 250 mg (Patient Supplied)  250 mg Oral DAILY  lisinopriL (PRINIVIL, ZESTRIL) tablet 5 mg  5 mg Oral DAILY  ondansetron (ZOFRAN) injection 4 mg  4 mg IntraVENous Q4H PRN  
 fentaNYL citrate (PF) injection 50 mcg  50 mcg IntraVENous Q4H PRN  
 aspirin chewable tablet 81 mg  81 mg Oral DAILY  atorvastatin (LIPITOR) tablet 40 mg  40 mg Oral QHS  docusate sodium (COLACE) capsule 100 mg  100 mg Oral BID  pantoprazole (PROTONIX) tablet 40 mg  40 mg Oral ACB  [Held by provider] metoprolol tartrate (LOPRESSOR) tablet 50 mg  50 mg Oral BID  
 oxybutynin chloride XL (DITROPAN XL) tablet 10 mg  10 mg Oral DAILY  polyethylene glycol (MIRALAX) packet 17 g  17 g Oral DAILY  guaiFENesin ER (MUCINEX) tablet 600 mg  600 mg Oral BID  
 benzonatate (TESSALON) capsule 100 mg  100 mg Oral TID PRN  
 cefTRIAXone (ROCEPHIN) 1 g in 0.9% sodium chloride (MBP/ADV) 50 mL  1 g IntraVENous Q24H  hydrALAZINE (APRESOLINE) 20 mg/mL injection 10 mg  10 mg IntraVENous Q6H PRN  
 acetaminophen (TYLENOL) tablet 650 mg  650 mg Oral Q6H PRN Objective:  
  
Physical Exam: 
General: pleasant, elderly AAM sitting in chair in NAD Heart: RRR, no m/S3/JVD Lungs: clear Abdomen: Soft, +BS, slightly distended Extremities: LE emma +DP/PT, no edema Neurologic: Grossly normal 
Skin:  Warm and dry. Data Review:  
Recent Labs  
  09/18/20 
0334 09/16/20 
0351 09/15/20 
1245 WBC 6.5 7.4 8.6 HGB 10.8* 11.2* 12.7 HCT 34.0* 35.1* 40.9  187 199 Recent Labs  
  09/18/20 
0334 09/16/20 
0351 09/15/20 
1336 09/15/20 
1245  141  --  141  
K 4.9 4.9  --  5.2*  
* 113*  --  110* CO2 24 23  --  28  
* 128*  --  88  
BUN 29* 21*  --  18  
CREA 1.46* 1.30  --  1.49* CA 7.8* 8.3*  --  9.1 MG 2.8* 2.6* 2.4  --   
PHOS 2.7 2.6  --   --   
ALB  --   --   --  3.2* TBILI  --   --   --  0.5 ALT  --   --   --  15 INR  --  1.0 1.0  --   
 
 
Recent Labs  
  09/15/20 
1245 TROIQ <0.05 Intake/Output Summary (Last 24 hours) at 9/18/2020 1210 Last data filed at 9/18/2020 1711 Gross per 24 hour Intake 590 ml Output 600 ml Net -10 ml Telemetry: SR.  No further pauses ECG: unable to view in system. Echocardiogram: EF 55-60%; mild to mod TR; mod to severe pHTN 
CTA chest: \"1. No evidence for embolism. 2. 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 3. Nonspecific consolidative opacifications in the posterior medial basilar 
segments of the left lower lobe and the inferior lingula. \" Assessment:  
 
Active Problems: 
  Penetrating atherosclerotic ulcer of aorta ([address] (6/6/2016) Overview: Seen by Dr. Janelle Ahmadi in 7/2016 - plan for recheck in 6 mos COPD exacerbation ([address] (9/15/2020) Acute hypoxemic respiratory failure ([address] (9/15/2020) Sinus pause (9/17/2020) Plan:  
 
Sinus pauses:  No further pauses with holding of BB. · Continue to hold BB for now. Dr. Riri Moreira to determine if/when to restart a low dose BB 
· ECHO with EF 55-60% and no significant new concerns CAD/HTN/HLD history:  Stable · BB on  Hold, as above · Continue other home meds Aortic arch ulcer: Increasing in size · Vascular notes reviewed: high risk. Patient not interested in repair at this time. COPD exacerbation: improving · Per hospitalist 
 
 
 
Laina Hernandes NP 
DNP, RN, AGACNP-BC Patient seen and examined by me with the above nurse practitioner. I personally performed all components of the history, physical, and medical decision making and agree with the assessment and plan with minor modifications as noted. No further concerning bradycardia overnight. Continue to hold beta-blocker. Monitor rhythm for another 24 hours.

## 2020-09-18 NOTE — PROGRESS NOTES
[facility] Name: Evon Link MRN: [id number] : 1940 Hospital: [facility] Date: 2020 IMPRESSION:  
· Acute/chronic hypoxic respiratory failure - note baseline 3 LPM with SpO2 typically in 80s per his report · COPD with possible exacerbation - patient of Dr. 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  
  
RECOMMENDATIONS:  
· O2 - wean as tolerated · Bronchodilators · Steroids - change to po · Empiric antibiotics · Vascular surgery consult pending for aortic arch ulcer · DVT prophylaxis · Mobilize · Can be transfer out of pcu · Will follow monday Subjective: No acute events overnight No acute distress No acute complaints Current Facility-Administered Medications Medication Dose Route Frequency  predniSONE (DELTASONE) tablet 40 mg  40 mg Oral DAILY WITH BREAKFAST  albuterol-ipratropium (DUO-NEB) 2.5 MG-0.5 MG/3 ML  3 mL Nebulization QID RT  
 budesonide (PULMICORT) 500 mcg/2 ml nebulizer suspension  500 mcg Nebulization BID RT  
 arformoteroL (BROVANA) neb solution 15 mcg  15 mcg Nebulization BID RT  
 influenza vaccine - (6 mos+)(PF) (FLUARIX/FLULAVAL/FLUZONE QUAD) injection 0.5 mL  0.5 mL IntraMUSCular PRIOR TO DISCHARGE  abiraterone tab 250 mg (Patient Supplied)  250 mg Oral DAILY  lisinopriL (PRINIVIL, ZESTRIL) tablet 5 mg  5 mg Oral DAILY  aspirin chewable tablet 81 mg  81 mg Oral DAILY  atorvastatin (LIPITOR) tablet 40 mg  40 mg Oral QHS  docusate sodium (COLACE) capsule 100 mg  100 mg Oral BID  pantoprazole (PROTONIX) tablet 40 mg  40 mg Oral ACB  [Held by provider] metoprolol tartrate (LOPRESSOR) tablet 50 mg  50 mg Oral BID  
  oxybutynin chloride XL (DITROPAN XL) tablet 10 mg  10 mg Oral DAILY  polyethylene glycol (MIRALAX) packet 17 g  17 g Oral DAILY  guaiFENesin ER (MUCINEX) tablet 600 mg  600 mg Oral BID  
 azithromycin (ZITHROMAX) 500 mg in 0.9% sodium chloride (MBP/ADV) 250 mL  500 mg IntraVENous Q24H  cefTRIAXone (ROCEPHIN) 1 g in 0.9% sodium chloride (MBP/ADV) 50 mL  1 g IntraVENous Q24H Review of Systems: A comprehensive review of systems was negative except for that written in the HPI. Objective:  
Vital Signs:   
Visit Vitals BP (!) 122/57 Pulse 78 Temp 97.7 °F (36.5 °C) Resp 16 Ht 6' (1.829 m) Wt 91 kg (200 lb 9.6 oz) SpO2 91% BMI 27.21 kg/m² O2 Device: Hi flow nasal cannula O2 Flow Rate (L/min): 8 l/min Temp (24hrs), Av.8 °F (36.6 °C), Min:97.4 °F (36.3 °C), Max:98.4 °F (36.9 °C) Intake/Output:  
Last shift:       07 - 1900 In: -  
Out: 200 [Urine:200] Last 3 shifts: 1901 -  0700 In: 1823 [P.O.:690; I.V.:350] Out: 950 [Urine:950] Intake/Output Summary (Last 24 hours) at 2020 7056 Last data filed at 2020 5835 Gross per 24 hour Intake 1040 ml Output 750 ml Net 290 ml Physical Exam:  
General:  Alert, cooperative, no distress, appears stated age. Head:  Normocephalic, without obvious abnormality, atraumatic. Eyes:  Conjunctivae/corneas clear. Nose: Nares normal. Septum midline. Throat: Lips, mucosa, and tongue normal. Teeth and gums normal.  
Neck: Supple, symmetrical, trachea midline Lungs:   Clear to auscultation bilaterally. Chest wall:  No tenderness or deformity. Heart:  Regular rate and rhythm Abdomen:   Soft, non-tender. Bowel sounds normal.    
Extremities: Extremities normal, atraumatic, no cyanosis or clubbing Skin: Skin color, texture, turgor normal. No rashes or lesions Neurologic: Grossly nonfocal  
 
Data:  
Labs: 
Recent Labs  
  20 
0334 20 
0351 09/15/20 
1245 WBC 6.5 7.4 8.6 HGB 10.8* 11.2* 12.7 HCT 34.0* 35.1* 40.9  187 199 Recent Labs  
  09/18/20 
0334 09/16/20 
0351 09/15/20 
1336 09/15/20 
1245  141  --  141  
K 4.9 4.9  --  5.2*  
* 113*  --  110* CO2 24 23  --  28  
* 128*  --  88  
BUN 29* 21*  --  18  
CREA 1.46* 1.30  --  1.49* CA 7.8* 8.3*  --  9.1 MG 2.8* 2.6* 2.4  --   
PHOS 2.7 2.6  --   --   
ALB  --   --   --  3.2* TBILI  --   --   --  0.5 ALT  --   --   --  15 INR  --  1.0 1.0  --   
 
Recent Labs  
  09/16/20 
0453 09/15/20 
1557 09/15/20 
1534 PHI 7.36 7.41 7.41  
PCO2I 35.1 32.0* 31.7* PO2I 46* 48* 36* HCO3I 19.8* 20.0* 20.2* Imaging: 
I have personally reviewed the patients radiographs: 
None today Alphonso Izaguirre MD

## 2020-09-18 NOTE — PROGRESS NOTES
TRANSFER - IN REPORT: 
 
Verbal report received from Markie Yang RN(name) on Evon Link  being received from CCU(unit) for routine progression of care Report consisted of patients Situation, Background, Assessment and  
Recommendations(SBAR). Information from the following report(s) SBAR, Kardex, MAR, Recent Results and Cardiac Rhythm NSR was reviewed with the receiving nurse. Opportunity for questions and clarification was provided. Assessment completed upon patients arrival to unit and care assumed. Primary Nurse Kain Coleman RN and Alistair Hidalgo RN performed a dual skin assessment on this patient No impairment noted Joe score is 21. 
 
07:30  Bedside shift change report given to Laina Huff (oncoming nurse) by [address] [place] [address] (offgoing nurse). Report included the following information SBAR, Kardex, MAR, Recent Results and Cardiac Rhythm NSR.

## 2020-09-18 NOTE — PROGRESS NOTES
Problem: Mobility Impaired (Adult and Pediatric) Goal: *Acute Goals and Plan of Care (Insert Text) Description: FUNCTIONAL STATUS PRIOR TO ADMISSION: Patient was modified independent using a rolling walker and wheelchair for functional mobility. HOME SUPPORT PRIOR TO ADMISSION: The patient lived with daughter but did not require assist. 
 
Physical Therapy Goals Initiated 9/18/2020 1. Patient will move from supine to sit and sit to supine  in bed with independence within 7 day(s). 2.  Patient will transfer from bed to chair and chair to bed with modified independence using the least restrictive device within 7 day(s). 3.  Patient will perform sit to stand with modified independence within 7 day(s). 4.  Patient will ambulate with modified independence for 50 feet with the least restrictive device within 7 day(s). Outcome: Progressing Towards Goal 
  
PHYSICAL THERAPY EVALUATION Patient: Mari Arvizu (14 y.o. male) Date: 9/18/2020 Primary Diagnosis: COPD exacerbation ([facility] [J44.1] Acute hypoxemic respiratory failure ([facility] [J96.01] Precautions:   Fall ASSESSMENT Based on the objective data described below, the patient presents with SOB and hypoxia with exertion. Pt was received in supine on higher john at 6L. Cleared by nursing to mobilize. He was able to come to the EOB without assistance. Performed donning socks with no assistance and oxygen saturation dropped to 84% with a delayed response. Cued to PLB and recovered to 94%. Stood and ambulated around the bed and back with RW and significant cues for walker management. Returned to EOB and oxygen dropped to 83%, able to recover to 91% with PLB within 30 seconds. He was left sitting up with OT. Current Level of Function Impacting Discharge (mobility/balance): CGA Functional Outcome Measure: The patient scored 70/100 on the barthel outcome measure which is indicative of 30% impaired. Other factors to consider for discharge: oxygen needs Patient will benefit from skilled therapy intervention to address the above noted impairments. PLAN : 
Recommendations and Planned Interventions: bed mobility training, transfer training, gait training, therapeutic exercises, patient and family training/education, and therapeutic activities Frequency/Duration: Patient will be followed by physical therapy:  3 times a week to address goals. Recommendation for discharge: (in order for the patient to meet his/her long term goals) Physical therapy at least 2 days/week in the home AND ensure assist and/or supervision for safety with mobility This discharge recommendation: 
Has not yet been discussed the attending provider and/or case management IF patient discharges home will need the following DME: increased oxygen needs SUBJECTIVE:  
Patient stated Amadou Blair was up all day.  OBJECTIVE DATA SUMMARY:  
HISTORY:   
Past Medical History:  
Diagnosis Date CAD (coronary artery disease)   
 mi 10/19/2015 with stent COPD (chronic obstructive pulmonary disease) ([facility] Hypertension Prostate cancer ([facility] 5/8/2012 Dr. Mahoney  - diagnosed 2/2012 - s/p EBRT on Lupron Past Surgical History:  
Procedure Laterality Date HX ORTHOPAEDIC  1961  
 left knee surgery Personal factors and/or comorbidities impacting plan of care:  
 
Home Situation Home Environment: Private residence # Steps to Enter: 4 Wheelchair Ramp: Yes One/Two Story Residence: Two story, live on 1st floor # of Interior Steps: 12 Height of Each Step (in): 12 inches Interior Rails: Both Lift Chair Available: No 
Living Alone: No 
Support Systems: Child(jonelle) Patient Expects to be Discharged to[de-identified] Private residence Current DME Used/Available at Home: Wheelchair, Junious Locket, rolling, [address], [place], quad, Shower chair EXAMINATION/PRESENTATION/DECISION MAKING:  
Critical Behavior: 
  
  
  
  
Hearing: Auditory Auditory Impairment: Hard of hearing, bilateral 
Skin:  intact Edema: WDL Range Of Motion: 
AROM: Within functional limits PROM: Within functional limits Strength:   
Strength: Generally decreased, functional 
  
  
  
  
  
  
Tone & Sensation:  
Tone: Normal 
  
  
  
  
Sensation: Intact Coordination: 
Coordination: Within functional limits Vision:  
  
Functional Mobility: 
Bed Mobility: 
Rolling: Independent Supine to Sit: Independent Scooting: Independent Transfers: 
Sit to Stand: Contact guard assistance Stand to Sit: Contact guard assistance Balance:  
Sitting: Intact Standing: Impaired Standing - Static: Good;Constant support Standing - Dynamic : Fair;Constant support Ambulation/Gait Training: 
Distance (ft): 30 Feet (ft) Assistive Device: Walker, rolling Ambulation - Level of Assistance: Contact guard assistance Gait Abnormalities: Decreased step clearance;Shuffling gait Base of Support: Widened Speed/Mary: Pace decreased (<100 feet/min); Shuffled Step Length: Left shortened;Right shortened Functional Measure: 
Barthel Index: 
 
Bathin Bladder: 10 Bowels: 10 
Groomin Dressing: 10 Feeding: 10 Mobility: 5 Stairs: 0 Toilet Use: 5 Transfer (Bed to Chair and Back): 15 Total: 70/100 The Barthel ADL Index: Guidelines 1. The index should be used as a record of what a patient does, not as a record of what a patient could do. 2. The main aim is to establish degree of independence from any help, physical or verbal, however minor and for whatever reason. 3. The need for supervision renders the patient not independent. 4. A patient's performance should be established using the best available evidence. Asking the patient, friends/relatives and nurses are the usual sources, but direct observation and common sense are also important. However direct testing is not needed. 5. Usually the patient's performance over the preceding 24-48 hours is important, but occasionally longer periods will be relevant. 6. Middle categories imply that the patient supplies over 50 per cent of the effort. 7. Use of aids to be independent is allowed. Regla Cage., Barthel, D.W. ([id number]). Functional evaluation: the Barthel Index. [facility] (14)2. LIZBETH Martines, Wilber Briones., Ana Barnes., [place], [address] (1999). Measuring the change indisability after inpatient rehabilitation; comparison of the responsiveness of the Barthel Index and Functional [place] Measure. Journal of Neurology, Neurosurgery, and Psychiatry, 66(4), 181-692. MIREYA Barth, DORCAS Alaniz, & Qi Norris M.A. (2004.) Assessment of post-stroke quality of life in cost-effectiveness studies: The usefulness of the Barthel Index and the EuroQoL-5D. [facility], 13, 449-75 Physical Therapy Evaluation Charge Determination History Examination Presentation Decision-Making HIGH Complexity :3+ comorbidities / personal factors will impact the outcome/ POC  MEDIUM Complexity : 3 Standardized tests and measures addressing body structure, function, activity limitation and / or participation in recreation  MEDIUM Complexity : Evolving with changing characteristics  Other outcome measures barthel  MEDIUM Based on the above components, the patient evaluation is determined to be of the following complexity level: MEDIUM Activity Tolerance:  
desaturates with exertion and requires oxygen Please refer to the flowsheet for vital signs taken during this treatment. After treatment patient left in no apparent distress:  
Supine in bed and with OT 
 
COMMUNICATION/EDUCATION:  
The patients plan of care was discussed with: Occupational therapist and Registered nurse.   
 
Fall prevention education was provided and the patient/caregiver indicated understanding. and Patient/family agree to work toward stated goals and plan of care. Thank you for this referral. 
Jeaneth Rose, PT, DPT Time Calculation: 24 mins

## 2020-09-18 NOTE — PROGRESS NOTES
1900: Report received from [address] [place] [address]: Assessment as noted. Midflow at 8L, sats range between 88-95% with rest. Pt does get WHEAT and sats can be in mid 80s. Tylenol given for c/o arm pain from Zithromax infiltrate. 0000: no changes in assessment. Pt resting comfortably. 0400: TRANSFER - OUT REPORT: 
 
Verbal report given to Donna Hughes RN on Wash Brace  being transferred to PCU [phone] for routine progression of care Report consisted of patients Situation, Background, Assessment and  
Recommendations(SBAR). Information from the following report(s) SBAR, Kardex, Procedure Summary, Intake/Output, MAR, Accordion, Recent Results, Med Rec Status and Cardiac Rhythm NSR was reviewed with the receiving nurse. Lines:  
Peripheral IV 09/15/20 Right Antecubital (Active) Site Assessment Clean, dry, & intact 09/18/20 0000 Phlebitis Assessment 0 09/18/20 0000 Infiltration Assessment 0 09/18/20 0000 Dressing Status Clean, dry, & intact 09/18/20 0000 Dressing Type Transparent;Tape 09/18/20 0000 Hub Color/Line Status Pink;Capped 09/18/20 0000 Action Taken Open ports on tubing capped 09/18/20 0000 Alcohol Cap Used Yes 09/18/20 0000 Opportunity for questions and clarification was provided. Patient transported with: 
 Monitor O2 @ 8 liters Patient's medications from home Patient-specific medications from Pharmacy Registered Nurse Tech

## 2020-09-18 NOTE — PROGRESS NOTES
Hospitalist Progress Note NAME: Chepe Dose :  1940 MRN:  [id number] Assessment / Plan: 
Acute hypoxic respiratory failure due to acute COPD exacerbation due to acute bronchitis, POA 
CAP 
COVID negative  
-Pt with worsening dyspnea despite using nebs at home and not responded to initial aggressive ED treatment. - C/x empiric CTx/zithromax C/w nebs and steroids Currently on mid flow at [address] 
-- Mucolytic with scheduled mucinex and tessalon perls prn for cough. - O2 to keep sats > 90%, reassess for home O2 on DC. 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  
 vascular sx recommended TAVR , patient not sure if he will go for the surgery Vascular surgeon to call daughter to discuss about TAVR Did explained to him that without surgery , aortic artery can rupture CTA:. 
1. No evidence for embolism. 2. 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 3. Nonspecific consolidative opacifications in the posterior medial basilar 
segments of the left lower lobe and the inferior lingula 
----- old finding since , was evaluated by CTS at that time, recommended conservative management at that time. CTS was consulted. Control BP < 120 goal  
Consult palliative for goals of care Vascular sx deemed that patient is high risk surgery , patient and  family don't want surgery Palliative care consulted for goals of care as pt still full code Family understand that the aortic might rupture and pt can die from it , reinforced again the message to the daughter Sinus pauses  
 hold BB , cardio on board Monitor HR Abdominal pain in settings of metastatic prostate cancer  
- ua yesterday with Mod LE and wbc 20-50, will repeat to r/o underlying UTI No UTI , Ucx neg Currently on CTX for bronchitis,  
-urology consulted 
-pain management : fentanyl was effective in ED, will continue  
-cont Zytega, needs to be give together with prednisone ( holding while on solumedrol IV). Cont Ana Ventura CT scan findings d/w pt and sister at bedside, informed then on high risk of rupture  
  
CKD stage III /  
hyperkalemia resolved Cr at baseline ~ 1.4, stable Mild hyperkalemia 5.2 
k 4.9 Leila Fields Avoid nephrotoxic drugs, adjust all meds to GFR.  
  
HTN/ CAD  
-BP soft  
-cont home  lisinopril / ASA Hold BB Hyperlipidemia, cont statin  
  
   
Code Status: Full code d/w pt and sister at bedside Surrogate Decision Maker: dtr  
  
DVT Prophylaxis: scd GI Prophylaxis: not indicated 
  
Baseline: lives with his dtr; home O2 3 L  
 
25.0 - 29.9 Overweight / Body mass index is 27.21 kg/m². Subjective: Chief Complaint / Reason for Physician Visit FU respiratory failure/copd / . On mid flow NC , o2 requirement decreasing . No acute complaints  Discussed with RN events overnight. Review of Systems: 
Symptom Y/N Comments  Symptom Y/N Comments Fever/Chills n   Chest Pain n   
Poor Appetite    Edema Cough    Abdominal Pain n   
Sputum    Joint Pain SOB/WHEAT n   Pruritis/Rash Nausea/vomit n   Tolerating PT/OT Diarrhea    Tolerating Diet y Constipation    Other Could NOT obtain due to:   
 
Objective: VITALS:  
Last 24hrs VS reviewed since prior progress note. Most recent are: 
Patient Vitals for the past 24 hrs: 
 Temp Pulse Resp BP SpO2  
09/18/20 0905     95 % 09/18/20 0842     91 % 09/18/20 0828     (!) 88 % 09/18/20 0806     (!) 86 % 09/18/20 0727     91 % 09/18/20 0724 97.7 °F (36.5 °C) 78 16 (!) 122/57 93 % 09/18/20 0429 97.6 °F (36.4 °C) 85 18 (!) 139/59 (!) 89 % 09/18/20 0402     90 % 09/18/20 0300  73 19 114/64 90 % 09/18/20 0100  73 20 (!) 106/45 92 % 09/18/20 0006  80 23  90 % 09/18/20 0001  72 22  93 % 09/18/20 0000 97.9 °F (36.6 °C) 76 18 (!) 115/48 90 % 09/17/20 2339     94 % 09/17/20 2054  76 20  95 % 09/17/20 2024  81 24  92 % 09/17/20 2021  83 25  90 % 09/17/20 2006  82 25  (!) 89 % 09/17/20 2000  89 29  (!) 85 % 09/17/20 1949     93 % 09/17/20 1947     91 % 09/17/20 1929 97.4 °F (36.3 °C) 79 23 (!) 130/49 90 % 09/17/20 1800  86 24  91 % 09/17/20 1700  71 27  91 % 09/17/20 1600 97.7 °F (36.5 °C) 64 25 (!) 109/49 94 % 09/17/20 1548     94 % 09/17/20 1500  68 20  94 % 09/17/20 1400  70 19  94 % 09/17/20 1300  65 20  94 % 09/17/20 1200 98.4 °F (36.9 °C) (!) 58 23 (!) 117/59 96 % 09/17/20 1156     97 % 09/17/20 1100  63 18 (!) 130/55 95 % Intake/Output Summary (Last 24 hours) at 9/18/2020 1031 Last data filed at 9/18/2020 5991 Gross per 24 hour Intake 800 ml Output 600 ml Net 200 ml PHYSICAL EXAM: 
General: WD, WN. Alert, cooperative, no acute distress   
EENT:  EOMI. Anicteric sclerae. MMM Resp:  Decreased breath sounds b/l ,  no wheezing or rales. No accessory muscle use CV:  Regular  rhythm,  No edema GI:  Soft, Non distended, Non tender.  +Bowel sounds Neurologic:  Alert and oriented X 3, normal speech, Psych:   Good insight. Not anxious nor agitated Skin:  No rashes. No jaundice Reviewed most current lab test results and cultures  YES Reviewed most current radiology test results   YES Review and summation of old records today    NO Reviewed patient's current orders and MAR    YES 
PMH/SH reviewed - no change compared to H&P 
________________________________________________________________________ Care Plan discussed with: 
  Comments Patient x Family RN x Care Manager Consultant Multidiciplinary team rounds were held today with , nursing, pharmacist and clinical coordinator. Patient's plan of care was discussed; medications were reviewed and discharge planning was addressed. ________________________________________________________________________ Total NON critical care TIME: 35   Minutes Total CRITICAL CARE TIME Spent:   Minutes non procedure based Comments >50% of visit spent in counseling and coordination of care    
________________________________________________________________________ Gem Roe MD  
 
Procedures: see electronic medical records for all procedures/Xrays and details which were not copied into this note but were reviewed prior to creation of Plan. LABS: 
I reviewed today's most current labs and imaging studies. Pertinent labs include: 
Recent Labs  
  09/18/20 
0334 09/16/20 
0351 09/15/20 
1245 WBC 6.5 7.4 8.6 HGB 10.8* 11.2* 12.7 HCT 34.0* 35.1* 40.9  187 199 Recent Labs  
  09/18/20 
0334 09/16/20 
0351 09/15/20 
1336 09/15/20 
1245  141  --  141  
K 4.9 4.9  --  5.2*  
* 113*  --  110* CO2 24 23  --  28  
* 128*  --  88  
BUN 29* 21*  --  18  
CREA 1.46* 1.30  --  1.49* CA 7.8* 8.3*  --  9.1 MG 2.8* 2.6* 2.4  --   
PHOS 2.7 2.6  --   --   
ALB  --   --   --  3.2* TBILI  --   --   --  0.5 ALT  --   --   --  15 INR  --  1.0 1.0  --   
 
 
Signed: Gem Roe MD

## 2020-09-18 NOTE — PROGRESS NOTES
Spiritual Care Assessment/Progress Note [address] 
 
 
NAME: Teo Guzmán      MRN: [id number] AGE: 78 y.o. SEX: male [employer] Affiliation: August Ngo  
Language: [place] 9/18/2020     Total Time (in minutes): 30 Spiritual Assessment begun in [facility] PROGRESSIVE CARE through conversation with: 
  
    [x]Patient        [x] Family    [] Friend(s) Reason for Consult: Palliative Care, Initial/Spiritual Assessment Spiritual beliefs: (Please include comment if needed) [x] Identifies with a cristian tradition: Mormonism    
   [] Supported by a cristian community:        
   [] Claims no spiritual orientation:       
   [] Seeking spiritual identity:            
   [] Adheres to an individual form of spirituality:       
   [] Not able to assess:                   
 
    
Identified resources for coping:  
   [] Prayer                           
   [] Music                  [] Guided Imagery [x] Family/friends                 [] Pet visits [] Devotional reading                         [] Unknown 
   [] Other:                                       
 
 
Interventions offered during this visit: (See comments for more details) Patient Interventions: Affirmation of emotions/emotional suffering, Affirmation of cristian, Coping skills reviewed/reinforced, Iconic (affirming the presence of God/Higher Power), Normalization of emotional/spiritual concerns Family/Friend(s): Affirmation of emotions/emotional suffering, Affirmation of cristian, Catharsis/review of pertinent events in supportive environment, Coping skills reviewed/reinforced, Iconic (affirming the presence of God/Higher Power), Normalization of emotional/spiritual concerns Plan of Care: 
 
 [] Support spiritual and/or cultural needs  
 [] Support AMD and/or advance care planning process    
 [] Support grieving process 
 [] Coordinate Rites and/or Rituals  
 [] Coordination with community clergy [] No spiritual needs identified at this time 
 [] Detailed Plan of Care below (See Comments)  [] Make referral to Music Therapy 
[] Make referral to Pet Therapy    
[] Make referral to Addiction services 
[] Make referral to [facility] 
[] Make referral to Spiritual Care Partner 
[] No future visits requested       
[x] Follow up visits as needed Visited pt for initial spiritual assessment. Pt is a new Palliative Medicine consult. Consulted pt's chart. Pt sitting in bedside recliner with his daughter Yandel Velarde present. He is quite pleasant and enjoys visitors and conversation. Pt lives with Petros Dias and her family and she self reports as being his primary care giver. He derives a sense of support from his family and his cristian. 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 Assured both of ongoing  support as needed. Chaplain Frances, MDiv, MS, [facility] 
[phone])

## 2020-09-18 NOTE — PROGRESS NOTES
Bedside and Verbal shift change report given to Leslie Wheeler RN (oncoming nurse) by Aakash Baig (offgoing nurse). Report given with SBAR, Kardex, Intake/Output, MAR and Recent Results.

## 2020-09-18 NOTE — PROGRESS NOTES
ADULT PROTOCOL: JET AEROSOL  REASSESSMENT Patient  Constanza Jacobo     78 y.o.   male     9/18/2020  10:02 AM 
 
Breath Sounds Pre Procedure: Right Breath Sounds: Diminished Left Breath Sounds: Diminished Breath Sounds Post Procedure:   
                                 
 
Breathing pattern: Pre procedure Breathing Pattern: Regular Post procedure Breathing Pattern: Regular Heart Rate: Pre procedure Pulse: 79 Post procedure Pulse: 76 Resp Rate: Pre procedure Respirations: 18 Post procedure Respirations: 18 
 
 
Cough: Pre procedure Cough: Non-productive Post procedure Oxygen: 10L HFFN Changed:Yes increased from 8 -10L SpO2: Pre procedure SpO2: 91 % Post procedure: 80 Nebulizer Therapy: Current medications Aerosolized Medications: Symbicort Changed: NO 
 
 
 
Problem List:  
Patient Active Problem List  
Diagnosis Code  ED (erectile dysfunction) N52.9  
 HTN (hypertension) I10  
 Prostate cancer ([facility] C61  
 PAD (peripheral artery disease) ([facility]) I73.9  
 STEMI (ST elevation myocardial infarction) ([facility]) I21.3  Chronic interstitial lung disease ([facility]) J84.9  
 S/P PTCA (percutaneous transluminal coronary angioplasty) Z98.61  
 Mucopurulent chronic bronchitis ([facility]) J41.1  Acute myocardial infarction of anterior wall, subsequent episode of care ([facility] I21.09  
 Penetrating atherosclerotic ulcer of aorta ([facility]) I71.9  Hyperlipidemia E78.5  Advance directive discussed with patient Z70.80  Coronary artery disease involving native coronary artery of native heart without angina pectoris I25.10  Hypoxemia R09.02  
 COPD (chronic obstructive pulmonary disease) ([facility]) J44.9  Mixed simple and mucopurulent chronic bronchitis ([facility]) J41.8  COPD exacerbation ([facility] J44.1  Acute hypoxemic respiratory failure ([facility]) J96.01  
 Sinus pause I45.5 Respiratory Therapist: Mary Taylor, RT

## 2020-09-18 NOTE — CONSULTS
Palliative Medicine Consult Noé: [id number] ([id number]) Patient Name: Leonidas Muñiz YOB: 1940 Date of Initial Consult: 9/17/2020 Reason for Consult: Care Decisions Requesting Provider: Morenita Riley MD 
Primary Care Physician: Danilo Hernandez MD 
 
 SUMMARY:  
Leonidas Muñiz is a 78 y.o. with a past history of ASHD s/p stenting, HTN, HLD, CKD III, COPD on oxygen, and prostate cancer, who was admitted on 9/15/2020 from home with a diagnosis of acute hypoxic respiratory failure due to COPD exacerbation. 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 He was offered surgery but is leaning towards not going forward with it. Current medical issues leading to Palliative Medicine involvement include: goals of care discussion in the setting of cancer, plus severe COPD and now with this penetrating ulcer PALLIATIVE DIAGNOSES:  
1. DNR Discusson 2. Goals of Care 3. Advanced Care Planning 4. COPD 5. Penetrating atheroclerotic ulcer of the aorta PLAN:  
1. Prior to meeting patient I did an extensive review of the chart 2. I met with him and his daughter Noe Oglesby who was at his bedside 3. He shared with me what he understands about the ulcer on his aortic arch- but tells me that he is too old for surgery. He verbalized a pretty clear understanding that his lung disease puts him at risk for complications under anesthesia. He said \"Something will get me, whether its my heart or my cancer, but I need to die of something!\" 4. His daughter is not satisfied with this yet, and still wants to talk through the surgery with Vascular before a final decision is made 5. We completed an AMD together, as he wants Fadumo to be his mPOA, and appreciated the option to do this now even if he opts not to have surgery done.   He was not interested in talking about the living will section though, as he asked Fadumo her opinion and there was clearly some things they needed to talk about privately. 6. We talked about DNR, and CPR in the context of his heart, and his lungs but again, Dawit Cardenas clearly has misconceptions that she was not willing to talk about with me 7. She told her dad in my presence \"we will just make a decision when the time comes\"- I encouraged them to talk about these issues together before then, so that she knows what his wishes are, but also so that he still has a voice even at the end of his life 8. I left them a DDNR- hopefully to spark conversation but they know that it is not valid without signatures 9. Made copies of the AMD for the family and left one on his chart. Dawit Cardenas will send one to her sister who lives in [facility] 10. Thank you for this consult, will sign off but please let us know if we can be of help in the future 11. Initial consult note routed to primary continuity provider and/or primary health care team members 12. Communicated plan of care with: Palliative Brenden MONTANO 192 Team 
 
 GOALS OF CARE / TREATMENT PREFERENCES:  
 
GOALS OF CARE: 
Patient/Health Care Proxy Stated Goals: Prolong life TREATMENT PREFERENCES:  
Code Status: Full Code Advance Care Planning: 
[x] [facility] Interdisciplinary Team has updated the ACP Navigator with Terryn and Patient Capacity Primary Decision Maker: Inez Villarrealter - Daughter - [phone] Secondary Decision Maker: Miladis Hurtado - Daughter - [phone] Medical Interventions: Full interventions Other Instructions: Other: As far as possible, the palliative care team has discussed with patient / health care proxy about goals of care / treatment preferences for patient. HISTORY:  
 
History obtained from: Patient, chart CHIEF COMPLAINT: None- feels well today HPI/SUBJECTIVE: The patient is:  
[x] Verbal and participatory [] Non-participatory due to:  
 
Awake, alert, conversational, Clinical Pain Assessment (nonverbal scale for severity on nonverbal patients):  
Clinical Pain Assessment Severity: 0 Duration: for how long has pt been experiencing pain (e.g., 2 days, 1 month, years) Frequency: how often pain is an issue (e.g., several times per day, once every few days, constant) FUNCTIONAL ASSESSMENT:  
 
Palliative Performance Scale (PPS): PPS: 50 PSYCHOSOCIAL/SPIRITUAL SCREENING:  
 
Palliative IDT has assessed this patient for cultural preferences / practices and a referral made as appropriate to needs (Cultural Services, Patient Advocacy, Ethics, etc.) Any spiritual / Judaism concerns: 
[] Yes /  [x] No 
 
Caregiver Burnout: 
[] Yes /  [x] No /  [] No Caregiver Present Anticipatory grief assessment:  
[x] Normal  / [] Maladaptive ESAS Anxiety: Anxiety: 0 
 
ESAS Depression: Depression: 0 REVIEW OF SYSTEMS:  
 
Positive and pertinent negative findings in ROS are noted above in HPI. The following systems were [x] reviewed / [] unable to be reviewed as noted in HPI Other findings are noted below. Systems: constitutional, ears/nose/mouth/throat, respiratory, gastrointestinal, genitourinary, musculoskeletal, integumentary, neurologic, psychiatric, endocrine. Positive findings noted below. Modified ESAS Completed by: provider Fatigue: 0 Drowsiness: 0 Depression: 0 Pain: 0 Anxiety: 0 Nausea: 0 Anorexia: 0 Dyspnea: 0 Constipation: No  
  Stool Occurrence(s): 1 PHYSICAL EXAM:  
 
From RN flowsheet: 
Wt Readings from Last 3 Encounters:  
09/18/20 200 lb 9.6 oz (91 kg) 09/14/20 194 lb (88 kg) 08/28/20 194 lb (88 kg) Blood pressure (!) 141/57, pulse 90, temperature 97.5 °F (36.4 °C), resp. rate 18, height 6' (1.829 m), weight 200 lb 9.6 oz (91 kg), SpO2 93 %. Pain Scale 1: Numeric (0 - 10) Pain Intensity 1: 0 Pain Location 1: Arm 
 Pain Orientation 1: Left Pain Description 1: Aching Pain Intervention(s) 1: Medication (see MAR), Repositioned, Rest, Cold pack, Warm moist pack Last bowel movement, if known:  
 
Constitutional: awake, alert, good understanding of his medical issues Eyes: pupils equal, anicteric ENMT: no nasal discharge, moist mucous membranes Cardiovascular: regular rhythm, distal pulses intact Respiratory: breathing not labored, symmetric Gastrointestinal: soft non-tender, +bowel sounds Musculoskeletal: no deformity, no tenderness to palpation Skin: warm, dry Neurologic: following commands, moving all extremities Psychiatric: full affect, no hallucinations Other: 
 
 
 HISTORY:  
 
Active Problems: 
  Penetrating atherosclerotic ulcer of aorta ([facility] [address] (2016) Overview: Seen by Dr. Atif Eng in 2016 - plan for recheck in 6 mos COPD exacerbation ([facility] [address] (9/15/2020) Acute hypoxemic respiratory failure ([facility] [address] (9/15/2020) Sinus pause (2020) Past Medical History:  
Diagnosis Date  CAD (coronary artery disease)   
 mi 10/19/2015 with stent  COPD (chronic obstructive pulmonary disease) (HCC)  Hypertension  Prostate cancer ([facility] [address] 2012 Dr. Leeroy Retana - diagnosed 2012 - s/p EBRT on Lupron Past Surgical History:  
Procedure Laterality Date  HX ORTHOPAEDIC    
 left knee surgery Family History Problem Relation Age of Onset  Heart Disease Mother   
     pacemaker  Diabetes Mother  Diabetes Sister  Heart Disease Brother History reviewed, no pertinent family history. Social History Tobacco Use  Smoking status: Former Smoker Packs/day: 0.50 Years: 40.00 Pack years: 20.00 Types: Cigarettes Last attempt to quit: 10/19/2015 Years since quittin.9  Smokeless tobacco: Never Used  Tobacco comment: trying quit 10/13/15 -  ppd Substance Use Topics  Alcohol use:  No  
 Alcohol/week: 0.0 standard drinks No Known Allergies Current Facility-Administered Medications Medication Dose Route Frequency  predniSONE (DELTASONE) tablet 40 mg  40 mg Oral DAILY WITH BREAKFAST  budesonide (PULMICORT) 500 mcg/2 ml nebulizer suspension  500 mcg Nebulization BID RT  
 arformoteroL (BROVANA) neb solution 15 mcg  15 mcg Nebulization BID RT  
 albuterol-ipratropium (DUO-NEB) 2.5 MG-0.5 MG/3 ML  3 mL Nebulization Q1H PRN  
 albuterol-ipratropium (DUO-NEB) 2.5 MG-0.5 MG/3 ML  3 mL Nebulization Q6H RT  
 azithromycin (ZITHROMAX) tablet 500 mg  500 mg Oral QPM  
 aluminum-magnesium hydroxide (MAALOX) oral suspension 30 mL  30 mL Oral Q6H PRN  
 influenza vaccine 2020-21 (6 mos+)(PF) (FLUARIX/FLULAVAL/FLUZONE QUAD) injection 0.5 mL  0.5 mL IntraMUSCular PRIOR TO DISCHARGE  abiraterone tab 250 mg (Patient Supplied)  250 mg Oral DAILY  lisinopriL (PRINIVIL, ZESTRIL) tablet 5 mg  5 mg Oral DAILY  ondansetron (ZOFRAN) injection 4 mg  4 mg IntraVENous Q4H PRN  
 fentaNYL citrate (PF) injection 50 mcg  50 mcg IntraVENous Q4H PRN  
 aspirin chewable tablet 81 mg  81 mg Oral DAILY  atorvastatin (LIPITOR) tablet 40 mg  40 mg Oral QHS  docusate sodium (COLACE) capsule 100 mg  100 mg Oral BID  pantoprazole (PROTONIX) tablet 40 mg  40 mg Oral ACB  [Held by provider] metoprolol tartrate (LOPRESSOR) tablet 50 mg  50 mg Oral BID  
 oxybutynin chloride XL (DITROPAN XL) tablet 10 mg  10 mg Oral DAILY  polyethylene glycol (MIRALAX) packet 17 g  17 g Oral DAILY  guaiFENesin ER (MUCINEX) tablet 600 mg  600 mg Oral BID  
 benzonatate (TESSALON) capsule 100 mg  100 mg Oral TID PRN  
 cefTRIAXone (ROCEPHIN) 1 g in 0.9% sodium chloride (MBP/ADV) 50 mL  1 g IntraVENous Q24H  hydrALAZINE (APRESOLINE) 20 mg/mL injection 10 mg  10 mg IntraVENous Q6H PRN  
 acetaminophen (TYLENOL) tablet 650 mg  650 mg Oral Q6H PRN  
 
 
 
 LAB AND IMAGING FINDINGS:  
 
Lab Results Component Value Date/Time WBC 6.5 09/18/2020 03:34 AM  
 HGB 10.8 (L) 09/18/2020 03:34 AM  
 PLATELET 514 41/37/3809 03:34 AM  
 
Lab Results Component Value Date/Time Sodium 140 09/18/2020 03:34 AM  
 Potassium 4.9 09/18/2020 03:34 AM  
 Chloride 111 (H) 09/18/2020 03:34 AM  
 CO2 24 09/18/2020 03:34 AM  
 BUN 29 (H) 09/18/2020 03:34 AM  
 Creatinine 1.46 (H) 09/18/2020 03:34 AM  
 Calcium 7.8 (L) 09/18/2020 03:34 AM  
 Magnesium 2.8 (H) 09/18/2020 03:34 AM  
 Phosphorus 2.7 09/18/2020 03:34 AM  
  
Lab Results Component Value Date/Time Alk. phosphatase 173 (H) 09/15/2020 12:45 PM  
 Protein, total 7.9 09/15/2020 12:45 PM  
 Albumin 3.2 (L) 09/15/2020 12:45 PM  
 Globulin 4.7 (H) 09/15/2020 12:45 PM  
 
Lab Results Component Value Date/Time INR 1.0 09/16/2020 03:51 AM  
 Prothrombin time 10.2 09/16/2020 03:51 AM  
 aPTT 28.3 06/05/2016 07:36 AM  
  
Lab Results Component Value Date/Time Iron 50 08/02/2013 10:18 AM  
 TIBC 231 (L) 08/02/2013 10:18 AM  
 Iron % saturation 22 08/02/2013 10:18 AM  
 Ferritin 33 09/15/2020 01:36 PM  
  
Lab Results Component Value Date/Time pH 7.44 06/05/2016 08:15 AM  
 PCO2 34 (L) 06/05/2016 08:15 AM  
 PO2 65 (L) 06/05/2016 08:15 AM  
 
No components found for: [place] Lab Results Component Value Date/Time CK 1,211 (H) 10/21/2015 04:12 AM  
 CK - MB 3.7 (H) 10/19/2015 05:37 PM  
  
 
 
   
 
Total time:  
Counseling / coordination time, spent as noted above:  
> 50% counseling / coordination?:  
 
Prolonged service was provided for  []30 min   []75 min in face to face time in the presence of the patient, spent as noted above. Time Start:  
Time End:  
Note: this can only be billed with 50078 (initial) or 03261 (follow up). If multiple start / stop times, list each separately.

## 2020-09-18 NOTE — PROGRESS NOTES
Problem: Self Care Deficits Care Plan (Adult) Goal: *Acute Goals and Plan of Care (Insert Text) Description:  
FUNCTIONAL STATUS PRIOR TO ADMISSION: Patient was modified independent using a rolling walker for house hold distance functional mobility. Patient was modified independent for ADLs/IADLs. Patient reports he completes sponge baths while seated, dresses on his own, and still drives. HOME SUPPORT: The patient lived with daughter who is currently working from home and can assist with ADLs/IADLs. Occupational Therapy Goals Initiated 9/18/2020 1. Patient will perform grooming with modified independence within 7 day(s). 2.  Patient will perform lower body dressing with modified independence within 7 day(s). 3.  Patient will perform upper body dressing with modified independence within 7 day(s). 4.  Patient will perform toilet transfers with supervision/set-up within 7 day(s). 5.  Patient will perform all aspects of toileting with modified independence within 7 day(s). 6.  Patient will utilize energy conservation techniques during functional activities with verbal cues within 7 day(s). Outcome: Not Met OCCUPATIONAL THERAPY EVALUATION Patient: Evon Link (89 y.o. male) Date: 9/18/2020 Primary Diagnosis: COPD exacerbation ([facility] [address] [J44.1] Acute hypoxemic respiratory failure ([facility] [address] [J96.01] Precautions: Fall ASSESSMENT Based on the objective data described below, the patient presents with decreased activity tolerance, decreased functional mobility, decreased safety awareness, and GW. Patient is currently functioning below his modified independent baseline for ADLs and functional mobility. Patient requires verbal, tactile, and visual cues while using RW for safety awareness when completing functional transfers/mobility. At the beginning of the session, patient was on 5L O2.  Vital signs were monitored during session and stats ranged between 83-95% with activity. Patient appears impulsive when completing ADLs by attempting to remove nasal cannula but put it back on with encouragement. Patient educated on importance of pursed lip breathing and demonstrated back with verbal cues. Patient would benefit from skilled OT services during his acute hospital stay to address activity tolerance, functional mobility, and safety awareness. Current Level of Function Impacting Discharge (ADLs/self-care): CGA to supervision/set-up for ADLs, CGA to independent for functional mobility/transfers Functional Outcome Measure: The patient scored 70 on the Barthel Index outcome measure which is indicative of 30% functional impairment. Other factors to consider for discharge: Patient lives with daughter who can provide 24/7 supervision and assistance with ADLs/IADLs Patient will benefit from skilled therapy intervention to address the above noted impairments. PLAN : 
Recommendations and Planned Interventions: self care training, functional mobility training, therapeutic exercise, balance training, therapeutic activities, endurance activities, patient education, and home safety training Frequency/Duration: Patient will be followed by occupational therapy 2 times a week to address goals. Recommendation for discharge: (in order for the patient to meet his/her long term goals) Occupational therapy at least 2 days/week in the home AND ensure assist and/or supervision for safety with ADLs/IADLs. This discharge recommendation: 
Has not yet been discussed the attending provider and/or case management IF patient discharges home will need the following DME: Increased oxygen SUBJECTIVE:  
Patient stated [place] Bunk I get dressed and go to the bathroom on my own!  OBJECTIVE DATA SUMMARY:  
HISTORY:  
Past Medical History:  
Diagnosis Date  CAD (coronary artery disease)   
 mi 10/19/2015 with stent  COPD (chronic obstructive pulmonary disease) (HCC)  Hypertension  Prostate cancer ([facility] [address] 5/8/2012 Dr. Dewain Essex - diagnosed 2/2012 - s/p EBRT on Lupron Past Surgical History:  
Procedure Laterality Date  HX ORTHOPAEDIC  1961  
 left knee surgery Expanded or extensive additional review of patient history:  
 
Home Situation Home Environment: Private residence # Steps to Enter: 4 Wheelchair Ramp: Yes One/Two Story Residence: Two story, live on 1st floor # of Interior Steps: 12 Height of Each Step (in): 12 inches Interior Rails: Both Lift Chair Available: No 
Living Alone: No 
Support Systems: Child(jonelle) Patient Expects to be Discharged to[de-identified] Private residence Current DME Used/Available at Home: Wheelchair, [place]ude, rolling, [place], quad, Shower chair Hand dominance: Right EXAMINATION OF PERFORMANCE DEFICITS: 
Cognitive/Behavioral Status: 
Neurologic State: Alert Orientation Level: Oriented X4 Cognition: Follows commands; Impulsive Perception: Appears intact Perseveration: No perseveration noted Safety/Judgement: Decreased awareness of need for assistance;Decreased awareness of need for safety;Decreased insight into deficits Hearing: Auditory Auditory Impairment: None Vision/Perceptual:   
Acuity: Within Defined Limits Range of Motion: 
AROM: Within functional limits PROM: Within functional limits Strength: 
Strength: Generally decreased, functional 
 
Coordination: 
Coordination: Within functional limits Fine Motor Skills-Upper: Left Intact; Right Intact Gross Motor Skills-Upper: Left Intact; Right Intact Tone & Sensation: 
Tone: Normal 
Sensation: Intact Balance: 
Sitting: Intact Standing: Impaired Standing - Static: Good;Constant support Standing - Dynamic : Fair;Constant support Functional Mobility and Transfers for ADLs: 
Bed Mobility: 
Rolling: Independent Supine to Sit: Independent Sit to Supine: Independent Scooting: Independent Transfers: 
Sit to Stand: Contact guard assistance Stand to Sit: Contact guard assistance ADL Assessment: 
Feeding: Independent Oral Facial Hygiene/Grooming: Supervision;Setup; Additional time Bathing: Supervision;Setup; Additional time Upper Body Dressing: Setup;Supervision Lower Body Dressing: Stand-by assistance; Additional time Toileting: Contact guard assistance ADL Intervention and task modifications: 
Patient educated on pursed lip breathing to assist with increasing O2 levels Cognitive Retraining Safety/Judgement: Decreased awareness of need for assistance;Decreased awareness of need for safety;Decreased insight into deficits Functional Measure: 
Barthel Index: 
 
Bathin Bladder: 10 Bowels: 10 
Groomin Dressing: 10 Feeding: 10 Mobility: 5 Stairs: 0 Toilet Use: 5 Transfer (Bed to Chair and Back): 15 Total: 70/100 The Barthel ADL Index: Guidelines 1. The index should be used as a record of what a patient does, not as a record of what a patient could do. 2. The main aim is to establish degree of independence from any help, physical or verbal, however minor and for whatever reason. 3. The need for supervision renders the patient not independent. 4. A patient's performance should be established using the best available evidence. Asking the patient, friends/relatives and nurses are the usual sources, but direct observation and common sense are also important. However direct testing is not needed. 5. Usually the patient's performance over the preceding 24-48 hours is important, but occasionally longer periods will be relevant. 6. Middle categories imply that the patient supplies over 50 per cent of the effort. 7. Use of aids to be independent is allowed. Franny Cadet., Barthel, D.W. (2889). Functional evaluation: the Barthel Index. [facility] (14)2.  
LIZBETH Hermosillo, Juan J Marie., Jerrica Jane., Danni Her. (1999). Measuring the change indisability after inpatient rehabilitation; comparison of the responsiveness of the Barthel Index and Functional [place] Measure. Journal of Neurology, Neurosurgery, and Psychiatry, 66(4), 751-812. MIREYA Fritz, DORCAS Alaniz, & Kike Mae M.A. (2004.) Assessment of post-stroke quality of life in cost-effectiveness studies: The usefulness of the Barthel Index and the EuroQoL-5D. [facility], 13, 253-21 Based on the above components, the patient evaluation is determined to be of the following complexity level: LOW Pain Rating: 
Patient did not c/o pain Activity Tolerance:  
Poor, desaturates with exertion and requires oxygen, requires frequent rest breaks, and observed SOB with activity O2 Levels During Session:  
While sitting EOB- 94% After don/doffing socks- 83% While standing at sink- 90% After completing bed mobility- 85% Please refer to the flowsheet for vital signs taken during this treatment. After treatment patient left in no apparent distress:   
Supine in bed, Call bell within reach, and respiratory therapist present COMMUNICATION/EDUCATION:  
The patients plan of care was discussed with: Physical therapist and Registered nurse. Home safety education was provided and the patient/caregiver indicated understanding., Patient/family have participated as able in goal setting and plan of care. , and Patient/family agree to work toward stated goals and plan of care. This patients plan of care is appropriate for delegation to [facility]. Thank you for this referral. 
Althea Mann OTR/L Time Calculation: 27 mins

## 2020-09-19 NOTE — PROGRESS NOTES
0700 shift change report given to Minh Jauregui (oncoming nurse) by nightshift (offgoing nurse). Report included the following information SBAR, Kardex and MAR. 
 
0900 pt o2 sats dropping to 85. Pt placed on 6L. sats improved

## 2020-09-19 NOTE — PROGRESS NOTES
RAPID RESPONSE TEAM- Follow Up Rounded on patient due to recent transfer out of CCU. Discussed with primary RN, [place]. No acute concerns, VSS, MEWS 1. Patient Vitals for the past 12 hrs: 
 Temp Pulse Resp BP SpO2  
09/18/20 1943 97.2 °F (36.2 °C) 84 16 (!) 124/56 94 % 09/18/20 1901     93 % 09/18/20 1845     95 % 09/18/20 1542 97.9 °F (36.6 °C) 91 18 137/71 91 % 09/18/20 1451     93 % 09/18/20 1046 97.5 °F (36.4 °C) 90 18 (!) 141/57 93 % No RRT interventions indicated at this time. Please call with any questions or concerns. Rosio Moore Rapid Response RN Mallory Tellez

## 2020-09-19 NOTE — PROGRESS NOTES
Hospitalist Progress Note NAME: Estefania Gooden :  1940 MRN:  [id number] Assessment / Plan: 
Acute hypoxic respiratory failure due to acute COPD exacerbation due to acute bronchitis, POA 
CAP 
COVID negative  
-Pt with worsening dyspnea despite using nebs at home and not responded to initial aggressive ED treatment. - C/x empiric CTx/zithromax C/w nebs and steroids Currently on mid flow at [address] [address] 
-- Mucolytic with scheduled mucinex and tessalon perls prn for cough. - O2 to keep sats > 90%, reassess for home O2 on DC. 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  
 vascular sx recommended TAVR , patient not sure if he will go for the surgery Vascular surgeon to call daughter to discuss about TAVR Did explained to him that without surgery , aortic artery can rupture CTA:. 
1. No evidence for embolism. 2. 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 3. Nonspecific consolidative opacifications in the posterior medial basilar 
segments of the left lower lobe and the inferior lingula 
----- old finding since 2016, was evaluated by CTS at that time, recommended conservative management at that time. CTS was consulted. Control BP < 120 goal  
Consult palliative for goals of care Vascular sx deemed that patient is high risk surgery , patient and  family don't want surgery Palliative care consulted for goals of care as pt still full code Family understand that the aortic might rupture and pt can die from it , reinforced again the message to the daughter I talked to the patient again today and he still does not want to pursue any surgical option understanding risks, benefits and alternatives of his choice. Sinus pauses  
 hold BB , cardio on board Monitor HR Abdominal pain in settings of metastatic prostate cancer  
- ua yesterday with Mod LE and wbc 20-50, will repeat to r/o underlying UTI No UTI , Ucx neg Currently on CTX for bronchitis,  
 -urology consulted 
-pain management : fentanyl was effective in ED, will continue  
-cont LAUREANOytega, needs to be give together with prednisone ( holding while on solumedrol IV). Cont [employer] CT scan findings d/w pt and sister at bedside, informed then on high risk of rupture  
  
CKD stage III  
hyperkalemia resolved Cr at baseline ~ 1.4, stable Potassium back to normal 
. Avoid nephrotoxic drugs, adjust all meds to GFR.  
  
HTN/ CAD  
-BP soft  
-cont home  lisinopril / ASA Hold BB Hyperlipidemia, cont statin  
  
   
Code Status: Full code d/w pt and sister at bedside Surrogate Decision Maker: dtr  
  
DVT Prophylaxis: scd GI Prophylaxis: not indicated 
  
Baseline: lives with his dtr; home O2 3 L  
 
25.0 - 29.9 Overweight / Body mass index is 27.21 kg/m². Subjective: Chief Complaint / Reason for Physician Visit Discussed with RN overnight events. Patient was seen and examined. No acute events overnight. \" I am doing very well but I really do not have any surgery\" Review of Systems: 
Symptom Y/N Comments  Symptom Y/N Comments Fever/Chills n   Chest Pain n   
Poor Appetite    Edema Cough n   Abdominal Pain n   
Sputum    Joint Pain SOB/WHEAT n   Pruritis/Rash Nausea/vomit n   Tolerating PT/OT Diarrhea    Tolerating Diet y Constipation    Other Could NOT obtain due to:   
 
 
 
Objective: VITALS:  
Last 24hrs VS reviewed since prior progress note. Most recent are: 
Patient Vitals for the past 24 hrs: 
 Temp Pulse Resp BP SpO2  
09/19/20 1037 97.9 °F (36.6 °C) 97 18 139/75 92 % 09/19/20 0838     93 % 09/19/20 0756 98.1 °F (36.7 °C) 90 17 137/64 94 % 09/19/20 0345 97.7 °F (36.5 °C) 94 18 (!) 117/92 93 % 09/19/20 0107     95 % 09/18/20 2324 97.7 °F (36.5 °C) 80 14 126/72 95 % 09/18/20 1943 97.2 °F (36.2 °C) 84 16 (!) 124/56 94 % 09/18/20 1901     93 % 09/18/20 1845     95 % 09/18/20 1542 97.9 °F (36.6 °C) 91 18 137/71 91 % 09/18/20 1451     93 % Intake/Output Summary (Last 24 hours) at 9/19/2020 1223 Last data filed at 9/19/2020 2401 Gross per 24 hour Intake 700 ml Output 850 ml Net -150 ml PHYSICAL EXAM: 
General: WD, WN. Alert, cooperative, no acute distress   
EENT:  EOMI. Anicteric sclerae. MMM Resp:  Decreased breath sounds b/l ,  no wheezing or rales. No accessory muscle use CV:  Regular  rhythm,  No edema GI:  Soft, Non distended, Non tender.  +Bowel sounds Neurologic:  Alert and oriented X 3, normal speech, Psych:   Good insight. Not anxious nor agitated Skin:  No rashes. No jaundice Reviewed most current lab test results and cultures  YES Reviewed most current radiology test results   YES Review and summation of old records today    NO Reviewed patient's current orders and MAR    YES 
PMH/ reviewed - no change compared to H&P 
________________________________________________________________________ Care Plan discussed with: 
  Comments Patient x Family RN x Care Manager Consultant Multidiciplinary team rounds were held today with , nursing, pharmacist and clinical coordinator. Patient's plan of care was discussed; medications were reviewed and discharge planning was addressed. ________________________________________________________________________ Comments >50% of visit spent in counseling and coordination of care    
________________________________________________________________________ Georgeanna Bringhurst, MD  
 
Procedures: see electronic medical records for all procedures/Xrays and details which were not copied into this note but were reviewed prior to creation of Plan. LABS: 
I reviewed today's most current labs and imaging studies. Pertinent labs include: 
Recent Labs  
  09/18/20 
0334 WBC 6.5 HGB 10.8* HCT 34.0*  
 Recent Labs 09/18/20 
9072   
K 4.9 * CO2 24 * BUN 29* CREA 1.46* CA 7.8*  
MG 2.8* PHOS 2.7 Signed: Jon Peterson MD

## 2020-09-19 NOTE — PROGRESS NOTES
Bedside shift change report given to [address] [facility] and [address] (oncoming nurse) by Spenser Camara (offgoing nurse). Report included the following information SBAR, Kardex, MAR, Recent Results and Cardiac Rhythm NSR.

## 2020-09-19 NOTE — PROGRESS NOTES
Cardiology Progress Note 9/19/2020 12:29 PM 
 
Admit Date: 9/15/2020 Admit Diagnosis: COPD exacerbation ([facility] [J44.1]; Acute hypoxemic respiratory failure ([facility] [J96.01] Subjective:  
 
Harrington Schlatter has no complaints. Resting HR . Had > 2 second pauses yesterday evening. Visit Vitals /75 (BP 1 Location: Left arm, BP Patient Position: Sitting) Pulse 97 Temp 97.9 °F (36.6 °C) Resp 18 Ht 6' (1.829 m) Wt 200 lb 9.6 oz (91 kg) SpO2 92% BMI 27.21 kg/m² Current Facility-Administered Medications Medication Dose Route Frequency  predniSONE (DELTASONE) tablet 40 mg  40 mg Oral DAILY WITH BREAKFAST  budesonide (PULMICORT) 500 mcg/2 ml nebulizer suspension  500 mcg Nebulization BID RT  
 arformoteroL (BROVANA) neb solution 15 mcg  15 mcg Nebulization BID RT  
 albuterol-ipratropium (DUO-NEB) 2.5 MG-0.5 MG/3 ML  3 mL Nebulization Q1H PRN  
 albuterol-ipratropium (DUO-NEB) 2.5 MG-0.5 MG/3 ML  3 mL Nebulization Q6H RT  
 azithromycin (ZITHROMAX) tablet 500 mg  500 mg Oral QPM  
 aluminum-magnesium hydroxide (MAALOX) oral suspension 30 mL  30 mL Oral Q6H PRN  
 influenza vaccine 2020-21 (6 mos+)(PF) (FLUARIX/FLULAVAL/FLUZONE QUAD) injection 0.5 mL  0.5 mL IntraMUSCular PRIOR TO DISCHARGE  abiraterone tab 250 mg (Patient Supplied)  250 mg Oral DAILY  lisinopriL (PRINIVIL, ZESTRIL) tablet 5 mg  5 mg Oral DAILY  ondansetron (ZOFRAN) injection 4 mg  4 mg IntraVENous Q4H PRN  
 fentaNYL citrate (PF) injection 50 mcg  50 mcg IntraVENous Q4H PRN  
 aspirin chewable tablet 81 mg  81 mg Oral DAILY  atorvastatin (LIPITOR) tablet 40 mg  40 mg Oral QHS  docusate sodium (COLACE) capsule 100 mg  100 mg Oral BID  pantoprazole (PROTONIX) tablet 40 mg  40 mg Oral ACB  [Held by provider] metoprolol tartrate (LOPRESSOR) tablet 50 mg  50 mg Oral BID  
 oxybutynin chloride XL (DITROPAN XL) tablet 10 mg  10 mg Oral DAILY  polyethylene glycol (MIRALAX) packet 17 g  17 g Oral DAILY  guaiFENesin ER (MUCINEX) tablet 600 mg  600 mg Oral BID  
 benzonatate (TESSALON) capsule 100 mg  100 mg Oral TID PRN  
 cefTRIAXone (ROCEPHIN) 1 g in 0.9% sodium chloride (MBP/ADV) 50 mL  1 g IntraVENous Q24H  hydrALAZINE (APRESOLINE) 20 mg/mL injection 10 mg  10 mg IntraVENous Q6H PRN  
 acetaminophen (TYLENOL) tablet 650 mg  650 mg Oral Q6H PRN Objective:  
  
Physical Exam: 
Visit Vitals /75 (BP 1 Location: Left arm, BP Patient Position: Sitting) Pulse 97 Temp 97.9 °F (36.6 °C) Resp 18 Ht 6' (1.829 m) Wt 200 lb 9.6 oz (91 kg) SpO2 92% BMI 27.21 kg/m² General Appearance:  Well developed, well nourished,alert and oriented x 3, and individual in no acute distress. Ears/Nose/Mouth/Throat:   Hearing grossly normal. 
  
    Neck: Supple. Chest:   Lungs clear to auscultation bilaterally. Cardiovascular:  Regular rate and rhythm, S1, S2 normal, no murmur. Abdomen:   Soft, non-tender, bowel sounds are active. Extremities: No edema bilaterally. Skin: Warm and dry. Data Review:  
Labs:  No results found for this or any previous visit (from the past 24 hour(s)). Telemetry: normal sinus rhythm Assessment:  
 
Hospital Problems  Date Reviewed: 7/15/2020 Codes Class Noted POA  
 DNR (do not resuscitate) discussion ICD-10-CM: Z71.89 ICD-9-CM: V65.49  Unknown Unknown Goals of care, counseling/discussion ICD-10-CM: Z71.89 ICD-9-CM: V65.49  Unknown Unknown Advanced care planning/counseling discussion ICD-10-CM: Z71.89 ICD-9-CM: V65.49  Unknown Unknown Sinus pause ICD-10-CM: I45.5 ICD-9-CM: 426.6  9/17/2020 Unknown COPD exacerbation ([facility] ICD-10-CM: J44.1 ICD-9-CM: 491.21  9/15/2020 Unknown Acute hypoxemic respiratory failure ([facility] ICD-10-CM: J96.01 
ICD-9-CM: 518.81  9/15/2020 Unknown Penetrating atherosclerotic ulcer of aorta ([facility] ICD-10-CM: I71.9 ICD-9-CM: 441.9  6/6/2016 Yes Overview Signed 10/24/2016  1:42 PM by Jorge Luis Mcgill MD  
  Seen by Dr. Cosme Hernandez in 7/2016 - plan for recheck in 6 mos Plan:  
 
Continue to hold metoprolol. If no significant pauses in next 24 hrs, will try low dose metoprolol. Discussed possible pacemaker with daughter.   
 
Slick Olivarez MD

## 2020-09-19 NOTE — PROGRESS NOTES
ADULT PROTOCOL: JET AEROSOL ASSESSMENT Patient  Constanza Jacobo     78 y.o.   male     9/19/2020  6:22 PM 
 
Breath Sounds Pre Procedure: Right Breath Sounds: Diminished Left Breath Sounds: Diminished Breath Sounds Post Procedure: Right Breath Sounds: Diminished Left Breath Sounds: Diminished Breathing pattern: Pre procedure Breathing Pattern: Regular Post procedure Breathing Pattern: Regular Heart Rate: Pre procedure Pulse: 89 
         Post procedure Pulse: 91 
 
Resp Rate: Pre procedure Respirations: 20 
         Post procedure Respirations: 20 
 
Oxygen: O2 Device: Nasal cannula 5L SpO2: Pre procedure SpO2: 90 % Post procedure SpO2: 90 % Nebulizer Therapy: Current medications Aerosolized Medications: DuoNeb Problem List:  
Patient Active Problem List  
Diagnosis Code  ED (erectile dysfunction) N52.9  
 HTN (hypertension) I10  
 Prostate cancer ([facility] C61  
 PAD (peripheral artery disease) ([facility]) I73.9  
 STEMI (ST elevation myocardial infarction) ([facility]) I21.3  Chronic interstitial lung disease ([facility]) J84.9  
 S/P PTCA (percutaneous transluminal coronary angioplasty) Z98.61  
 Mucopurulent chronic bronchitis ([facility]) J41.1  Acute myocardial infarction of anterior wall, subsequent episode of care ([facility] I21.09  
 Penetrating atherosclerotic ulcer of aorta ([facility]) I71.9  Hyperlipidemia E78.5  Advance directive discussed with patient Z70.80  Coronary artery disease involving native coronary artery of native heart without angina pectoris I25.10  Hypoxemia R09.02  
 COPD (chronic obstructive pulmonary disease) ([facility]) J44.9  Mixed simple and mucopurulent chronic bronchitis ([facility]) J41.8  COPD exacerbation ([facility] J44.1  Acute hypoxemic respiratory failure ([facility]) J96.01  
 Sinus pause I45.5  DNR (do not resuscitate) discussion Z70.80  
 Goals of care, counseling/discussion Z71.89  
  Advanced care planning/counseling discussion Z71.89 Respiratory Therapist: Satinder Sigala

## 2020-09-20 NOTE — ROUTINE PROCESS
Patient was assisted back to bed with nurses. He has no complaints. Lung sounds diminished. He is on 8 liters high flow. He might need his medications for constipation put in as PRN as he had 3 soft stools today and may end up getting looser.

## 2020-09-20 NOTE — PROGRESS NOTES
Cardiology Progress Note 9/20/2020 9:59 AM 
 
Admit Date: 9/15/2020 Admit Diagnosis: COPD exacerbation ([facility] [J44.1]; Acute hypoxemic respiratory failure ([facility] [J96.01] Subjective:  
 
[facility] has no complaints. No pauses for > 36 hrs. Resting rate . Visit Vitals /60 Pulse 95 Temp 97.8 °F (36.6 °C) Resp 16 Ht 6' (1.829 m) Wt 202 lb 11.2 oz (91.9 kg) SpO2 90% BMI 27.49 kg/m² Current Facility-Administered Medications Medication Dose Route Frequency  predniSONE (DELTASONE) tablet 40 mg  40 mg Oral DAILY WITH BREAKFAST  budesonide (PULMICORT) 500 mcg/2 ml nebulizer suspension  500 mcg Nebulization BID RT  
 arformoteroL (BROVANA) neb solution 15 mcg  15 mcg Nebulization BID RT  
 albuterol-ipratropium (DUO-NEB) 2.5 MG-0.5 MG/3 ML  3 mL Nebulization Q1H PRN  
 albuterol-ipratropium (DUO-NEB) 2.5 MG-0.5 MG/3 ML  3 mL Nebulization Q6H RT  
 aluminum-magnesium hydroxide (MAALOX) oral suspension 30 mL  30 mL Oral Q6H PRN  
 influenza vaccine 2020-21 (6 mos+)(PF) (FLUARIX/FLULAVAL/FLUZONE QUAD) injection 0.5 mL  0.5 mL IntraMUSCular PRIOR TO DISCHARGE  abiraterone tab 250 mg (Patient Supplied)  250 mg Oral DAILY  [Held by provider] lisinopriL (PRINIVIL, ZESTRIL) tablet 5 mg  5 mg Oral DAILY  ondansetron (ZOFRAN) injection 4 mg  4 mg IntraVENous Q4H PRN  
 fentaNYL citrate (PF) injection 50 mcg  50 mcg IntraVENous Q4H PRN  
 aspirin chewable tablet 81 mg  81 mg Oral DAILY  atorvastatin (LIPITOR) tablet 40 mg  40 mg Oral QHS  docusate sodium (COLACE) capsule 100 mg  100 mg Oral BID  pantoprazole (PROTONIX) tablet 40 mg  40 mg Oral ACB  [Held by provider] metoprolol tartrate (LOPRESSOR) tablet 50 mg  50 mg Oral BID  
 oxybutynin chloride XL (DITROPAN XL) tablet 10 mg  10 mg Oral DAILY  polyethylene glycol (MIRALAX) packet 17 g  17 g Oral DAILY  guaiFENesin ER (MUCINEX) tablet 600 mg  600 mg Oral BID  
  benzonatate (TESSALON) capsule 100 mg  100 mg Oral TID PRN  
 cefTRIAXone (ROCEPHIN) 1 g in 0.9% sodium chloride (MBP/ADV) 50 mL  1 g IntraVENous Q24H  hydrALAZINE (APRESOLINE) 20 mg/mL injection 10 mg  10 mg IntraVENous Q6H PRN  
 acetaminophen (TYLENOL) tablet 650 mg  650 mg Oral Q6H PRN Objective:  
  
Physical Exam: 
Visit Vitals /63 (BP 1 Location: Left arm, BP Patient Position: Sitting) Pulse 95 Temp 97.6 °F (36.4 °C) Resp 18 Ht 6' (1.829 m) Wt 202 lb 11.2 oz (91.9 kg) SpO2 91% BMI 27.49 kg/m² General Appearance:  Well developed, well nourished,alert and oriented x 3, and individual in no acute distress. Ears/Nose/Mouth/Throat:   Hearing grossly normal. 
  
    Neck: Supple. Chest:   Lungs clear to auscultation bilaterally. Cardiovascular:  Regular rate and rhythm, S1, S2 normal, no murmur. Abdomen:   Soft, non-tender, bowel sounds are active. Extremities: No edema bilaterally. Skin: Warm and dry. Data Review:  
Labs:  No results found for this or any previous visit (from the past 24 hour(s)). Telemetry: normal sinus rhythm Assessment:  
 
Hospital Problems  Date Reviewed: 7/15/2020 Codes Class Noted POA  
 DNR (do not resuscitate) discussion ICD-10-CM: Z71.89 ICD-9-CM: V65.49  Unknown Unknown Goals of care, counseling/discussion ICD-10-CM: Z71.89 ICD-9-CM: V65.49  Unknown Unknown Advanced care planning/counseling discussion ICD-10-CM: Z71.89 ICD-9-CM: V65.49  Unknown Unknown Sinus pause ICD-10-CM: I45.5 ICD-9-CM: 426.6  9/17/2020 Unknown COPD exacerbation ([facility] ICD-10-CM: J44.1 ICD-9-CM: 491.21  9/15/2020 Unknown Acute hypoxemic respiratory failure ([facility] ICD-10-CM: J96.01 
ICD-9-CM: 518.81  9/15/2020 Unknown Penetrating atherosclerotic ulcer of aorta ([facility] ICD-10-CM: I71.9 ICD-9-CM: 441.9  6/6/2016 Yes  Overview Signed 10/24/2016  1:42 PM by Jeanne Garza MD  
 Seen by Dr. Tamiko Cloud in 7/2016 - plan for recheck in 6 mos Plan:  
 
Restart low dose metoprolol. Discussed pacemaker if significant pauses. Daughter updated.  
 
Linda Coleman MD

## 2020-09-20 NOTE — PROGRESS NOTES
ADULT PROTOCOL: JET AEROSOL  REASSESSMENT Patient  Silvana Gibson     78 y.o.   male     9/20/2020  8:33 AM 
 
Breath Sounds Pre Procedure: Right Breath Sounds: Diminished Left Breath Sounds: Diminished Breath Sounds Post Procedure: Right Breath Sounds: Diminished Left Breath Sounds: Diminished Breathing pattern: Pre procedure Breathing Pattern: Regular Post procedure Breathing Pattern: Regular Heart Rate: Pre procedure Pulse: 95 
         Post procedure Pulse: 97 Resp Rate: Pre procedure Respirations: 20 
         Post procedure Respirations: 20 
 
Oxygen: O2 Device: Hi flow nasal cannula  8L SpO2: Pre procedure SpO2: 90 % Post procedure SpO2: 90 % Nebulizer Therapy: Current medications Aerosolized Medications: DuoNeb, Pulmicort, Chasity Constantino Problem List:  
Patient Active Problem List  
Diagnosis Code  ED (erectile dysfunction) N52.9  
 HTN (hypertension) I10  
 [facility] C61  
 PAD (peripheral artery disease) ([facility]) I73.9  
 STEMI (ST elevation myocardial infarction) ([facility]) I21.3  Chronic interstitial lung disease ([facility]) J84.9  
 S/P PTCA (percutaneous transluminal coronary angioplasty) Z98.61  
 Mucopurulent chronic bronchitis ([facility]) J41.1  Acute myocardial infarction of anterior wall, subsequent episode of care ([facility] I21.09  
 Penetrating atherosclerotic ulcer of aorta ([facility]) I71.9  Hyperlipidemia E78.5  Advance directive discussed with patient Z70.80  Coronary artery disease involving native coronary artery of native heart without angina pectoris I25.10  Hypoxemia R09.02  
 COPD (chronic obstructive pulmonary disease) ([facility]) J44.9  Mixed simple and mucopurulent chronic bronchitis ([facility]) J41.8  COPD exacerbation ([facility] J44.1  Acute hypoxemic respiratory failure ([facility]) J96.01  
 Sinus pause I45.5  DNR (do not resuscitate) discussion Z70.80  
  Goals of care, counseling/discussion Z71.89  
 Advanced care planning/counseling discussion Z71.89 Respiratory Therapist: Alberto Mondragon

## 2020-09-20 NOTE — PROGRESS NOTES
Bedside shift change report given to Joseluis Sanders (oncoming nurse) by Jennifer Hwang (offgoing nurse). Report included the following information SBAR, Kardex, MAR, Recent Results and Cardiac Rhythm NSR.

## 2020-09-20 NOTE — PROGRESS NOTES
Hospitalist Progress Note NAME: Betty Aparicio :  1940 MRN:  [id number] Assessment / Plan: 
Acute hypoxic respiratory failure due to acute COPD exacerbation due to acute bronchitis, POA 
CAP 
COVID negative  
-Pt with worsening dyspnea despite using nebs at home and not responded to initial aggressive ED treatment. - C/x empiric CTx/zithromax C/w nebs and steroids Currently on mid flow at [address] 
-- Mucolytic with scheduled mucinex and tessalon perls prn for cough. - O2 to keep sats > 90%, reassess for home O2 on DC. 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  
 vascular sx recommended TAVR , patient not sure if he will go for the surgery Vascular surgeon to call daughter to discuss about TAVR Did explained to him that without surgery , aortic artery can rupture CTA:. 
1. No evidence for embolism. 2. 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 3. Nonspecific consolidative opacifications in the posterior medial basilar 
segments of the left lower lobe and the inferior lingula 
----- old finding since 2016, was evaluated by CTS at that time, recommended conservative management at that time. CTS was consulted. Control BP < 120 goal  
Consult palliative for goals of care Vascular sx deemed that patient is high risk surgery , patient and  family don't want surgery Palliative care consulted for goals of care as pt still full code Family understand that the aortic might rupture and pt can die from it , reinforced again the message to the daughter I talked to the patient again today and he still does not want to pursue any surgical option understanding risks, benefits and alternatives of his choice. Sinus pauses Resuming beta-blocker at lower dose. Cardiology evaluated for possible pacemaker if frequent pauses. Monitor HR Abdominal pain in settings of metastatic prostate cancer - ua yesterday with Mod LE and wbc 20-50, will repeat to r/o underlying UTI No UTI , Ucx neg Currently on CTX for bronchitis,  
-urology consulted 
-pain management : fentanyl was effective in ED, will continue  
-cont LAUREANOytega, needs to be give together with prednisone ( holding while on solumedrol IV). Cont [employer] CT scan findings d/w pt and sister at bedside, informed then on high risk of rupture  
  
CKD stage III  
hyperkalemia resolved Cr at baseline ~ 1.4, stable Potassium back to normal 
. Avoid nephrotoxic drugs, adjust all meds to GFR.  
  
HTN/ CAD  
-BP soft  
-cont home   ASA Resuming BB at lower dose Hyperlipidemia, cont statin  
  
   
Code Status: Full code d/w pt and sister at bedside Surrogate Decision Maker: dtr  
  
DVT Prophylaxis: scd GI Prophylaxis: not indicated 
  
Baseline: lives with his dtr; home O2 3 L  
 
25.0 - 29.9 Overweight / Body mass index is 27.49 kg/m². Subjective: Chief Complaint / Reason for Physician Visit Discussed with RN overnight events. Patient was seen and examined. No acute events overnight. \"doing very well\" Review of Systems: 
Symptom Y/N Comments  Symptom Y/N Comments Fever/Chills n   Chest Pain n   
Poor Appetite    Edema Cough n   Abdominal Pain n   
Sputum    Joint Pain SOB/WHEAT n   Pruritis/Rash Nausea/vomit n   Tolerating PT/OT Diarrhea    Tolerating Diet y Constipation    Other Could NOT obtain due to:   
 
 
 
Objective: VITALS:  
Last 24hrs VS reviewed since prior progress note. Most recent are: 
Patient Vitals for the past 24 hrs: 
 Temp Pulse Resp BP SpO2  
09/20/20 1022 97.6 °F (36.4 °C) 95 18 128/63 91 %  
09/20/20 0830     90 % 09/20/20 0459 97.8 °F (36.6 °C) 95 16 119/60 90 % 09/20/20 0200  97   93 % 09/20/20 0110  89   93 % 09/19/20 2311 97.4 °F (36.3 °C) 95 18 (!) 117/99 91 % 09/19/20 2219  100   90 % 09/19/20 2217  100   (!) 86 % 09/19/20 2213  (!) 103   (!) 85 % 09/19/20 2209  (!) 106   (!) 82 % 09/19/20 2021     94 % 09/19/20 2015     94 % 09/19/20 1917 98.2 °F (36.8 °C) 91 16 (!) 96/44 93 % 09/19/20 1622 97.8 °F (36.6 °C)      
09/19/20 1612 97.8 °F (36.6 °C) 94 17 (!) 107/40 92 % 09/19/20 1421     90 % Intake/Output Summary (Last 24 hours) at 9/20/2020 1212 Last data filed at 9/20/2020 8014 Gross per 24 hour Intake 240 ml Output 350 ml Net -110 ml PHYSICAL EXAM: 
General: WD, WN. Alert, cooperative, no acute distress   
EENT:  EOMI. Anicteric sclerae. MMM Resp:  Decreased breath sounds b/l ,  no wheezing or rales. No accessory muscle use CV:  Regular  rhythm,  No edema GI:  Soft, Non distended, Non tender.  +Bowel sounds Neurologic:  Alert and oriented X 3, normal speech, Psych:   Good insight. Not anxious nor agitated Skin:  No rashes. No jaundice Reviewed most current lab test results and cultures  YES Reviewed most current radiology test results   YES Review and summation of old records today    NO Reviewed patient's current orders and MAR    YES 
PMH/SH reviewed - no change compared to H&P 
________________________________________________________________________ Care Plan discussed with: 
  Comments Patient x Family RN x Care Manager Consultant Multidiciplinary team rounds were held today with , nursing, pharmacist and clinical coordinator. Patient's plan of care was discussed; medications were reviewed and discharge planning was addressed. ________________________________________________________________________ Comments >50% of visit spent in counseling and coordination of care    
________________________________________________________________________ Leonid Tim MD  
 
Procedures: see electronic medical records for all procedures/Xrays and details which were not copied into this note but were reviewed prior to creation of Plan. LABS: 
I reviewed today's most current labs and imaging studies. Pertinent labs include: 
Recent Labs  
  09/18/20 
0334 WBC 6.5 HGB 10.8* HCT 34.0*  
 Recent Labs  
  09/18/20 
7606   
K 4.9 * CO2 24 * BUN 29* CREA 1.46* CA 7.8*  
MG 2.8* PHOS 2.7 Signed: Farhan Metcalf MD

## 2020-09-20 NOTE — PROGRESS NOTES
Bedside shift change report given to Markus Crews RN (oncoming nurse) by Conrado Weaver RN (offgoing nurse). Report included the following information SBAR.

## 2020-09-21 PROBLEM — I49.5 SSS (SICK SINUS SYNDROME) (HCC): Chronic | Status: ACTIVE | Noted: 2020-01-01

## 2020-09-21 PROBLEM — I49.5 SSS (SICK SINUS SYNDROME) (HCC): Status: ACTIVE | Noted: 2020-01-01

## 2020-09-21 NOTE — CONSULTS
[address], [place], [address] [facility]  [phone] Date of  Admission: 9/15/2020 12:33 PM  
 
Admission type:Emergency Consult for: COPD exacerbation ([address] [J44.1] Acute hypoxemic respiratory failure ([address] [J96.01] Consult by: Dr Saulo Fitzgerald, TANJA Subjective:  
 
Eligio Guzman is a 78 y.o. male admitted for COPD exacerbation ([address] [J44.1] Acute hypoxemic respiratory failure ([address] [J96.01]. Mr Severo Batty has pmhx of ASHD s/p stenting, HTN, HLD, CKD III, COPD on oxygen, and prostate cancer, who was admitted on 9/15/2020 from home with a diagnosis of acute hypoxic respiratory failure due to COPD exacerbation. 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 He was placed on bb per cardiology noted to have greater than 3 sec pauses with episodes of junctional rhythm. Eligio Guzman  follows with Dr. Mariana Loya for cardiology. Last ECHO 01/17 with EF 55%; hypokinesis of apical wall; mod concentric hypertrophy. Cath 10/15 with stenting. Stress with no change in infarction from prior; no ischemia Patient Active Problem List  
 Diagnosis Date Noted  DNR (do not resuscitate) discussion  Goals of care, counseling/discussion  Advanced care planning/counseling discussion  Sinus pause 09/17/2020  COPD exacerbation ([address] 09/15/2020  Acute hypoxemic respiratory failure ([address] 09/15/2020  Mixed simple and mucopurulent chronic bronchitis ([address] 04/17/2019  Hypoxemia 03/23/2019  COPD (chronic obstructive pulmonary disease) ([address] 03/23/2019  Coronary artery disease involving native coronary artery of native heart without angina pectoris 04/25/2017  Advance directive discussed with patient 07/22/2016  Hyperlipidemia 06/27/2016  Acute myocardial infarction of anterior wall, subsequent episode of care ([address] 06/06/2016  Penetrating atherosclerotic ulcer of aorta ([address] 06/06/2016  Mucopurulent chronic bronchitis ([facility] 2016  S/P PTCA (percutaneous transluminal coronary angioplasty) 10/21/2015  Chronic interstitial lung disease ([facility] 10/20/2015  
 STEMI (ST elevation myocardial infarction) ([facility] 10/19/2015  PAD (peripheral artery disease) ([facility] 2013  Prostate cancer ([place] [address].) 2012  
 HTN (hypertension) 2011  ED (erectile dysfunction) 2010 Mojgan Herrera MD 
Past Medical History:  
Diagnosis Date  CAD (coronary artery disease)   
 mi 10/19/2015 with stent  COPD (chronic obstructive pulmonary disease) (HCC)  Hypertension  Prostate cancer ([facility] 2012 Dr. Flex Ervin - diagnosed 2012 - s/p EBRT on Lupron Social History Socioeconomic History  Marital status:  Spouse name: Not on file  Number of children: Not on file  Years of education: Not on file  Highest education level: Not on file Tobacco Use  Smoking status: Former Smoker Packs/day: 0.50 Years: 40.00 Pack years: 20.00 Types: Cigarettes Last attempt to quit: 10/19/2015 Years since quittin.9  Smokeless tobacco: Never Used  Tobacco comment: trying quit 10/13/15 -  ppd Substance and Sexual Activity  Alcohol use: No  
  Alcohol/week: 0.0 standard drinks  Drug use: No  
 Sexual activity: Not Currently No Known Allergies Family History Problem Relation Age of Onset  Heart Disease Mother   
     pacemaker  Diabetes Mother  Diabetes Sister  Heart Disease Brother Current Facility-Administered Medications Medication Dose Route Frequency  metoprolol tartrate (LOPRESSOR) tablet 12.5 mg  12.5 mg Oral Q12H  
 albuterol-ipratropium (DUO-NEB) 2.5 MG-0.5 MG/3 ML  3 mL Nebulization TID RT  
 predniSONE (DELTASONE) tablet 40 mg  40 mg Oral DAILY WITH BREAKFAST  budesonide (PULMICORT) 500 mcg/2 ml nebulizer suspension  500 mcg Nebulization BID RT  
  arformoteroL (BROVANA) neb solution 15 mcg  15 mcg Nebulization BID RT  
 albuterol-ipratropium (DUO-NEB) 2.5 MG-0.5 MG/3 ML  3 mL Nebulization Q1H PRN  
 aluminum-magnesium hydroxide (MAALOX) oral suspension 30 mL  30 mL Oral Q6H PRN  
 influenza vaccine 2020-21 (6 mos+)(PF) (FLUARIX/FLULAVAL/FLUZONE QUAD) injection 0.5 mL  0.5 mL IntraMUSCular PRIOR TO DISCHARGE  abiraterone tab 250 mg (Patient Supplied)  250 mg Oral DAILY  [Held by provider] lisinopriL (PRINIVIL, ZESTRIL) tablet 5 mg  5 mg Oral DAILY  ondansetron (ZOFRAN) injection 4 mg  4 mg IntraVENous Q4H PRN  
 fentaNYL citrate (PF) injection 50 mcg  50 mcg IntraVENous Q4H PRN  
 aspirin chewable tablet 81 mg  81 mg Oral DAILY  atorvastatin (LIPITOR) tablet 40 mg  40 mg Oral QHS  docusate sodium (COLACE) capsule 100 mg  100 mg Oral BID  pantoprazole (PROTONIX) tablet 40 mg  40 mg Oral ACB  oxybutynin chloride XL (DITROPAN XL) tablet 10 mg  10 mg Oral DAILY  polyethylene glycol (MIRALAX) packet 17 g  17 g Oral DAILY  guaiFENesin ER (MUCINEX) tablet 600 mg  600 mg Oral BID  
 benzonatate (TESSALON) capsule 100 mg  100 mg Oral TID PRN  
 cefTRIAXone (ROCEPHIN) 1 g in 0.9% sodium chloride (MBP/ADV) 50 mL  1 g IntraVENous Q24H  hydrALAZINE (APRESOLINE) 20 mg/mL injection 10 mg  10 mg IntraVENous Q6H PRN  
 acetaminophen (TYLENOL) tablet 650 mg  650 mg Oral Q6H PRN Review of Symptoms: 
Constitutional: negative Eyes: negative Ears, nose, mouth, throat, and face: negative Respiratory: dyspnea Cardiovascular: negative Gastrointestinal: negative Genitourinary:negative Musculoskeletal:negative Neurological: negative Endocrine: negative Subjective:  
  
Visit Vitals /63 Pulse 83 Temp 98 °F (36.7 °C) Resp 17 Ht 6' (1.829 m) Wt 202 lb 11.2 oz (91.9 kg) SpO2 94% BMI 27.49 kg/m² Physical: 
 
General: WD, WN.  Alert, cooperative, no acute distress, 8 L nc 
 Heart: Regular, S1 WNL and S2 WNL. No S3 or S4, no m/S3/JVD, no carotid bruits Lungs: clear Abdomen: Soft, +BS, NTND Extremities: LE emma +DP/PT, no edema Neurologic: Grossly normal 
 
Data Review: No results for input(s): WBC, HGB, HCT, PLT, HGBEXT, HCTEXT, PLTEXT in the last 72 hours. No results for input(s): NA, K, CL, CO2, GLU, BUN, CREA, CA, MG, PHOS, ALB, TBIL, TBILI, ALT, INR, INREXT in the last 72 hours. No lab exists for component: SGOT,  BNP No results for input(s): TROIQ, CPK, CKMB in the last 72 hours. Intake/Output Summary (Last 24 hours) at 9/21/2020 1032 Last data filed at 9/21/2020 0972 Gross per 24 hour Intake 360 ml Output 1000 ml Net -640 ml  
  
 
Cardiographics Telemetry: sinus, sinus pauses up to 3 sec, junctional  
 
ECG: sinus merced, 59 bpm 
 
Echocardiogram:  
· LV: Estimated LVEF is 55 - 60%. Normal cavity size and systolic function (ejection fraction normal). Mild concentric hypertrophy. · TV: Mild to moderate tricuspid valve regurgitation is present. · PA: Moderate to severe pulmonary hypertension. Pulmonary arterial systolic pressure is 61 mmHg. Assessment: 
 
   
  
 Active Problems: 
  Penetrating atherosclerotic ulcer of aorta ([facility] (6/6/2016) Overview: Seen by Dr. Omid Williamson in 7/2016 - plan for recheck in 6 mos COPD exacerbation ([facility] (9/15/2020) Acute hypoxemic respiratory failure ([address] (9/15/2020) Sinus pause (9/17/2020) DNR (do not resuscitate) discussion () Goals of care, counseling/discussion () Advanced care planning/counseling discussion () Plan:  
 
Cisco Langford is a right handed pt with pmhx of ASHD s/p stenting, HTN, HLD, CKD III, COPD on oxygen, and prostate cancer. 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 He would benefit from bb with penetrating aortic ulcer.  He is a  candidate for a dual chamber ppm. I discussed the risks/benefits/alternatives of the procedure with the patient. Risks include (but are not limited to) bleeding, infection, cva/mi/death. The patient understands and would like to proceed. Thank you for this interesting consultation. Kathy Porras ANP Patient seen and examined by me with nurse practitioner. I personally performed all components of the history, physical, and medical decision making and agree with the assessment and plan with minor modifications as noted. Pt with cad/ aortic ulcer requiring bb. Now with junctional bradycardia c/w sick sinus. Candidate for dc ppm. Pt understands and agrees to proceed.   
 
Vasquez Barnard MD, [place] Olivares

## 2020-09-21 NOTE — PROGRESS NOTES
Bedside shift change report given to Pooja Morrell RN (oncoming nurse) by CHRIS Artis (offgoing nurse). Report included the following information SBAR, Kardex, MAR and Cardiac Rhythm NSR with pauses .

## 2020-09-21 NOTE — PROGRESS NOTES
[facility] Name: Bard Zuñiga MRN: [id number] : 1940 Hospital: [facility] Date: 2020 IMPRESSION:  
· Acute/chronic hypoxic respiratory failure - note baseline 3 LPM with SpO2 typically in 80s per his report. Still on moderate oxygen support. · COPD with possible exacerbation - Follows with me in office. · Pneumonia · COVID negative · Abdominal pain, cause uncertain, but it has resolved, had several soft BMs. 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 
· Discussed with nurse this am.   
  
RECOMMENDATIONS:  
· O2 - wean as tolerated, Will need to establish what he needs at home to keep pox over 90%. · Bronchodilators · Steroids - change to po, Can wean as tolerated. · Empiric antibiotics · Vascular surgery consult pending for aortic arch ulcer · DVT prophylaxis · Mobilize Subjective:  
Talkative this am. NO chest pain, no back pain, no leg pain. No acute events overnight No acute distress No acute complaints Tolerating po intake. Current Facility-Administered Medications Medication Dose Route Frequency  metoprolol tartrate (LOPRESSOR) tablet 12.5 mg  12.5 mg Oral Q12H  
 albuterol-ipratropium (DUO-NEB) 2.5 MG-0.5 MG/3 ML  3 mL Nebulization TID RT  
 predniSONE (DELTASONE) tablet 40 mg  40 mg Oral DAILY WITH BREAKFAST  budesonide (PULMICORT) 500 mcg/2 ml nebulizer suspension  500 mcg Nebulization BID RT  
 arformoteroL (BROVANA) neb solution 15 mcg  15 mcg Nebulization BID RT  
 influenza vaccine  (6 mos+)(PF) (FLUARIX/FLULAVAL/FLUZONE QUAD) injection 0.5 mL  0.5 mL IntraMUSCular PRIOR TO DISCHARGE  abiraterone tab 250 mg (Patient Supplied)  250 mg Oral DAILY  [Held by provider] lisinopriL (PRINIVIL, ZESTRIL) tablet 5 mg  5 mg Oral DAILY  aspirin chewable tablet 81 mg  81 mg Oral DAILY  atorvastatin (LIPITOR) tablet 40 mg  40 mg Oral QHS  pantoprazole (PROTONIX) tablet 40 mg  40 mg Oral ACB  oxybutynin chloride XL (DITROPAN XL) tablet 10 mg  10 mg Oral DAILY  guaiFENesin ER (MUCINEX) tablet 600 mg  600 mg Oral BID  cefTRIAXone (ROCEPHIN) 1 g in 0.9% sodium chloride (MBP/ADV) 50 mL  1 g IntraVENous Q24H Review of Systems: A comprehensive review of systems was negative except for that written in the HPI. Objective:  
Vital Signs:   
Visit Vitals /63 Pulse 83 Temp 98 °F (36.7 °C) Resp 17 Ht 6' (1.829 m) Wt 91.9 kg (202 lb 11.2 oz) SpO2 94% BMI 27.49 kg/m² O2 Device: Hi flow nasal cannula O2 Flow Rate (L/min): 8 l/min Temp (24hrs), Av.8 °F (36.6 °C), Min:97.1 °F (36.2 °C), Max:98.1 °F (36.7 °C) Intake/Output:  
Last shift:       07 -  1900 In: 120 [P.O.:120] Out: 100 [Urine:100] Last 3 shifts:  190 -  0700 In: 720 [P.O.:720] Out: 1250 [[id number]] Intake/Output Summary (Last 24 hours) at 2020 1046 Last data filed at 2020 4577 Gross per 24 hour Intake 360 ml Output 1000 ml Net -640 ml Physical Exam:  
General:  Alert, cooperative, no distress, appears stated age. Head:  Normocephalic, without obvious abnormality, atraumatic. Eyes:  Conjunctivae/corneas clear. Nose: Nares normal. Septum midline. Throat: Lips, mucosa, and tongue normal. Teeth and gums normal.  
Neck: Supple, symmetrical, trachea midline Lungs:   Clear to auscultation bilaterally. Chest wall:  No tenderness or deformity. Heart:  Regular rate and rhythm Abdomen:   Soft, non-tender. Bowel sounds normal.    
Extremities: Extremities normal, atraumatic, no cyanosis or clubbing Skin: Skin color, texture, turgor normal. No rashes or lesions Neurologic: Grossly nonfocal, motor is intact. Psych: No overt anxiety or depression. Data:  
Labs: No results for input(s): WBC, HGB, HCT, PLT, HGBEXT, HCTEXT, PLTEXT, HGBEXT, HCTEXT, PLTEXT in the last 72 hours. No results for input(s): NA, K, CL, CO2, GLU, BUN, CREA, CA, MG, PHOS, ALB, TBIL, TBILI, ALT, INR, INREXT, INREXT in the last 72 hours. No lab exists for component: SGOT No results for input(s): PHI, PCO2I, PO2I, HCO3I, FIO2I in the last 72 hours. Imaging: 
I have personally reviewed the patients radiographs: 
9-15-20: COMPARISON: 9/14/2020 
  
FINDINGS: A portable AP radiograph of the chest was obtained at 1313 hours. Ill-defined patchy retrocardiac opacification at the left pulmonary base is 
unchanged. The lungs are otherwise clear. There is no pneumothorax or pleural 
effusion. Cardiac, mediastinal and hilar contours are stable. .  
  
IMPRESSION: Unchanged AP chest x-ray appearance.   
 
  
Melony Santana MD

## 2020-09-21 NOTE — PROGRESS NOTES
Hospitalist Progress Note NAME: Eligio Guzman :  1940 MRN:  [id number] Assessment / Plan: 
Acute hypoxic respiratory failure due to acute COPD exacerbation due to acute bronchitis, POA 
CAP 
COVID negative  
-Pt with worsening dyspnea despite using nebs at home and not responded to initial aggressive ED treatment. - C/x empiric CTx/zithromax C/w nebs and steroids Currently on mid flow at [facility] in [address] 
-- Mucolytic with scheduled mucinex and tessalon perls prn for cough. - O2 to keep sats > 90%, reassess for home O2 on DC. 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  
 vascular sx recommended TAVR , patient not sure if he will go for the surgery Vascular surgeon to call daughter to discuss about TAVR Did explained to him that without surgery , aortic artery can rupture CTA:. 
1. No evidence for embolism. 2. 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 3. Nonspecific consolidative opacifications in the posterior medial basilar 
segments of the left lower lobe and the inferior lingula 
----- old finding since , was evaluated by CTS at that time, recommended conservative management at that time. CTS was consulted. Control BP < 120 goal  
Consult palliative for goals of care Vascular sx deemed that patient is high risk surgery , patient and  family don't want surgery Palliative care consulted for goals of care as pt still full code Family understand that the aortic might rupture and pt can die from it , reinforced again the message to the daughter I talked to the patient again today and he still does not want to pursue any surgical option understanding risks, benefits and alternatives of his choice. Sinus pauses Resumed beta-blocker at lower dose on . More sinus pauses overnight was reported today Cardiology input is appreciated. Plans for permanent pacemaker Monitor HR Abdominal pain in settings of metastatic prostate cancer - ua yesterday with Mod LE and wbc 20-50, will repeat to r/o underlying UTI No UTI , Ucx neg Currently on CTX for bronchitis,  
-urology consulted 
-pain management : fentanyl was effective in ED, will continue  
-cont LAUREANOytega, needs to be give together with prednisone ( holding while on solumedrol IV). Cont [employer] CT scan findings d/w pt and sister at bedside, informed then on high risk of rupture  
  
CKD stage III  
hyperkalemia resolved Cr at baseline ~ 1.4, stable Potassium back to normal 
. Avoid nephrotoxic drugs, adjust all meds to GFR.  
  
HTN/ CAD  
-BP soft  
-cont home   ASA Resuming BB at lower dose Hyperlipidemia, cont statin  
  
   
Code Status: Full code d/w pt and sister at bedside Surrogate Decision Maker: dtr  
  
DVT Prophylaxis: scd GI Prophylaxis: not indicated 
  
Baseline: lives with his dtr; home O2 3 L  
 
25.0 - 29.9 Overweight / Body mass index is 27.49 kg/m². Subjective: Chief Complaint / Reason for Physician Visit Discussed with RN overnight events. Patient was seen and examined. No acute events overnight. \"Nothing much just want to leave home\" Review of Systems: 
Symptom Y/N Comments  Symptom Y/N Comments Fever/Chills n   Chest Pain n   
Poor Appetite    Edema Cough n   Abdominal Pain n   
Sputum    Joint Pain SOB/WHEAT n   Pruritis/Rash Nausea/vomit n   Tolerating PT/OT Diarrhea    Tolerating Diet y Constipation    Other Could NOT obtain due to:   
 
 
 
Objective: VITALS:  
Last 24hrs VS reviewed since prior progress note. Most recent are: 
Patient Vitals for the past 24 hrs: 
 Temp Pulse Resp BP SpO2  
09/21/20 0803     94 % 09/21/20 0757 98 °F (36.7 °C) 83 17 128/63 93 % 09/21/20 0722  83   (!) 89 % 09/21/20 0317 97.1 °F (36.2 °C) 82 17 (!) 155/60 92 %  
09/20/20 2242 97.9 °F (36.6 °C) 76 18 (!) 111/43 91 %  
09/20/20 2000     96 %  
09/20/20 1953  90     
09/20/20 1952  82   95 % 09/20/20 1951  82   96 %  
09/20/20 1950  86   96 %  
09/20/20 1949  89   95 % 09/20/20 1948  80   94 % 09/20/20 1947  81   95 % 09/20/20 1946  80   91 %  
09/20/20 1945  84   94 % 09/20/20 1944  79   94 % 09/20/20 1943  79   94 % 09/20/20 1942  81   94 % 09/20/20 1941  81   93 % 09/20/20 1940  82   93 % 09/20/20 1939  82   94 % 09/1940  79   94 % 09/20/20 1937  78   94 % 09/20/20 1936  79   92 %  
09/20/20 1935  78   93 % 09/20/20 1934  88   91 %  
09/20/20 1933  85   94 % 09/20/20 1932  79   93 % 09/20/20 1931  77   94 % 09/20/20 1930  76   94 % 09/20/20 1929  76   95 % 09/20/20 1928  77   95 % 09/20/20 1927  76   95 % 09/20/20 1926  77   95 % 09/20/20 1925  76   94 % 09/20/20 1924  76   94 % 09/20/20 1923  78   94 % 09/20/20 1922  80   93 % 09/20/20 1921 98.1 °F (36.7 °C) 89 18 134/64 93 % 09/20/20 1920  78   94 % 09/20/20 1919  77   94 % 09/20/20 1918  78   94 % 09/20/20 1917  78   94 % 09/20/20 1916  77   92 %  
09/20/20 1915  82   95 % 09/20/20 1914  88   94 % 09/20/20 1913  86   95 % 09/20/20 1912  81   97 % 09/20/20 1911  73   95 % 09/20/20 1910  75   94 % 09/20/20 1909  73   95 % 09/20/20 1908  74   95 % 09/20/20 1907  75   95 % 09/20/20 1906  79   95 % 09/20/20 1905  77   94 % 09/20/20 1904  (!) 47   93 % 09/20/20 1903  79   93 % 09/20/20 1902  79   91 %  
09/20/20 1901  81   93 % 09/20/20 1900  81   93 % 09/20/20 1859  72   94 % 09/20/20 1858  74   93 % 09/20/20 1857  70   94 % 09/20/20 1856  73   94 % 09/20/20 1855  74   94 % 09/20/20 1854  74   94 % 09/20/20 1853  74   96 %  
09/20/20 1852  74   95 % 09/20/20 1851  72   96 %  
09/20/20 1850  74   96 %  
09/20/20 1849  70   96 %  
09/20/20 1848  74   96 % 09/20/20 1847  74   96 %  
09/20/20 1846  75   96 %  
09/20/20 1845  74   96 %  
09/20/20 1844  76   96 %  
09/20/20 1843  75   96 %  
09/20/20 1842  75   94 % 09/20/20 1841  78   92 %  
09/20/20 1840  83   93 % 09/20/20 1839  86   95 % 09/20/20 1838  78   96 %  
09/20/20 1837  75   95 % 09/20/20 1836  77   95 % 09/20/20 1835  82   94 % 09/20/20 1834  78   93 % 09/20/20 1833  78   94 % 09/20/20 1832  78   94 % 09/20/20 1831  76   95 % 09/20/20 1830  79   95 % 09/20/20 1829  79   95 % 09/20/20 1828  81   94 % 09/20/20 1827  81   95 % 09/20/20 1826  87   94 % 09/20/20 1825  77   94 % 09/20/20 1824  80   94 % 09/20/20 1823  79  (!) 95/38 94 % 09/20/20 1822  84   93 % 09/20/20 1821  82   93 % 09/20/20 1820  79   93 % 09/20/20 1819  77   92 %  
09/20/20 1818 98.1 °F (36.7 °C) 80 24 (!) 95/38 93 % 09/20/20 1238 97.5 °F (36.4 °C) 99 18 (!) 147/57 90 % Intake/Output Summary (Last 24 hours) at 9/21/2020 1144 Last data filed at 9/21/2020 5125 Gross per 24 hour Intake 360 ml Output 1000 ml Net -640 ml PHYSICAL EXAM: 
General: WD, WN. Alert, cooperative, no acute distress   
EENT:  EOMI. Anicteric sclerae. MMM Resp:  Decreased breath sounds b/l ,  no wheezing or rales. No accessory muscle use CV:  Regular  rhythm,  No edema GI:  Soft, Non distended, Non tender.  +Bowel sounds Neurologic:  Alert and oriented X 3, normal speech, Psych:   Good insight. Not anxious nor agitated Skin:  No rashes. No jaundice Reviewed most current lab test results and cultures  YES Reviewed most current radiology test results   YES Review and summation of old records today    NO Reviewed patient's current orders and MAR    YES 
PMH/ reviewed - no change compared to H&P 
________________________________________________________________________ Care Plan discussed with: 
  Comments Patient x Family  x  discussed with the patient's daughter at bedside. RN x Care Manager Consultant Multidiciplinary team rounds were held today with , nursing, pharmacist and clinical coordinator. Patient's plan of care was discussed; medications were reviewed and discharge planning was addressed. ________________________________________________________________________ Comments >50% of visit spent in counseling and coordination of care    
________________________________________________________________________ Rebekah Cool MD  
 
Procedures: see electronic medical records for all procedures/Xrays and details which were not copied into this note but were reviewed prior to creation of Plan. LABS: 
I reviewed today's most current labs and imaging studies. Pertinent labs include: No results for input(s): WBC, HGB, HCT, PLT, HGBEXT, HCTEXT, PLTEXT, HGBEXT, HCTEXT, PLTEXT in the last 72 hours. No results for input(s): NA, K, CL, CO2, GLU, BUN, CREA, CA, MG, PHOS, ALB, TBIL, TBILI, ALT, INR, INREXT, INREXT in the last 72 hours. No lab exists for component: SGOT Signed: Rebekah Cool MD

## 2020-09-21 NOTE — PROGRESS NOTES
[address]  [phone] Cardiology Progress Note 9/21/2020 1110AM 
 
Admit Date: 9/15/2020 Admit Diagnosis: COPD exacerbation ([facility] [J44.1] Acute hypoxemic respiratory failure ([facility] [J96.01] Interval History/Subjective:  
 
Anirudh Flynn is a 78 y.o. male with PMH CAD s/p stenting, HTN, prostate CA, HLD, COPD who was admitted for COPD exacerbation ([facility].) [J44.1] Acute hypoxemic respiratory failure ([facility] [J96.01].   
-VSS; Additional pause and junctional bradycardia overnight  
-no labs 
-Mr. Roman Hein is feeling well. Breathing feels better. No c/o pain. Visit Vitals BP (!) 143/64 Pulse 89 Temp 98.6 °F (37 °C) Resp 17 Ht 6' (1.829 m) Wt 91.9 kg (202 lb 11.2 oz) SpO2 (!) 89% BMI 27.49 kg/m² Current Facility-Administered Medications Medication Dose Route Frequency  docusate sodium (COLACE) capsule 100 mg  100 mg Oral BID PRN  polyethylene glycol (MIRALAX) packet 17 g  17 g Oral DAILY PRN  
 metoprolol tartrate (LOPRESSOR) tablet 12.5 mg  12.5 mg Oral Q12H  
 albuterol-ipratropium (DUO-NEB) 2.5 MG-0.5 MG/3 ML  3 mL Nebulization TID RT  
 predniSONE (DELTASONE) tablet 40 mg  40 mg Oral DAILY WITH BREAKFAST  budesonide (PULMICORT) 500 mcg/2 ml nebulizer suspension  500 mcg Nebulization BID RT  
 arformoteroL (BROVANA) neb solution 15 mcg  15 mcg Nebulization BID RT  
 albuterol-ipratropium (DUO-NEB) 2.5 MG-0.5 MG/3 ML  3 mL Nebulization Q1H PRN  
 aluminum-magnesium hydroxide (MAALOX) oral suspension 30 mL  30 mL Oral Q6H PRN  
 influenza vaccine 2020-21 (6 mos+)(PF) (FLUARIX/FLULAVAL/FLUZONE QUAD) injection 0.5 mL  0.5 mL IntraMUSCular PRIOR TO DISCHARGE  abiraterone tab 250 mg (Patient Supplied)  250 mg Oral DAILY  [Held by provider] lisinopriL (PRINIVIL, ZESTRIL) tablet 5 mg  5 mg Oral DAILY  ondansetron (ZOFRAN) injection 4 mg  4 mg IntraVENous Q4H PRN  
  fentaNYL citrate (PF) injection 50 mcg  50 mcg IntraVENous Q4H PRN  
 aspirin chewable tablet 81 mg  81 mg Oral DAILY  atorvastatin (LIPITOR) tablet 40 mg  40 mg Oral QHS  pantoprazole (PROTONIX) tablet 40 mg  40 mg Oral ACB  oxybutynin chloride XL (DITROPAN XL) tablet 10 mg  10 mg Oral DAILY  guaiFENesin ER (MUCINEX) tablet 600 mg  600 mg Oral BID  
 benzonatate (TESSALON) capsule 100 mg  100 mg Oral TID PRN  
 cefTRIAXone (ROCEPHIN) 1 g in 0.9% sodium chloride (MBP/ADV) 50 mL  1 g IntraVENous Q24H  hydrALAZINE (APRESOLINE) 20 mg/mL injection 10 mg  10 mg IntraVENous Q6H PRN  
 acetaminophen (TYLENOL) tablet 650 mg  650 mg Oral Q6H PRN Objective:  
  
Physical Exam: 
General: pleasant, elderly AAM sitting in chair in NAD Heart: RRR, no m/S3/JVD Lungs: clear Abdomen: Soft, +BS, slightly distended Extremities: LE emma +DP/PT, no edema Neurologic: Grossly normal 
Skin:  Warm and dry. Data Review: No results for input(s): WBC, HGB, HCT, PLT, HGBEXT, HCTEXT, PLTEXT, HGBEXT, HCTEXT, PLTEXT in the last 72 hours. No results for input(s): NA, K, CL, CO2, GLU, BUN, CREA, CA, MG, PHOS, ALB, TBIL, TBILI, ALT, INR, INREXT, INREXT in the last 72 hours. No lab exists for component: SGOT No results for input(s): TROIQ, CPK, CKMB in the last 72 hours. Intake/Output Summary (Last 24 hours) at 9/21/2020 1203 Last data filed at 9/21/2020 0457 Gross per 24 hour Intake 360 ml Output 1000 ml Net -640 ml Telemetry: SR.  3.61 sec pause overnight;  Periods of junctional bradycardia into the 30s. ECG: unable to view in system. Echocardiogram: EF 55-60%; mild to mod TR; mod to severe pHTN 
CTA chest: \"1. No evidence for embolism. 2. 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 3. Nonspecific consolidative opacifications in the posterior medial basilar 
segments of the left lower lobe and the inferior lingula. \" Assessment:  
 
Active Problems: Penetrating atherosclerotic ulcer of aorta ([facility] (6/6/2016) Overview: Seen by Dr. Osito Ly in 7/2016 - plan for recheck in 6 mos COPD exacerbation ([facility] (9/15/2020) Acute hypoxemic respiratory failure ([facility] (9/15/2020) Sinus pause (9/17/2020) DNR (do not resuscitate) discussion () Goals of care, counseling/discussion () Advanced care planning/counseling discussion () Plan:  
 
Sinus pauses: Additional pause of 3.61 seconds when BB restarted. EF 55-60% and no significant new concerns · Hold BB · EP consult CAD/HTN/HLD history:  Stable · BB on  Hold, as above · Continue other home meds Aortic arch ulcer: Increasing in size · Vascular notes reviewed: high risk. Patient not interested in repair at this time. COPD exacerbation: improving · Per hospitalist and pulmonary Lei TANJA Huitron 
DNP, RN, AGAP-BC Patient seen and examined by me with the above nurse practitioner. I personally performed all components of the history, physical, and medical decision making and agree with the assessment and plan with minor modifications as noted. Patient with recurrent pauses/sick sinus syndrome after starting low-dose beta-blocker which is necessary for his penetrating aortic arch ulcer. Consulting electrophysiology for possible pacemaker placement.

## 2020-09-21 NOTE — PROGRESS NOTES
ADULT PROTOCOL: JET AEROSOL  REASSESSMENT Patient  Jennifer Davis     78 y.o.   male     9/21/2020  4:16 PM 
 
Breath Sounds Pre Procedure: Right Breath Sounds: Diminished Left Breath Sounds: Diminished Breath Sounds Post Procedure: Right Breath Sounds: Diminished Left Breath Sounds: Diminished Breathing pattern: Pre procedure Breathing Pattern: Regular Post procedure Breathing Pattern: Regular Heart Rate: Pre procedure Pulse: 88 
         Post procedure Pulse: 88 Resp Rate: Pre procedure Respirations: 18 Post procedure Respirations: 18 
 
Oxygen: O2 Device: Hi flow nasal cannula   8L SpO2: Pre procedure SpO2: 92 % Post procedure SpO2: 93 % Nebulizer Therapy: Current medications Aerosolized Medications: DuoNeb Problem List:  
Patient Active Problem List  
Diagnosis Code  ED (erectile dysfunction) N52.9  
 HTN (hypertension) I10  
 Prostate cancer ([facility] C61  
 PAD (peripheral artery disease) ([facility]) I73.9  
 STEMI (ST elevation myocardial infarction) ([facility]) I21.3  Chronic interstitial lung disease ([facility]) J84.9  
 S/P PTCA (percutaneous transluminal coronary angioplasty) Z98.61  
 Mucopurulent chronic bronchitis ([facility]) J41.1  Acute myocardial infarction of anterior wall, subsequent episode of care ([facility] I21.09  
 Penetrating atherosclerotic ulcer of aorta ([facility]) I71.9  Hyperlipidemia E78.5  Advance directive discussed with patient Z70.80  Coronary artery disease involving native coronary artery of native heart without angina pectoris I25.10  Hypoxemia R09.02  
 COPD (chronic obstructive pulmonary disease) ([facility]) J44.9  Mixed simple and mucopurulent chronic bronchitis ([facility]) J41.8  COPD exacerbation ([facility] J44.1  Acute hypoxemic respiratory failure ([facility]) J96.01  
 SSS (sick sinus syndrome) ([facility]) I49.5  DNR (do not resuscitate) discussion Z70.80  
  Goals of care, counseling/discussion Z71.89  
 Advanced care planning/counseling discussion Z71.89 Respiratory Therapist: Emily Fish

## 2020-09-22 NOTE — DISCHARGE SUMMARY
Hospitalist Discharge Summary Patient ID: 
Cisco Langford 
[id number] 
19 y.o. 
1940 
9/15/2020 PCP on record: Lawrence Romero MD 
 
Admit date: 9/15/2020 Discharge date and time: 9/22/2020 DISCHARGE DIAGNOSIS: 
Acute hypoxic respiratory failure  
 acute COPD exacerbation CAP 
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  
hyperkalemia resolved   
HTN/ CAD Hyperlipidemia, CONSULTATIONS: 
IP CONSULT TO UROLOGY 
IP CONSULT TO PALLIATIVE CARE - PROVIDER 
IP CONSULT TO ELECTROPHYSIOLOGY 
IP CONSULT TO HOSPITALIST 
IP CONSULT TO PULMONOLOGY 
IP CONSULT TO CARDIAC SURGERY 
IP CONSULT TO VASCULAR SURGERY 
IP CONSULT TO CARDIOLOGY Excerpted HPI from H&P of Alisia Madrigal MD: 
Jack Jalloh is a 78 y.o.  male who presents with above complaint. Pt started with dyspnea couple of days ago. Dyspnea was progressively getting worse. He admits to nonproductive cough and wheezing. He was using jet nebs at home with minimal relive of his Sx. Denies fever/chills/CP. He also c/o lower abdominal pain. It radiates to his flanks and into his back. No dysuria/ diarrhea. He was seen yesterday in ED for the same Sx. He had lab work, urinalysis, chest x-ray, and a CT scan of the abdomen.  No acute surgical pathology was found on a CT or lab work. he felt better after being treated in ED and was discharged home. On presentation to the ED today he was found to be hypoxic and in respiratory distress. He is on 3 L NC at home. He is currently requiring 8 L HFO2 with sats 90-92% at rest. ED provider discussed pts abdominal pain with oncology team and urologist Dr. Oswald Reyes, who feels that this pain may be secondary to his treatment for his cancer or the cancer itself.  
  
We were asked to admit for work up and evaluation of the above problems.   
 
______________________________________________________________________ DISCHARGE SUMMARY/HOSPITAL COURSE:  for full details see H&P, daily progress notes, labs, consult notes. Acute hypoxic respiratory failure due to acute COPD exacerbation due to acute bronchitis, POA 
CAP 
COVID negative  
-treated with ceftriaxone and azithromycin 
-Treated with bronchodilators 
-On 3 liters oxygen which is baseline 
-On chronic prednisone 2.5 mg 
-Continue home inhalers 
  
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  
 vascular sx recommended TAVR , patient  Does nor want surgery at this point 
  
Sick Sinus syndrome with Sinus pauses S/p Pacemaker insertion Resume Home dose of BB 
  
Abdominal pain in settings of metastatic prostate cancer  
- resolved 
  
CKD stage III  
hyperkalemia resolved Cr at baseline ~ 1.4, stable Potassium back to normal 
 
  
HTN/ CAD  
-resume home meds 
  
Hyperlipidemia, cont statin  
 
 
 
_______________________________________________________________________ Patient seen and examined by me on discharge day. Pertinent Findings: 
Gen:    Not in distress Chest: Clear lungs CVS:   Regular rhythm. No edema Abd:  Soft, not distended, not tender Neuro:  Alert, oriented x4 
_______________________________________________________________________ DISCHARGE MEDICATIONS:  
Current Discharge Medication List  
  
CONTINUE these medications which have NOT CHANGED Details  
docusate sodium (COLACE) 100 mg capsule Take 1 Cap by mouth two (2) times a day for 90 days. Qty: 60 Cap, Refills: 2  
  
polyethylene glycol (Miralax) 17 gram/dose powder Take 17 g by mouth daily. 1 tablespoon with 8 oz of water daily 
Qty: 507 g, Refills: 0  
  
cyclobenzaprine (FLEXERIL) 5 mg tablet TAKE 1 TABLET BY MOUTH TWICE DAILY AS NEEDED FOR MUSCLE SPASM Qty: 20 Tab, Refills: 0  
  
abiraterone 250 mg tab Take  by mouth. 4 tabs daily oxybutynin chloride XL (DITROPAN XL) 10 mg CR tablet Take 10 mg by mouth daily. lansoprazole (PREVACID) 15 mg capsule Take 15 mg by mouth Daily (before breakfast). predniSONE (DELTASONE) 2.5 mg tablet Take 5 mg by mouth two (2) times a day. diazePAM (Valium) 5 mg tablet Take 1 Tab by mouth every eight (8) hours as needed for Muscle Spasm(s). Max Daily Amount: 15 mg. 
Qty: 20 Tab, Refills: 0 Associated Diagnoses: Neck pain  
  
lidocaine 4 % patch 1 Patch by TransDERmal route every twelve (12) hours every twelve (12) hours for 30 days. Qty: 60 Patch, Refills: 0  
  
albuterol (PROVENTIL HFA, VENTOLIN HFA, PROAIR HFA) 90 mcg/actuation inhaler Use 2 puffs very 4 hours prn 
Qty: 1 Inhaler, Refills: 1 Associated Diagnoses: Chronic obstructive pulmonary disease, unspecified COPD type ([facility] famotidine (PEPCID) 20 mg tablet Take 1 Tab by mouth as needed for Heartburn. Qty: 90 Tab, Refills: 3 Associated Diagnoses: Gastroesophageal reflux disease, esophagitis presence not specified  
  
metoprolol tartrate (LOPRESSOR) 50 mg tablet TAKE ONE TABLET BY MOUTH TWICE DAILY Qty: 180 Tab, Refills: 3 Comments: Please consider 90 day supplies to promote better adherence  
  
lisinopriL (PRINIVIL, ZESTRIL) 2.5 mg tablet Take 1 tablet by mouth once daily 
Qty: 90 Tab, Refills: 3  
  
albuterol-ipratropium (DUO-NEB) 2.5 mg-0.5 mg/3 ml nebu 3 mL by Nebulization route every six (6) hours as needed (sob). Qty: 30 Nebule, Refills: 4 Associated Diagnoses: SOB (shortness of breath)  
  
fluticasone furoate-vilanterol (BREO ELLIPTA) 100-25 mcg/dose inhaler Take 1 Puff by inhalation daily. Qty: 1 Inhaler, Refills: 4 Oxygen 3L continouus  
  
fluticasone (FLONASE) 50 mcg/actuation nasal spray 2 Sprays by Both Nostrils route daily. Qty: 1 Bottle, Refills: 1 Associated Diagnoses: Acute sinusitis, recurrence not specified, unspecified location atorvastatin (LIPITOR) 40 mg tablet TAKE 1 TABLET EVERY DAY Qty: 90 Tab, Refills: 4 Associated Diagnoses: Hyperlipidemia, unspecified hyperlipidemia type XTANDI 40 mg capsule Take 160 mg by mouth daily. aspirin 81 mg chewable tablet Take 1 Tab by mouth daily. RISK OF HEART ATTACK IF ANY MISSED DOSES Qty: 90 Tab, Refills: 3 Patient Follow Up Instructions: Activity: Activity as tolerated Diet: Cardiac Diet Wound Care: Keep wound clean and dry Follow-up with PCP and Cardiology in 1-2 weeks Follow-up tests/labs none Follow-up Information Follow up With Specialties Details Why Contact Info Angelica Rodriguez MD Internal Medicine   [address] [place] [address] [phone] 
  
  
 
________________________________________________________________ Risk of deterioration: High 
 
Condition at Discharge:  Stable 
__________________________________________________________________ Disposition Home with family and home health services 
 
____________________________________________________________________ Code Status: Full Code 
___________________________________________________________________ Total time in minutes spent coordinating this discharge (includes going over instructions, follow-up, prescriptions, and preparing report for sign off to her PCP) :  40 minutes Signed: 
Wendy Curry MD

## 2020-09-22 NOTE — PROGRESS NOTES
Attempted to see pt this am and pt has bed rest orders. Please note in chart when this is no longer the case. Thank you.

## 2020-09-22 NOTE — PROGRESS NOTES
Cardiac Electrophysiology Progress Note [address] [place] [address], [address] [facility]  [phone] 
 
9/22/2020 8:26 AM 
 
Admit Date: 9/15/2020 Admit Diagnosis: COPD exacerbation ([facility] [J44.1] Acute hypoxemic respiratory failure ([facility] [J96.01] Subjective:  
 
Beverly Garcia   denies chest pain, chest pressure/discomfort, dyspnea, palpitations. Some pain at device insertion site. Visit Vitals BP (!) 151/71 (BP 1 Location: Right arm, BP Patient Position: At rest) Pulse 90 Temp 98.5 °F (36.9 °C) Resp 20 Ht 6' (1.829 m) Wt 202 lb 11.2 oz (91.9 kg) SpO2 91% BMI 27.49 kg/m² Current Facility-Administered Medications Medication Dose Route Frequency  docusate sodium (COLACE) capsule 100 mg  100 mg Oral BID PRN  polyethylene glycol (MIRALAX) packet 17 g  17 g Oral DAILY PRN  
 sodium chloride (NS) flush 5-40 mL  5-40 mL IntraVENous Q8H  
 sodium chloride (NS) flush 5-40 mL  5-40 mL IntraVENous PRN  
 naloxone (NARCAN) injection 0.4 mg  0.4 mg IntraVENous PRN  
 metoprolol tartrate (LOPRESSOR) tablet 12.5 mg  12.5 mg Oral Q12H  
 albuterol-ipratropium (DUO-NEB) 2.5 MG-0.5 MG/3 ML  3 mL Nebulization TID RT  
 predniSONE (DELTASONE) tablet 40 mg  40 mg Oral DAILY WITH BREAKFAST  budesonide (PULMICORT) 500 mcg/2 ml nebulizer suspension  500 mcg Nebulization BID RT  
 arformoteroL (BROVANA) neb solution 15 mcg  15 mcg Nebulization BID RT  
 albuterol-ipratropium (DUO-NEB) 2.5 MG-0.5 MG/3 ML  3 mL Nebulization Q1H PRN  
 aluminum-magnesium hydroxide (MAALOX) oral suspension 30 mL  30 mL Oral Q6H PRN  
 influenza vaccine 2020-21 (6 mos+)(PF) (FLUARIX/FLULAVAL/FLUZONE QUAD) injection 0.5 mL  0.5 mL IntraMUSCular PRIOR TO DISCHARGE  abiraterone tab 250 mg (Patient Supplied)  250 mg Oral DAILY  [Held by provider] lisinopriL (PRINIVIL, ZESTRIL) tablet 5 mg  5 mg Oral DAILY  ondansetron (ZOFRAN) injection 4 mg  4 mg IntraVENous Q4H PRN  
  fentaNYL citrate (PF) injection 50 mcg  50 mcg IntraVENous Q4H PRN  
 aspirin chewable tablet 81 mg  81 mg Oral DAILY  atorvastatin (LIPITOR) tablet 40 mg  40 mg Oral QHS  pantoprazole (PROTONIX) tablet 40 mg  40 mg Oral ACB  oxybutynin chloride XL (DITROPAN XL) tablet 10 mg  10 mg Oral DAILY  guaiFENesin ER (MUCINEX) tablet 600 mg  600 mg Oral BID  
 benzonatate (TESSALON) capsule 100 mg  100 mg Oral TID PRN  
 hydrALAZINE (APRESOLINE) 20 mg/mL injection 10 mg  10 mg IntraVENous Q6H PRN  
 acetaminophen (TYLENOL) tablet 650 mg  650 mg Oral Q6H PRN Objective:  
  
Visit Vitals BP (!) 151/71 (BP 1 Location: Right arm, BP Patient Position: At rest) Pulse 90 Temp 98.5 °F (36.9 °C) Resp 20 Ht 6' (1.829 m) Wt 202 lb 11.2 oz (91.9 kg) SpO2 91% BMI 27.49 kg/m² Physical Exam: Abdomen: soft, non-tender Extremities: extremities normal 
Heart: regular rate and rhythm Lungs: clear to auscultation bilaterally Pulses: 2+ and symmetric Data Review:  
Labs:   
No results for input(s): WBC, HGB, HCT, PLT, HGBEXT, HCTEXT, PLTEXT in the last 72 hours. No results for input(s): NA, K, CL, CO2, GLU, BUN, CREA, CA, MG, PHOS, ALB, TBIL, TBILI, ALT, INR, INREXT in the last 72 hours. No lab exists for component: SGOT,  BNP No results for input(s): TROIQ, CPK, CKMB in the last 72 hours. Intake/Output Summary (Last 24 hours) at 9/22/2020 1151 Last data filed at 9/21/2020 2235 Gross per 24 hour Intake 320 ml Output 500 ml Net -180 ml Telemetry: SR, occ A pacing Assessment:  
 
Active Problems: 
  HTN (hypertension) (7/5/2011) Penetrating atherosclerotic ulcer of aorta ([facility] (6/6/2016) Overview: Seen by Dr. Janelle Ahmadi in 7/2016 - plan for recheck in 6 mos Hyperlipidemia (6/27/2016) COPD exacerbation ([address] (9/15/2020) Acute hypoxemic respiratory failure ([facility] (9/15/2020) SSS (sick sinus syndrome) ([facility] (9/17/2020) DNR (do not resuscitate) discussion () Goals of care, counseling/discussion () Advanced care planning/counseling discussion () Plan:  
 
Teo Guzmán is s/p dual chamber pacemaker 9/21/2020 for SSS with pmhx of ASHD s/p stenting, HTN, HLD, CKD III, COPD on oxygen, and prostate cancer. 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 His device interrogation this am was WNL. [id number] Marcia Amado for d/c from EP standpoint. Follow up as outpatient in 2-3 weeks. Tylenol for pain Kathy Pickett, ANP Bernice Hinojosa MD, [facility] 
9/22/2020 
8:26 AM 
 
Patient seen and examined by me with nurse practitioner. I personally performed all components of the history, physical, and medical decision making and agree with the assessment and plan with minor modifications as noted. Sp dc ppm. Device check and cxr wnl. [id number] Marcia Amado for [facility]. F/u in 2-3 weeks as outpt Bernice Hinojosa MD, [facility]

## 2020-09-22 NOTE — PROGRESS NOTES
0700 shift change report given to Srinivasa (oncoming nurse) by Renetta Liang (offgoing nurse). Report included the following information SBAR, Kardex, ED Summary and MAR.  
 
1053 per attending sat goal is 88% and above. Pt 02 now at 7L. 
 
1129 case management made aware of d/c and pts increased needs for o2 during this hospital stay. Pt currently in 3L NC with desat 85% noted at rest.   
 
1321 pt discharge paperwork given to pt with sister at bedside.

## 2020-09-22 NOTE — PROGRESS NOTES
Matthew Plan: D/C Hmoe with [facility]- referrals sent F/U appointments - Isabelle Amado- 10/13/20 at 11:15am and PCP- Dr. [employer]- patient to call to schedule f/u appointment. 2nd IM Letter given Transportation provided by Daughter 2:49pm 
CM received a call that  [facility] could not service pt in [place] [address]. Referral sent to [address]. 12:30n 
CM discussed d/c plan wth pt and daughter. Pt and daughter did not have any concerns art d/c. FOC completed, signed and placed on chart. Referral sent to [facility]. Medicare pt has received, reviewed, and signed 2nd IM letter informing them of their right to appeal the discharge. Signed copied has been placed on pt bedside chart. Care Management Interventions PCP Verified by CM: Yes Mode of Transport at Discharge: Other (see comment)([facility] to provide transportation) Transition of Care Consult (CM Consult): Home Health, Discharge Planning [address]: Yes Discharge Durable Medical Equipment: No(home oxygen) Current Support Network: Relative's Home(Lives wit is DTR. Home is two stories. He has 02 and awheelchair and nebulizer. He is independent with his ADL's. ) Confirm Follow Up Transport: Family The Patient and/or Patient Representative was Provided with a Choice of Provider and Agrees with the Discharge Plan?: Yes Name of the Patient Representative Who was Provided with a Choice of Provider and Agrees with the Discharge Plan: PT 
Freedom of Choice List was Provided with Basic Dialogue that Supports the Patient's Individualized Plan of Care/Goals, Treatment Preferences and Shares the Quality Data Associated with the Providers?: Yes Discharge Location Discharge Placement: Home with home health [facility]  [facility] 
Phone: [phone]

## 2020-09-22 NOTE — PROGRESS NOTES
[facility] Name: Jennifer Davis MRN: [id number] : 1940 Hospital: [facility] Date: 2020 IMPRESSION:  
· Acute/chronic hypoxic respiratory failure - note baseline 3 LPM with SpO2 typically in 94%. AT this baseline. · COPD stable. · Pneumonia now better. · COVID negative · Abdominal pain, cause uncertain, but it has resolved, had several soft BMs. 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 
· Discussed with nurse this am.   
  
RECOMMENDATIONS:  
· O2 - wean as tolerated, Will need to establish what he needs at home to keep pox over 90%. · Bronchodilators · Steroids - change to po, Can wean as tolerated. · Empiric antibiotics · Vascular surgery consult pending for aortic arch ulcer · DVT prophylaxis · Mobilize Subjective:  
Talkative this am. NO chest pain, no back pain, no leg pain. No acute events overnight No acute distress No acute complaints Tolerating po intake. Current Facility-Administered Medications Medication Dose Route Frequency  sodium chloride (NS) flush 5-40 mL  5-40 mL IntraVENous Q8H  
 metoprolol tartrate (LOPRESSOR) tablet 12.5 mg  12.5 mg Oral Q12H  
 albuterol-ipratropium (DUO-NEB) 2.5 MG-0.5 MG/3 ML  3 mL Nebulization TID RT  
 predniSONE (DELTASONE) tablet 40 mg  40 mg Oral DAILY WITH BREAKFAST  budesonide (PULMICORT) 500 mcg/2 ml nebulizer suspension  500 mcg Nebulization BID RT  
 arformoteroL (BROVANA) neb solution 15 mcg  15 mcg Nebulization BID RT  
 influenza vaccine - (6 mos+)(PF) (FLUARIX/FLULAVAL/FLUZONE QUAD) injection 0.5 mL  0.5 mL IntraMUSCular PRIOR TO DISCHARGE  abiraterone tab 250 mg (Patient Supplied)  250 mg Oral DAILY  lisinopriL (PRINIVIL, ZESTRIL) tablet 5 mg  5 mg Oral DAILY  aspirin chewable tablet 81 mg  81 mg Oral DAILY  atorvastatin (LIPITOR) tablet 40 mg  40 mg Oral QHS  pantoprazole (PROTONIX) tablet 40 mg  40 mg Oral ACB  oxybutynin chloride XL (DITROPAN XL) tablet 10 mg  10 mg Oral DAILY  guaiFENesin ER (MUCINEX) tablet 600 mg  600 mg Oral BID Review of Systems: A comprehensive review of systems was negative except for that written in the HPI. Objective:  
Vital Signs:   
Visit Vitals BP (!) 142/69 (BP 1 Location: Right arm, BP Patient Position: Sitting) Pulse 87 Temp 97.3 °F (36.3 °C) Resp 14 Ht 6' (1.829 m) Wt 91.9 kg (202 lb 11.2 oz) SpO2 94% BMI 27.49 kg/m² O2 Device: Nasal cannula O2 Flow Rate (L/min): 3 l/min Temp (24hrs), Av.8 °F (36.6 °C), Min:97.3 °F (36.3 °C), Max:98.5 °F (36.9 °C) Intake/Output:  
Last shift:      No intake/output data recorded. Last 3 shifts:  1901 -  0700 In: 320 [P.O.:320] Out: 1100 [Urine:1100] Intake/Output Summary (Last 24 hours) at 2020 1215 Last data filed at 2020 2235 Gross per 24 hour Intake 200 ml Output 350 ml Net -150 ml Physical Exam:  
General:  Alert, cooperative, no distress, appears stated age. On 3L NC with Pox of 94%. Head:  Normocephalic, without obvious abnormality, atraumatic. Eyes:  Conjunctivae/corneas clear. Nose: Nares normal. Septum midline. Throat: Lips, mucosa, and tongue normal. Teeth and gums normal.  
Neck: Supple, symmetrical, trachea midline Lungs:   Clear to auscultation bilaterally. Seems comfortable when seen . Chest wall:  No tenderness or deformity. Has a left upper chest PPM in place. Left arm is in sling. Heart:  Regular rate and rhythm Abdomen:   Soft, non-tender. Bowel sounds normal.    
Extremities: Extremities normal, atraumatic, no cyanosis or clubbing Skin: Skin color, texture, turgor normal. No rashes or lesions Neurologic: Grossly nonfocal, motor is intact. Psych: No overt anxiety or depression. Data:  
Labs: No results for input(s): WBC, HGB, HCT, PLT, HGBEXT, HCTEXT, PLTEXT, HGBEXT, HCTEXT, PLTEXT in the last 72 hours. No results for input(s): NA, K, CL, CO2, GLU, BUN, CREA, CA, MG, PHOS, ALB, TBIL, TBILI, ALT, INR, INREXT, INREXT in the last 72 hours. No lab exists for component: SGOT No results for input(s): PHI, PCO2I, PO2I, HCO3I, FIO2I in the last 72 hours. Imaging: 
I have personally reviewed the patients radiographs: 
9-15-20: COMPARISON: 9/14/2020 
  
FINDINGS: A portable AP radiograph of the chest was obtained at 1313 hours. Ill-defined patchy retrocardiac opacification at the left pulmonary base is 
unchanged. The lungs are otherwise clear. There is no pneumothorax or pleural 
effusion. Cardiac, mediastinal and hilar contours are stable. .  
  
IMPRESSION: Unchanged AP chest x-ray appearance. 9-21-20: CXR: INDICATION: Evaluate for pneumothorax 
  
FINDINGS: 
Pacemaker has been placed via the left subclavian vein. The lungs show low 
volumes with bibasilar haziness favoring atelectasis. Heart is top normal in 
size. There is no pulmonary edema.  There is no evident pneumothorax, adenopathy 
or pleural effusion. 
 IMPRESSION: No pneumothorax. 
  
 
 
  
Peg Serna MD

## 2020-09-22 NOTE — DISCHARGE INSTRUCTIONS
HOSPITALIST DISCHARGE INSTRUCTIONS    NAME: Noe Vidal   :  1940   MRN:  [id number]     Date/Time:  2020 11:09 AM    ADMIT DATE: 9/15/2020     DISCHARGE DATE: 2020     DISCHARGE DIAGNOSIS:  COPD exacerbation  Community Acquired Pneumonia  Sick Sinus syndrome  Metastatic Prostatic Cancer      MEDICATIONS:  · It is important that you take the medication exactly as they are prescribed. · Keep your medication in the bottles provided by the pharmacist and keep a list of the medication names, dosages, and times to be taken in your wallet. · Do not take other medications without consulting your doctor. Pain Management: per above medications    What to do at Home    Recommended diet:  Cardiac Diet    Recommended activity: Activity as tolerated    If you have questions regarding the hospital related prescriptions or hospital related issues please call [facility] at [phone]. If you experience any of the following symptoms then please call your primary care physician or return to the emergency room if you cannot get hold of your doctor:  Fever, chills, nausea, vomiting, diarrhea, change in mentation, falling, bleeding, shortness of breath. Follow Up:  Dr. Magaly Stafford MD  you are to call and set up an appointment to see them in 7-10 days. Information obtained by :  I understand that if any problems occur once I am at home I am to contact my physician. I understand and acknowledge receipt of the instructions indicated above.                                                                                                                                            Physician's or R.N.'s Signature                                                                  Date/Time                                                                                                                                              Patient or Representative Signature Date/Time      [address]  [phone]        NEW PACEMAKER IMPLANT DISCHARGE INSTRUCTIONS    Patient ID:  Estefania Gooden  [id number]  73 y.o.  1940    Admit Date: 9/15/2020    Discharge Date: 9/21/2020     Admitting Physician: Janna Neri MD     Discharge Physician: Janna Neri MD    Admission Diagnoses:   COPD exacerbation [facility]) [J44.1]  Acute hypoxemic respiratory failure [facility]) [J96.01]    Discharge Diagnoses: Active Problems:    HTN (hypertension) (7/5/2011)      Penetrating atherosclerotic ulcer of aorta ([facility] (6/6/2016)      Overview: Seen by Dr. David Portillo in 7/2016 - plan for recheck in 6 mos      Hyperlipidemia (6/27/2016)      COPD exacerbation ([facility] (9/15/2020)      Acute hypoxemic respiratory failure ([facility] (9/15/2020)      SSS (sick sinus syndrome) ([facility] (9/17/2020)      DNR (do not resuscitate) discussion ()      Goals of care, counseling/discussion ()      Advanced care planning/counseling discussion ()        Discharge Condition: Good    Cardiology Procedures this Admission:  Pacemaker insertion. Disposition: home    Reference discharge instructions provided by nursing for diet and activity. Follow-up with device clinic in three weeks. Call [phone] to make an appointment. Signed:  EDA Hodges  9/21/2020  2:16 PM      DISCHARGE INSTRUCTIONS FOR PATIENTS WITH PACEMAKERS    1. Remember to call for an appointment for 3 weeks [phone] to check healing and implant programming. 2. Medic Alert Bracelets are available from your pharmacist to wear at all times if you choose to wear one. 3. Carry your ID card for pacemaker with you at all times. This card will be given to you in the hospital or mailed to you. 4. The pacemaker will bulge slightly under your skin. The bulge will decrease in size over the next few weeks.   Please notify the doctor's office if you notice any of the following around your site:   A.  A bruise that does not go away. B.  Soreness or yellow, green, or brown drainage from the site. C. Any swelling from the site. D. If you have a fever of 100 degrees or higher that lasts for a few days. INCISION CARE       1.  Leave the dressing over your site until your follow up visit. 2.  You may not shower until after follow up visit. 3.  For comfort, wear loose fitting clothing. 1. 4.  Ice pack to affected shoulder for first 24 hours, wear your sling for 2 days. 2. 5.  Report any signs of infection, fever, pain, swelling, redness, oozing, or heat at site especially if these symptoms increase after the first 3 to 4 days. ACTIVITY PRECAUTIONS     1. Avoid rough contact with the implant site. 2. No driving for 14 days. 3. Avoid lifting your arm over your head, carrying anything on the affected side, or lifting over 10 pounds for 90 days. For the first 2 days only bend your arm at the elbow. 4. Any extreme activity such as golf, weight lifting or exercise biking should be restricted for 60 days. 5. Do not carry objects by holding them against your implant site. 6.  No shooting rifles or any type of gun with the affected shoulder permanently. SPECIAL PRECAUTIONS     1. You should avoid all strong magnetic fields, such as arc welding, large transformers, large motors. 2.  You may or may not (depending on your device) have an MRI which uses a strong magnet to take pictures. 3.  Treatments or surgery that requires diathermy or electrocautery should be discussed with your doctor before scheduled. 4. Avoid radio frequency transmitters, including radar. 5. Advise dentist or other medical personnel you see that you have a pacemaker. 6.  Cell phones and microwave oven use is okay. 7.  If you plan to move or take a trip to a new area, the doctor's office will give you a name of a doctor to contact for any problems.       ANTIBIOTIC THERAPY    During the first 8 weeks after your pacemaker insertion, you may need antibiotics before any dental work or certain tests or operations. Let the dentist or doctor who is caring for you know that you have had an implanted device.

## 2020-09-22 NOTE — PROGRESS NOTES
1900- Bedside shift change report given to Rudy Friend RN (oncoming nurse) by Sarah Jay RN (offgoing nurse). Report included the following information SBAR, Kardex, Procedure Summary, Intake/Output, MAR, Recent Results, Med Rec Status and Cardiac Rhythm NSR w/pacemaker. 2100- Pt helped to chair w 1 assist, tolerated well, sats mid 90's.  
 
2300- Pt not comfortable in chair, assisted back into bed. 
 
0300- Pt resting comfortably in bed, no complaints of pain at this time, VSS. 0700- Bedside shift change report given to Sarah Jay RN (oncoming nurse) by Rudy Friend RN (offgoing nurse). Report included the following information SBAR, Kardex, Procedure Summary, Intake/Output, MAR, Recent Results, Med Rec Status and Cardiac Rhythm NSR.

## 2020-09-23 NOTE — PROGRESS NOTES
9/22/2020 8:54 PM 
 
Admit Date: 9/15/2020 Admit Diagnosis: COPD exacerbation ([facility] [J44.1]; Acute hypoxemic respiratory failure ([facility] [J96.01] Subjective:  
 
Peace Yeung denies chest pain or shortness of breath. S/p pacer. No current facility-administered medications for this encounter. Current Outpatient Medications Medication Sig  
 docusate sodium (COLACE) 100 mg capsule Take 1 Cap by mouth two (2) times a day for 90 days.  polyethylene glycol (Miralax) 17 gram/dose powder Take 17 g by mouth daily. 1 tablespoon with 8 oz of water daily  cyclobenzaprine (FLEXERIL) 5 mg tablet TAKE 1 TABLET BY MOUTH TWICE DAILY AS NEEDED FOR MUSCLE SPASM  abiraterone 250 mg tab Take  by mouth. 4 tabs daily  oxybutynin chloride XL (DITROPAN XL) 10 mg CR tablet Take 10 mg by mouth daily.  lansoprazole (PREVACID) 15 mg capsule Take 15 mg by mouth Daily (before breakfast).  predniSONE (DELTASONE) 2.5 mg tablet Take 5 mg by mouth two (2) times a day.  diazePAM (Valium) 5 mg tablet Take 1 Tab by mouth every eight (8) hours as needed for Muscle Spasm(s). Max Daily Amount: 15 mg.  
 lidocaine 4 % patch 1 Patch by TransDERmal route every twelve (12) hours every twelve (12) hours for 30 days.  albuterol (PROVENTIL HFA, VENTOLIN HFA, PROAIR HFA) 90 mcg/actuation inhaler Use 2 puffs very 4 hours prn  famotidine (PEPCID) 20 mg tablet Take 1 Tab by mouth as needed for Heartburn.  metoprolol tartrate (LOPRESSOR) 50 mg tablet TAKE ONE TABLET BY MOUTH TWICE DAILY  lisinopriL (PRINIVIL, ZESTRIL) 2.5 mg tablet Take 1 tablet by mouth once daily  albuterol-ipratropium (DUO-NEB) 2.5 mg-0.5 mg/3 ml nebu 3 mL by Nebulization route every six (6) hours as needed (sob).  fluticasone furoate-vilanterol (BREO ELLIPTA) 100-25 mcg/dose inhaler Take 1 Puff by inhalation daily.  Oxygen 3L continouus  fluticasone (FLONASE) 50 mcg/actuation nasal spray 2 Sprays by Both Nostrils route daily.  atorvastatin (LIPITOR) 40 mg tablet TAKE 1 TABLET EVERY DAY  XTANDI 40 mg capsule Take 160 mg by mouth daily.  aspirin 81 mg chewable tablet Take 1 Tab by mouth daily. RISK OF HEART ATTACK IF ANY MISSED DOSES Objective:  
  
Physical Exam:   
Visit Vitals BP (!) 142/69 (BP 1 Location: Right arm, BP Patient Position: Sitting) Pulse 87 Temp 97.3 °F (36.3 °C) Resp 14 Ht 6' (1.829 m) Wt 202 lb 11.2 oz (91.9 kg) SpO2 94% BMI 27.49 kg/m² Gen:  NAD Mental Status - Alert. General Appearance - Not in acute distress. Chest and Lung Exam  
Inspection: Accessory muscles - No use of accessory muscles in breathing. Auscultation:  
Breath sounds: - Normal.  
Cardiovascular Inspection: Jugular vein - Bilateral - Inspection Normal.  
Palpation/Percussion:  
Apical Impulse: - Normal.  
Auscultation: Rhythm - Regular. Heart Sounds - S1 WNL and S2 WNL. No S3 or S4. Murmurs & Other Heart Sounds: Auscultation of the heart reveals - No Murmurs. Peripheral Vascular Upper Extremity: Inspection - Bilateral - No Cyanotic nailbeds or Digital clubbing. Lower Extremity:  
Palpation: Edema - Bilateral - No edema. Abdomen:   Soft, non-tender, bowel sounds are active. Neuro: A&O times 3, CN and motor grossly WNL Data Review: No results for input(s): WBC, HGB, HCT, PLT, HGBEXT, HCTEXT, PLTEXT in the last 72 hours. No results for input(s): NA, K, CL, CO2, GLU, BUN, CREA, CA, MG, PHOS, ALB, TBIL, TBILI, ALT, INR, INREXT in the last 72 hours. No lab exists for component: SGOT No results for input(s): TROIQ, CPK, CKMB in the last 72 hours. Intake/Output Summary (Last 24 hours) at 9/22/2020 2054 Last data filed at 9/21/2020 2235 Gross per 24 hour Intake  Output 200 ml Net -200 ml Telemetry: normal sinus rhythm Assessment:  
 
Active Problems: 
  HTN (hypertension) (7/5/2011) Penetrating atherosclerotic ulcer of aorta ([facility] (6/6/2016) Overview: Seen by Dr. Gaudencio Gonzalez in 7/2016 - plan for recheck in 6 mos Hyperlipidemia (6/27/2016) COPD exacerbation ([facility] (9/15/2020) Acute hypoxemic respiratory failure ([facility] (9/15/2020) SSS (sick sinus syndrome) ([facility] (9/17/2020) DNR (do not resuscitate) discussion () Goals of care, counseling/discussion () Advanced care planning/counseling discussion () Plan:  
 
Sinus pauses: Additional pause of 3.61 seconds when BB restarted. EF 55-60% and no significant new concerns · Hold BB · S/p pacer- OK for DC per Dr. Kamala Gordon 
  
  
CAD/HTN/HLD history:  Stable · OK to restart BB · Continue other home meds 
  
  
Aortic arch ulcer: Increasing in size · Vascular notes reviewed: high risk. Patient not interested in repair at this time.   
  
  
COPD exacerbation: improving · Per hospitalist and pulmonary

## 2020-09-23 NOTE — PROGRESS NOTES
Patient was admitted to [facility] on 9-15-20 and discharged on 9-22-20 for: 
 
DISCHARGE DIAGNOSIS: 
Acute hypoxic respiratory failure  
 acute COPD exacerbation CAP 
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  
hyperkalemia resolved   
HTN/ CAD Hyperlipidemia Patient's daughter, Jerrod Larsen (on PHI), was contacted within one business day of discharge. Discharge Challenges to be reviewed by the provider:  
Additional needs identified to be addressed with provider:  yes -  Daughter states patient has not had a fever/chills since discharge, and daughter denies patient having nausea/vomiting since discharge. Daughter states patient's \"left arm is a little sore from pacemaker being put in\" and daughter states patient is wearing sling to his left arm as ordered. Daughter reports patient's appetite is good and daughter denies patient having a cough/sputum. Daughter has no red flags to report at this time and states patient is doing well. - Daughter states she has not checked patient's chest wall, at insertion site of pacemaker, and is unable to give information on how the site looks or if there is a dressing in place at this time. Discussed COVID-19 related testing which was available at this time. Test results were negative. Patient's daughter informed of results, if available? yes Method of communication with provider : chart routing Component Latest Ref Rng & Units 9/18/2020 9/16/2020 9/16/2020 9/16/2020  
 
      3:34 AM  3:51 AM  3:51 AM  3:51 AM  
RBC 
    4.10 - 5.70 M/uL 3.91 (L)   4.00 (L) HGB 
    12.1 - 17.0 g/dL 10.8 (L)   11.2 (L) HCT 
    36.6 - 50.3 % 34.0 (L)   35.1 (L) Component     Latest Ref Rng & Units 9/18/2020 9/16/2020 9/16/2020 9/16/2020  
 
      3:34 AM  3:51 AM  3:51 AM  3:51 AM  
RDW 
 11.5 - 14.5 % 18.5 (H)   18.7 (H) Component Latest Ref Rng & Units 2020  
 
      3:34 AM  3:51 AM  3:51 AM  3:51 AM  
NRBC 
    0  WBC 0.0   0.3 (H) ABSOLUTE NRBC 
    0.00 - 0.01 K/uL 0.00   0.02 (H) Component Latest Ref Rng & Units 2020  
 
      3:34 AM  3:51 AM  3:51 AM  3:51 AM  
Chloride 97 - 108 mmol/L 111 (H)   113 (H) Component Latest Ref Rng & Units 2020  
 
      3:34 AM  3:51 AM  3:51 AM  3:51 AM  
Glucose 65 - 100 mg/dL 178 (H)   128 (H) BUN 
    6 - 20 MG/DL 29 (H)   21 (H) Creatinine 
    0.70 - 1.30 MG/DL 1.46 (H)   1.30  
BUN/Creatinine ratio 12 - 20   20   16 GFR est AA 
    >60 ml/min/1.73m2 56 (L)   >60  
GFR est non-AA 
    >60 ml/min/1.73m2 46 (L)   53 (L) Calcium 8.5 - 10.1 MG/DL 7.8 (L)   8.3 (L) Component Latest Ref Rng & Units 2020  
 
      3:34 AM  3:34 AM  3:51 AM  3:51 AM  
Magnesium 1.6 - 2.4 mg/dL 2.8 (H)   2.6 (H) Advance Care Planning:  
Does patient have an Advance Directive:  yes; reviewed and current per daughter. Was this a readmission? no  
Patient stated reason for the admission: N/A, telephonic assessment completed with patient's daughter. Patients top risk factors for readmission: medical condition Interventions to address risk factors: Education provided to patient's daughter regarding signs/symptoms of pain and inflammation, daughter verbalizes an understanding. Care Transition Nurse (CTN) contacted the patient's daughter, Asher Lake (on PHI), by telephone to perform post hospital discharge assessment. Verified name and  with daughter as identifiers. Provided introduction to self, and explanation of the CTN role.   
 
CTN reviewed discharge instructions, medical action plan and red flags with daughter who verbalized understanding. Daughter given an opportunity to ask questions and does not have any further questions or concerns at this time. The daughter agrees to contact the PCP office for questions related to patient's healthcare. Medication reconciliation was not performed with patient's daughter during this phone conversation as daughter is not familiar with patient's medications and is not currently in the presence of medication bottles. Advised obtaining a 90-day supply of all daily and as-needed medications. Referral to Pharm D needed: no  
 
Home Health/Outpatient orders at discharge: none [facility]: n/a Date of initial visit: n/a Durable Medical Equipment ordered at discharge: none [address] [facility] [address]: n/a Durable Medical Equipment received: n/a Covid Risk Education Patient has following risk factors of: COPD, acute respiratory failure and recent pacemaker insertion. Education provided regarding infection prevention, and signs and symptoms of COVID-19 and when to seek medical attention with daughter who verbalized understanding. Discussed exposure protocols and quarantine From CDC: Are you at higher risk for severe illness?  and given an opportunity for questions and concerns. The daughter agrees to contact the COVID-19 hotline [phone] or PCP office for questions related to COVID-19. For more information on steps you can take to protect yourself, see CDC's How to Protect Yourself Patient/family/caregiver given information for [employer] and agrees to enroll no Patient's preferred e-mail: declines Patient's preferred phone number: declines Discussed follow-up appointments. If no appointment was previously scheduled, appointment scheduling offered: yes Curly5 Kiara Amado follow up appointment(s):  
Future Appointments Date Time Provider Estrellita Hinson 10/6/2020  2:30 PM Mila Guido., NP SANDIP BS AMB 10/13/2020 11:15 AM PACEMAKER, [facility] BS AMB Daughter states she will discuss the scheduling of BR[place]LEY appointment with patient's sister who generally scheduled appointments for patient. Non-BS follow up appointment(s): Please see below for appointments. Plan for follow-up call in 10-14 days based on severity of symptoms and risk factors. CTN provided contact information for future needs. Goals  Attends follow-up appointments as directed. 9-23-20: Patient does not have BRADLEY appointment scheduled at this time and daughter, Amara Navarrete (on PHI), states she will discuss [place] appointment with her aunt, who generally makes appointments for patient. Patient has cardio follow-up scheduled with Orlando Cochran NP/[facility] on 10-6-20 and patient has pacemaker check scheduled with [facility] on 10-13-20, per Cheko Rivas, at the office of [facility]. Daughter states family members will provide transportation to/from appointments. Kelly Pack Understands red flags post discharge. 9-23-20: Red flags of pain and inflammation reviewed with daughter and daughter verbalizes an understanding. Daughter states patient has not had a fever/chills since discharge, and daughter denies patient having nausea/vomiting since discharge. Daughter states patient's \"left arm is a little sore from pacemaker being put in\" and daughter states patient is wearing sling to his left arm as ordered. Daughter reports patient's appetite is good and daughter denies patient having a cough/sputum. Daughter has no red flags to report at this time and states patient is doing well. Care Transitions Nurse will review red flags again on next phone conversation with patient/daughter.  Tania Ross

## 2020-10-02 NOTE — PROGRESS NOTES
[address] [place] [address], [address] [facility]  [phone] Subjective:  
  
Yamini Oglesby is a 78 y.o. male is here hospital follow up. Admitted last month for acute COPD exacerbation. Had episodes of sinus pauses which resolved upon dc of BB. However, recurrent . +3 secs pause upon reinitiating BB for  Ascending aortic ulcer with erosion, ASHD. Now s/p PM insertion by Dr Conor Calderon. 
 
Feeling better, breathing better. The patient denies chest pain/ shortness of breath, orthopnea, PND, LE edema, palpitations, syncope, or presyncope. Patient Active Problem List  
 Diagnosis Date Noted  DNR (do not resuscitate) discussion  Goals of care, counseling/discussion  Advanced care planning/counseling discussion  SSS (sick sinus syndrome) ([address] 09/17/2020  COPD exacerbation ([address] 09/15/2020  Acute hypoxemic respiratory failure ([address] 09/15/2020  Mixed simple and mucopurulent chronic bronchitis ([address] 04/17/2019  Hypoxemia 03/23/2019  COPD (chronic obstructive pulmonary disease) ([address] 03/23/2019  Coronary artery disease involving native coronary artery of native heart without angina pectoris 04/25/2017  Advance directive discussed with patient 07/22/2016  Hyperlipidemia 06/27/2016  Acute myocardial infarction of anterior wall, subsequent episode of care ([address] 06/06/2016  Penetrating atherosclerotic ulcer of aorta ([address] 06/06/2016  Mucopurulent chronic bronchitis ([address] 06/05/2016  S/P PTCA (percutaneous transluminal coronary angioplasty) 10/21/2015  Chronic interstitial lung disease ([address] 10/20/2015  
 STEMI (ST elevation myocardial infarction) ([address] 10/19/2015  PAD (peripheral artery disease) ([address] 12/31/2013  Prostate cancer ([address] 05/08/2012  
 HTN (hypertension) 07/05/2011  ED (erectile dysfunction) 02/24/2010 Neema Sawyer MD 
Past Medical History:  
Diagnosis Date  CAD (coronary artery disease) mi 10/19/2015 with stent  COPD (chronic obstructive pulmonary disease) (HCC)  Hypertension  Prostate cancer ([facility] 2012 Dr. Pepe Hernandez - diagnosed 2012 - s/p EBRT on Lupron Past Surgical History:  
Procedure Laterality Date  HX ORTHOPAEDIC    
 left knee surgery  MT INS NEW/RPLCMT PRM PM W/TRANSV ELTRD ATRIAL&VENT N/A 2020 INSERT PPM DUAL performed by Jojo Bolanos MD at [facility] No Known Allergies Family History Problem Relation Age of Onset  Heart Disease Mother   
     pacemaker  Diabetes Mother  Diabetes Sister  Heart Disease Brother Social History Socioeconomic History  Marital status:  Spouse name: Not on file  Number of children: Not on file  Years of education: Not on file  Highest education level: Not on file Occupational History  Not on file Social Needs  Financial resource strain: Not on file  Food insecurity Worry: Not on file Inability: Not on file  Transportation needs Medical: Not on file Non-medical: Not on file Tobacco Use  Smoking status: Former Smoker Packs/day: 0.50 Years: 40.00 Pack years: 20.00 Types: Cigarettes Last attempt to quit: 10/19/2015 Years since quittin.9  Smokeless tobacco: Never Used  Tobacco comment: trying quit 10/13/15 -  ppd Substance and Sexual Activity  Alcohol use: No  
  Alcohol/week: 0.0 standard drinks  Drug use: No  
 Sexual activity: Not Currently Lifestyle  Physical activity Days per week: Not on file Minutes per session: Not on file  Stress: Not on file Relationships  Social connections Talks on phone: Not on file Gets together: Not on file Attends Gnosticist service: Not on file Active member of club or organization: Not on file Attends meetings of clubs or organizations: Not on file Relationship status: Not on file  Intimate partner violence Fear of current or ex partner: Not on file Emotionally abused: Not on file Physically abused: Not on file Forced sexual activity: Not on file Other Topics Concern  Not on file Social History Narrative  Not on file Current Outpatient Medications Medication Sig  
 docusate sodium (COLACE) 100 mg capsule Take 1 Cap by mouth two (2) times a day for 90 days.  polyethylene glycol (Miralax) 17 gram/dose powder Take 17 g by mouth daily. 1 tablespoon with 8 oz of water daily  cyclobenzaprine (FLEXERIL) 5 mg tablet TAKE 1 TABLET BY MOUTH TWICE DAILY AS NEEDED FOR MUSCLE SPASM  abiraterone 250 mg tab Take  by mouth. 4 tabs daily  oxybutynin chloride XL (DITROPAN XL) 10 mg CR tablet Take 10 mg by mouth daily.  lansoprazole (PREVACID) 15 mg capsule Take 15 mg by mouth Daily (before breakfast).  predniSONE (DELTASONE) 2.5 mg tablet Take 5 mg by mouth two (2) times a day.  diazePAM (Valium) 5 mg tablet Take 1 Tab by mouth every eight (8) hours as needed for Muscle Spasm(s). Max Daily Amount: 15 mg.  
 albuterol (PROVENTIL HFA, VENTOLIN HFA, PROAIR HFA) 90 mcg/actuation inhaler Use 2 puffs very 4 hours prn  famotidine (PEPCID) 20 mg tablet Take 1 Tab by mouth as needed for Heartburn.  metoprolol tartrate (LOPRESSOR) 50 mg tablet TAKE ONE TABLET BY MOUTH TWICE DAILY  lisinopriL (PRINIVIL, ZESTRIL) 2.5 mg tablet Take 1 tablet by mouth once daily  albuterol-ipratropium (DUO-NEB) 2.5 mg-0.5 mg/3 ml nebu 3 mL by Nebulization route every six (6) hours as needed (sob).  fluticasone furoate-vilanterol (BREO ELLIPTA) 100-25 mcg/dose inhaler Take 1 Puff by inhalation daily.  Oxygen 3L continouus  fluticasone (FLONASE) 50 mcg/actuation nasal spray 2 Sprays by Both Nostrils route daily.  atorvastatin (LIPITOR) 40 mg tablet TAKE 1 TABLET EVERY DAY  XTANDI 40 mg capsule Take 160 mg by mouth daily.  aspirin 81 mg chewable tablet Take 1 Tab by mouth daily. RISK OF HEART ATTACK IF ANY MISSED DOSES No current facility-administered medications for this visit. Review of Symptoms: 
11 systems reviewed, negative other than as stated in the HPI Physical ExamPhysical Exam:   
There were no vitals filed for this visit. There is no height or weight on file to calculate BMI. General PE Gen:  NAD Mental Status - Alert. General Appearance - Not in acute distress. HEENT: 
PERRL, no carotid bruits or JVD Chest and Lung Exam  
Inspection: Accessory muscles - No use of accessory muscles in breathing. Auscultation:  
Breath sounds: wheezing noted Cardiovascular Inspection: Jugular vein - Bilateral - Inspection Normal.  
Palpation/Percussion:  
Apical Impulse: - Normal.  
Auscultation: Rhythm - Regular. Heart Sounds - S1 WNL and S2 WNL. No S3 or S4. Murmurs & Other Heart Sounds: Auscultation of the heart reveals - No Murmurs. Peripheral Vascular Upper Extremity: Inspection - Bilateral - No Cyanotic nailbeds or Digital clubbing. Lower Extremity:  
Palpation: Edema - Bilateral - No edema. Abdomen:   Soft, non-tender, bowel sounds are active. Neuro: A&O times 3, CN and motor grossly WNL Labs:  
Lab Results Component Value Date/Time  Cholesterol, total 73 (L) 04/25/2017 02:38 PM  
 Cholesterol, total 95 10/20/2015 04:06 AM  
 Cholesterol, total 124 05/14/2015 11:24 AM  
 Cholesterol, total 136 12/31/2013 10:41 AM  
 Cholesterol, total 135 03/22/2013 08:52 AM  
 HDL Cholesterol 31 (L) 04/25/2017 02:38 PM  
 HDL Cholesterol 33 10/20/2015 04:06 AM  
 HDL Cholesterol 35 (L) 05/14/2015 11:24 AM  
 HDL Cholesterol 36 (L) 12/31/2013 10:41 AM  
 HDL Cholesterol 34 (L) 03/22/2013 08:52 AM  
 LDL, calculated 19 04/25/2017 02:38 PM  
 LDL, calculated 39.6 10/20/2015 04:06 AM  
 LDL, calculated 69 05/14/2015 11:24 AM  
 LDL, calculated 77 12/31/2013 10:41 AM  
 LDL, calculated 79 03/22/2013 08:52 AM  
 Triglyceride 116 04/25/2017 02:38 PM  
 Triglyceride 112 10/20/2015 04:06 AM  
 Triglyceride 98 05/14/2015 11:24 AM  
 Triglyceride 113 12/31/2013 10:41 AM  
 Triglyceride 109 03/22/2013 08:52 AM  
 CHOL/HDL Ratio 2.9 10/20/2015 04:06 AM  
 
Lab Results Component Value Date/Time CK 1,211 (H) 10/21/2015 04:12 AM  
 
Lab Results Component Value Date/Time Sodium 140 09/18/2020 03:34 AM  
 Potassium 4.9 09/18/2020 03:34 AM  
 Chloride 111 (H) 09/18/2020 03:34 AM  
 CO2 24 09/18/2020 03:34 AM  
 Anion gap 5 09/18/2020 03:34 AM  
 Glucose 178 (H) 09/18/2020 03:34 AM  
 BUN 29 (H) 09/18/2020 03:34 AM  
 Creatinine 1.46 (H) 09/18/2020 03:34 AM  
 BUN/Creatinine ratio 20 09/18/2020 03:34 AM  
 GFR est AA 56 (L) 09/18/2020 03:34 AM  
 GFR est non-AA 46 (L) 09/18/2020 03:34 AM  
 Calcium 7.8 (L) 09/18/2020 03:34 AM  
 Bilirubin, total 0.5 09/15/2020 12:45 PM  
 Alk. phosphatase 173 (H) 09/15/2020 12:45 PM  
 Protein, total 7.9 09/15/2020 12:45 PM  
 Albumin 3.2 (L) 09/15/2020 12:45 PM  
 Globulin 4.7 (H) 09/15/2020 12:45 PM  
 A-G Ratio 0.7 (L) 09/15/2020 12:45 PM  
 ALT (SGPT) 15 09/15/2020 12:45 PM  
 
 
EKG: 
 
 
 Assessment: 
 
 Assessment: 1. ASHD (arteriosclerotic heart disease) 2. S/P PTCA (percutaneous transluminal coronary angioplasty) 3. Mixed hyperlipidemia 4. Essential hypertension 5. Chronic obstructive pulmonary disease, unspecified COPD type ([email or web address] 6. SSS (sick sinus syndrome) ([email or web address] 7. S/P placement of cardiac pacemaker 8. Penetrating atherosclerotic ulcer of aorta ([email or web address] No orders of the defined types were placed in this encounter. Plan:  
 
 
Atherosclerotic heart disease History of PTCA stenting 2015, no ischemia on nuclear stress test 2017. Continue ASA, BB, statin SSS s/p PM 9/2020 Normal EF mod-severe pulm HTN per echo 9/2020 Device per Dr Janet Rojas 
 
 
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 Continue BB Seen by Dr. Susan Magaña in 2016. Still recommending strict blood pressure control. He will speak with the vascular surgeon for further recommendations. 
  
Hypertension Controlled  
  
Hyperlipidemia: On statin. Lipids and labs followed by PCP 
  
COPD, ILD On oxygen therapy continuously Followed by Dr Crystal Durant 
  
CT chest/neck 1/19: Abnormal right vocal cord. Left lung base nodule. Severe COPD 
ENT: Dr Milton Francisco 
  
Prostate cancer Followed by Dr Huy Garibay [facility] 
  
Counseled on diet and exercise- eventual goal of 30-60 minutes 5-7 times a week as per AHA guidelines. 
  
  
Continue current care and f/u  6 mos Elham Garcia NP

## 2020-10-06 NOTE — PROGRESS NOTES
1. Have you been to the ER, urgent care clinic since your last visit? Hospitalized since your last visit? Yes When: on 9/15/20 
 
2. Have you seen or consulted any other health care providers outside of the [address] [place] [address] since your last visit? Include any pap smears or colon screening. No 
 
Chief Complaint Patient presents with  
[facility] Follow Up  
  for COPD Patient denies cardiac symptoms.

## 2020-10-07 NOTE — PROGRESS NOTES
Chief Complaint Patient presents with  [id number] 1. Have you been to the ER, urgent care clinic since your last visit? Hospitalized since your last visit? Yes When: [id number] [facility] ER 9/21 and 9/22 for abdominal and back pain and difficulty breathing 2. Have you seen or consulted any other health care providers outside of the [address] [place] [address] since your last visit? Include any pap smears or colon screening. No 
 
Visit Vitals /62 (BP 1 Location: Right arm, BP Patient Position: Sitting) Pulse 91 Temp 97.4 °F (36.3 °C) (Oral) Resp 16 Ht 6' (1.829 m) Wt 194 lb (88 kg) SpO2 95% Comment: on 3L of oxygen BMI 26.31 kg/m²

## 2020-10-07 NOTE — PROGRESS NOTES
HPI Mr. Richie Bender is a 78y.o. year old male, he is seen today for hospital follow up. Accompanied by sister. Hospital records reviewed. Was admitted 9/15 - 9/22/20 for acute hypoxic respiratory failure due to copd exacerbation due to acute bronchitis and CAP. Patient was treated with ceftriaxone and azithromycin as well as bronchodilators. He was hypoxic requiring at least 8 L high flow oxygen with sats 90 to 92% at rest.  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 He was seen by vascular surgery who recommended surgery but patient does not want surgery at this point. He was found to have sick sinus syndrome with 3-second pauses and is status post pacemaker insertion. He is on metoprolol. Initially had abdominal pain and tells me that he had constipation. He has no abdominal pain at this time No abdominal pain, n/v. No chest pain or sob. No palpitations, no dizziness. No cough, no f/c, no wheezing. No constipation or diarrhea. Has heartburn, asking for less expensive alternative to famotidine. Chief Complaint Patient presents with  Transitions Of Care ED [facility] released 9/22/2020 Prior to Admission medications Medication Sig Start Date End Date Taking? Authorizing Provider  
omeprazole (PRILOSEC) 20 mg capsule Take 1 Cap by mouth daily. 10/7/20  Yes Baljit Ojeda MD  
polyethylene glycol (Miralax) 17 gram/dose powder Take 17 g by mouth daily. 1 tablespoon with 8 oz of water daily 9/14/20  Yes Chica Tsang,   
abiraterone 250 mg tab Take  by mouth. 1 tab daily   Yes Other, MD Nancy  
oxybutynin chloride XL (DITROPAN XL) 10 mg CR tablet Take 10 mg by mouth daily. Yes Juliet, MD Nancy  
predniSONE (DELTASONE) 2.5 mg tablet Take 5 mg by mouth two (2) times a day.    Yes Juliet, MD Nancy  
albuterol (PROVENTIL HFA, VENTOLIN HFA, PROAIR HFA) 90 mcg/actuation inhaler Use 2 puffs very 4 hours prn 7/30/20  Yes Christina Mendez MD  
metoprolol tartrate (LOPRESSOR) 50 mg tablet TAKE ONE TABLET BY MOUTH TWICE DAILY 7/13/20  Yes Eric Ramirez MD  
lisinopriL (PRINIVIL, ZESTRIL) 2.5 mg tablet Take 1 tablet by mouth once daily 3/21/20  Yes Eric Ramirez MD  
albuterol-ipratropium (DUO-NEB) 2.5 mg-0.5 mg/3 ml nebu 3 mL by Nebulization route every six (6) hours as needed (sob). 10/19/19  Yes Angeles Schmidt NP  
fluticasone furoate-vilanterol (BREO ELLIPTA) 100-25 mcg/dose inhaler Take 1 Puff by inhalation daily. 3/25/19  Yes Brayden Dinh, DO Oxygen 3L continouus   Yes Provider, Historical  
fluticasone (FLONASE) 50 mcg/actuation nasal spray 2 Sprays by Both Nostrils route daily. 2/18/19  Yes Kaylee Schulz MD  
atorvastatin (LIPITOR) 40 mg tablet TAKE 1 TABLET EVERY DAY 9/20/18  Yes Christina Mendez MD  
aspirin 81 mg chewable tablet Take 1 Tab by mouth daily. RISK OF HEART ATTACK IF ANY MISSED DOSES 10/21/15  Yes Eric Ramirez MD  
trospium (SANCTURA) 20 mg tablet TAKE 1 TABLET BY MOUTH TWICE DAILY 10/1/20   Provider, Historical  
famotidine (PEPCID) 20 mg tablet Take 1 Tab by mouth as needed for Heartburn. 7/15/20 10/7/20  Christina Mendez MD  
XTANDI 40 mg capsule Take 160 mg by mouth daily. 1/11/18 10/7/20  Provider, Historical  
 
 
 
No Known Allergies REVIEW OF SYSTEMS: 
Per HPI PHYSICAL EXAM: 
Visit Vitals /62 (BP 1 Location: Right arm, BP Patient Position: Sitting) Pulse 91 Temp 97.4 °F (36.3 °C) (Oral) Resp 16 Ht 6' (1.829 m) Wt 194 lb (88 kg) SpO2 95% Comment: on 3L of oxygen BMI 26.31 kg/m² Constitutional: Appears well-developed and well-nourished. No distress. NC O2 in place. HENT:  
Head: Normocephalic and atraumatic. Eyes: No scleral icterus. Neck: no lad, no tm, supple Cardiovascular: Normal S1/S2, regular rhythm. No murmurs, rubs, or gallops. Pulmonary/Chest: Effort normal. No respiratory distress. No wheezes or  rales. Scattered rhonchi. Pacemake bandage with dried blood, otherwise c/d/i without tenderness. Abdomen: Soft, NT/ND, +BS, no rebound or guarding. Ext: No edema. Neurological: Alert. Psychiatric: Normal mood and affect. Behavior is normal. 
 
 
  
Lab Results Component Value Date/Time Sodium 140 09/18/2020 03:34 AM  
 Potassium 4.9 09/18/2020 03:34 AM  
 Chloride 111 (H) 09/18/2020 03:34 AM  
 CO2 24 09/18/2020 03:34 AM  
 Anion gap 5 09/18/2020 03:34 AM  
 Glucose 178 (H) 09/18/2020 03:34 AM  
 BUN 29 (H) 09/18/2020 03:34 AM  
 Creatinine 1.46 (H) 09/18/2020 03:34 AM  
 BUN/Creatinine ratio 20 09/18/2020 03:34 AM  
 GFR est AA 56 (L) 09/18/2020 03:34 AM  
 GFR est non-AA 46 (L) 09/18/2020 03:34 AM  
 Calcium 7.8 (L) 09/18/2020 03:34 AM  
 Bilirubin, total 0.5 09/15/2020 12:45 PM  
 Alk. phosphatase 173 (H) 09/15/2020 12:45 PM  
 Protein, total 7.9 09/15/2020 12:45 PM  
 Albumin 3.2 (L) 09/15/2020 12:45 PM  
 Globulin 4.7 (H) 09/15/2020 12:45 PM  
 A-G Ratio 0.7 (L) 09/15/2020 12:45 PM  
 ALT (SGPT) 15 09/15/2020 12:45 PM  
 
Lab Results Component Value Date/Time Hemoglobin A1c 6.0 (H) 12/12/2011 03:22 PM  
 Hemoglobin A1c 6.0 (H) 07/05/2011 01:44 PM  
  
Lab Results Component Value Date/Time Cholesterol, total 73 (L) 04/25/2017 02:38 PM  
 HDL Cholesterol 31 (L) 04/25/2017 02:38 PM  
 LDL, calculated 19 04/25/2017 02:38 PM  
 VLDL, calculated 23 04/25/2017 02:38 PM  
 Triglyceride 116 04/25/2017 02:38 PM  
 CHOL/HDL Ratio 2.9 10/20/2015 04:06 AM  
  
 
 
ASSESSMENT/PLAN Diagnoses and all orders for this visit: 1. Penetrating atherosclerotic ulcer of aorta ([facility] Doesn't want surgery - on metoprolol 2. Chronic obstructive pulmonary disease, unspecified COPD type ([facility] Stable - back to baseline 3. Acute hypoxemic respiratory failure ([facility] Resolved - on usual O2 now 4. SSS (sick sinus syndrome) ([facility] S/p pacemaker 5. Essential hypertension Controlled on current regimen, continue 6. Prostate cancer ([facility] Followed by Dr. Dalia Grigsby - on prednisone + abiraterone now 7. Gastroesophageal reflux disease, unspecified whether esophagitis present 
-     omeprazole (PRILOSEC) 20 mg capsule; Take 1 Cap by mouth daily. Health Maintenance Due Topic Date Due  Shingrix Vaccine Age 50> (1 of 2) 02/28/1988  Lipid Screen  04/25/2018  GLAUCOMA SCREENING Q2Y  10/26/2020 Follow-up and Dispositions · Return in about 4 months (around 2/7/2021) for bp, copd. Reviewed plan of care. Patient has provided input and agrees with goals. The nurse provided the patient and/or family with advanced directive information if needed and encouraged the patient to provide a copy to the office when available.

## 2020-10-15 NOTE — PROGRESS NOTES
Patient feels well following pacemaker implantation. Subclavian pacemaker site approximated well, no discharge. No erythema or heat. Device interrogation WNL. Follow up as planned in 3 mo.   
 
Gume Ye RN

## 2020-10-21 NOTE — PROGRESS NOTES
Patient has graduated from the Transitions of Care Coordination  program on 10/21/2020. Patient/family has the ability to self-manage at this time Care management goals have been completed. Patient was not referred to the [facility] team for further management. Goals Addressed This Visit's Progress  COMPLETED: Attends follow-up appointments as directed. 10/21/20 Attempted to contact patient for [facility] follow up, no answer at phone listed. Per EMR patient has attended follow up appointments with both cards and PCP. ~BEB 
 
9-23-20: Patient does not have [facility] appointment scheduled at this time and daughter, Leonor Sy (on PHI), states she will discuss [place] appointment with her aunt, who generally makes appointments for patient. Patient has cardio follow-up scheduled with Agnes Shaffer NP/[facility] on 10-6-20 and patient has pacemaker check scheduled with [facility] on 10-13-20, per Chapincito Hernandez, at the office of [facility]. Daughter states family members will provide transportation to/from appointments. JACKY 
  
  COMPLETED: Understands red flags post discharge. 9-23-20: Red flags of pain and inflammation reviewed with daughter and daughter verbalizes an understanding. Daughter states patient has not had a fever/chills since discharge, and daughter denies patient having nausea/vomiting since discharge. Daughter states patient's \"left arm is a little sore from pacemaker being put in\" and daughter states patient is wearing sling to his left arm as ordered. Daughter reports patient's appetite is good and daughter denies patient having a cough/sputum. Daughter has no red flags to report at this time and states patient is doing well. Care Transitions Nurse will review red flags again on next phone conversation with patient/daughter. Anant New Patient has Care Transition Nurse's contact information for any further questions, concerns, or needs. Patients upcoming visits: Future Appointments Date Time Provider Estrellita Hinson 1/19/2021 11:15 AM PACEMAKER, KALI OROPEZA BS AMB  
1/19/2021 11:30 AM MD SANDIP Chavis BS AMB  
2/8/2021  1:00 PM Ga Purcell MD [facility] BS AMB  
4/8/2021  9:15 AM Cb Avelar MD [facility] BS AMB

## 2021-01-01 ENCOUNTER — APPOINTMENT (OUTPATIENT)
Dept: GENERAL RADIOLOGY | Age: 81
End: 2021-01-01
Attending: EMERGENCY MEDICINE
Payer: MEDICARE

## 2021-01-01 ENCOUNTER — APPOINTMENT (OUTPATIENT)
Dept: ULTRASOUND IMAGING | Age: 81
DRG: 291 | End: 2021-01-01
Attending: INTERNAL MEDICINE
Payer: MEDICARE

## 2021-01-01 ENCOUNTER — APPOINTMENT (OUTPATIENT)
Dept: CT IMAGING | Age: 81
DRG: 291 | End: 2021-01-01
Attending: INTERNAL MEDICINE
Payer: MEDICARE

## 2021-01-01 ENCOUNTER — PATIENT OUTREACH (OUTPATIENT)
Dept: CASE MANAGEMENT | Age: 81
End: 2021-01-01

## 2021-01-01 ENCOUNTER — APPOINTMENT (OUTPATIENT)
Dept: NON INVASIVE DIAGNOSTICS | Age: 81
DRG: 291 | End: 2021-01-01
Attending: INTERNAL MEDICINE
Payer: MEDICARE

## 2021-01-01 ENCOUNTER — APPOINTMENT (OUTPATIENT)
Dept: GENERAL RADIOLOGY | Age: 81
DRG: 291 | End: 2021-01-01
Attending: EMERGENCY MEDICINE
Payer: MEDICARE

## 2021-01-01 ENCOUNTER — HOSPITAL ENCOUNTER (EMERGENCY)
Age: 81
End: 2021-02-03
Attending: EMERGENCY MEDICINE
Payer: MEDICARE

## 2021-01-01 ENCOUNTER — HOSPICE ADMISSION (OUTPATIENT)
Dept: HOSPICE | Facility: HOSPICE | Age: 81
End: 2021-01-01

## 2021-01-01 ENCOUNTER — HOSPITAL ENCOUNTER (INPATIENT)
Age: 81
LOS: 10 days | Discharge: HOME HEALTH | DRG: 291 | End: 2021-01-28
Attending: EMERGENCY MEDICINE | Admitting: INTERNAL MEDICINE
Payer: MEDICARE

## 2021-01-01 VITALS
HEIGHT: 72 IN | BODY MASS INDEX: 28.79 KG/M2 | RESPIRATION RATE: 19 BRPM | TEMPERATURE: 101.2 F | OXYGEN SATURATION: 94 % | HEART RATE: 108 BPM | SYSTOLIC BLOOD PRESSURE: 125 MMHG | WEIGHT: 212.52 LBS | DIASTOLIC BLOOD PRESSURE: 92 MMHG

## 2021-01-01 VITALS
SYSTOLIC BLOOD PRESSURE: 159 MMHG | BODY MASS INDEX: 26.95 KG/M2 | HEIGHT: 72 IN | TEMPERATURE: 96.4 F | WEIGHT: 199 LBS | OXYGEN SATURATION: 97 % | RESPIRATION RATE: 24 BRPM | DIASTOLIC BLOOD PRESSURE: 65 MMHG | HEART RATE: 77 BPM

## 2021-01-01 DIAGNOSIS — J96.21 ACUTE ON CHRONIC RESPIRATORY FAILURE WITH HYPOXIA (HCC): Primary | ICD-10-CM

## 2021-01-01 DIAGNOSIS — I50.9 ACUTE CONGESTIVE HEART FAILURE, UNSPECIFIED HEART FAILURE TYPE (HCC): ICD-10-CM

## 2021-01-01 DIAGNOSIS — U07.1 COVID-19 VIRUS INFECTION: ICD-10-CM

## 2021-01-01 DIAGNOSIS — J96.01 ACUTE RESPIRATORY FAILURE WITH HYPOXIA (HCC): Primary | ICD-10-CM

## 2021-01-01 DIAGNOSIS — J44.9 COPD, VERY SEVERE (HCC): ICD-10-CM

## 2021-01-01 DIAGNOSIS — Z20.822 SUSPECTED COVID-19 VIRUS INFECTION: ICD-10-CM

## 2021-01-01 LAB
ALBUMIN SERPL-MCNC: 2.4 G/DL (ref 3.5–5)
ALBUMIN SERPL-MCNC: 2.8 G/DL (ref 3.5–5)
ALBUMIN SERPL-MCNC: 2.9 G/DL (ref 3.5–5)
ALBUMIN SERPL-MCNC: 2.9 G/DL (ref 3.5–5)
ALBUMIN SERPL-MCNC: 3 G/DL (ref 3.5–5)
ALBUMIN SERPL-MCNC: 3 G/DL (ref 3.5–5)
ALBUMIN SERPL-MCNC: 3.1 G/DL (ref 3.5–5)
ALBUMIN SERPL-MCNC: 3.1 G/DL (ref 3.5–5)
ALBUMIN SERPL-MCNC: 3.2 G/DL (ref 3.5–5)
ALBUMIN SERPL-MCNC: 3.2 G/DL (ref 3.5–5)
ALBUMIN/GLOB SERPL: 0.5 {RATIO} (ref 1.1–2.2)
ALBUMIN/GLOB SERPL: 0.6 {RATIO} (ref 1.1–2.2)
ALBUMIN/GLOB SERPL: 0.7 {RATIO} (ref 1.1–2.2)
ALBUMIN/GLOB SERPL: 0.8 {RATIO} (ref 1.1–2.2)
ALP SERPL-CCNC: 60 U/L (ref 45–117)
ALP SERPL-CCNC: 60 U/L (ref 45–117)
ALP SERPL-CCNC: 61 U/L (ref 45–117)
ALP SERPL-CCNC: 62 U/L (ref 45–117)
ALP SERPL-CCNC: 63 U/L (ref 45–117)
ALP SERPL-CCNC: 66 U/L (ref 45–117)
ALP SERPL-CCNC: 71 U/L (ref 45–117)
ALP SERPL-CCNC: 73 U/L (ref 45–117)
ALP SERPL-CCNC: 73 U/L (ref 45–117)
ALP SERPL-CCNC: 75 U/L (ref 45–117)
ALT SERPL-CCNC: 12 U/L (ref 12–78)
ALT SERPL-CCNC: 13 U/L (ref 12–78)
ALT SERPL-CCNC: 14 U/L (ref 12–78)
ALT SERPL-CCNC: 17 U/L (ref 12–78)
ALT SERPL-CCNC: 20 U/L (ref 12–78)
ALT SERPL-CCNC: 20 U/L (ref 12–78)
ALT SERPL-CCNC: 23 U/L (ref 12–78)
ALT SERPL-CCNC: 34 U/L (ref 12–78)
ANION GAP SERPL CALC-SCNC: 10 MMOL/L (ref 5–15)
ANION GAP SERPL CALC-SCNC: 6 MMOL/L (ref 5–15)
ANION GAP SERPL CALC-SCNC: 7 MMOL/L (ref 5–15)
ANION GAP SERPL CALC-SCNC: 8 MMOL/L (ref 5–15)
ANION GAP SERPL CALC-SCNC: 8 MMOL/L (ref 5–15)
ANION GAP SERPL CALC-SCNC: 9 MMOL/L (ref 5–15)
ARTERIAL PATENCY WRIST A: YES
AST SERPL-CCNC: 11 U/L (ref 15–37)
AST SERPL-CCNC: 12 U/L (ref 15–37)
AST SERPL-CCNC: 12 U/L (ref 15–37)
AST SERPL-CCNC: 14 U/L (ref 15–37)
AST SERPL-CCNC: 15 U/L (ref 15–37)
AST SERPL-CCNC: 39 U/L (ref 15–37)
ATRIAL RATE: 109 BPM
ATRIAL RATE: 82 BPM
BASE DEFICIT BLD-SCNC: 9 MMOL/L
BASOPHILS # BLD: 0 K/UL (ref 0–0.1)
BASOPHILS NFR BLD: 0 % (ref 0–1)
BDY SITE: ABNORMAL
BILIRUB SERPL-MCNC: 0.2 MG/DL (ref 0.2–1)
BILIRUB SERPL-MCNC: 0.2 MG/DL (ref 0.2–1)
BILIRUB SERPL-MCNC: 0.3 MG/DL (ref 0.2–1)
BILIRUB SERPL-MCNC: 0.4 MG/DL (ref 0.2–1)
BILIRUB SERPL-MCNC: 0.5 MG/DL (ref 0.2–1)
BILIRUB SERPL-MCNC: 0.6 MG/DL (ref 0.2–1)
BILIRUB SERPL-MCNC: 0.7 MG/DL (ref 0.2–1)
BILIRUB SERPL-MCNC: 1.2 MG/DL (ref 0.2–1)
BNP SERPL-MCNC: 1069 PG/ML
BNP SERPL-MCNC: 6120 PG/ML
BUN SERPL-MCNC: 16 MG/DL (ref 6–20)
BUN SERPL-MCNC: 19 MG/DL (ref 6–20)
BUN SERPL-MCNC: 23 MG/DL (ref 6–20)
BUN SERPL-MCNC: 27 MG/DL (ref 6–20)
BUN SERPL-MCNC: 29 MG/DL (ref 6–20)
BUN SERPL-MCNC: 29 MG/DL (ref 6–20)
BUN SERPL-MCNC: 33 MG/DL (ref 6–20)
BUN SERPL-MCNC: 37 MG/DL (ref 6–20)
BUN SERPL-MCNC: 38 MG/DL (ref 6–20)
BUN SERPL-MCNC: 41 MG/DL (ref 6–20)
BUN SERPL-MCNC: 43 MG/DL (ref 6–20)
BUN/CREAT SERPL: 12 (ref 12–20)
BUN/CREAT SERPL: 13 (ref 12–20)
BUN/CREAT SERPL: 14 (ref 12–20)
BUN/CREAT SERPL: 18 (ref 12–20)
BUN/CREAT SERPL: 20 (ref 12–20)
BUN/CREAT SERPL: 20 (ref 12–20)
BUN/CREAT SERPL: 21 (ref 12–20)
BUN/CREAT SERPL: 23 (ref 12–20)
BUN/CREAT SERPL: 23 (ref 12–20)
BUN/CREAT SERPL: 24 (ref 12–20)
BUN/CREAT SERPL: 24 (ref 12–20)
CA-I BLD-SCNC: 1.12 MMOL/L (ref 1.12–1.32)
CALCIUM SERPL-MCNC: 7.4 MG/DL (ref 8.5–10.1)
CALCIUM SERPL-MCNC: 8 MG/DL (ref 8.5–10.1)
CALCIUM SERPL-MCNC: 8.1 MG/DL (ref 8.5–10.1)
CALCIUM SERPL-MCNC: 8.2 MG/DL (ref 8.5–10.1)
CALCIUM SERPL-MCNC: 8.3 MG/DL (ref 8.5–10.1)
CALCIUM SERPL-MCNC: 8.5 MG/DL (ref 8.5–10.1)
CALCIUM SERPL-MCNC: 8.5 MG/DL (ref 8.5–10.1)
CALCIUM SERPL-MCNC: 8.8 MG/DL (ref 8.5–10.1)
CALCIUM SERPL-MCNC: 8.9 MG/DL (ref 8.5–10.1)
CALCIUM SERPL-MCNC: 9.2 MG/DL (ref 8.5–10.1)
CALCIUM SERPL-MCNC: 9.6 MG/DL (ref 8.5–10.1)
CALCULATED P AXIS, ECG09: 51 DEGREES
CALCULATED P AXIS, ECG09: 70 DEGREES
CALCULATED R AXIS, ECG10: 28 DEGREES
CALCULATED R AXIS, ECG10: 32 DEGREES
CALCULATED T AXIS, ECG11: 109 DEGREES
CALCULATED T AXIS, ECG11: 139 DEGREES
CHLORIDE SERPL-SCNC: 100 MMOL/L (ref 97–108)
CHLORIDE SERPL-SCNC: 104 MMOL/L (ref 97–108)
CHLORIDE SERPL-SCNC: 105 MMOL/L (ref 97–108)
CHLORIDE SERPL-SCNC: 106 MMOL/L (ref 97–108)
CHLORIDE SERPL-SCNC: 106 MMOL/L (ref 97–108)
CHLORIDE SERPL-SCNC: 108 MMOL/L (ref 97–108)
CHLORIDE SERPL-SCNC: 110 MMOL/L (ref 97–108)
CHLORIDE SERPL-SCNC: 111 MMOL/L (ref 97–108)
CHLORIDE SERPL-SCNC: 113 MMOL/L (ref 97–108)
CK MB CFR SERPL CALC: 2.3 % (ref 0–2.5)
CK MB SERPL-MCNC: 1.7 NG/ML (ref 5–25)
CK SERPL-CCNC: 75 U/L (ref 39–308)
CK SERPL-CCNC: 90 U/L (ref 39–308)
CO2 SERPL-SCNC: 19 MMOL/L (ref 21–32)
CO2 SERPL-SCNC: 19 MMOL/L (ref 21–32)
CO2 SERPL-SCNC: 21 MMOL/L (ref 21–32)
CO2 SERPL-SCNC: 21 MMOL/L (ref 21–32)
CO2 SERPL-SCNC: 22 MMOL/L (ref 21–32)
CO2 SERPL-SCNC: 22 MMOL/L (ref 21–32)
CO2 SERPL-SCNC: 23 MMOL/L (ref 21–32)
CO2 SERPL-SCNC: 23 MMOL/L (ref 21–32)
CO2 SERPL-SCNC: 25 MMOL/L (ref 21–32)
CO2 SERPL-SCNC: 26 MMOL/L (ref 21–32)
CO2 SERPL-SCNC: 26 MMOL/L (ref 21–32)
COMMENT, HOLDF: NORMAL
COVID-19 RAPID TEST, COVR: DETECTED
COVID-19 RAPID TEST, COVR: NOT DETECTED
CREAT SERPL-MCNC: 1.24 MG/DL (ref 0.7–1.3)
CREAT SERPL-MCNC: 1.33 MG/DL (ref 0.7–1.3)
CREAT SERPL-MCNC: 1.41 MG/DL (ref 0.7–1.3)
CREAT SERPL-MCNC: 1.46 MG/DL (ref 0.7–1.3)
CREAT SERPL-MCNC: 1.46 MG/DL (ref 0.7–1.3)
CREAT SERPL-MCNC: 1.48 MG/DL (ref 0.7–1.3)
CREAT SERPL-MCNC: 1.57 MG/DL (ref 0.7–1.3)
CREAT SERPL-MCNC: 1.77 MG/DL (ref 0.7–1.3)
CREAT SERPL-MCNC: 1.79 MG/DL (ref 0.7–1.3)
CREAT SERPL-MCNC: 1.8 MG/DL (ref 0.7–1.3)
CREAT SERPL-MCNC: 1.89 MG/DL (ref 0.7–1.3)
CRP SERPL HS-MCNC: 7.3 MG/L
D DIMER PPP FEU-MCNC: 1.29 MG/L FEU (ref 0–0.65)
DIAGNOSIS, 93000: NORMAL
DIAGNOSIS, 93000: NORMAL
DIFFERENTIAL METHOD BLD: ABNORMAL
ECHO AO ROOT DIAM: 3.35 CM
ECHO AV AREA PEAK VELOCITY: 2.01 CM2
ECHO AV AREA/BSA PEAK VELOCITY: 0.9 CM2/M2
ECHO AV PEAK GRADIENT: 7.97 MMHG
ECHO AV PEAK VELOCITY: 141.14 CM/S
ECHO EST RA PRESSURE: 10 MMHG
ECHO LA AREA 4C: 18.82 CM2
ECHO LA MAJOR AXIS: 3.69 CM
ECHO LA MINOR AXIS: 1.74 CM
ECHO LA VOL 4C: 43.94 ML (ref 18–58)
ECHO LA VOLUME INDEX A4C: 20.75 ML/M2 (ref 16–28)
ECHO LV E' LATERAL VELOCITY: 6.16 CM/S
ECHO LV E' SEPTAL VELOCITY: 3.07 CM/S
ECHO LV INTERNAL DIMENSION DIASTOLIC: 4.04 CM (ref 4.2–5.9)
ECHO LV INTERNAL DIMENSION SYSTOLIC: 2.58 CM
ECHO LV IVSD: 1.48 CM (ref 0.6–1)
ECHO LV IVSS: 1.92 CM
ECHO LV MASS 2D: 242.3 G (ref 88–224)
ECHO LV MASS INDEX 2D: 114.4 G/M2 (ref 49–115)
ECHO LV POSTERIOR WALL DIASTOLIC: 1.57 CM (ref 0.6–1)
ECHO LV POSTERIOR WALL SYSTOLIC: 1.87 CM
ECHO LVOT DIAM: 2.15 CM
ECHO LVOT PEAK GRADIENT: 2.48 MMHG
ECHO LVOT PEAK VELOCITY: 78.67 CM/S
ECHO MV A VELOCITY: 63.76 CM/S
ECHO MV E DECELERATION TIME (DT): 175.62 MS
ECHO MV E VELOCITY: 29.68 CM/S
ECHO MV E/A RATIO: 0.47
ECHO MV E/E' LATERAL: 4.82
ECHO MV E/E' RATIO (AVERAGED): 7.24
ECHO MV E/E' SEPTAL: 9.67
ECHO PV MAX VELOCITY: 86.51 CM/S
ECHO PV PEAK INSTANTANEOUS GRADIENT SYSTOLIC: 3 MMHG
ECHO RV INTERNAL DIMENSION: 2.36 CM
EOSINOPHIL # BLD: 0 K/UL (ref 0–0.4)
EOSINOPHIL NFR BLD: 0 % (ref 0–7)
ERYTHROCYTE [DISTWIDTH] IN BLOOD BY AUTOMATED COUNT: 16.6 % (ref 11.5–14.5)
ERYTHROCYTE [DISTWIDTH] IN BLOOD BY AUTOMATED COUNT: 16.7 % (ref 11.5–14.5)
ERYTHROCYTE [DISTWIDTH] IN BLOOD BY AUTOMATED COUNT: 17.1 % (ref 11.5–14.5)
ERYTHROCYTE [DISTWIDTH] IN BLOOD BY AUTOMATED COUNT: 17.1 % (ref 11.5–14.5)
ERYTHROCYTE [DISTWIDTH] IN BLOOD BY AUTOMATED COUNT: 17.4 % (ref 11.5–14.5)
ERYTHROCYTE [DISTWIDTH] IN BLOOD BY AUTOMATED COUNT: 17.7 % (ref 11.5–14.5)
ERYTHROCYTE [DISTWIDTH] IN BLOOD BY AUTOMATED COUNT: 17.7 % (ref 11.5–14.5)
ERYTHROCYTE [DISTWIDTH] IN BLOOD BY AUTOMATED COUNT: 17.8 % (ref 11.5–14.5)
ERYTHROCYTE [DISTWIDTH] IN BLOOD BY AUTOMATED COUNT: 17.9 % (ref 11.5–14.5)
ERYTHROCYTE [SEDIMENTATION RATE] IN BLOOD: 51 MM/HR (ref 0–20)
FERRITIN SERPL-MCNC: 34 NG/ML (ref 26–388)
FIBRINOGEN PPP-MCNC: 583 MG/DL (ref 200–475)
FLUAV AG NPH QL IA: NEGATIVE
FLUBV AG NOSE QL IA: NEGATIVE
GAS FLOW.O2 O2 DELIVERY SYS: ABNORMAL L/MIN
GLOBULIN SER CALC-MCNC: 4 G/DL (ref 2–4)
GLOBULIN SER CALC-MCNC: 4.1 G/DL (ref 2–4)
GLOBULIN SER CALC-MCNC: 4.1 G/DL (ref 2–4)
GLOBULIN SER CALC-MCNC: 4.2 G/DL (ref 2–4)
GLOBULIN SER CALC-MCNC: 4.3 G/DL (ref 2–4)
GLOBULIN SER CALC-MCNC: 4.4 G/DL (ref 2–4)
GLOBULIN SER CALC-MCNC: 4.4 G/DL (ref 2–4)
GLOBULIN SER CALC-MCNC: 4.5 G/DL (ref 2–4)
GLOBULIN SER CALC-MCNC: 4.6 G/DL (ref 2–4)
GLOBULIN SER CALC-MCNC: 4.9 G/DL (ref 2–4)
GLUCOSE BLD STRIP.AUTO-MCNC: 332 MG/DL (ref 65–100)
GLUCOSE SERPL-MCNC: 124 MG/DL (ref 65–100)
GLUCOSE SERPL-MCNC: 129 MG/DL (ref 65–100)
GLUCOSE SERPL-MCNC: 130 MG/DL (ref 65–100)
GLUCOSE SERPL-MCNC: 151 MG/DL (ref 65–100)
GLUCOSE SERPL-MCNC: 157 MG/DL (ref 65–100)
GLUCOSE SERPL-MCNC: 180 MG/DL (ref 65–100)
GLUCOSE SERPL-MCNC: 181 MG/DL (ref 65–100)
GLUCOSE SERPL-MCNC: 199 MG/DL (ref 65–100)
GLUCOSE SERPL-MCNC: 209 MG/DL (ref 65–100)
GLUCOSE SERPL-MCNC: 240 MG/DL (ref 65–100)
GLUCOSE SERPL-MCNC: 313 MG/DL (ref 65–100)
HCO3 BLD-SCNC: 17.4 MMOL/L (ref 22–26)
HCT VFR BLD AUTO: 34 % (ref 36.6–50.3)
HCT VFR BLD AUTO: 34.1 % (ref 36.6–50.3)
HCT VFR BLD AUTO: 34.3 % (ref 36.6–50.3)
HCT VFR BLD AUTO: 34.8 % (ref 36.6–50.3)
HCT VFR BLD AUTO: 35.5 % (ref 36.6–50.3)
HCT VFR BLD AUTO: 36 % (ref 36.6–50.3)
HCT VFR BLD AUTO: 36.1 % (ref 36.6–50.3)
HCT VFR BLD AUTO: 36.9 % (ref 36.6–50.3)
HCT VFR BLD AUTO: 37.6 % (ref 36.6–50.3)
HEALTH STATUS, XMCV2T: NORMAL
HEALTH STATUS, XMCV2T: NORMAL
HGB BLD-MCNC: 10.4 G/DL (ref 12.1–17)
HGB BLD-MCNC: 10.5 G/DL (ref 12.1–17)
HGB BLD-MCNC: 10.6 G/DL (ref 12.1–17)
HGB BLD-MCNC: 10.7 G/DL (ref 12.1–17)
HGB BLD-MCNC: 10.8 G/DL (ref 12.1–17)
HGB BLD-MCNC: 10.8 G/DL (ref 12.1–17)
HGB BLD-MCNC: 10.9 G/DL (ref 12.1–17)
HGB BLD-MCNC: 11.3 G/DL (ref 12.1–17)
HGB BLD-MCNC: 11.4 G/DL (ref 12.1–17)
IMM GRANULOCYTES # BLD AUTO: 0 K/UL (ref 0–0.04)
IMM GRANULOCYTES NFR BLD AUTO: 0 % (ref 0–0.5)
L PNEUMO1 AG UR QL IA: NEGATIVE
LACTATE BLD-SCNC: 1.17 MMOL/L (ref 0.4–2)
LACTATE BLD-SCNC: 1.58 MMOL/L (ref 0.4–2)
LACTATE BLD-SCNC: 4.87 MMOL/L (ref 0.4–2)
LDH SERPL L TO P-CCNC: 315 U/L (ref 85–241)
LYMPHOCYTES # BLD: 0.8 K/UL (ref 0.8–3.5)
LYMPHOCYTES # BLD: 1 K/UL (ref 0.8–3.5)
LYMPHOCYTES # BLD: 1.2 K/UL (ref 0.8–3.5)
LYMPHOCYTES # BLD: 1.4 K/UL (ref 0.8–3.5)
LYMPHOCYTES # BLD: 1.5 K/UL (ref 0.8–3.5)
LYMPHOCYTES # BLD: 2 K/UL (ref 0.8–3.5)
LYMPHOCYTES # BLD: 2.2 K/UL (ref 0.8–3.5)
LYMPHOCYTES # BLD: 2.5 K/UL (ref 0.8–3.5)
LYMPHOCYTES NFR BLD: 10 % (ref 12–49)
LYMPHOCYTES NFR BLD: 11 % (ref 12–49)
LYMPHOCYTES NFR BLD: 11 % (ref 12–49)
LYMPHOCYTES NFR BLD: 12 % (ref 12–49)
LYMPHOCYTES NFR BLD: 14 % (ref 12–49)
LYMPHOCYTES NFR BLD: 17 % (ref 12–49)
LYMPHOCYTES NFR BLD: 26 % (ref 12–49)
LYMPHOCYTES NFR BLD: 34 % (ref 12–49)
M PNEUMO IGG SER IA-ACNC: 462 U/ML (ref 0–99)
M PNEUMO IGM SER IA-ACNC: <770 U/ML (ref 0–769)
MAGNESIUM SERPL-MCNC: 2.5 MG/DL (ref 1.6–2.4)
MAGNESIUM SERPL-MCNC: 2.5 MG/DL (ref 1.6–2.4)
MCH RBC QN AUTO: 25.5 PG (ref 26–34)
MCH RBC QN AUTO: 25.6 PG (ref 26–34)
MCH RBC QN AUTO: 25.7 PG (ref 26–34)
MCH RBC QN AUTO: 25.7 PG (ref 26–34)
MCH RBC QN AUTO: 25.8 PG (ref 26–34)
MCH RBC QN AUTO: 25.9 PG (ref 26–34)
MCH RBC QN AUTO: 25.9 PG (ref 26–34)
MCH RBC QN AUTO: 26.2 PG (ref 26–34)
MCH RBC QN AUTO: 26.2 PG (ref 26–34)
MCHC RBC AUTO-ENTMCNC: 29.9 G/DL (ref 30–36.5)
MCHC RBC AUTO-ENTMCNC: 30 G/DL (ref 30–36.5)
MCHC RBC AUTO-ENTMCNC: 30.1 G/DL (ref 30–36.5)
MCHC RBC AUTO-ENTMCNC: 30.2 G/DL (ref 30–36.5)
MCHC RBC AUTO-ENTMCNC: 30.4 G/DL (ref 30–36.5)
MCHC RBC AUTO-ENTMCNC: 30.8 G/DL (ref 30–36.5)
MCHC RBC AUTO-ENTMCNC: 30.9 G/DL (ref 30–36.5)
MCHC RBC AUTO-ENTMCNC: 30.9 G/DL (ref 30–36.5)
MCHC RBC AUTO-ENTMCNC: 31.5 G/DL (ref 30–36.5)
MCV RBC AUTO: 81.5 FL (ref 80–99)
MCV RBC AUTO: 83.4 FL (ref 80–99)
MCV RBC AUTO: 83.5 FL (ref 80–99)
MCV RBC AUTO: 84.3 FL (ref 80–99)
MCV RBC AUTO: 84.7 FL (ref 80–99)
MCV RBC AUTO: 85.1 FL (ref 80–99)
MCV RBC AUTO: 85.7 FL (ref 80–99)
MCV RBC AUTO: 86 FL (ref 80–99)
MCV RBC AUTO: 87.4 FL (ref 80–99)
METAMYELOCYTES NFR BLD MANUAL: 1 %
METAMYELOCYTES NFR BLD MANUAL: 1 %
MONOCYTES # BLD: 0.1 K/UL (ref 0–1)
MONOCYTES # BLD: 0.3 K/UL (ref 0–1)
MONOCYTES # BLD: 0.4 K/UL (ref 0–1)
MONOCYTES # BLD: 0.4 K/UL (ref 0–1)
MONOCYTES # BLD: 0.5 K/UL (ref 0–1)
MONOCYTES # BLD: 0.6 K/UL (ref 0–1)
MONOCYTES # BLD: 0.7 K/UL (ref 0–1)
MONOCYTES # BLD: 1 K/UL (ref 0–1)
MONOCYTES NFR BLD: 1 % (ref 5–13)
MONOCYTES NFR BLD: 3 % (ref 5–13)
MONOCYTES NFR BLD: 4 % (ref 5–13)
MONOCYTES NFR BLD: 5 % (ref 5–13)
MONOCYTES NFR BLD: 6 % (ref 5–13)
MONOCYTES NFR BLD: 7 % (ref 5–13)
NEUTS BAND NFR BLD MANUAL: 1 %
NEUTS BAND NFR BLD MANUAL: 1 %
NEUTS SEG # BLD: 10.2 K/UL (ref 1.8–8)
NEUTS SEG # BLD: 13.8 K/UL (ref 1.8–8)
NEUTS SEG # BLD: 3.8 K/UL (ref 1.8–8)
NEUTS SEG # BLD: 6 K/UL (ref 1.8–8)
NEUTS SEG # BLD: 6.7 K/UL (ref 1.8–8)
NEUTS SEG # BLD: 6.7 K/UL (ref 1.8–8)
NEUTS SEG # BLD: 7.5 K/UL (ref 1.8–8)
NEUTS SEG # BLD: 8.2 K/UL (ref 1.8–8)
NEUTS SEG NFR BLD: 61 % (ref 32–75)
NEUTS SEG NFR BLD: 67 % (ref 32–75)
NEUTS SEG NFR BLD: 77 % (ref 32–75)
NEUTS SEG NFR BLD: 80 % (ref 32–75)
NEUTS SEG NFR BLD: 81 % (ref 32–75)
NEUTS SEG NFR BLD: 82 % (ref 32–75)
NEUTS SEG NFR BLD: 87 % (ref 32–75)
NEUTS SEG NFR BLD: 88 % (ref 32–75)
NRBC # BLD: 0 K/UL (ref 0–0.01)
NRBC # BLD: 0.02 K/UL (ref 0–0.01)
NRBC # BLD: 0.02 K/UL (ref 0–0.01)
NRBC # BLD: 0.03 K/UL (ref 0–0.01)
NRBC # BLD: 0.04 K/UL (ref 0–0.01)
NRBC # BLD: 0.04 K/UL (ref 0–0.01)
NRBC BLD-RTO: 0 PER 100 WBC
NRBC BLD-RTO: 0.2 PER 100 WBC
NRBC BLD-RTO: 0.2 PER 100 WBC
NRBC BLD-RTO: 0.3 PER 100 WBC
NRBC BLD-RTO: 0.3 PER 100 WBC
NRBC BLD-RTO: 0.4 PER 100 WBC
O2/TOTAL GAS SETTING VFR VENT: 100 %
P-R INTERVAL, ECG05: 120 MS
P-R INTERVAL, ECG05: 122 MS
PCO2 BLD: 35.8 MMHG (ref 35–45)
PEEP RESPIRATORY: 12 CMH2O
PH BLD: 7.3 [PH] (ref 7.35–7.45)
PHOSPHATE SERPL-MCNC: 3.6 MG/DL (ref 2.6–4.7)
PHOSPHATE SERPL-MCNC: 3.7 MG/DL (ref 2.6–4.7)
PLATELET # BLD AUTO: 163 K/UL (ref 150–400)
PLATELET # BLD AUTO: 166 K/UL (ref 150–400)
PLATELET # BLD AUTO: 175 K/UL (ref 150–400)
PLATELET # BLD AUTO: 177 K/UL (ref 150–400)
PLATELET # BLD AUTO: 178 K/UL (ref 150–400)
PLATELET # BLD AUTO: 180 K/UL (ref 150–400)
PLATELET # BLD AUTO: 181 K/UL (ref 150–400)
PLATELET # BLD AUTO: 192 K/UL (ref 150–400)
PLATELET # BLD AUTO: 213 K/UL (ref 150–400)
PLATELET COMMENTS,PCOM: ABNORMAL
PMV BLD AUTO: 11.6 FL (ref 8.9–12.9)
PMV BLD AUTO: 11.7 FL (ref 8.9–12.9)
PMV BLD AUTO: 12 FL (ref 8.9–12.9)
PMV BLD AUTO: 12.3 FL (ref 8.9–12.9)
PMV BLD AUTO: 12.5 FL (ref 8.9–12.9)
PMV BLD AUTO: 12.6 FL (ref 8.9–12.9)
PMV BLD AUTO: 12.8 FL (ref 8.9–12.9)
PMV BLD AUTO: 13 FL (ref 8.9–12.9)
PO2 BLD: 74 MMHG (ref 80–100)
POTASSIUM SERPL-SCNC: 3.6 MMOL/L (ref 3.5–5.1)
POTASSIUM SERPL-SCNC: 4.2 MMOL/L (ref 3.5–5.1)
POTASSIUM SERPL-SCNC: 4.3 MMOL/L (ref 3.5–5.1)
POTASSIUM SERPL-SCNC: 4.4 MMOL/L (ref 3.5–5.1)
POTASSIUM SERPL-SCNC: 4.5 MMOL/L (ref 3.5–5.1)
POTASSIUM SERPL-SCNC: 4.5 MMOL/L (ref 3.5–5.1)
POTASSIUM SERPL-SCNC: 4.6 MMOL/L (ref 3.5–5.1)
POTASSIUM SERPL-SCNC: 5.3 MMOL/L (ref 3.5–5.1)
POTASSIUM SERPL-SCNC: 5.5 MMOL/L (ref 3.5–5.1)
PROCALCITONIN SERPL-MCNC: <0.05 NG/ML
PROCALCITONIN SERPL-MCNC: <0.05 NG/ML
PROT SERPL-MCNC: 6.9 G/DL (ref 6.4–8.2)
PROT SERPL-MCNC: 6.9 G/DL (ref 6.4–8.2)
PROT SERPL-MCNC: 7.2 G/DL (ref 6.4–8.2)
PROT SERPL-MCNC: 7.2 G/DL (ref 6.4–8.2)
PROT SERPL-MCNC: 7.3 G/DL (ref 6.4–8.2)
PROT SERPL-MCNC: 7.3 G/DL (ref 6.4–8.2)
PROT SERPL-MCNC: 7.5 G/DL (ref 6.4–8.2)
PROT SERPL-MCNC: 7.6 G/DL (ref 6.4–8.2)
Q-T INTERVAL, ECG07: 310 MS
Q-T INTERVAL, ECG07: 394 MS
QRS DURATION, ECG06: 82 MS
QRS DURATION, ECG06: 84 MS
QTC CALCULATION (BEZET), ECG08: 417 MS
QTC CALCULATION (BEZET), ECG08: 460 MS
RBC # BLD AUTO: 4.05 M/UL (ref 4.1–5.7)
RBC # BLD AUTO: 4.06 M/UL (ref 4.1–5.7)
RBC # BLD AUTO: 4.09 M/UL (ref 4.1–5.7)
RBC # BLD AUTO: 4.12 M/UL (ref 4.1–5.7)
RBC # BLD AUTO: 4.17 M/UL (ref 4.1–5.7)
RBC # BLD AUTO: 4.17 M/UL (ref 4.1–5.7)
RBC # BLD AUTO: 4.28 M/UL (ref 4.1–5.7)
RBC # BLD AUTO: 4.37 M/UL (ref 4.1–5.7)
RBC # BLD AUTO: 4.42 M/UL (ref 4.1–5.7)
RBC MORPH BLD: ABNORMAL
S PNEUM DA 18C IGG SER IA-MCNC: 0.5 UG/ML
S PNEUM DA 19A IGG SER IA-MCNC: 0.5 UG/ML
S PNEUM DA 6B IGG SER IA-MCNC: <0.1 UG/ML
S PNEUM DA 7F IGG SER IA-MCNC: 1 UG/ML
S PNEUM DA 9V IGG SER IA-MCNC: 0.1 UG/ML
SAMPLES BEING HELD,HOLD: NORMAL
SAO2 % BLD: 93 % (ref 92–97)
SARS-COV-2, COV2: NORMAL
SARS-COV-2, COV2: NOT DETECTED
SERVICE CMNT-IMP: ABNORMAL
SODIUM SERPL-SCNC: 133 MMOL/L (ref 136–145)
SODIUM SERPL-SCNC: 135 MMOL/L (ref 136–145)
SODIUM SERPL-SCNC: 136 MMOL/L (ref 136–145)
SODIUM SERPL-SCNC: 137 MMOL/L (ref 136–145)
SODIUM SERPL-SCNC: 137 MMOL/L (ref 136–145)
SODIUM SERPL-SCNC: 138 MMOL/L (ref 136–145)
SODIUM SERPL-SCNC: 138 MMOL/L (ref 136–145)
SODIUM SERPL-SCNC: 140 MMOL/L (ref 136–145)
SODIUM SERPL-SCNC: 143 MMOL/L (ref 136–145)
SOURCE, COVRS: ABNORMAL
SOURCE, COVRS: NORMAL
SOURCE, COVRS: NORMAL
SPECIMEN SOURCE, FCOV2M: NORMAL
SPECIMEN SOURCE, FCOV2M: NORMAL
SPECIMEN SOURCE: NORMAL
SPECIMEN TYPE, XMCV1T: NORMAL
SPECIMEN TYPE, XMCV1T: NORMAL
SPECIMEN TYPE: ABNORMAL
STREP PNEUMO TYPE 1, 139091: 0.2 UG/ML
STREP PNEUMO TYPE 12, 139096: <0.1 UG/ML
STREP PNEUMO TYPE 14, 139097: 0.1 UG/ML
STREP PNEUMO TYPE 19, 139098: 0.2 UG/ML
STREP PNEUMO TYPE 23, 139099: 2.1 UG/ML
STREP PNEUMO TYPE 3, 139092: 1 UG/ML
STREP PNEUMO TYPE 4, 139093: 0.2 UG/ML
STREP PNEUMO TYPE 8, 139094: 0.5 UG/ML
STREP PNEUMO TYPE 9, 139095: 0.7 UG/ML
TOTAL RESP. RATE, ITRR: 34
TROPONIN I SERPL-MCNC: 0.14 NG/ML
TROPONIN I SERPL-MCNC: <0.05 NG/ML
TROPONIN I SERPL-MCNC: <0.05 NG/ML
VENTRICULAR RATE, ECG03: 109 BPM
VENTRICULAR RATE, ECG03: 82 BPM
WBC # BLD AUTO: 10 K/UL (ref 4.1–11.1)
WBC # BLD AUTO: 11.8 K/UL (ref 4.1–11.1)
WBC # BLD AUTO: 17 K/UL (ref 4.1–11.1)
WBC # BLD AUTO: 6.4 K/UL (ref 4.1–11.1)
WBC # BLD AUTO: 7.6 K/UL (ref 4.1–11.1)
WBC # BLD AUTO: 8.7 K/UL (ref 4.1–11.1)
WBC # BLD AUTO: 8.8 K/UL (ref 4.1–11.1)
WBC # BLD AUTO: 9.2 K/UL (ref 4.1–11.1)
WBC # BLD AUTO: 9.3 K/UL (ref 4.1–11.1)

## 2021-01-01 PROCEDURE — 71250 CT THORAX DX C-: CPT

## 2021-01-01 PROCEDURE — 74011250637 HC RX REV CODE- 250/637: Performed by: INTERNAL MEDICINE

## 2021-01-01 PROCEDURE — 36415 COLL VENOUS BLD VENIPUNCTURE: CPT

## 2021-01-01 PROCEDURE — 76770 US EXAM ABDO BACK WALL COMP: CPT

## 2021-01-01 PROCEDURE — 80053 COMPREHEN METABOLIC PANEL: CPT

## 2021-01-01 PROCEDURE — 94640 AIRWAY INHALATION TREATMENT: CPT

## 2021-01-01 PROCEDURE — 74011250636 HC RX REV CODE- 250/636: Performed by: GENERAL ACUTE CARE HOSPITAL

## 2021-01-01 PROCEDURE — 65660000001 HC RM ICU INTERMED STEPDOWN

## 2021-01-01 PROCEDURE — 99285 EMERGENCY DEPT VISIT HI MDM: CPT

## 2021-01-01 PROCEDURE — 84484 ASSAY OF TROPONIN QUANT: CPT

## 2021-01-01 PROCEDURE — 86738 MYCOPLASMA ANTIBODY: CPT

## 2021-01-01 PROCEDURE — 83735 ASSAY OF MAGNESIUM: CPT

## 2021-01-01 PROCEDURE — 96375 TX/PRO/DX INJ NEW DRUG ADDON: CPT

## 2021-01-01 PROCEDURE — 77010033678 HC OXYGEN DAILY

## 2021-01-01 PROCEDURE — 87449 NOS EACH ORGANISM AG IA: CPT

## 2021-01-01 PROCEDURE — 74011250636 HC RX REV CODE- 250/636: Performed by: INTERNAL MEDICINE

## 2021-01-01 PROCEDURE — 74011250637 HC RX REV CODE- 250/637: Performed by: GENERAL ACUTE CARE HOSPITAL

## 2021-01-01 PROCEDURE — 85025 COMPLETE CBC W/AUTO DIFF WBC: CPT

## 2021-01-01 PROCEDURE — 85384 FIBRINOGEN ACTIVITY: CPT

## 2021-01-01 PROCEDURE — 96374 THER/PROPH/DIAG INJ IV PUSH: CPT

## 2021-01-01 PROCEDURE — 83605 ASSAY OF LACTIC ACID: CPT

## 2021-01-01 PROCEDURE — 77010033711 HC HIGH FLOW OXYGEN

## 2021-01-01 PROCEDURE — 2709999900 HC NON-CHARGEABLE SUPPLY

## 2021-01-01 PROCEDURE — 84145 PROCALCITONIN (PCT): CPT

## 2021-01-01 PROCEDURE — 74011250636 HC RX REV CODE- 250/636

## 2021-01-01 PROCEDURE — 99223 1ST HOSP IP/OBS HIGH 75: CPT | Performed by: NURSE PRACTITIONER

## 2021-01-01 PROCEDURE — 85027 COMPLETE CBC AUTOMATED: CPT

## 2021-01-01 PROCEDURE — 83880 ASSAY OF NATRIURETIC PEPTIDE: CPT

## 2021-01-01 PROCEDURE — 74011636637 HC RX REV CODE- 636/637: Performed by: INTERNAL MEDICINE

## 2021-01-01 PROCEDURE — 74011000250 HC RX REV CODE- 250: Performed by: INTERNAL MEDICINE

## 2021-01-01 PROCEDURE — 82728 ASSAY OF FERRITIN: CPT

## 2021-01-01 PROCEDURE — 97161 PT EVAL LOW COMPLEX 20 MIN: CPT

## 2021-01-01 PROCEDURE — 93005 ELECTROCARDIOGRAM TRACING: CPT

## 2021-01-01 PROCEDURE — 86141 C-REACTIVE PROTEIN HS: CPT

## 2021-01-01 PROCEDURE — 94660 CPAP INITIATION&MGMT: CPT

## 2021-01-01 PROCEDURE — 77030012794 HC KT NSL CANN/HGH TRAN -A

## 2021-01-01 PROCEDURE — 93306 TTE W/DOPPLER COMPLETE: CPT | Performed by: INTERNAL MEDICINE

## 2021-01-01 PROCEDURE — 82550 ASSAY OF CK (CPK): CPT

## 2021-01-01 PROCEDURE — 74011636637 HC RX REV CODE- 636/637: Performed by: GENERAL ACUTE CARE HOSPITAL

## 2021-01-01 PROCEDURE — 97165 OT EVAL LOW COMPLEX 30 MIN: CPT | Performed by: OCCUPATIONAL THERAPIST

## 2021-01-01 PROCEDURE — 87635 SARS-COV-2 COVID-19 AMP PRB: CPT

## 2021-01-01 PROCEDURE — 74011250636 HC RX REV CODE- 250/636: Performed by: EMERGENCY MEDICINE

## 2021-01-01 PROCEDURE — 97535 SELF CARE MNGMENT TRAINING: CPT | Performed by: OCCUPATIONAL THERAPIST

## 2021-01-01 PROCEDURE — 36600 WITHDRAWAL OF ARTERIAL BLOOD: CPT

## 2021-01-01 PROCEDURE — 74011000250 HC RX REV CODE- 250: Performed by: EMERGENCY MEDICINE

## 2021-01-01 PROCEDURE — 93306 TTE W/DOPPLER COMPLETE: CPT

## 2021-01-01 PROCEDURE — 86317 IMMUNOASSAY INFECTIOUS AGENT: CPT

## 2021-01-01 PROCEDURE — 71045 X-RAY EXAM CHEST 1 VIEW: CPT

## 2021-01-01 PROCEDURE — 87040 BLOOD CULTURE FOR BACTERIA: CPT

## 2021-01-01 PROCEDURE — 85652 RBC SED RATE AUTOMATED: CPT

## 2021-01-01 PROCEDURE — 74011000258 HC RX REV CODE- 258: Performed by: INTERNAL MEDICINE

## 2021-01-01 PROCEDURE — 74011000250 HC RX REV CODE- 250: Performed by: GENERAL ACUTE CARE HOSPITAL

## 2021-01-01 PROCEDURE — 84100 ASSAY OF PHOSPHORUS: CPT

## 2021-01-01 PROCEDURE — 87804 INFLUENZA ASSAY W/OPTIC: CPT

## 2021-01-01 PROCEDURE — 82553 CREATINE MB FRACTION: CPT

## 2021-01-01 PROCEDURE — 77030029684 HC NEB SM VOL KT MONA -A

## 2021-01-01 PROCEDURE — 80048 BASIC METABOLIC PNL TOTAL CA: CPT

## 2021-01-01 PROCEDURE — 82962 GLUCOSE BLOOD TEST: CPT

## 2021-01-01 PROCEDURE — 82803 BLOOD GASES ANY COMBINATION: CPT

## 2021-01-01 PROCEDURE — 83615 LACTATE (LD) (LDH) ENZYME: CPT

## 2021-01-01 PROCEDURE — 99291 CRITICAL CARE FIRST HOUR: CPT

## 2021-01-01 PROCEDURE — 85379 FIBRIN DEGRADATION QUANT: CPT

## 2021-01-01 PROCEDURE — 93308 TTE F-UP OR LMTD: CPT

## 2021-01-01 PROCEDURE — 93308 TTE F-UP OR LMTD: CPT | Performed by: INTERNAL MEDICINE

## 2021-01-01 RX ORDER — ATORVASTATIN CALCIUM 40 MG/1
40 TABLET, FILM COATED ORAL DAILY
Status: DISCONTINUED | OUTPATIENT
Start: 2021-01-01 | End: 2021-01-01 | Stop reason: HOSPADM

## 2021-01-01 RX ORDER — FUROSEMIDE 10 MG/ML
40 INJECTION INTRAMUSCULAR; INTRAVENOUS
Status: COMPLETED | OUTPATIENT
Start: 2021-01-01 | End: 2021-01-01

## 2021-01-01 RX ORDER — DEXTROSE 50 % IN WATER (D50W) INTRAVENOUS SYRINGE
12.5-25 AS NEEDED
Status: DISCONTINUED | OUTPATIENT
Start: 2021-01-01 | End: 2021-01-01 | Stop reason: HOSPADM

## 2021-01-01 RX ORDER — SODIUM CHLORIDE 0.9 % (FLUSH) 0.9 %
5-40 SYRINGE (ML) INJECTION AS NEEDED
Status: DISCONTINUED | OUTPATIENT
Start: 2021-01-01 | End: 2021-01-01 | Stop reason: SDUPTHER

## 2021-01-01 RX ORDER — MORPHINE SULFATE 2 MG/ML
INJECTION, SOLUTION INTRAMUSCULAR; INTRAVENOUS
Status: COMPLETED
Start: 2021-01-01 | End: 2021-01-01

## 2021-01-01 RX ORDER — PANTOPRAZOLE SODIUM 40 MG/1
40 TABLET, DELAYED RELEASE ORAL
Status: DISCONTINUED | OUTPATIENT
Start: 2021-01-01 | End: 2021-01-01 | Stop reason: HOSPADM

## 2021-01-01 RX ORDER — ALBUTEROL SULFATE 0.83 MG/ML
5 SOLUTION RESPIRATORY (INHALATION)
Status: COMPLETED | OUTPATIENT
Start: 2021-01-01 | End: 2021-01-01

## 2021-01-01 RX ORDER — FUROSEMIDE 10 MG/ML
40 INJECTION INTRAMUSCULAR; INTRAVENOUS 2 TIMES DAILY
Status: DISCONTINUED | OUTPATIENT
Start: 2021-01-01 | End: 2021-01-01

## 2021-01-01 RX ORDER — METOPROLOL TARTRATE 50 MG/1
50 TABLET ORAL DAILY
Status: DISCONTINUED | OUTPATIENT
Start: 2021-01-01 | End: 2021-01-01 | Stop reason: HOSPADM

## 2021-01-01 RX ORDER — MORPHINE SULFATE 2 MG/ML
2-10 INJECTION, SOLUTION INTRAMUSCULAR; INTRAVENOUS
Status: DISCONTINUED | OUTPATIENT
Start: 2021-01-01 | End: 2021-01-01

## 2021-01-01 RX ORDER — IPRATROPIUM BROMIDE 0.5 MG/2.5ML
0.5 SOLUTION RESPIRATORY (INHALATION)
Status: DISCONTINUED | OUTPATIENT
Start: 2021-01-01 | End: 2021-01-01

## 2021-01-01 RX ORDER — OXYBUTYNIN CHLORIDE 5 MG/1
10 TABLET, EXTENDED RELEASE ORAL DAILY
Status: DISCONTINUED | OUTPATIENT
Start: 2021-01-01 | End: 2021-01-01 | Stop reason: HOSPADM

## 2021-01-01 RX ORDER — INSULIN LISPRO 100 [IU]/ML
INJECTION, SOLUTION INTRAVENOUS; SUBCUTANEOUS
Status: DISCONTINUED | OUTPATIENT
Start: 2021-01-01 | End: 2021-01-01 | Stop reason: HOSPADM

## 2021-01-01 RX ORDER — SODIUM CHLORIDE 0.9 % (FLUSH) 0.9 %
5-40 SYRINGE (ML) INJECTION EVERY 8 HOURS
Status: DISCONTINUED | OUTPATIENT
Start: 2021-01-01 | End: 2021-01-01 | Stop reason: SDUPTHER

## 2021-01-01 RX ORDER — AZITHROMYCIN 500 MG/1
500 TABLET, FILM COATED ORAL DAILY
Status: COMPLETED | OUTPATIENT
Start: 2021-01-01 | End: 2021-01-01

## 2021-01-01 RX ORDER — PREDNISONE 10 MG/1
TABLET ORAL
Qty: 20 TAB | Refills: 0 | Status: SHIPPED | OUTPATIENT
Start: 2021-01-01

## 2021-01-01 RX ORDER — SODIUM BICARBONATE 650 MG/1
650 TABLET ORAL 3 TIMES DAILY
Status: DISCONTINUED | OUTPATIENT
Start: 2021-01-01 | End: 2021-01-01 | Stop reason: HOSPADM

## 2021-01-01 RX ORDER — FUROSEMIDE 10 MG/ML
40 INJECTION INTRAMUSCULAR; INTRAVENOUS DAILY
Status: DISCONTINUED | OUTPATIENT
Start: 2021-01-01 | End: 2021-01-01

## 2021-01-01 RX ORDER — MORPHINE SULFATE 100 MG/5ML
10 SOLUTION ORAL
Status: DISCONTINUED | OUTPATIENT
Start: 2021-01-01 | End: 2021-01-01 | Stop reason: HOSPADM

## 2021-01-01 RX ORDER — ACETAMINOPHEN 650 MG/1
650 SUPPOSITORY RECTAL
Status: DISCONTINUED | OUTPATIENT
Start: 2021-01-01 | End: 2021-01-01 | Stop reason: HOSPADM

## 2021-01-01 RX ORDER — SODIUM BICARBONATE 650 MG/1
650 TABLET ORAL 3 TIMES DAILY
Qty: 90 TAB | Refills: 0 | Status: SHIPPED | OUTPATIENT
Start: 2021-01-01 | End: 2021-02-27

## 2021-01-01 RX ORDER — MAGNESIUM SULFATE 100 %
4 CRYSTALS MISCELLANEOUS AS NEEDED
Status: DISCONTINUED | OUTPATIENT
Start: 2021-01-01 | End: 2021-01-01 | Stop reason: HOSPADM

## 2021-01-01 RX ORDER — LISINOPRIL 5 MG/1
2.5 TABLET ORAL DAILY
Status: DISCONTINUED | OUTPATIENT
Start: 2021-01-01 | End: 2021-01-01

## 2021-01-01 RX ORDER — SODIUM CHLORIDE 0.9 % (FLUSH) 0.9 %
5-40 SYRINGE (ML) INJECTION AS NEEDED
Status: DISCONTINUED | OUTPATIENT
Start: 2021-01-01 | End: 2021-01-01 | Stop reason: HOSPADM

## 2021-01-01 RX ORDER — PROMETHAZINE HYDROCHLORIDE 25 MG/1
12.5 TABLET ORAL
Status: DISCONTINUED | OUTPATIENT
Start: 2021-01-01 | End: 2021-01-01 | Stop reason: HOSPADM

## 2021-01-01 RX ORDER — MORPHINE SULFATE 4 MG/ML
INJECTION INTRAVENOUS
Status: DISCONTINUED
Start: 2021-01-01 | End: 2021-01-01 | Stop reason: HOSPADM

## 2021-01-01 RX ORDER — ONDANSETRON 2 MG/ML
4 INJECTION INTRAMUSCULAR; INTRAVENOUS
Status: DISCONTINUED | OUTPATIENT
Start: 2021-01-01 | End: 2021-01-01 | Stop reason: HOSPADM

## 2021-01-01 RX ORDER — FUROSEMIDE 40 MG/1
40 TABLET ORAL DAILY
Status: DISCONTINUED | OUTPATIENT
Start: 2021-01-01 | End: 2021-01-01 | Stop reason: HOSPADM

## 2021-01-01 RX ORDER — IPRATROPIUM BROMIDE AND ALBUTEROL SULFATE 2.5; .5 MG/3ML; MG/3ML
3 SOLUTION RESPIRATORY (INHALATION)
Status: DISCONTINUED | OUTPATIENT
Start: 2021-01-01 | End: 2021-01-01 | Stop reason: HOSPADM

## 2021-01-01 RX ORDER — ACETAMINOPHEN 325 MG/1
650 TABLET ORAL
Status: DISCONTINUED | OUTPATIENT
Start: 2021-01-01 | End: 2021-01-01 | Stop reason: HOSPADM

## 2021-01-01 RX ORDER — PREDNISONE 10 MG/1
10 TABLET ORAL
Status: DISCONTINUED | OUTPATIENT
Start: 2021-01-01 | End: 2021-01-01

## 2021-01-01 RX ORDER — POLYETHYLENE GLYCOL 3350 17 G/17G
17 POWDER, FOR SOLUTION ORAL DAILY PRN
Status: DISCONTINUED | OUTPATIENT
Start: 2021-01-01 | End: 2021-01-01 | Stop reason: HOSPADM

## 2021-01-01 RX ORDER — ALBUTEROL SULFATE 0.83 MG/ML
2.5 SOLUTION RESPIRATORY (INHALATION)
Status: DISCONTINUED | OUTPATIENT
Start: 2021-01-01 | End: 2021-01-01

## 2021-01-01 RX ORDER — AZITHROMYCIN 250 MG/1
500 TABLET, FILM COATED ORAL DAILY
Status: DISCONTINUED | OUTPATIENT
Start: 2021-01-01 | End: 2021-01-01

## 2021-01-01 RX ORDER — GUAIFENESIN 100 MG/5ML
81 LIQUID (ML) ORAL DAILY
Status: DISCONTINUED | OUTPATIENT
Start: 2021-01-01 | End: 2021-01-01 | Stop reason: HOSPADM

## 2021-01-01 RX ORDER — ALBUTEROL SULFATE 90 UG/1
2 AEROSOL, METERED RESPIRATORY (INHALATION)
Status: DISCONTINUED | OUTPATIENT
Start: 2021-01-01 | End: 2021-01-01 | Stop reason: HOSPADM

## 2021-01-01 RX ORDER — ENOXAPARIN SODIUM 100 MG/ML
40 INJECTION SUBCUTANEOUS DAILY
Status: DISCONTINUED | OUTPATIENT
Start: 2021-01-01 | End: 2021-01-01

## 2021-01-01 RX ORDER — SODIUM CHLORIDE 0.9 % (FLUSH) 0.9 %
5-40 SYRINGE (ML) INJECTION EVERY 8 HOURS
Status: DISCONTINUED | OUTPATIENT
Start: 2021-01-01 | End: 2021-01-01 | Stop reason: HOSPADM

## 2021-01-01 RX ORDER — LORAZEPAM 2 MG/ML
1 CONCENTRATE ORAL
Status: DISCONTINUED | OUTPATIENT
Start: 2021-01-01 | End: 2021-01-01 | Stop reason: HOSPADM

## 2021-01-01 RX ORDER — IBUPROFEN 400 MG/1
400 TABLET ORAL ONCE
Status: COMPLETED | OUTPATIENT
Start: 2021-01-01 | End: 2021-01-01

## 2021-01-01 RX ORDER — MORPHINE SULFATE IN 0.9 % NACL 150MG/30ML
PATIENT CONTROLLED ANALGESIA SYRINGE INTRAVENOUS
Status: DISCONTINUED | OUTPATIENT
Start: 2021-01-01 | End: 2021-01-01

## 2021-01-01 RX ORDER — MORPHINE SULFATE 4 MG/ML
4 INJECTION, SOLUTION INTRAMUSCULAR; INTRAVENOUS ONCE
Status: COMPLETED | OUTPATIENT
Start: 2021-01-01 | End: 2021-01-01

## 2021-01-01 RX ORDER — MORPHINE SULFATE IN 0.9 % NACL 150MG/30ML
PATIENT CONTROLLED ANALGESIA SYRINGE INTRAVENOUS
Status: DISCONTINUED | OUTPATIENT
Start: 2021-01-01 | End: 2021-01-01 | Stop reason: HOSPADM

## 2021-01-01 RX ORDER — HYOSCYAMINE SULFATE 0.12 MG/1
0.12 TABLET SUBLINGUAL
Status: DISCONTINUED | OUTPATIENT
Start: 2021-01-01 | End: 2021-01-01 | Stop reason: HOSPADM

## 2021-01-01 RX ADMIN — ATORVASTATIN CALCIUM 40 MG: 40 TABLET, FILM COATED ORAL at 08:51

## 2021-01-01 RX ADMIN — SODIUM BICARBONATE 650 MG: 650 TABLET ORAL at 23:19

## 2021-01-01 RX ADMIN — Medication 10 ML: at 14:00

## 2021-01-01 RX ADMIN — IPRATROPIUM BROMIDE 0.5 MG: 0.5 SOLUTION RESPIRATORY (INHALATION) at 13:10

## 2021-01-01 RX ADMIN — ARFORMOTEROL TARTRATE: 15 SOLUTION RESPIRATORY (INHALATION) at 20:54

## 2021-01-01 RX ADMIN — HUMAN INSULIN 8 UNITS: 100 INJECTION, SUSPENSION SUBCUTANEOUS at 14:28

## 2021-01-01 RX ADMIN — PREDNISONE 10 MG: 10 TABLET ORAL at 16:20

## 2021-01-01 RX ADMIN — IPRATROPIUM BROMIDE 0.5 MG: 0.5 SOLUTION RESPIRATORY (INHALATION) at 07:23

## 2021-01-01 RX ADMIN — Medication 10 ML: at 14:29

## 2021-01-01 RX ADMIN — ASPIRIN 81 MG: 81 TABLET, CHEWABLE ORAL at 08:17

## 2021-01-01 RX ADMIN — Medication 10 ML: at 06:21

## 2021-01-01 RX ADMIN — CEFTRIAXONE 1 G: 1 INJECTION, POWDER, FOR SOLUTION INTRAMUSCULAR; INTRAVENOUS at 06:27

## 2021-01-01 RX ADMIN — INSULIN LISPRO 7 UNITS: 100 INJECTION, SOLUTION INTRAVENOUS; SUBCUTANEOUS at 12:25

## 2021-01-01 RX ADMIN — ARFORMOTEROL TARTRATE: 15 SOLUTION RESPIRATORY (INHALATION) at 07:55

## 2021-01-01 RX ADMIN — ASPIRIN 81 MG: 81 TABLET, CHEWABLE ORAL at 09:53

## 2021-01-01 RX ADMIN — OXYBUTYNIN CHLORIDE 10 MG: 5 TABLET, EXTENDED RELEASE ORAL at 09:43

## 2021-01-01 RX ADMIN — ASPIRIN 81 MG: 81 TABLET, CHEWABLE ORAL at 09:04

## 2021-01-01 RX ADMIN — IPRATROPIUM BROMIDE 0.5 MG: 0.5 SOLUTION RESPIRATORY (INHALATION) at 13:59

## 2021-01-01 RX ADMIN — IPRATROPIUM BROMIDE 0.5 MG: 0.5 SOLUTION RESPIRATORY (INHALATION) at 20:23

## 2021-01-01 RX ADMIN — SODIUM BICARBONATE 650 MG: 650 TABLET ORAL at 08:51

## 2021-01-01 RX ADMIN — IPRATROPIUM BROMIDE 0.5 MG: 0.5 SOLUTION RESPIRATORY (INHALATION) at 07:27

## 2021-01-01 RX ADMIN — PANTOPRAZOLE SODIUM 40 MG: 40 TABLET, DELAYED RELEASE ORAL at 09:43

## 2021-01-01 RX ADMIN — Medication 10 ML: at 23:24

## 2021-01-01 RX ADMIN — ATORVASTATIN CALCIUM 40 MG: 40 TABLET, FILM COATED ORAL at 09:55

## 2021-01-01 RX ADMIN — ATORVASTATIN CALCIUM 40 MG: 40 TABLET, FILM COATED ORAL at 09:13

## 2021-01-01 RX ADMIN — FUROSEMIDE 40 MG: 10 INJECTION, SOLUTION INTRAMUSCULAR; INTRAVENOUS at 08:34

## 2021-01-01 RX ADMIN — IPRATROPIUM BROMIDE AND ALBUTEROL SULFATE 3 ML: .5; 3 SOLUTION RESPIRATORY (INHALATION) at 11:20

## 2021-01-01 RX ADMIN — Medication 10 ML: at 21:29

## 2021-01-01 RX ADMIN — ALBUTEROL SULFATE 2.5 MG: 2.5 SOLUTION RESPIRATORY (INHALATION) at 07:27

## 2021-01-01 RX ADMIN — LISINOPRIL 2.5 MG: 5 TABLET ORAL at 08:57

## 2021-01-01 RX ADMIN — PANTOPRAZOLE SODIUM 40 MG: 40 TABLET, DELAYED RELEASE ORAL at 09:56

## 2021-01-01 RX ADMIN — Medication 10 ML: at 06:23

## 2021-01-01 RX ADMIN — Medication 10 ML: at 13:39

## 2021-01-01 RX ADMIN — SODIUM BICARBONATE 650 MG: 650 TABLET ORAL at 17:13

## 2021-01-01 RX ADMIN — METHYLPREDNISOLONE SODIUM SUCCINATE 40 MG: 40 INJECTION, POWDER, FOR SOLUTION INTRAMUSCULAR; INTRAVENOUS at 18:28

## 2021-01-01 RX ADMIN — METHYLPREDNISOLONE SODIUM SUCCINATE 40 MG: 40 INJECTION, POWDER, FOR SOLUTION INTRAMUSCULAR; INTRAVENOUS at 17:29

## 2021-01-01 RX ADMIN — SODIUM BICARBONATE 650 MG: 650 TABLET ORAL at 18:10

## 2021-01-01 RX ADMIN — ARFORMOTEROL TARTRATE: 15 SOLUTION RESPIRATORY (INHALATION) at 20:55

## 2021-01-01 RX ADMIN — MORPHINE SULFATE 10 MG: 2 INJECTION, SOLUTION INTRAMUSCULAR; INTRAVENOUS at 04:23

## 2021-01-01 RX ADMIN — SODIUM ZIRCONIUM CYCLOSILICATE 10 G: 10 POWDER, FOR SUSPENSION ORAL at 11:27

## 2021-01-01 RX ADMIN — IPRATROPIUM BROMIDE 0.5 MG: 0.5 SOLUTION RESPIRATORY (INHALATION) at 12:31

## 2021-01-01 RX ADMIN — SODIUM BICARBONATE 650 MG: 650 TABLET ORAL at 09:56

## 2021-01-01 RX ADMIN — METHYLPREDNISOLONE SODIUM SUCCINATE 40 MG: 40 INJECTION, POWDER, FOR SOLUTION INTRAMUSCULAR; INTRAVENOUS at 06:21

## 2021-01-01 RX ADMIN — SODIUM BICARBONATE 650 MG: 650 TABLET ORAL at 22:32

## 2021-01-01 RX ADMIN — IPRATROPIUM BROMIDE 0.5 MG: 0.5 SOLUTION RESPIRATORY (INHALATION) at 21:55

## 2021-01-01 RX ADMIN — IPRATROPIUM BROMIDE AND ALBUTEROL SULFATE 3 ML: .5; 3 SOLUTION RESPIRATORY (INHALATION) at 15:34

## 2021-01-01 RX ADMIN — ATORVASTATIN CALCIUM 40 MG: 40 TABLET, FILM COATED ORAL at 09:43

## 2021-01-01 RX ADMIN — METHYLPREDNISOLONE SODIUM SUCCINATE 40 MG: 40 INJECTION, POWDER, FOR SOLUTION INTRAMUSCULAR; INTRAVENOUS at 23:33

## 2021-01-01 RX ADMIN — ALBUTEROL SULFATE 2.5 MG: 2.5 SOLUTION RESPIRATORY (INHALATION) at 11:24

## 2021-01-01 RX ADMIN — Medication 10 ML: at 05:31

## 2021-01-01 RX ADMIN — PREDNISONE 10 MG: 10 TABLET ORAL at 09:13

## 2021-01-01 RX ADMIN — ATORVASTATIN CALCIUM 40 MG: 40 TABLET, FILM COATED ORAL at 16:20

## 2021-01-01 RX ADMIN — Medication 10 ML: at 13:26

## 2021-01-01 RX ADMIN — ARFORMOTEROL TARTRATE: 15 SOLUTION RESPIRATORY (INHALATION) at 21:49

## 2021-01-01 RX ADMIN — Medication 10 ML: at 07:38

## 2021-01-01 RX ADMIN — OXYBUTYNIN CHLORIDE 10 MG: 5 TABLET, EXTENDED RELEASE ORAL at 08:25

## 2021-01-01 RX ADMIN — ASPIRIN 81 MG: 81 TABLET, CHEWABLE ORAL at 08:25

## 2021-01-01 RX ADMIN — IPRATROPIUM BROMIDE 0.5 MG: 0.5 SOLUTION RESPIRATORY (INHALATION) at 07:55

## 2021-01-01 RX ADMIN — ALBUTEROL SULFATE 5 MG: 2.5 SOLUTION RESPIRATORY (INHALATION) at 02:17

## 2021-01-01 RX ADMIN — LISINOPRIL 2.5 MG: 5 TABLET ORAL at 08:25

## 2021-01-01 RX ADMIN — PANTOPRAZOLE SODIUM 40 MG: 40 TABLET, DELAYED RELEASE ORAL at 09:20

## 2021-01-01 RX ADMIN — AZITHROMYCIN 500 MG: 500 TABLET, FILM COATED ORAL at 09:53

## 2021-01-01 RX ADMIN — Medication 10 ML: at 05:25

## 2021-01-01 RX ADMIN — Medication 10 ML: at 20:51

## 2021-01-01 RX ADMIN — ARFORMOTEROL TARTRATE: 15 SOLUTION RESPIRATORY (INHALATION) at 10:04

## 2021-01-01 RX ADMIN — ARFORMOTEROL TARTRATE: 15 SOLUTION RESPIRATORY (INHALATION) at 20:09

## 2021-01-01 RX ADMIN — METOPROLOL TARTRATE 50 MG: 50 TABLET, FILM COATED ORAL at 09:43

## 2021-01-01 RX ADMIN — SODIUM BICARBONATE 650 MG: 650 TABLET ORAL at 09:44

## 2021-01-01 RX ADMIN — METHYLPREDNISOLONE SODIUM SUCCINATE 40 MG: 40 INJECTION, POWDER, FOR SOLUTION INTRAMUSCULAR; INTRAVENOUS at 10:05

## 2021-01-01 RX ADMIN — PANTOPRAZOLE SODIUM 40 MG: 40 TABLET, DELAYED RELEASE ORAL at 08:33

## 2021-01-01 RX ADMIN — Medication 10 ML: at 17:25

## 2021-01-01 RX ADMIN — METHYLPREDNISOLONE SODIUM SUCCINATE 40 MG: 40 INJECTION, POWDER, FOR SOLUTION INTRAMUSCULAR; INTRAVENOUS at 23:12

## 2021-01-01 RX ADMIN — METHYLPREDNISOLONE SODIUM SUCCINATE 40 MG: 40 INJECTION, POWDER, FOR SOLUTION INTRAMUSCULAR; INTRAVENOUS at 17:01

## 2021-01-01 RX ADMIN — ACETAMINOPHEN 650 MG: 325 TABLET ORAL at 14:21

## 2021-01-01 RX ADMIN — IPRATROPIUM BROMIDE 0.5 MG: 0.5 SOLUTION RESPIRATORY (INHALATION) at 15:40

## 2021-01-01 RX ADMIN — PANTOPRAZOLE SODIUM 40 MG: 40 TABLET, DELAYED RELEASE ORAL at 08:58

## 2021-01-01 RX ADMIN — ATORVASTATIN CALCIUM 40 MG: 40 TABLET, FILM COATED ORAL at 08:58

## 2021-01-01 RX ADMIN — METHYLPREDNISOLONE SODIUM SUCCINATE 40 MG: 40 INJECTION, POWDER, FOR SOLUTION INTRAMUSCULAR; INTRAVENOUS at 05:13

## 2021-01-01 RX ADMIN — OXYBUTYNIN CHLORIDE 10 MG: 5 TABLET, EXTENDED RELEASE ORAL at 09:19

## 2021-01-01 RX ADMIN — ALBUTEROL SULFATE 2.5 MG: 2.5 SOLUTION RESPIRATORY (INHALATION) at 07:23

## 2021-01-01 RX ADMIN — IPRATROPIUM BROMIDE 0.5 MG: 0.5 SOLUTION RESPIRATORY (INHALATION) at 12:00

## 2021-01-01 RX ADMIN — Medication 10 ML: at 23:07

## 2021-01-01 RX ADMIN — AZITHROMYCIN 500 MG: 500 TABLET, FILM COATED ORAL at 08:25

## 2021-01-01 RX ADMIN — METOPROLOL TARTRATE 50 MG: 50 TABLET, FILM COATED ORAL at 09:56

## 2021-01-01 RX ADMIN — Medication 10 ML: at 15:26

## 2021-01-01 RX ADMIN — AZITHROMYCIN 500 MG: 500 TABLET, FILM COATED ORAL at 08:58

## 2021-01-01 RX ADMIN — ATORVASTATIN CALCIUM 40 MG: 40 TABLET, FILM COATED ORAL at 08:16

## 2021-01-01 RX ADMIN — Medication 10 ML: at 06:01

## 2021-01-01 RX ADMIN — ARFORMOTEROL TARTRATE: 15 SOLUTION RESPIRATORY (INHALATION) at 08:04

## 2021-01-01 RX ADMIN — PREDNISONE 10 MG: 10 TABLET ORAL at 08:57

## 2021-01-01 RX ADMIN — ACETAMINOPHEN 650 MG: 325 TABLET ORAL at 18:19

## 2021-01-01 RX ADMIN — ALBUTEROL SULFATE 2.5 MG: 2.5 SOLUTION RESPIRATORY (INHALATION) at 07:38

## 2021-01-01 RX ADMIN — METHYLPREDNISOLONE SODIUM SUCCINATE 40 MG: 40 INJECTION, POWDER, FOR SOLUTION INTRAMUSCULAR; INTRAVENOUS at 11:27

## 2021-01-01 RX ADMIN — IPRATROPIUM BROMIDE 0.5 MG: 0.5 SOLUTION RESPIRATORY (INHALATION) at 08:39

## 2021-01-01 RX ADMIN — ASPIRIN 81 MG: 81 TABLET, CHEWABLE ORAL at 09:55

## 2021-01-01 RX ADMIN — FUROSEMIDE 40 MG: 40 TABLET ORAL at 09:44

## 2021-01-01 RX ADMIN — OXYBUTYNIN CHLORIDE 10 MG: 5 TABLET, EXTENDED RELEASE ORAL at 08:33

## 2021-01-01 RX ADMIN — IPRATROPIUM BROMIDE 0.5 MG: 0.5 SOLUTION RESPIRATORY (INHALATION) at 07:20

## 2021-01-01 RX ADMIN — OXYBUTYNIN CHLORIDE 10 MG: 5 TABLET, EXTENDED RELEASE ORAL at 08:17

## 2021-01-01 RX ADMIN — METHYLPREDNISOLONE SODIUM SUCCINATE 40 MG: 40 INJECTION, POWDER, FOR SOLUTION INTRAMUSCULAR; INTRAVENOUS at 06:08

## 2021-01-01 RX ADMIN — CEFTRIAXONE 1 G: 1 INJECTION, POWDER, FOR SOLUTION INTRAMUSCULAR; INTRAVENOUS at 06:11

## 2021-01-01 RX ADMIN — CEFTRIAXONE 1 G: 1 INJECTION, POWDER, FOR SOLUTION INTRAMUSCULAR; INTRAVENOUS at 05:06

## 2021-01-01 RX ADMIN — PREDNISONE 10 MG: 10 TABLET ORAL at 08:25

## 2021-01-01 RX ADMIN — METOPROLOL TARTRATE 50 MG: 50 TABLET, FILM COATED ORAL at 08:57

## 2021-01-01 RX ADMIN — ALBUTEROL SULFATE 2.5 MG: 2.5 SOLUTION RESPIRATORY (INHALATION) at 12:32

## 2021-01-01 RX ADMIN — PANTOPRAZOLE SODIUM 40 MG: 40 TABLET, DELAYED RELEASE ORAL at 08:51

## 2021-01-01 RX ADMIN — Medication 10 ML: at 23:21

## 2021-01-01 RX ADMIN — SODIUM BICARBONATE 650 MG: 650 TABLET ORAL at 09:06

## 2021-01-01 RX ADMIN — SODIUM BICARBONATE 650 MG: 650 TABLET ORAL at 21:32

## 2021-01-01 RX ADMIN — SODIUM BICARBONATE 650 MG: 650 TABLET ORAL at 18:43

## 2021-01-01 RX ADMIN — ATORVASTATIN CALCIUM 40 MG: 40 TABLET, FILM COATED ORAL at 08:33

## 2021-01-01 RX ADMIN — ASPIRIN 81 MG: 81 TABLET, CHEWABLE ORAL at 08:50

## 2021-01-01 RX ADMIN — METOPROLOL TARTRATE 50 MG: 50 TABLET, FILM COATED ORAL at 09:05

## 2021-01-01 RX ADMIN — IPRATROPIUM BROMIDE 0.5 MG: 0.5 SOLUTION RESPIRATORY (INHALATION) at 16:11

## 2021-01-01 RX ADMIN — METOPROLOL TARTRATE 50 MG: 50 TABLET, FILM COATED ORAL at 08:17

## 2021-01-01 RX ADMIN — SODIUM BICARBONATE 650 MG: 650 TABLET ORAL at 15:25

## 2021-01-01 RX ADMIN — SODIUM BICARBONATE 650 MG: 650 TABLET ORAL at 09:20

## 2021-01-01 RX ADMIN — METHYLPREDNISOLONE SODIUM SUCCINATE 40 MG: 40 INJECTION, POWDER, FOR SOLUTION INTRAMUSCULAR; INTRAVENOUS at 15:25

## 2021-01-01 RX ADMIN — SODIUM BICARBONATE 650 MG: 650 TABLET ORAL at 17:01

## 2021-01-01 RX ADMIN — METHYLPREDNISOLONE SODIUM SUCCINATE 40 MG: 40 INJECTION, POWDER, FOR SOLUTION INTRAMUSCULAR; INTRAVENOUS at 23:39

## 2021-01-01 RX ADMIN — IPRATROPIUM BROMIDE 0.5 MG: 0.5 SOLUTION RESPIRATORY (INHALATION) at 07:38

## 2021-01-01 RX ADMIN — METHYLPREDNISOLONE SODIUM SUCCINATE 125 MG: 125 INJECTION, POWDER, FOR SOLUTION INTRAMUSCULAR; INTRAVENOUS at 02:16

## 2021-01-01 RX ADMIN — Medication: at 05:02

## 2021-01-01 RX ADMIN — ARFORMOTEROL TARTRATE: 15 SOLUTION RESPIRATORY (INHALATION) at 21:42

## 2021-01-01 RX ADMIN — SODIUM BICARBONATE 650 MG: 650 TABLET ORAL at 21:29

## 2021-01-01 RX ADMIN — IPRATROPIUM BROMIDE 0.5 MG: 0.5 SOLUTION RESPIRATORY (INHALATION) at 08:04

## 2021-01-01 RX ADMIN — METHYLPREDNISOLONE SODIUM SUCCINATE 40 MG: 40 INJECTION, POWDER, FOR SOLUTION INTRAMUSCULAR; INTRAVENOUS at 23:53

## 2021-01-01 RX ADMIN — METHYLPREDNISOLONE SODIUM SUCCINATE 40 MG: 40 INJECTION, POWDER, FOR SOLUTION INTRAMUSCULAR; INTRAVENOUS at 14:28

## 2021-01-01 RX ADMIN — FUROSEMIDE 40 MG: 40 TABLET ORAL at 08:50

## 2021-01-01 RX ADMIN — METHYLPREDNISOLONE SODIUM SUCCINATE 40 MG: 40 INJECTION, POWDER, FOR SOLUTION INTRAMUSCULAR; INTRAVENOUS at 17:13

## 2021-01-01 RX ADMIN — ALBUTEROL SULFATE 2.5 MG: 2.5 SOLUTION RESPIRATORY (INHALATION) at 16:14

## 2021-01-01 RX ADMIN — FUROSEMIDE 40 MG: 10 INJECTION, SOLUTION INTRAMUSCULAR; INTRAVENOUS at 08:56

## 2021-01-01 RX ADMIN — Medication 10 ML: at 22:24

## 2021-01-01 RX ADMIN — SODIUM BICARBONATE 650 MG: 650 TABLET ORAL at 23:06

## 2021-01-01 RX ADMIN — ATORVASTATIN CALCIUM 40 MG: 40 TABLET, FILM COATED ORAL at 09:20

## 2021-01-01 RX ADMIN — OXYBUTYNIN CHLORIDE 10 MG: 5 TABLET, EXTENDED RELEASE ORAL at 09:13

## 2021-01-01 RX ADMIN — METHYLPREDNISOLONE SODIUM SUCCINATE 40 MG: 40 INJECTION, POWDER, FOR SOLUTION INTRAMUSCULAR; INTRAVENOUS at 06:01

## 2021-01-01 RX ADMIN — Medication 10 ML: at 21:32

## 2021-01-01 RX ADMIN — METHYLPREDNISOLONE SODIUM SUCCINATE 40 MG: 40 INJECTION, POWDER, FOR SOLUTION INTRAMUSCULAR; INTRAVENOUS at 06:20

## 2021-01-01 RX ADMIN — ACETAMINOPHEN 650 MG: 325 TABLET ORAL at 05:13

## 2021-01-01 RX ADMIN — ALBUTEROL SULFATE 2.5 MG: 2.5 SOLUTION RESPIRATORY (INHALATION) at 20:39

## 2021-01-01 RX ADMIN — ARFORMOTEROL TARTRATE: 15 SOLUTION RESPIRATORY (INHALATION) at 12:31

## 2021-01-01 RX ADMIN — FUROSEMIDE 40 MG: 10 INJECTION, SOLUTION INTRAMUSCULAR; INTRAVENOUS at 08:28

## 2021-01-01 RX ADMIN — METOPROLOL TARTRATE 50 MG: 50 TABLET, FILM COATED ORAL at 08:51

## 2021-01-01 RX ADMIN — METHYLPREDNISOLONE SODIUM SUCCINATE 40 MG: 40 INJECTION, POWDER, FOR SOLUTION INTRAMUSCULAR; INTRAVENOUS at 14:10

## 2021-01-01 RX ADMIN — Medication 10 ML: at 23:04

## 2021-01-01 RX ADMIN — SODIUM BICARBONATE 650 MG: 650 TABLET ORAL at 08:16

## 2021-01-01 RX ADMIN — PANTOPRAZOLE SODIUM 40 MG: 40 TABLET, DELAYED RELEASE ORAL at 08:25

## 2021-01-01 RX ADMIN — Medication 10 ML: at 22:33

## 2021-01-01 RX ADMIN — FUROSEMIDE 40 MG: 40 TABLET ORAL at 09:05

## 2021-01-01 RX ADMIN — PANTOPRAZOLE SODIUM 40 MG: 40 TABLET, DELAYED RELEASE ORAL at 09:14

## 2021-01-01 RX ADMIN — Medication 10 ML: at 14:11

## 2021-01-01 RX ADMIN — ATORVASTATIN CALCIUM 40 MG: 40 TABLET, FILM COATED ORAL at 09:04

## 2021-01-01 RX ADMIN — METHYLPREDNISOLONE SODIUM SUCCINATE 40 MG: 40 INJECTION, POWDER, FOR SOLUTION INTRAMUSCULAR; INTRAVENOUS at 12:03

## 2021-01-01 RX ADMIN — METHYLPREDNISOLONE SODIUM SUCCINATE 40 MG: 40 INJECTION, POWDER, FOR SOLUTION INTRAMUSCULAR; INTRAVENOUS at 18:10

## 2021-01-01 RX ADMIN — CEFTRIAXONE 1 G: 1 INJECTION, POWDER, FOR SOLUTION INTRAMUSCULAR; INTRAVENOUS at 05:25

## 2021-01-01 RX ADMIN — Medication 5 ML: at 14:00

## 2021-01-01 RX ADMIN — ASPIRIN 81 MG: 81 TABLET, CHEWABLE ORAL at 08:33

## 2021-01-01 RX ADMIN — METHYLPREDNISOLONE SODIUM SUCCINATE 40 MG: 40 INJECTION, POWDER, FOR SOLUTION INTRAMUSCULAR; INTRAVENOUS at 17:16

## 2021-01-01 RX ADMIN — IPRATROPIUM BROMIDE 0.5 MG: 0.5 SOLUTION RESPIRATORY (INHALATION) at 10:04

## 2021-01-01 RX ADMIN — ALBUTEROL SULFATE 2.5 MG: 2.5 SOLUTION RESPIRATORY (INHALATION) at 19:45

## 2021-01-01 RX ADMIN — CEFTRIAXONE 1 G: 1 INJECTION, POWDER, FOR SOLUTION INTRAMUSCULAR; INTRAVENOUS at 06:23

## 2021-01-01 RX ADMIN — ALBUTEROL SULFATE 2.5 MG: 2.5 SOLUTION RESPIRATORY (INHALATION) at 16:11

## 2021-01-01 RX ADMIN — METHYLPREDNISOLONE SODIUM SUCCINATE 40 MG: 40 INJECTION, POWDER, FOR SOLUTION INTRAMUSCULAR; INTRAVENOUS at 01:19

## 2021-01-01 RX ADMIN — ALBUTEROL SULFATE 2.5 MG: 2.5 SOLUTION RESPIRATORY (INHALATION) at 12:31

## 2021-01-01 RX ADMIN — IPRATROPIUM BROMIDE 0.5 MG: 0.5 SOLUTION RESPIRATORY (INHALATION) at 19:45

## 2021-01-01 RX ADMIN — METOPROLOL TARTRATE 50 MG: 50 TABLET, FILM COATED ORAL at 09:13

## 2021-01-01 RX ADMIN — ALBUTEROL SULFATE 5 MG: 2.5 SOLUTION RESPIRATORY (INHALATION) at 02:15

## 2021-01-01 RX ADMIN — IPRATROPIUM BROMIDE 0.5 MG: 0.5 SOLUTION RESPIRATORY (INHALATION) at 21:42

## 2021-01-01 RX ADMIN — ASPIRIN 81 MG: 81 TABLET, CHEWABLE ORAL at 09:20

## 2021-01-01 RX ADMIN — ARFORMOTEROL TARTRATE: 15 SOLUTION RESPIRATORY (INHALATION) at 19:04

## 2021-01-01 RX ADMIN — IPRATROPIUM BROMIDE 0.5 MG: 0.5 SOLUTION RESPIRATORY (INHALATION) at 16:14

## 2021-01-01 RX ADMIN — Medication 10 ML: at 06:00

## 2021-01-01 RX ADMIN — OXYBUTYNIN CHLORIDE 10 MG: 5 TABLET, EXTENDED RELEASE ORAL at 08:57

## 2021-01-01 RX ADMIN — Medication 10 ML: at 15:15

## 2021-01-01 RX ADMIN — Medication 10 ML: at 06:09

## 2021-01-01 RX ADMIN — MORPHINE SULFATE 4 MG: 4 INJECTION INTRAVENOUS at 04:13

## 2021-01-01 RX ADMIN — ASPIRIN 81 MG: 81 TABLET, CHEWABLE ORAL at 09:43

## 2021-01-01 RX ADMIN — PANTOPRAZOLE SODIUM 40 MG: 40 TABLET, DELAYED RELEASE ORAL at 08:17

## 2021-01-01 RX ADMIN — ATORVASTATIN CALCIUM 40 MG: 40 TABLET, FILM COATED ORAL at 08:25

## 2021-01-01 RX ADMIN — OXYBUTYNIN CHLORIDE 10 MG: 5 TABLET, EXTENDED RELEASE ORAL at 09:05

## 2021-01-01 RX ADMIN — METHYLPREDNISOLONE SODIUM SUCCINATE 40 MG: 40 INJECTION, POWDER, FOR SOLUTION INTRAMUSCULAR; INTRAVENOUS at 13:38

## 2021-01-01 RX ADMIN — IPRATROPIUM BROMIDE 0.5 MG: 0.5 SOLUTION RESPIRATORY (INHALATION) at 15:38

## 2021-01-01 RX ADMIN — Medication 10 ML: at 23:20

## 2021-01-01 RX ADMIN — METHYLPREDNISOLONE SODIUM SUCCINATE 40 MG: 40 INJECTION, POWDER, FOR SOLUTION INTRAMUSCULAR; INTRAVENOUS at 18:43

## 2021-01-01 RX ADMIN — METHYLPREDNISOLONE SODIUM SUCCINATE 40 MG: 40 INJECTION, POWDER, FOR SOLUTION INTRAMUSCULAR; INTRAVENOUS at 23:19

## 2021-01-01 RX ADMIN — FUROSEMIDE 40 MG: 40 TABLET ORAL at 09:20

## 2021-01-01 RX ADMIN — ACETAMINOPHEN 650 MG: 325 TABLET ORAL at 10:04

## 2021-01-01 RX ADMIN — FUROSEMIDE 40 MG: 10 INJECTION, SOLUTION INTRAMUSCULAR; INTRAVENOUS at 04:25

## 2021-01-01 RX ADMIN — Medication 10 ML: at 23:58

## 2021-01-01 RX ADMIN — ASPIRIN 81 MG: 81 TABLET, CHEWABLE ORAL at 08:57

## 2021-01-01 RX ADMIN — Medication 10 ML: at 15:14

## 2021-01-01 RX ADMIN — Medication 10 ML: at 04:25

## 2021-01-01 RX ADMIN — OXYBUTYNIN CHLORIDE 10 MG: 5 TABLET, EXTENDED RELEASE ORAL at 09:56

## 2021-01-01 RX ADMIN — METOPROLOL TARTRATE 50 MG: 50 TABLET, FILM COATED ORAL at 08:33

## 2021-01-01 RX ADMIN — IPRATROPIUM BROMIDE 0.5 MG: 0.5 SOLUTION RESPIRATORY (INHALATION) at 11:24

## 2021-01-01 RX ADMIN — ARFORMOTEROL TARTRATE: 15 SOLUTION RESPIRATORY (INHALATION) at 07:23

## 2021-01-01 RX ADMIN — METOPROLOL TARTRATE 50 MG: 50 TABLET, FILM COATED ORAL at 09:19

## 2021-01-01 RX ADMIN — ASPIRIN 81 MG: 81 TABLET, CHEWABLE ORAL at 09:13

## 2021-01-01 RX ADMIN — METHYLPREDNISOLONE SODIUM SUCCINATE 40 MG: 40 INJECTION, POWDER, FOR SOLUTION INTRAMUSCULAR; INTRAVENOUS at 06:12

## 2021-01-01 RX ADMIN — FUROSEMIDE 40 MG: 10 INJECTION, SOLUTION INTRAVENOUS at 02:56

## 2021-01-01 RX ADMIN — ARFORMOTEROL TARTRATE: 15 SOLUTION RESPIRATORY (INHALATION) at 08:39

## 2021-01-01 RX ADMIN — PANTOPRAZOLE SODIUM 40 MG: 40 TABLET, DELAYED RELEASE ORAL at 09:06

## 2021-01-01 RX ADMIN — FUROSEMIDE 40 MG: 40 TABLET ORAL at 09:55

## 2021-01-01 RX ADMIN — HUMAN INSULIN 8 UNITS: 100 INJECTION, SUSPENSION SUBCUTANEOUS at 09:05

## 2021-01-01 RX ADMIN — IBUPROFEN 400 MG: 400 TABLET, FILM COATED ORAL at 14:46

## 2021-01-01 RX ADMIN — ARFORMOTEROL TARTRATE: 15 SOLUTION RESPIRATORY (INHALATION) at 08:35

## 2021-01-01 RX ADMIN — SODIUM BICARBONATE 650 MG: 650 TABLET ORAL at 22:23

## 2021-01-01 RX ADMIN — IPRATROPIUM BROMIDE 0.5 MG: 0.5 SOLUTION RESPIRATORY (INHALATION) at 20:39

## 2021-01-01 RX ADMIN — SODIUM BICARBONATE 650 MG: 650 TABLET ORAL at 17:16

## 2021-01-01 RX ADMIN — Medication 10 ML: at 22:13

## 2021-01-01 RX ADMIN — IPRATROPIUM BROMIDE 0.5 MG: 0.5 SOLUTION RESPIRATORY (INHALATION) at 20:54

## 2021-01-01 RX ADMIN — AZITHROMYCIN 500 MG: 500 TABLET, FILM COATED ORAL at 08:33

## 2021-01-01 RX ADMIN — METOPROLOL TARTRATE 50 MG: 50 TABLET, FILM COATED ORAL at 08:25

## 2021-01-01 RX ADMIN — METHYLPREDNISOLONE SODIUM SUCCINATE 40 MG: 40 INJECTION, POWDER, FOR SOLUTION INTRAMUSCULAR; INTRAVENOUS at 07:38

## 2021-01-01 RX ADMIN — IPRATROPIUM BROMIDE 0.5 MG: 0.5 SOLUTION RESPIRATORY (INHALATION) at 08:34

## 2021-01-01 RX ADMIN — FUROSEMIDE 40 MG: 10 INJECTION, SOLUTION INTRAMUSCULAR; INTRAVENOUS at 09:54

## 2021-01-01 RX ADMIN — IPRATROPIUM BROMIDE 0.5 MG: 0.5 SOLUTION RESPIRATORY (INHALATION) at 20:09

## 2021-01-01 RX ADMIN — Medication 10 ML: at 17:30

## 2021-01-01 RX ADMIN — FUROSEMIDE 40 MG: 40 TABLET ORAL at 08:16

## 2021-01-01 RX ADMIN — OXYBUTYNIN CHLORIDE 10 MG: 5 TABLET, EXTENDED RELEASE ORAL at 08:51

## 2021-01-01 RX ADMIN — Medication 10 ML: at 05:14

## 2021-01-01 RX ADMIN — Medication 10 ML: at 23:19

## 2021-01-01 RX ADMIN — ARFORMOTEROL TARTRATE: 15 SOLUTION RESPIRATORY (INHALATION) at 19:45

## 2021-01-01 RX ADMIN — METHYLPREDNISOLONE SODIUM SUCCINATE 40 MG: 40 INJECTION, POWDER, FOR SOLUTION INTRAMUSCULAR; INTRAVENOUS at 23:04

## 2021-01-01 RX ADMIN — ARFORMOTEROL TARTRATE: 15 SOLUTION RESPIRATORY (INHALATION) at 07:40

## 2021-01-01 RX ADMIN — IPRATROPIUM BROMIDE 0.5 MG: 0.5 SOLUTION RESPIRATORY (INHALATION) at 11:17

## 2021-01-01 RX ADMIN — IPRATROPIUM BROMIDE 0.5 MG: 0.5 SOLUTION RESPIRATORY (INHALATION) at 13:47

## 2021-01-01 RX ADMIN — SODIUM ZIRCONIUM CYCLOSILICATE 10 G: 10 POWDER, FOR SUSPENSION ORAL at 14:11

## 2021-01-01 RX ADMIN — ALBUTEROL SULFATE 2.5 MG: 2.5 SOLUTION RESPIRATORY (INHALATION) at 15:40

## 2021-01-01 RX ADMIN — ARFORMOTEROL TARTRATE: 15 SOLUTION RESPIRATORY (INHALATION) at 20:45

## 2021-01-01 RX ADMIN — Medication 10 ML: at 06:12

## 2021-01-01 RX ADMIN — Medication 10 ML: at 09:56

## 2021-01-01 RX ADMIN — AZITHROMYCIN 500 MG: 500 TABLET, FILM COATED ORAL at 09:13

## 2021-01-01 RX ADMIN — METHYLPREDNISOLONE SODIUM SUCCINATE 40 MG: 40 INJECTION, POWDER, FOR SOLUTION INTRAMUSCULAR; INTRAVENOUS at 13:25

## 2021-01-01 RX ADMIN — METOPROLOL TARTRATE 50 MG: 50 TABLET, FILM COATED ORAL at 09:53

## 2021-01-01 RX ADMIN — ARFORMOTEROL TARTRATE: 15 SOLUTION RESPIRATORY (INHALATION) at 07:30

## 2021-01-01 RX ADMIN — ARFORMOTEROL TARTRATE: 15 SOLUTION RESPIRATORY (INHALATION) at 07:20

## 2021-01-01 RX ADMIN — IPRATROPIUM BROMIDE 0.5 MG: 0.5 SOLUTION RESPIRATORY (INHALATION) at 19:04

## 2021-01-18 PROBLEM — I50.33 ACUTE ON CHRONIC DIASTOLIC (CONGESTIVE) HEART FAILURE (HCC): Status: ACTIVE | Noted: 2021-01-01

## 2021-01-18 PROBLEM — J96.01 ACUTE RESPIRATORY FAILURE WITH HYPOXIA (HCC): Status: ACTIVE | Noted: 2021-01-01

## 2021-01-18 PROBLEM — Z20.822 SUSPECTED COVID-19 VIRUS INFECTION: Status: ACTIVE | Noted: 2021-01-01

## 2021-01-18 NOTE — H&P
Hospitalist Admission Note NAME: Patience Platt :  1940 MRN:  [id number] Date/Time:  2021 5:14 AM 
 
Patient PCP: Gema Brown MD 
______________________________________________________________________ Given the patient's current clinical presentation, I have a high level of concern for decompensation if discharged from the emergency department. Complex decision making was performed, which includes reviewing the patient's available past medical records, laboratory results, and x-ray films. My assessment of this patient's clinical condition and my plan of care is as follows. Assessment / Plan: 
 
Acute hypoxic respiratory failure, POA Acute on chronic congestive heart failure, heart failure with preserved ejection fraction, POA Suspected COVID-19 pneumonia, POA Community-acquired pneumonia, POA Moderate to severe pulmonary hypertension, POA Present shortness of breath, oxygen saturation 88% requiring oxygen via nasal cannula, air hunger X-ray with possible pulmonary edema and airspace disease Most recent echo 2020 with a EF 55 to 60% and moderate to severe pulmonary hypertension We will order new echo Rapid COVID-19 negative, obtain PCR Daily weights TOAN's IV diuresis with Lasix 40 mg IV twice daily Continue home meds Continue home inhaler We will start azithromycin and ceftriaxone for community-acquired pneumonia History of allergic enlarging ulcer of the distal aortic arch Vascular surgery recommending TAVR in 2020 but patient refused surgery in the past 
Asymptomatic Sick sinus syndrome Status post pacemaker insertion 2020 CKD stage III Creatinine baseline Hypertension Coronary artery disease Continue home meds as tolerated Code Status: Full code DVT Prophylaxis: Lovenox GI Prophylaxis: not indicated Baseline: Active, independent Subjective: CHIEF COMPLAINT: Shortness of breath HISTORY OF PRESENT ILLNESS:    
 
The patient is 78years old man with past medical history significant for coronary artery disease, CKD stage III, sick sinus syndrome, ulcer at the distal aortic arch, pulmonary hypertension presented emergency department due to shortness of breath started last night. Patient reported that since last night he has been feeling short of breath and has been getting worse since that time for which he decided to come to the emergency department today. He denied any chest pain, cough, abdominal pain, nausea, vomiting, dizziness, diarrhea or any other symptoms. He reported that his shortness of breath is always there even if he is not moving around. Patient does not smoke, drink alcohol or use illicit drugs. The emergency department, x-ray revealed possible edema and infiltrates. He was found to be hypoxic for which he required oxygen via nasal cannula. We were asked to admit for work up and evaluation of the above problems. Past Medical History:  
Diagnosis Date  CAD (coronary artery disease)   
 mi 10/19/2015 with stent  COPD (chronic obstructive pulmonary disease) (HCC)  Hypertension  Prostate cancer ([facility] 2012 Dr. Irasema Jimenez - diagnosed 2012 - s/p EBRT on Lupron Past Surgical History:  
Procedure Laterality Date  HX ORTHOPAEDIC    
 left knee surgery  IA INS NEW/RPLCMT PRM PM W/TRANSV ELTRD ATRIAL&VENT N/A 2020 INSERT PPM DUAL performed by Phil Lima MD at [facility] C[facility] CATH [facility] Social History Tobacco Use  Smoking status: Former Smoker Packs/day: 0.50 Years: 40.00 Pack years: 20.00 Types: Cigarettes Quit date: 10/19/2015 Years since quittin.2  Smokeless tobacco: Never Used  Tobacco comment: trying quit 10/13/15 -  ppd Substance Use Topics  Alcohol use: No  
  Alcohol/week: 0.0 standard drinks Family History Problem Relation Age of Onset  Heart Disease Mother   
     pacemaker  Diabetes Mother  Diabetes Sister  Heart Disease Brother No Known Allergies Prior to Admission medications Medication Sig Start Date End Date Taking? Authorizing Provider  
atorvastatin (LIPITOR) 40 mg tablet Take 1 tablet by mouth once daily 12/30/20   Debi Rebollar MD  
omeprazole (PRILOSEC) 20 mg capsule Take 1 Cap by mouth daily. 10/7/20   Debi Rebollar MD  
trospium (SANCTURA) 20 mg tablet TAKE 1 TABLET BY MOUTH TWICE DAILY 10/1/20   Provider, Historical  
polyethylene glycol (Miralax) 17 gram/dose powder Take 17 g by mouth daily. 1 tablespoon with 8 oz of water daily 9/14/20   Jerry Martinez,   
abiraterone 250 mg tab Take  by mouth. 1 tab daily    Nancy Chung MD  
oxybutynin chloride XL (DITROPAN XL) 10 mg CR tablet Take 10 mg by mouth daily. Nancy Chung MD  
predniSONE (DELTASONE) 2.5 mg tablet Take 5 mg by mouth two (2) times a day. Nancy Chung MD  
albuterol (PROVENTIL HFA, VENTOLIN HFA, PROAIR HFA) 90 mcg/actuation inhaler Use 2 puffs very 4 hours prn 7/30/20   Debi Rebollar MD  
metoprolol tartrate (LOPRESSOR) 50 mg tablet TAKE ONE TABLET BY MOUTH TWICE DAILY 7/13/20   Mercedez Bolton MD  
lisinopriL (PRINIVIL, ZESTRIL) 2.5 mg tablet Take 1 tablet by mouth once daily 3/21/20   Mercedez Bolton MD  
albuterol-ipratropium (DUO-NEB) 2.5 mg-0.5 mg/3 ml nebu 3 mL by Nebulization route every six (6) hours as needed (sob). 10/19/19   Renetta Oquendo NP  
fluticasone furoate-vilanterol (BREO ELLIPTA) 100-25 mcg/dose inhaler Take 1 Puff by inhalation daily. 3/25/19   Beth OLIVIER DO Oxygen 3L continouus    Provider, Historical  
fluticasone (FLONASE) 50 mcg/actuation nasal spray 2 Sprays by Both Nostrils route daily.  2/18/19   Talia Deleon MD  
 aspirin 81 mg chewable tablet Take 1 Tab by mouth daily. RISK OF HEART ATTACK IF ANY MISSED DOSES 10/21/15   Shira Astudillo MD  
 
 
REVIEW OF SYSTEMS:    
I am not able to complete the review of systems because: The patient is intubated and sedated The patient has altered mental status due to his acute medical problems The patient has baseline aphasia from prior stroke(s) The patient has baseline dementia and is not reliable historian The patient is in acute medical distress and unable to provide information Total of 12 systems reviewed as follows:   
   POSITIVE= underlined text  Negative = text not underlined General:  fever, chills, sweats, generalized weakness, weight loss/gain,  
   loss of appetite Eyes:    blurred vision, eye pain, loss of vision, double vision ENT:    rhinorrhea, pharyngitis Respiratory:   cough, sputum production, SOB, WHEAT, wheezing, pleuritic pain  
Cardiology:   chest pain, palpitations, orthopnea, PND, edema, syncope Gastrointestinal:  abdominal pain , N/V, diarrhea, dysphagia, constipation, bleeding Genitourinary:  frequency, urgency, dysuria, hematuria, incontinence Muskuloskeletal :  arthralgia, myalgia, back pain Hematology:  easy bruising, nose or gum bleeding, lymphadenopathy Dermatological: rash, ulceration, pruritis, color change / jaundice Endocrine:   hot flashes or polydipsia Neurological:  headache, dizziness, confusion, focal weakness, paresthesia, Speech difficulties, memory loss, gait difficulty Psychological: Feelings of anxiety, depression, agitation Objective: VITALS:   
Visit Vitals BP (!) 147/72 Pulse 92 Temp 97.7 °F (36.5 °C) Resp 26 Ht 6' (1.829 m) Wt 92.5 kg (204 lb) SpO2 90% BMI 27.67 kg/m² PHYSICAL EXAM: 
 
 
_______________________________________________________________________ Care Plan discussed with: 
  Comments Patient x Family RN x Care Manager Consultant:     
_______________________________________________________________________ Expected  Disposition:  
Home with Family x HH/PT/OT/RN   
SNF/LTC   
NAEL   
________________________________________________________________________ TOTAL TIME:  65 Minutes Critical Care Provided     Minutes non procedure based Comments  
 x Reviewed previous records  
>50% of visit spent in counseling and coordination of care x Discussion with patient and/or family and questions answered 
  
 
________________________________________________________________________ Signed: Monica Issa MD 
 
Procedures: see electronic medical records for all procedures/Xrays and details which were not copied into this note but were reviewed prior to creation of Plan. LAB DATA REVIEWED:   
Recent Results (from the past 24 hour(s)) EKG, 12 LEAD, INITIAL Collection Time: 01/18/21 12:42 AM  
Result Value Ref Range Ventricular Rate 82 BPM  
 Atrial Rate 82 BPM  
 P-R Interval 120 ms QRS Duration 84 ms Q-T Interval 394 ms QTC Calculation (Bezet) 460 ms Calculated P Axis 51 degrees Calculated R Axis 32 degrees Calculated T Axis 139 degrees Diagnosis Normal sinus rhythm Possible Left atrial enlargement ST & T wave abnormality, consider anterolateral ischemia Prolonged QT When compared with ECG of 17-SEP-2020 11:38, 
QRS axis shifted left Minimal criteria for Anterior infarct are no longer present Non-specific change in ST segment in Lateral leads T wave inversion more evident in Lateral leads CBC WITH AUTOMATED DIFF Collection Time: 01/18/21 12:47 AM  
Result Value Ref Range WBC 6.4 4.1 - 11.1 K/uL  
 RBC 4.12 4.10 - 5.70 M/uL  
 HGB 10.8 (L) 12.1 - 17.0 g/dL HCT 36.0 (L) 36.6 - 50.3 % MCV 87.4 80.0 - 99.0 FL  
 MCH 26.2 26.0 - 34.0 PG  
 MCHC 30.0 30.0 - 36.5 g/dL  
 RDW 17.8 (H) 11.5 - 14.5 % PLATELET 477 010 - 677 K/uL MPV 11.7 8.9 - 12.9 FL  
 NRBC 0.3 (H) 0  WBC ABSOLUTE NRBC 0.02 (H) 0.00 - 0.01 K/uL NEUTROPHILS 61 32 - 75 % LYMPHOCYTES 34 12 - 49 % MONOCYTES 5 5 - 13 % EOSINOPHILS 0 0 - 7 % BASOPHILS 0 0 - 1 % IMMATURE GRANULOCYTES 0 0.0 - 0.5 % ABS. NEUTROPHILS 3.8 1.8 - 8.0 K/UL  
 ABS. LYMPHOCYTES 2.2 0.8 - 3.5 K/UL  
 ABS. MONOCYTES 0.3 0.0 - 1.0 K/UL  
 ABS. EOSINOPHILS 0.0 0.0 - 0.4 K/UL  
 ABS. BASOPHILS 0.0 0.0 - 0.1 K/UL  
 ABS. IMM. GRANS. 0.0 0.00 - 0.04 K/UL  
 DF AUTOMATED METABOLIC PANEL, COMPREHENSIVE Collection Time: 01/18/21 12:47 AM  
Result Value Ref Range Sodium 143 136 - 145 mmol/L Potassium 3.6 3.5 - 5.1 mmol/L Chloride 113 (H) 97 - 108 mmol/L  
 CO2 23 21 - 32 mmol/L Anion gap 7 5 - 15 mmol/L Glucose 180 (H) 65 - 100 mg/dL BUN 16 6 - 20 MG/DL Creatinine 1.24 0.70 - 1.30 MG/DL  
 BUN/Creatinine ratio 13 12 - 20 GFR est AA >60 >60 ml/min/1.73m2 GFR est non-AA 56 (L) >60 ml/min/1.73m2 Calcium 8.0 (L) 8.5 - 10.1 MG/DL  Bilirubin, total 0.4 0.2 - 1.0 MG/DL  
  ALT (SGPT) 14 12 - 78 U/L  
 AST (SGOT) 11 (L) 15 - 37 U/L  
 Alk. phosphatase 73 45 - 117 U/L  
 Protein, total 7.2 6.4 - 8.2 g/dL  
 Albumin 3.1 (L) 3.5 - 5.0 g/dL  
 Globulin 4.1 (H) 2.0 - 4.0 g/dL  
 A-G Ratio 0.8 (L) 1.1 - 2.2    
TROPONIN I  
 Collection Time: 01/18/21 12:47 AM  
Result Value Ref Range  
 Troponin-I, Qt. <0.05 <0.05 ng/mL  
NT-PRO BNP  
 Collection Time: 01/18/21 12:47 AM  
Result Value Ref Range  
 NT pro-BNP 1,069 (H) <450 PG/ML  
POC LACTIC ACID  
 Collection Time: 01/18/21  1:35 AM  
Result Value Ref Range  
 Lactic Acid (POC) 1.17 0.40 - 2.00 mmol/L  
SARS-COV-2  
 Collection Time: 01/18/21  2:25 AM  
Result Value Ref Range  
 Specimen source Nasopharyngeal    
 Specimen source Nasopharyngeal    
 COVID-19 rapid test Not detected NOTD    
 Specimen type NP Swab    
 Health status Symptomatic Testing

## 2021-01-18 NOTE — CONSULTS
[facility] [place] Sleep [facility] Initial Patient Consult Name: Ruth Denis MRN: [id number] : 1940 Hospital: [facility]  Date: 2021 IMPRESSION:  
· COVID PUI, He does not appear to be too far off his baseline at this present time. · May be a mild component of volume overload. · Possible atypical Pneumonia · Pulmonary Hypertension, felt to be moderate to severe. LVEFo f 55%. · Acute on Chronic Hypoxic Respiratory Failure, POx was 88% on RA. Used Inogene oxygen at 3L per NC.  
· COPD (PFT from 20: FVC: 4.35L 111% pred. FEV1: 2.18L 76% pred, FEV1/fvc: 50%. Normal Lung volumes. DLCO: severely reduced with DLCO/VA of 35%. 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 Had EBRT, on Lupron. Follow with Miah/Marko. · Chronic Hoarseness. · CAD, prior stents, Distal Aortic Arthc Ulcer. Pt refused TAVR in 2020. Follows with Dr. Ignacio Laura. · Has been on Chronic Prednsione therapy · Sick Sinus Syndrom: had PM placed in 2020. · CRI 
· PAD · Leg cramps · Ex Smoker · Full Code RECOMMENDATIONS:  
· Await covid testing · Oxygen to keep pox over 90%. · ON Iv lasix. · Agree with rest of current treatments. PCP: Yao Hodges Subjective:  
CC: Shortness of breath This patient has been seen and evaluated at the request of Dr. Ludmila James for above. Patient is a 78 y.o. male who presents with above. Has increased shortness of breath over last 24 hrs, first started at 10pm on . NO chest pain, no cough. NO abdominal pain. No tobacco or Etoh use. Tolerating po intake well. He is on 4.5L of oxygen at home. He did not feelt that his breathing therapies were helping. NO cough. NO fevers, no nausea or vomiting. Past Medical History:  
Diagnosis Date  CAD (coronary artery disease)   
 mi 10/19/2015 with stent  COPD (chronic obstructive pulmonary disease) (HCC)  Hypertension  Prostate cancer ([facility] [address] 5/8/2012 Dr. Omar Hernandez - diagnosed 2/2012 - s/p EBRT on Lupron Past Surgical History:  
Procedure Laterality Date  HX ORTHOPAEDIC  1961  
 left knee surgery  MI INS NEW/RPLCMT PRM PM W/TRANSV ELTRD ATRIAL&VENT N/A 9/21/2020 INSERT PPM DUAL performed by Tracey Daniel MD at [facility]ATH [facility] Prior to Admission medications Medication Sig Start Date End Date Taking? Authorizing Provider  
atorvastatin (LIPITOR) 40 mg tablet Take 1 tablet by mouth once daily 12/30/20  Yes Waleska House MD  
omeprazole (PRILOSEC) 20 mg capsule Take 1 Cap by mouth daily. 10/7/20  Yes Waleska House MD  
abiraterone 250 mg tab Take  by mouth. 1 tab daily with a low fat breakfast   Yes Juliet, MD Nancy  
oxybutynin chloride XL (DITROPAN XL) 10 mg CR tablet Take 10 mg by mouth nightly. Yes Juliet, MD Nancy  
albuterol (PROVENTIL HFA, VENTOLIN HFA, PROAIR HFA) 90 mcg/actuation inhaler Use 2 puffs very 4 hours prn 7/30/20  Yes Waleska House MD  
metoprolol tartrate (LOPRESSOR) 50 mg tablet TAKE ONE TABLET BY MOUTH TWICE DAILY 7/13/20  Yes Casandra Peralta MD  
lisinopriL (PRINIVIL, ZESTRIL) 2.5 mg tablet Take 1 tablet by mouth once daily 3/21/20  Yes Casandra Peralta MD  
albuterol-ipratropium (DUO-NEB) 2.5 mg-0.5 mg/3 ml nebu 3 mL by Nebulization route every six (6) hours as needed (sob). 10/19/19  Yes Elina Mcclain NP  
fluticasone furoate-vilanterol (BREO ELLIPTA) 100-25 mcg/dose inhaler Take 1 Puff by inhalation daily. 3/25/19  Yes Quan Coleman, DO Oxygen 4.5L continouus   Yes Provider, Historical  
fluticasone (FLONASE) 50 mcg/actuation nasal spray 2 Sprays by Both Nostrils route daily. 2/18/19  Yes Mukesh Smith MD  
aspirin 81 mg chewable tablet Take 1 Tab by mouth daily. RISK OF HEART ATTACK IF ANY MISSED DOSES 10/21/15  Yes Casandra Peralta MD  
 
No Known Allergies Social History Tobacco Use  
  Smoking status: Former Smoker Packs/day: 0.50 Years: 40.00 Pack years: 20.00 Types: Cigarettes Quit date: 10/19/2015 Years since quittin.2  Smokeless tobacco: Never Used  Tobacco comment: trying quit 10/13/15 -  ppd Substance Use Topics  Alcohol use: No  
  Alcohol/week: 0.0 standard drinks Family History Problem Relation Age of Onset  Heart Disease Mother   
     pacemaker  Diabetes Mother  Diabetes Sister  Heart Disease Brother Current Facility-Administered Medications Medication Dose Route Frequency  sodium chloride (NS) flush 5-40 mL  5-40 mL IntraVENous Q8H  
 aspirin chewable tablet 81 mg  81 mg Oral DAILY  atorvastatin (LIPITOR) tablet 40 mg  40 mg Oral DAILY  metoprolol tartrate (LOPRESSOR) tablet 50 mg  50 mg Oral DAILY  sodium chloride (NS) flush 5-40 mL  5-40 mL IntraVENous Q8H  
 enoxaparin (LOVENOX) injection 40 mg  40 mg SubCUTAneous DAILY  cefTRIAXone (ROCEPHIN) 1 g in 0.9% sodium chloride (MBP/ADV) 50 mL MBP  1 g IntraVENous Q24H  
 azithromycin (ZITHROMAX) tablet 500 mg  500 mg Oral DAILY  [START ON 2021] pantoprazole (PROTONIX) tablet 40 mg  40 mg Oral ACB  [START ON 2021] oxybutynin chloride XL (DITROPAN XL) tablet 10 mg  10 mg Oral DAILY  [START ON 2021] lisinopriL (PRINIVIL, ZESTRIL) tablet 2.5 mg  2.5 mg Oral DAILY  arformoterol 15 mcg/budesonide 0.5 mg neb solution   Nebulization BID  ipratropium (ATROVENT) 0.02 % nebulizer solution 0.5 mg  0.5 mg Nebulization QID RT  
 [START ON 2021] furosemide (LASIX) injection 40 mg  40 mg IntraVENous DAILY  predniSONE (DELTASONE) tablet 10 mg  10 mg Oral DAILY WITH BREAKFAST Review of Systems: A comprehensive review of systems was negative. Objective:  
Vital Signs:   
Visit Vitals /73 (BP 1 Location: Right arm, BP Patient Position: At rest) Pulse 74 Temp 97.8 °F (36.6 °C) Resp 22 Ht 6' (1.829 m) Wt 92.5 kg (204 lb) SpO2 96% BMI 27.67 kg/m² O2 Device: Nasal cannula O2 Flow Rate (L/min): 5.5 l/min(turned O2 back to his baseline at home 4.5lpm NC) Temp (24hrs), Av.7 °F (36.5 °C), Min:97.7 °F (36.5 °C), Max:97.8 °F (36.6 °C) Intake/Output:  
Last shift:       07 -  1900 In: 240 [P.O.:240] Out: 850 [Urine:850] Last 3 shifts: No intake/output data recorded. Intake/Output Summary (Last 24 hours) at 2021 1705 Last data filed at 2021 1340 Gross per 24 hour Intake 240 ml Output 850 ml Net -610 ml Physical Exam:  
General:  Alert, cooperative, no distress, appears stated age. Hoarse. On 4.5L NC oxygen. Head:  Normocephalic, without obvious abnormality, atraumatic. Eyes:  Conjunctivae/corneas clear. PERRL, EOMs intact. Nose: Nares normal. Septum midline. Mucosa normal. No drainage or sinus tenderness. Throat: Lips, mucosa, and tongue normal. Teeth and gums normal.  
Neck: Supple, symmetrical, trachea midline, no adenopathy, thyroid: no enlargment/tenderness/nodules, no carotid bruit and no JVD. Back:   Symmetric, no curvature. ROM normal.  
Lungs:   Clear to auscultation bilaterally. Good air movement. Chest wall:  No tenderness or deformity. Has PPM in place over his left upper chest.   
Heart:  Regular rate and rhythm, S1, S2 normal, no murmur, click, rub or gallop. Abdomen:   Soft, non-tender. Bowel sounds normal. No masses,  No organomegaly. Extremities: Extremities normal, atraumatic, no cyanosis or edema. Pulses: 2+ and symmetric all extremities. Skin: Skin color, texture, turgor normal. No rashes or lesions Lymph nodes: Cervical, supraclavicular, and axillary nodes normal.  
Neurologic: Grossly nonfocal, motor seems to be intact. Psych: No overt anxiety or depression. Data review:  
 
Recent Results (from the past 24 hour(s)) EKG, 12 LEAD, INITIAL Collection Time: 21 12:42 AM  
Result Value Ref Range Ventricular Rate 82 BPM  
 Atrial Rate 82 BPM  
 P-R Interval 120 ms QRS Duration 84 ms Q-T Interval 394 ms QTC Calculation (Bezet) 460 ms Calculated P Axis 51 degrees Calculated R Axis 32 degrees Calculated T Axis 139 degrees Diagnosis Normal sinus rhythm Possible Left atrial enlargement ST & T wave abnormality, consider anterolateral ischemia Prolonged QT When compared with ECG of 17-SEP-2020 11:38, 
QRS axis shifted left Minimal criteria for Anterior infarct are no longer present Non-specific change in ST segment in Lateral leads T wave inversion more evident in Lateral leads Confirmed by Priscilla Koyanagi ([id number]) on 1/18/2021 1:17:33 PM 
  
CBC WITH AUTOMATED DIFF Collection Time: 01/18/21 12:47 AM  
Result Value Ref Range WBC 6.4 4.1 - 11.1 K/uL  
 RBC 4.12 4.10 - 5.70 M/uL  
 HGB 10.8 (L) 12.1 - 17.0 g/dL HCT 36.0 (L) 36.6 - 50.3 % MCV 87.4 80.0 - 99.0 FL  
 MCH 26.2 26.0 - 34.0 PG  
 MCHC 30.0 30.0 - 36.5 g/dL  
 RDW 17.8 (H) 11.5 - 14.5 % PLATELET 414 132 - 823 K/uL MPV 11.7 8.9 - 12.9 FL  
 NRBC 0.3 (H) 0  WBC ABSOLUTE NRBC 0.02 (H) 0.00 - 0.01 K/uL NEUTROPHILS 61 32 - 75 % LYMPHOCYTES 34 12 - 49 % MONOCYTES 5 5 - 13 % EOSINOPHILS 0 0 - 7 % BASOPHILS 0 0 - 1 % IMMATURE GRANULOCYTES 0 0.0 - 0.5 % ABS. NEUTROPHILS 3.8 1.8 - 8.0 K/UL  
 ABS. LYMPHOCYTES 2.2 0.8 - 3.5 K/UL  
 ABS. MONOCYTES 0.3 0.0 - 1.0 K/UL  
 ABS. EOSINOPHILS 0.0 0.0 - 0.4 K/UL  
 ABS. BASOPHILS 0.0 0.0 - 0.1 K/UL  
 ABS. IMM. GRANS. 0.0 0.00 - 0.04 K/UL  
 DF AUTOMATED METABOLIC PANEL, COMPREHENSIVE Collection Time: 01/18/21 12:47 AM  
Result Value Ref Range Sodium 143 136 - 145 mmol/L Potassium 3.6 3.5 - 5.1 mmol/L Chloride 113 (H) 97 - 108 mmol/L  
 CO2 23 21 - 32 mmol/L Anion gap 7 5 - 15 mmol/L Glucose 180 (H) 65 - 100 mg/dL BUN 16 6 - 20 MG/DL  Creatinine 1.24 0.70 - 1.30 MG/DL  
 BUN/Creatinine ratio 13 12 - 20    
 GFR est AA >60 >60 ml/min/1.73m2 GFR est non-AA 56 (L) >60 ml/min/1.73m2 Calcium 8.0 (L) 8.5 - 10.1 MG/DL Bilirubin, total 0.4 0.2 - 1.0 MG/DL  
 ALT (SGPT) 14 12 - 78 U/L  
 AST (SGOT) 11 (L) 15 - 37 U/L Alk. phosphatase 73 45 - 117 U/L Protein, total 7.2 6.4 - 8.2 g/dL Albumin 3.1 (L) 3.5 - 5.0 g/dL Globulin 4.1 (H) 2.0 - 4.0 g/dL A-G Ratio 0.8 (L) 1.1 - 2.2    
TROPONIN I Collection Time: 01/18/21 12:47 AM  
Result Value Ref Range Troponin-I, Qt. <0.05 <0.05 ng/mL NT-PRO BNP Collection Time: 01/18/21 12:47 AM  
Result Value Ref Range NT pro-BNP 1,069 (H) <450 PG/ML  
POC LACTIC ACID Collection Time: 01/18/21  1:35 AM  
Result Value Ref Range Lactic Acid (POC) 1.17 0.40 - 2.00 mmol/L  
SARS-COV-2 Collection Time: 01/18/21  2:25 AM  
Result Value Ref Range Specimen source Nasopharyngeal    
 Specimen source Nasopharyngeal    
 COVID-19 rapid test Not detected NOTD Specimen type NP Swab Health status Symptomatic Testing POC LACTIC ACID Collection Time: 01/18/21  5:40 AM  
Result Value Ref Range Lactic Acid (POC) 1.58 0.40 - 2.00 mmol/L  
SARS-COV-2 Collection Time: 01/18/21  6:58 AM  
Result Value Ref Range Specimen source Nasopharyngeal    
 SARS-CoV-2 PENDING Specimen source Nasopharyngeal    
 Specimen type NP Swab Health status Symptomatic Testing TROPONIN I Collection Time: 01/18/21 11:41 AM  
Result Value Ref Range Troponin-I, Qt. <0.05 <0.05 ng/mL SED RATE (ESR) Collection Time: 01/18/21 11:41 AM  
Result Value Ref Range Sed rate, automated 51 (H) 0 - 20 mm/hr LD Collection Time: 01/18/21 11:41 AM  
Result Value Ref Range  (H) 85 - 241 U/L  
D DIMER Collection Time: 01/18/21 11:41 AM  
Result Value Ref Range D-dimer 1.29 (H) 0.00 - 0.65 mg/L FEU FIBRINOGEN Collection Time: 01/18/21 11:41 AM  
Result Value Ref Range Fibrinogen 583 (H) 200 - 475 mg/dL CK  
 Collection Time: 01/18/21 11:41 AM  
Result Value Ref Range CK 90 39 - 308 U/L  
PROCALCITONIN Collection Time: 01/18/21 11:41 AM  
Result Value Ref Range Procalcitonin <0.05 ng/mL Imaging: 
I have personally reviewed the patients radiographs and have reviewed the reports: 
 
 
1-18-21: CXR: TECHNIQUE: Portable AP upright chest view at 0051 hours 
  
FINDINGS: The left chest ICD and wires are stable. The cardiomediastinal 
contours are stable. There are mild bilateral interstitial and patchy airspace 
opacities. There is no pleural effusion or pneumothorax. The bones and upper 
abdomen are stable. 
  
IMPRESSION IMPRESSION: Mild bilateral interstitial and patchy airspace opacities can be 
seen with pulmonary edema or atypical/viral infection. 9-: CT of chest: IMPRESSION:  
1. No evidence for embolism. 2. 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 3. Nonspecific consolidative opacifications in the posterior medial basilar 
segments of the left lower lobe and the inferior lingula. · 9-17-20: Echo: LV: Estimated LVEF is 55 - 60%. Normal cavity size and systolic function (ejection fraction normal). Mild concentric hypertrophy. · TV: Mild to moderate tricuspid valve regurgitation is present. PA: Moderate to severe pulmonary hypertension. Pulmonary arterial systolic pressure is 61 mmHg.  
 
 
 
  
Nasra Lovell MD

## 2021-01-18 NOTE — ED NOTES
The patient room 2220 is not even been started to be cleaned' The Floor will call when the room is beginning to be cleaned so pt can be transferred to the room

## 2021-01-18 NOTE — ED PROVIDER NOTES
EMERGENCY DEPARTMENT HISTORY AND PHYSICAL EXAM 
 
 
Date: 1/18/2021 Patient Name: Randy Guidry History of Presenting Illness Chief Complaint Patient presents with  Shortness of Breath COPD History Provided By: Patient and EMS 
 
HPI: Randy Guidry, 78 y.o. male with PMHx significant for COPD, CAD status post stent, hypertension, prior prostate cancer, who presents with a chief complaint of shortness of breath. Patient reports his symptoms began last night around 10 PM.  Chronically wears 4.5 L of oxygen at home, however when EMS arrived he was wearing his oxygen and was satting in the 76s. Reports he was using his breathing treatments at home but they did not seem to be helping with his symptoms. Denies any productive cough, fever, chest pain, abdominal pain, nausea, vomiting. PCP: Aleksandr Douglas MD 
 
There are no other complaints, changes, or physical findings at this time. Current Facility-Administered Medications Medication Dose Route Frequency Provider Last Rate Last Admin  sodium chloride (NS) flush 5-40 mL  5-40 mL IntraVENous Q8H Bri Ugarte MD   10 mL at 01/18/21 0425  sodium chloride (NS) flush 5-40 mL  5-40 mL IntraVENous PRN Bri Ugarte MD      
 . PHARMACY TO SUBSTITUTE PER PROTOCOL (Reordered from: abiraterone 250 mg tab)    Per Protocol Bri Ugarte MD      
 albuterol (PROVENTIL HFA, VENTOLIN HFA, PROAIR HFA) inhaler 2 Puff  2 Puff Inhalation Q4H PRN Bri Ugarte MD      
 aspirin chewable tablet 81 mg  81 mg Oral DAILY Bri Ugarte MD      
 atorvastatin (LIPITOR) tablet 40 mg  40 mg Oral DAILY Bri Ugarte MD      
 metoprolol tartrate (LOPRESSOR) tablet 50 mg  50 mg Oral DAILY Bri Ugarte MD      
 sodium chloride (NS) flush 5-40 mL  5-40 mL IntraVENous Q8H David Estrada MD      
 sodium chloride (NS) flush 5-40 mL  5-40 mL IntraVENous PRN Bri Ugarte MD      
  acetaminophen (TYLENOL) tablet 650 mg  650 mg Oral Q6H PRN Contreras Zhao MD      
 Or  
 acetaminophen (TYLENOL) suppository 650 mg  650 mg Rectal Q6H PRN Contreras Zhao MD      
 polyethylene glycol (MIRALAX) packet 17 g  17 g Oral DAILY PRN Contreras Zhao MD      
 promethazine (PHENERGAN) tablet 12.5 mg  12.5 mg Oral Q6H PRN Contreras Zhao MD      
 Or  
 ondansetron (ZOFRAN) injection 4 mg  4 mg IntraVENous Q6H PRN Contreras Zhao MD      
 enoxaparin (LOVENOX) injection 40 mg  40 mg SubCUTAneous DAILY Contreras Zhao MD      
 furosemide (LASIX) injection 40 mg  40 mg IntraVENous BID Contreras Zhao MD      
 cefTRIAXone (ROCEPHIN) 1 g in 0.9% sodium chloride (MBP/ADV) 50 mL MBP  1 g IntraVENous Q24H Contreras Zhao  mL/hr at 01/18/21 0627 1 g at 01/18/21 2548  
 azithromycin (ZITHROMAX) tablet 500 mg  500 mg Oral DAILY Contreras Zhao MD      
 
Current Outpatient Medications Medication Sig Dispense Refill  atorvastatin (LIPITOR) 40 mg tablet Take 1 tablet by mouth once daily 90 Tab 0  
 omeprazole (PRILOSEC) 20 mg capsule Take 1 Cap by mouth daily. 90 Cap 3  
 trospium (SANCTURA) 20 mg tablet TAKE 1 TABLET BY MOUTH TWICE DAILY  polyethylene glycol (Miralax) 17 gram/dose powder Take 17 g by mouth daily. 1 tablespoon with 8 oz of water daily 507 g 0  
 abiraterone 250 mg tab Take  by mouth. 1 tab daily  oxybutynin chloride XL (DITROPAN XL) 10 mg CR tablet Take 10 mg by mouth daily.  predniSONE (DELTASONE) 2.5 mg tablet Take 5 mg by mouth two (2) times a day.     
 albuterol (PROVENTIL HFA, VENTOLIN HFA, PROAIR HFA) 90 mcg/actuation inhaler Use 2 puffs very 4 hours prn 1 Inhaler 1  
 metoprolol tartrate (LOPRESSOR) 50 mg tablet TAKE ONE TABLET BY MOUTH TWICE DAILY 180 Tab 3  
 lisinopriL (PRINIVIL, ZESTRIL) 2.5 mg tablet Take 1 tablet by mouth once daily 90 Tab 3  
  albuterol-ipratropium (DUO-NEB) 2.5 mg-0.5 mg/3 ml nebu 3 mL by Nebulization route every six (6) hours as needed (sob). 30 Nebule 4  
 fluticasone furoate-vilanterol (BREO ELLIPTA) 100-25 mcg/dose inhaler Take 1 Puff by inhalation daily. 1 Inhaler 4  
 Oxygen 3L continouus  fluticasone (FLONASE) 50 mcg/actuation nasal spray 2 Sprays by Both Nostrils route daily. 1 Bottle 1  
 aspirin 81 mg chewable tablet Take 1 Tab by mouth daily. RISK OF HEART ATTACK IF ANY MISSED DOSES 90 Tab 3 Past History Past Medical History: 
Past Medical History:  
Diagnosis Date  CAD (coronary artery disease)   
 mi 10/19/2015 with stent  COPD (chronic obstructive pulmonary disease) (HCC)  Hypertension  Prostate cancer ([facility] [address] 2012 Dr. Hailey Ferraro - diagnosed 2012 - s/p EBRT on Lupron Past Surgical History: 
Past Surgical History:  
Procedure Laterality Date  HX ORTHOPAEDIC    
 left knee surgery  IA INS NEW/RPLCMT PRM PM W/TRANSV ELTRD ATRIAL&VENT N/A 2020 INSERT PPM DUAL performed by Gail Stern MD at [facility] CARDIAC CATH LAB Family History: 
Family History Problem Relation Age of Onset  Heart Disease Mother   
     pacemaker  Diabetes Mother  Diabetes Sister  Heart Disease Brother Social History: 
Social History Tobacco Use  Smoking status: Former Smoker Packs/day: 0.50 Years: 40.00 Pack years: 20.00 Types: Cigarettes Quit date: 10/19/2015 Years since quittin.2  Smokeless tobacco: Never Used  Tobacco comment: trying quit 10/13/15 - 1/2 ppd Substance Use Topics  Alcohol use: No  
  Alcohol/week: 0.0 standard drinks  Drug use: No  
 
Allergies: 
No Known Allergies Review of Systems Review of Systems Constitutional: Negative for chills and fever. HENT: Negative for congestion, rhinorrhea and sore throat. Respiratory: Positive for shortness of breath. Negative for cough. Cardiovascular: Negative for chest pain. Gastrointestinal: Negative for abdominal pain, nausea and vomiting. Genitourinary: Negative for dysuria and urgency. Skin: Negative for rash. Neurological: Negative for dizziness, light-headedness and headaches. All other systems reviewed and are negative. Physical Exam  
Physical Exam 
Vitals signs and nursing note reviewed. Constitutional:   
   General: He is not in acute distress. Appearance: He is well-developed. HENT:  
   Head: Normocephalic and atraumatic. Eyes:  
   Conjunctiva/sclera: Conjunctivae normal.  
   Pupils: Pupils are equal, round, and reactive to light. Neck: Musculoskeletal: Normal range of motion. Cardiovascular:  
   Rate and Rhythm: Normal rate and regular rhythm. Pulmonary:  
   Effort: Pulmonary effort is normal. No respiratory distress. Breath sounds: Normal breath sounds. No stridor. Abdominal:  
   General: There is no distension. Palpations: Abdomen is soft. Tenderness: There is no abdominal tenderness. Musculoskeletal: Normal range of motion. Skin: 
   General: Skin is warm and dry. Neurological:  
   Mental Status: He is alert and oriented to person, place, and time. Diagnostic Study Results Labs - Recent Results (from the past 12 hour(s)) EKG, 12 LEAD, INITIAL Collection Time: 01/18/21 12:42 AM  
Result Value Ref Range Ventricular Rate 82 BPM  
 Atrial Rate 82 BPM  
 P-R Interval 120 ms QRS Duration 84 ms Q-T Interval 394 ms QTC Calculation (Bezet) 460 ms Calculated P Axis 51 degrees Calculated R Axis 32 degrees Calculated T Axis 139 degrees Diagnosis Normal sinus rhythm Possible Left atrial enlargement ST & T wave abnormality, consider anterolateral ischemia Prolonged QT When compared with ECG of 17-SEP-2020 11:38, 
QRS axis shifted left Minimal criteria for Anterior infarct are no longer present Non-specific change in ST segment in Lateral leads T wave inversion more evident in Lateral leads CBC WITH AUTOMATED DIFF Collection Time: 01/18/21 12:47 AM  
Result Value Ref Range WBC 6.4 4.1 - 11.1 K/uL  
 RBC 4.12 4.10 - 5.70 M/uL  
 HGB 10.8 (L) 12.1 - 17.0 g/dL HCT 36.0 (L) 36.6 - 50.3 % MCV 87.4 80.0 - 99.0 FL  
 MCH 26.2 26.0 - 34.0 PG  
 MCHC 30.0 30.0 - 36.5 g/dL  
 RDW 17.8 (H) 11.5 - 14.5 % PLATELET 510 884 - 729 K/uL MPV 11.7 8.9 - 12.9 FL  
 NRBC 0.3 (H) 0  WBC ABSOLUTE NRBC 0.02 (H) 0.00 - 0.01 K/uL NEUTROPHILS 61 32 - 75 % LYMPHOCYTES 34 12 - 49 % MONOCYTES 5 5 - 13 % EOSINOPHILS 0 0 - 7 % BASOPHILS 0 0 - 1 % IMMATURE GRANULOCYTES 0 0.0 - 0.5 % ABS. NEUTROPHILS 3.8 1.8 - 8.0 K/UL  
 ABS. LYMPHOCYTES 2.2 0.8 - 3.5 K/UL  
 ABS. MONOCYTES 0.3 0.0 - 1.0 K/UL  
 ABS. EOSINOPHILS 0.0 0.0 - 0.4 K/UL  
 ABS. BASOPHILS 0.0 0.0 - 0.1 K/UL  
 ABS. IMM. GRANS. 0.0 0.00 - 0.04 K/UL  
 DF AUTOMATED METABOLIC PANEL, COMPREHENSIVE Collection Time: 01/18/21 12:47 AM  
Result Value Ref Range Sodium 143 136 - 145 mmol/L Potassium 3.6 3.5 - 5.1 mmol/L Chloride 113 (H) 97 - 108 mmol/L  
 CO2 23 21 - 32 mmol/L Anion gap 7 5 - 15 mmol/L Glucose 180 (H) 65 - 100 mg/dL BUN 16 6 - 20 MG/DL Creatinine 1.24 0.70 - 1.30 MG/DL  
 BUN/Creatinine ratio 13 12 - 20 GFR est AA >60 >60 ml/min/1.73m2 GFR est non-AA 56 (L) >60 ml/min/1.73m2 Calcium 8.0 (L) 8.5 - 10.1 MG/DL Bilirubin, total 0.4 0.2 - 1.0 MG/DL  
 ALT (SGPT) 14 12 - 78 U/L  
 AST (SGOT) 11 (L) 15 - 37 U/L Alk. phosphatase 73 45 - 117 U/L Protein, total 7.2 6.4 - 8.2 g/dL Albumin 3.1 (L) 3.5 - 5.0 g/dL Globulin 4.1 (H) 2.0 - 4.0 g/dL A-G Ratio 0.8 (L) 1.1 - 2.2    
TROPONIN I Collection Time: 01/18/21 12:47 AM  
Result Value Ref Range Troponin-I, Qt. <0.05 <0.05 ng/mL NT-PRO BNP Collection Time: 01/18/21 12:47 AM  
Result Value Ref Range NT pro-BNP 1,069 (H) <450 PG/ML  
POC LACTIC ACID Collection Time: 01/18/21  1:35 AM  
Result Value Ref Range Lactic Acid (POC) 1.17 0.40 - 2.00 mmol/L  
SARS-COV-2 Collection Time: 01/18/21  2:25 AM  
Result Value Ref Range Specimen source Nasopharyngeal    
 Specimen source Nasopharyngeal    
 COVID-19 rapid test Not detected NOTD Specimen type NP Swab Health status Symptomatic Testing POC LACTIC ACID Collection Time: 01/18/21  5:40 AM  
Result Value Ref Range Lactic Acid (POC) 1.58 0.40 - 2.00 mmol/L Radiologic Studies -  
XR CHEST PORT Final Result IMPRESSION: Mild bilateral interstitial and patchy airspace opacities can be  
seen with pulmonary edema or atypical/viral infection. Xr Chest Titus Doddsirisha Result Date: 1/18/2021 IMPRESSION: Mild bilateral interstitial and patchy airspace opacities can be seen with pulmonary edema or atypical/viral infection. Medical Decision Making I am the first provider for this patient. I reviewed the vital signs, available nursing notes, past medical history, past surgical history, family history and social history. Vital Signs-Reviewed the patient's vital signs. Patient Vitals for the past 12 hrs: 
 Temp Pulse Resp BP SpO2  
01/18/21 0445  92 26 (!) 147/72 90 % 01/18/21 0430  81 24 (!) 154/75 (!) 88 % 01/18/21 0415  71 22 (!) 153/67 95 % 01/18/21 0400  70 22 (!) 151/65 95 % 01/18/21 0345  78 24 (!) 147/68 94 % 01/18/21 0330  72 21 (!) 148/57 99 % 01/18/21 0315  78 25 (!) 148/52 98 % 01/18/21 0300  89 30 (!) 157/57 92 % 01/18/21 0245  87 26 (!) 148/45 94 % 01/18/21 0230  86 23 (!) 141/49 92 % 01/18/21 0215  86 29 (!) 135/38 94 % 01/18/21 0200  83 28 (!) 129/47 95 % 01/18/21 0145  82 26 (!) 155/51 94 % 01/18/21 0130  85 25 (!) 154/60 93 % 01/18/21 0115  84 25 (!) 149/79 94 % 01/18/21 0100  86 29 (!) 166/66 97 % 01/18/21 0045  82 27 (!) 154/62 96 % 01/18/21 0042 97.7 °F (36.5 °C) 81 (!) 36 (!) 172/64 96 % Pulse Oximetry Analysis - 95% on 10L Cardiac Monitor:  
Rate: 81 bpm 
Rhythm: Normal Sinus Rhythm ED EKG interpretation: 
Rhythm: normal sinus rhythm; and regular . Rate (approx.): 82; Axis: normal; P wave: normal; QRS interval: normal ; ST/T wave: non-specific changes; Other findings: borderline ekg. This EKG was interpreted by ROSSY Howard MD,ED Provider. Records Reviewed: Nursing Notes and Old Medical Records Provider Notes (Medical Decision Making):  
Patient presents with a chief complaint of shortness of breath and hypoxia. On arrival he is requiring 10 L to maintain oxygen saturations. He is speaking in complete sentences and has clear lungs. Differential includes CHF, COPD, pneumonia, COVID-19. Will check basic lab work, troponin, BNP, chest x-ray, Covid swab. ED Course:  
Initial assessment performed. The patients presenting problems have been discussed, and they are in agreement with the care plan formulated and outlined with them. I have encouraged them to ask questions as they arise throughout their visit. Labs notable for slightly elevated BNP. Chest x-ray with bilateral infiltrates versus edema. Will give a dose of IV Lasix. Patient attempted to move from the bed to use the commode and became significantly tachypneic and hypoxic. Is able to maintain sats between 5 and 6 L while seated in bed. Discussed with Dr. Marilee Quintero, hospitalist, who will admit the patient. Rapid Covid returned negative but will send PCR. Procedures: 
Procedures Critical Care: 
none Disposition: 
 
Admission Note: 
Patient is being admitted to the hospital by Dr. Marilee Quintero, Service: Hospitalist.  The results of their tests and reasons for their admission have been discussed with them and available family. They convey agreement and understanding for the need to be admitted and for their admission diagnosis. Diagnosis Clinical Impression: 1. Acute respiratory failure with hypoxia ([address] 2. Acute congestive heart failure, unspecified heart failure type ([address] 3. Suspected COVID-19 virus infection Please note that this dictation was completed with [employer]etch, the computer voice recognition software. Quite often unanticipated grammatical, syntax, homophones, and other interpretive errors are inadvertently transcribed by the computer software. Please disregard these errors. Please excuse any errors that have escaped final proofreading

## 2021-01-18 NOTE — ED TRIAGE NOTES
Pt presents via EMS w/ c/o SOB. Pt reports nebs at home w/ no help.  Pt also had duo-neb on way to hospital.

## 2021-01-18 NOTE — PROGRESS NOTES
Problem: Falls - Risk of 
Goal: *Absence of Falls Description: Document [place]via Fall Risk and appropriate interventions in the flowsheet. Outcome: Progressing Towards Goal 
Note: Fall Risk Interventions: 
  
 
  
 
Medication Interventions: Bed/chair exit alarm, Patient to call before getting OOB, Teach patient to arise slowly Problem: Patient Education: Go to Patient Education Activity Goal: Patient/Family Education Outcome: Progressing Towards Goal 
  
Problem: Pressure Injury - Risk of 
Goal: *Prevention of pressure injury Description: Document Joe Scale and appropriate interventions in the flowsheet. Outcome: Progressing Towards Goal 
Note: Pressure Injury Interventions: 
Sensory Interventions: Assess changes in LOC, Keep linens dry and wrinkle-free, Maintain/enhance activity level, Minimize linen layers, Pressure redistribution bed/mattress (bed type), Sit a 90-degree angle/use footstool if needed, Turn and reposition approx. every two hours (pillows and wedges if needed), Use 30-degree side-lying position Moisture Interventions: Absorbent underpads, Apply protective barrier, creams and emollients, Check for incontinence Q2 hours and as needed, Minimize layers, Offer toileting Q_hr Activity Interventions: Increase time out of bed, Pressure redistribution bed/mattress(bed type), PT/OT evaluation Mobility Interventions: HOB 30 degrees or less, Pressure redistribution bed/mattress (bed type), PT/OT evaluation, Turn and reposition approx. every two hours(pillow and wedges) Nutrition Interventions: Document food/fluid/supplement intake Friction and Shear Interventions: Apply protective barrier, creams and emollients, HOB 30 degrees or less, Lift team/patient mobility team, Minimize layers, Transfer aides:transfer board/Tr lift/ceiling lift, Sit at 90-degree angle Problem: Patient Education: Go to Patient Education Activity Goal: Patient/Family Education Outcome: Progressing Towards Goal 
  
Problem: Patient Education: Go to Patient Education Activity Goal: Patient/Family Education Outcome: Progressing Towards Goal 
  
Problem: Heart Failure: Day 1 Goal: Off Pathway (Use only if patient is Off Pathway) Outcome: Progressing Towards Goal 
Goal: Activity/Safety Outcome: Progressing Towards Goal 
Goal: Consults, if ordered Outcome: Progressing Towards Goal 
Goal: Diagnostic Test/Procedures Outcome: Progressing Towards Goal 
Goal: Nutrition/Diet Outcome: Progressing Towards Goal 
Goal: Discharge Planning Outcome: Progressing Towards Goal 
Goal: Medications Outcome: Progressing Towards Goal 
Goal: Respiratory Outcome: Progressing Towards Goal 
Goal: Treatments/Interventions/Procedures Outcome: Progressing Towards Goal 
Goal: Psychosocial 
Outcome: Progressing Towards Goal 
Goal: *Oxygen saturation within defined limits Outcome: Progressing Towards Goal 
Goal: *Hemodynamically stable Outcome: Progressing Towards Goal 
Goal: *Optimal pain control at patient's stated goal 
Outcome: Progressing Towards Goal 
Goal: *Anxiety reduced or absent Outcome: Progressing Towards Goal

## 2021-01-18 NOTE — ED NOTES
At some point noted pt on O 2 4lpm; noted his sats dropped to 85-88% Increase pt back to the 5lpm he was on when I began my shift Pt also gets up to use urinal and moves a lot around his bed and stretcher He p[ulled the O2 off the wall So the O2 is back on now Pt is finished eating lunch

## 2021-01-18 NOTE — ED NOTES
p luisana is upset and wants some medicine for leg cramps He was told the doctor that was in here to see him, said they would order medicine for his leg cramps Will medicate with tylenol Will page the attending doctor. As perfect serve is not available at this time.

## 2021-01-18 NOTE — ED NOTES
Pt asked to have pull up changed; he had a thorough soaked depends; he wet his pants as trying to use the urinal around his pants We have adult depends; I cleaned him with his help. Pt has a fresh depends on now. Pt takes po water well and his iced tea

## 2021-01-18 NOTE — ED NOTES
TRANSFER - OUT REPORT: 
 
Verbal report given to Jemima(name) on Irina Gonzales  being transferred to [facility](unit) for routine progression of care Report consisted of patients Situation, Background, Assessment and  
Recommendations(SBAR). Information from the following report(s) SBAR, Kardex, ED Summary, Procedure Summary, Intake/Output, MAR, Accordion, Recent Results, Med Rec Status and Cardiac Rhythm NSR was reviewed with the receiving nurse. Lines:  
Peripheral IV 01/18/21 Right Antecubital (Active) Opportunity for questions and clarification was provided.    
 
Patient transported with: 
 with Incident Command/ transport /RN

## 2021-01-19 NOTE — PROGRESS NOTES
[facility] INTERDISCIPLINARY ROUNDS Cardiopulmonary Care Interdisciplinary Rounds were held today to discuss patient's plan of care and outcomes. The following members were present: NP/Physician, Pharmacy, Nursing and Case Management. PLAN OF CARE:  
Continue current treatment plan, pt COVID negative this AM. OK for pt to transfer to stepdown unit. Transfer order in place, awaiting bed placement.   
 
Expected Length of Stay:  - - -

## 2021-01-19 NOTE — PROGRESS NOTES
End of Shift Note Bedside shift change report given to [address] (oncoming nurse) by Arik Santana (offgoing nurse). Report included the following information SBAR and Kardex Shift worked:  [id number] Shift summary and any significant changes:  
  Patient up to chair once. Walking steady with walker. Concerns for physician to address:   
  
Zone phone for oncoming shift:   [phone] Activity: 
Activity Level: Up with Assistance Number times ambulated in hallways past shift: 0 Number of times OOB to chair past shift: 0 Cardiac:  
Cardiac Monitoring: Yes     
Cardiac Rhythm: Normal sinus rhythm Access:  
Current line(s): PIV Genitourinary:  
Urinary status: voiding Respiratory:  
O2 Device: Aerosol mask Chronic home O2 use?: YES Incentive spirometer at bedside: NO 
  
GI: 
Last Bowel Movement Date: 01/18/21 Current diet:  DIET CARDIAC Regular Passing flatus: YES Tolerating current diet: YES 
% Diet Eaten: 50 % Pain Management:  
Patient states pain is manageable on current regimen: YES Skin: 
Joe Score: 17 Interventions: Increase time out of bed Patient Safety: 
Fall Score: Total Score: 1 Interventions: bed/chair alarm, gripper socks and pt to call before getting OOB Length of Stay: 
Expected LOS: - - - Actual LOS: 1 Arik Santana

## 2021-01-19 NOTE — PROGRESS NOTES
[facility], Critical Care, and Sleep Medicine Patient Consult Name: Nicole Tai MRN: [id number] : 1940 Hospital: [facility] Date: 2021 IMPRESSION:  
· COVID NEGATIVE · Acute on Chronic Hypoxic Respiratory Failure, POx was 88% on RA. Used Inogene oxygen at 3L per NC. · Suspected volume overload. · Possible atypical Pneumonia · Pulmonary Hypertension, felt to be moderate to severe. LVEFo f 55%. · COPD (PFT from 20: FVC: 4.35L 111% pred. FEV1: 2.18L 76% pred, FEV1/fvc: 50%. Normal Lung volumes. DLCO: severely reduced with DLCO/VA of 35%. 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 Had EBRT, on Lupron. Follow with Miah/Marko. · Chronic Hoarseness. · CAD, prior stents, Distal Aortic Arthc Ulcer. Pt refused TAVR in 2020. Follows with Dr. Emerald Humphries. · Has been on Chronic Prednsione therapy · Sick Sinus Syndrom: had PM placed in 2020. · CRI 
· PAD · Leg cramps · Ex Smoker · Full Code RECOMMENDATIONS:  
· Oxygen to keep pox over 90%. · On empiric Abx in case there is an acute infection, and for COPD exacerbation. · ON his baseline   Prednisone. · ON Iv lasix. · Agree with rest of current treatments. · Will follow along with you. PCP: Licha Huerta Subjective: Pt was having more desaturations this am. No chest pain, no back pain. Pt has hoarseness. NO headaches. No back pain, no leg pain. Tolerating po intake pretty well. Discussed with  His nurse this am.  
 
CC: Shortness of breath This patient has been seen and evaluated at the request of Dr. Werner Collins for above. Patient is a 78 y.o. male who presents with above. Has increased shortness of breath over last 24 hrs, first started at 10pm on . NO chest pain, no cough. NO abdominal pain. No tobacco or Etoh use. Tolerating po intake well. He is on 4.5L of oxygen at home. He did not feelt that his breathing therapies were helping. NO cough. NO fevers, no nausea or vomiting. Past Medical History:  
Diagnosis Date  CAD (coronary artery disease)   
 mi 10/19/2015 with stent  COPD (chronic obstructive pulmonary disease) (HCC)  Hypertension  Prostate cancer ([facility] [address] 5/8/2012 Dr. Annel Laguna - diagnosed 2/2012 - s/p EBRT on Lupron Past Surgical History:  
Procedure Laterality Date  HX ORTHOPAEDIC  1961  
 left knee surgery  OH INS NEW/RPLCMT PRM PM W/TRANSV ELTRD ATRIAL&VENT N/A 9/21/2020 INSERT PPM DUAL performed by Grayson Galicia MD at [facility] CARDIAC CATH LAB Prior to Admission medications Medication Sig Start Date End Date Taking? Authorizing Provider  
atorvastatin (LIPITOR) 40 mg tablet Take 1 tablet by mouth once daily 12/30/20  Yes Maida Valenzuela MD  
omeprazole (PRILOSEC) 20 mg capsule Take 1 Cap by mouth daily. 10/7/20  Yes Maida Valenzuela MD  
abiraterone 250 mg tab Take  by mouth. 1 tab daily with a low fat breakfast   Yes Juliet, MD Nancy  
oxybutynin chloride XL (DITROPAN XL) 10 mg CR tablet Take 10 mg by mouth nightly. Yes Juliet, MD Nancy  
albuterol (PROVENTIL HFA, VENTOLIN HFA, PROAIR HFA) 90 mcg/actuation inhaler Use 2 puffs very 4 hours prn 7/30/20  Yes Maida Valenzuela MD  
metoprolol tartrate (LOPRESSOR) 50 mg tablet TAKE ONE TABLET BY MOUTH TWICE DAILY 7/13/20  Yes Renee Mckoy MD  
lisinopriL (PRINIVIL, ZESTRIL) 2.5 mg tablet Take 1 tablet by mouth once daily 3/21/20  Yes Renee Mckoy MD  
albuterol-ipratropium (DUO-NEB) 2.5 mg-0.5 mg/3 ml nebu 3 mL by Nebulization route every six (6) hours as needed (sob). 10/19/19  Yes Bret Valladares NP  
fluticasone furoate-vilanterol (BREO ELLIPTA) 100-25 mcg/dose inhaler Take 1 Puff by inhalation daily. 3/25/19  Yes Mary Serrano, DO Oxygen 4.5L continouus   Yes Provider, Historical  
 fluticasone (FLONASE) 50 mcg/actuation nasal spray 2 Sprays by Both Nostrils route daily. 19  Yes Ciara Kilpatrick MD  
aspirin 81 mg chewable tablet Take 1 Tab by mouth daily. RISK OF HEART ATTACK IF ANY MISSED DOSES 10/21/15  Yes Red Lee MD  
 
No Known Allergies Social History Tobacco Use  Smoking status: Former Smoker Packs/day: 0.50 Years: 40.00 Pack years: 20.00 Types: Cigarettes Quit date: 10/19/2015 Years since quittin.2  Smokeless tobacco: Never Used  Tobacco comment: trying quit 10/13/15 -  ppd Substance Use Topics  Alcohol use: No  
  Alcohol/week: 0.0 standard drinks Family History Problem Relation Age of Onset  Heart Disease Mother   
     pacemaker  Diabetes Mother  Diabetes Sister  Heart Disease Brother Current Facility-Administered Medications Medication Dose Route Frequency  sodium chloride (NS) flush 5-40 mL  5-40 mL IntraVENous Q8H  
 aspirin chewable tablet 81 mg  81 mg Oral DAILY  atorvastatin (LIPITOR) tablet 40 mg  40 mg Oral DAILY  metoprolol tartrate (LOPRESSOR) tablet 50 mg  50 mg Oral DAILY  sodium chloride (NS) flush 5-40 mL  5-40 mL IntraVENous Q8H  
 enoxaparin (LOVENOX) injection 40 mg  40 mg SubCUTAneous DAILY  cefTRIAXone (ROCEPHIN) 1 g in 0.9% sodium chloride (MBP/ADV) 50 mL MBP  1 g IntraVENous Q24H  
 azithromycin (ZITHROMAX) tablet 500 mg  500 mg Oral DAILY  pantoprazole (PROTONIX) tablet 40 mg  40 mg Oral ACB  oxybutynin chloride XL (DITROPAN XL) tablet 10 mg  10 mg Oral DAILY  lisinopriL (PRINIVIL, ZESTRIL) tablet 2.5 mg  2.5 mg Oral DAILY  arformoterol 15 mcg/budesonide 0.5 mg neb solution   Nebulization BID  ipratropium (ATROVENT) 0.02 % nebulizer solution 0.5 mg  0.5 mg Nebulization QID RT  
 furosemide (LASIX) injection 40 mg  40 mg IntraVENous DAILY  predniSONE (DELTASONE) tablet 10 mg  10 mg Oral DAILY WITH BREAKFAST Review of Systems: A comprehensive review of systems was negative. Objective:  
Vital Signs:   
Visit Vitals /74 (BP 1 Location: Right arm, BP Patient Position: At rest) Pulse 85 Temp 97.4 °F (36.3 °C) Resp 18 Ht 6' (1.829 m) Wt 89.4 kg (197 lb) SpO2 93% BMI 26.72 kg/m² O2 Device: Nasal cannula O2 Flow Rate (L/min): 5 l/min Temp (24hrs), Av.6 °F (36.4 °C), Min:97.4 °F (36.3 °C), Max:97.8 °F (36.6 °C) Intake/Output:  
Last shift:      No intake/output data recorded. Last 3 shifts:  1901 -  0700 In: 240 [P.O.:240] Out: 1100 [Urine:1100] Intake/Output Summary (Last 24 hours) at 2021 1036 Last data filed at 2021 2323 Gross per 24 hour Intake 240 ml Output 600 ml Net -360 ml Physical Exam:  
General:  Alert, cooperative, no distress, appears stated age. Has his chronic Hoarseness. On 4.5L NC oxygen. Head:  Normocephalic, without obvious abnormality, atraumatic. Eyes:  Conjunctivae/corneas clear. PERRL, EOMs intact. Nose: Nares normal. Septum midline. Mucosa normal. No drainage or sinus tenderness. Throat: Lips, mucosa, and tongue normal. Teeth and gums normal.  
Neck: Supple, symmetrical, trachea midline, no adenopathy, thyroid: no enlargment/tenderness/nodules, no carotid bruit and no JVD. Back:   Symmetric, no curvature. ROM normal.  
Lungs:   Clear to auscultation bilaterally. Good air movement. Chest wall:  No tenderness or deformity. Has PPM in place over his left upper chest.   
Heart:  Regular rate and rhythm, S1, S2 normal, no murmur, click, rub or gallop. Abdomen:   Soft, non-tender. Bowel sounds normal. No masses,  No organomegaly. Extremities: Extremities normal, atraumatic, no cyanosis or edema. Pulses: 2+ and symmetric all extremities. Skin: Skin color, texture, turgor normal. No rashes or lesions Lymph nodes: Cervical, supraclavicular, and axillary nodes normal.  
 Neurologic: Grossly nonfocal, motor seems to be intact. 
Psych: No overt anxiety or depression.   
 
Data review:  
 
Recent Results (from the past 24 hour(s))  
TROPONIN I  
 Collection Time: 01/18/21 11:41 AM  
Result Value Ref Range  
 Troponin-I, Qt. <0.05 <0.05 ng/mL  
SED RATE (ESR)  
 Collection Time: 01/18/21 11:41 AM  
Result Value Ref Range  
 Sed rate, automated 51 (H) 0 - 20 mm/hr  
CRP, HIGH SENSITIVITY  
 Collection Time: 01/18/21 11:41 AM  
Result Value Ref Range  
 CRP, High sensitivity 7.3 mg/L  
LD  
 Collection Time: 01/18/21 11:41 AM  
Result Value Ref Range  
  (H) 85 - 241 U/L  
D DIMER  
 Collection Time: 01/18/21 11:41 AM  
Result Value Ref Range  
 D-dimer 1.29 (H) 0.00 - 0.65 mg/L FEU  
FERRITIN  
 Collection Time: 01/18/21 11:41 AM  
Result Value Ref Range  
 Ferritin 34 26 - 388 NG/ML  
FIBRINOGEN  
 Collection Time: 01/18/21 11:41 AM  
Result Value Ref Range  
 Fibrinogen 583 (H) 200 - 475 mg/dL  
CK  
 Collection Time: 01/18/21 11:41 AM  
Result Value Ref Range  
 CK 90 39 - 308 U/L  
PROCALCITONIN  
 Collection Time: 01/18/21 11:41 AM  
Result Value Ref Range  
 Procalcitonin <0.05 ng/mL  
METABOLIC PANEL, COMPREHENSIVE  
 Collection Time: 01/19/21 12:09 AM  
Result Value Ref Range  
 Sodium 140 136 - 145 mmol/L  
 Potassium 4.2 3.5 - 5.1 mmol/L  
 Chloride 111 (H) 97 - 108 mmol/L  
 CO2 22 21 - 32 mmol/L  
 Anion gap 7 5 - 15 mmol/L  
 Glucose 130 (H) 65 - 100 mg/dL  
 BUN 19 6 - 20 MG/DL  
 Creatinine 1.33 (H) 0.70 - 1.30 MG/DL  
 BUN/Creatinine ratio 14 12 - 20    
 GFR est AA >60 >60 ml/min/1.73m2  
 GFR est non-AA 51 (L) >60 ml/min/1.73m2  
 Calcium 8.5 8.5 - 10.1 MG/DL  
 Bilirubin, total 0.4 0.2 - 1.0 MG/DL  
 ALT (SGPT) 14 12 - 78 U/L  
 AST (SGOT) 15 15 - 37 U/L  
 Alk. phosphatase 71 45 - 117 U/L  
 Protein, total 7.6 6.4 - 8.2 g/dL  
 Albumin 3.2 (L) 3.5 - 5.0 g/dL  
 Globulin 4.4 (H) 2.0 - 4.0 g/dL  
 A-G Ratio 0.7 (L) 1.1 - 2.2    
CBC W/O DIFF  
 Collection Time: 01/19/21 12:09 AM  
Result Value Ref Range WBC 8.8 4.1 - 11.1 K/uL  
 RBC 4.17 4.10 - 5.70 M/uL  
 HGB 10.8 (L) 12.1 - 17.0 g/dL HCT 35.5 (L) 36.6 - 50.3 % MCV 85.1 80.0 - 99.0 FL  
 MCH 25.9 (L) 26.0 - 34.0 PG  
 MCHC 30.4 30.0 - 36.5 g/dL  
 RDW 17.9 (H) 11.5 - 14.5 % PLATELET 092 416 - 637 K/uL MPV 12.0 8.9 - 12.9 FL  
 NRBC 0.2 (H) 0  WBC ABSOLUTE NRBC 0.02 (H) 0.00 - 0.01 K/uL Imaging: 
I have personally reviewed the patients radiographs and have reviewed the reports: 
 
 
1-18-21: CXR: TECHNIQUE: Portable AP upright chest view at 0051 hours 
  
FINDINGS: The left chest ICD and wires are stable. The cardiomediastinal 
contours are stable. There are mild bilateral interstitial and patchy airspace 
opacities. There is no pleural effusion or pneumothorax. The bones and upper 
abdomen are stable. 
  
IMPRESSION IMPRESSION: Mild bilateral interstitial and patchy airspace opacities can be 
seen with pulmonary edema or atypical/viral infection. 9-: CT of chest: IMPRESSION:  
1. No evidence for embolism. 2. 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 3. Nonspecific consolidative opacifications in the posterior medial basilar 
segments of the left lower lobe and the inferior lingula. · 9-17-20: Echo: LV: Estimated LVEF is 55 - 60%. Normal cavity size and systolic function (ejection fraction normal). Mild concentric hypertrophy. · TV: Mild to moderate tricuspid valve regurgitation is present. PA: Moderate to severe pulmonary hypertension. Pulmonary arterial systolic pressure is 61 mmHg.  
 
 
 
  
Jareth Valles MD

## 2021-01-19 NOTE — PROGRESS NOTES
Physician Progress Note Nando Tejada 
CSN #:                  [id number] :                       1940 ADMIT DATE:       2021 12:28 AM 
[address] [place] DATE: 
RESPONDING 
PROVIDER #:        Abdon Becerril MD 
 
 
 
 
QUERY TEXT: 
 
Dr Shreyas Danielle: 
 
Pt admitted with Acute hypoxic respiratory failure and has Acute on chronic CHF documented. If possible, please document in progress notes and discharge summary further specificity regarding the type of CHF: 
 
The medical record reflects the following: 
Risk Factors: 80yoM w/ hx SSS, Pulmonary HTN . CKD III, CAD, HTN, COPD Clinical Indicators: SOB, Dyspnea @ rest & exertion; air hunger; volume overload; 2+ pitting LE edema,; NTpBNP 1069; CXR  Mild bilateral interstitial and patchy airspace opacities Treatment: IV Lasix, Daily weights, I&O, Echo, supplemental O2 
 
thank you, 
Evan Orr RN, CDI Options provided: 
-- Acute on Chronic Systolic CHF/HFrEF 
-- Acute on Chronic Diastolic CHF/HFpEF 
-- Acute on Chronic Systolic and Diastolic CHF 
-- Acute Systolic CHF/HFrEF 
-- Acute Diastolic CHF/HFpEF 
-- Acute Systolic and Diastolic CHF 
-- Chronic Systolic CHF/HFrEF 
-- Chronic Diastolic CHF/HFpEF 
-- Chronic Systolic and Diastolic CHF 
-- Other - I will add my own diagnosis -- Disagree - Not applicable / Not valid -- Disagree - Clinically unable to determine / Unknown 
-- Refer to Clinical Documentation Reviewer PROVIDER RESPONSE TEXT: 
 
This patient is in acute on chronic diastolic CHF/HFpEF.  
 
Query created by: Jaun Severe on 2021 1:12 PM 
 
 
Electronically signed by:  Abdon Becerril MD 2021 4:55 PM

## 2021-01-19 NOTE — PROGRESS NOTES
Hospitalist Progress Note NAME: Franco Kennedy :  1940 MRN:  [id number] Assessment / Plan: 
Acute hypoxic respiratory failure, POA Acute on chronic congestive heart failure, heart failure with preserved ejection fraction, POA Community-acquired pneumonia, POA Moderate to severe pulmonary hypertension, POA Covid PCR test came back negative DC droplet and  contact isolation. Patient is a 3 to 4 L of oxygen at home now requiring up to 6 L. 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 Vascular surgery recommending TAVR in 2020 but patient refused surgery Asymptomatic Sick sinus syndrome Status post pacemaker insertion 2020 CKD stage III Creatinine baseline Hypertension Coronary artery disease Continue home meds as tolerated Chronic hoarseness supportive care Code Status: Full code DVT Prophylaxis: Lovenox GI Prophylaxis: not indicated Baseline: Active, independent Body mass index is 26.72 kg/m².: 25.0 - 29.9 Overweight Subjective: Chief Complaint / Reason for Physician Visit \"Patient was seen and examined today feels comfortable mild shortness of breath denied chest pain denied other complaints. \". Discussed with RN events overnight. Review of Systems: 
Symptom Y/N Comments  Symptom Y/N Comments Fever/Chills n   Chest Pain n   
Poor Appetite    Edema Cough y   Abdominal Pain n   
Sputum    Joint Pain SOB/WHEAT y   Pruritis/Rash Nausea/vomit    Tolerating PT/OT Diarrhea    Tolerating Diet Constipation    Other Could NOT obtain due to:   
 
Objective: VITALS:  
Last 24hrs VS reviewed since prior progress note. Most recent are: Patient Vitals for the past 24 hrs: 
 Temp Pulse Resp BP SpO2  
01/19/21 1429 97.3 °F (36.3 °C) 86 18 122/70 90 % 01/19/21 1127     91 % 01/19/21 1117     (!) 89 % 01/19/21 1046 97.4 °F (36.3 °C) 73 18 116/74 90 % 01/19/21 0804     93 % 01/19/21 0725 97.4 °F (36.3 °C) 85 18 137/74 94 % 01/19/21 0310 97.4 °F (36.3 °C) 80 18 (!) 123/51 95 % 01/18/21 2211 97.6 °F (36.4 °C) 75 20 (!) 146/60 96 % 01/18/21 1935 97.4 °F (36.3 °C) 78 20 127/60 93 % 01/18/21 1702 97.8 °F (36.6 °C) 74 22 106/73 96 % Intake/Output Summary (Last 24 hours) at 1/19/2021 1530 Last data filed at 1/19/2021 1429 Gross per 24 hour Intake 960 ml Output 850 ml Net 110 ml PHYSICAL EXAM: 
General: WD, WN. Alert, cooperative, no acute distress   
EENT:  EOMI. Anicteric sclerae. MMM Resp:  Decreased air entry in the lower lung fields and some crackles heard. CV:  Regular  rhythm,  No edema GI:  Soft, Non distended, Non tender.  +Bowel sounds Neurologic:  Alert and oriented X 3, normal speech, No lower extremity pitting significant edema. Reviewed most current lab test results and cultures  YES Reviewed most current radiology test results   YES Review and summation of old records today    NO Reviewed patient's current orders and MAR    YES 
PMH/SH reviewed - no change compared to H&P 
________________________________________________________________________ Care Plan discussed with: 
  Comments Patient y Family RN y   
Care Manager Consultant Multidiciplinary team rounds were held today with , nursing, pharmacist and clinical coordinator. Patient's plan of care was discussed; medications were reviewed and discharge planning was addressed. ________________________________________________________________________ Total NON critical care TIME: 35   Minutes Total CRITICAL CARE TIME Spent:   Minutes non procedure based Comments >50% of visit spent in counseling and coordination of care    
________________________________________________________________________ Samantha Lorenzo MD  
 
Procedures: see electronic medical records for all procedures/Xrays and details which were not copied into this note but were reviewed prior to creation of Plan. LABS: 
I reviewed today's most current labs and imaging studies. Pertinent labs include: 
Recent Labs  
  01/19/21 
0009 01/18/21 
0047 WBC 8.8 6.4 HGB 10.8* 10.8* HCT 35.5* 36.0*  
 175 Recent Labs  
  01/19/21 
0009 01/18/21 
0047  143 K 4.2 3.6 * 113* CO2 22 23 * 180* BUN 19 16 CREA 1.33* 1.24  
CA 8.5 8.0* ALB 3.2* 3.1* TBILI 0.4 0.4 ALT 14 14 Signed:  Samantha Lorenzo MD

## 2021-01-19 NOTE — PROGRESS NOTES
Problem: Falls - Risk of 
Goal: *Absence of Falls Description: Document Jackie Mooney Fall Risk and appropriate interventions in the flowsheet. Outcome: Progressing Towards Goal 
Note: Fall Risk Interventions: 
  
 
  
 
Medication Interventions: Bed/chair exit alarm, Patient to call before getting OOB, Teach patient to arise slowly Problem: Pressure Injury - Risk of 
Goal: *Prevention of pressure injury Description: Document Joe Scale and appropriate interventions in the flowsheet. Outcome: Progressing Towards Goal 
Note: Pressure Injury Interventions: 
Sensory Interventions: Assess changes in LOC, Assess need for specialty bed, Keep linens dry and wrinkle-free, Maintain/enhance activity level, Minimize linen layers, Monitor skin under medical devices Moisture Interventions: Absorbent underpads, Apply protective barrier, creams and emollients, Check for incontinence Q2 hours and as needed, Internal/External urinary devices, Maintain skin hydration (lotion/cream), Offer toileting Q_hr Activity Interventions: Increase time out of bed, Pressure redistribution bed/mattress(bed type), PT/OT evaluation Mobility Interventions: HOB 30 degrees or less, Pressure redistribution bed/mattress (bed type), PT/OT evaluation, Turn and reposition approx. every two hours(pillow and wedges) Nutrition Interventions: Document food/fluid/supplement intake, Offer support with meals,snacks and hydration Friction and Shear Interventions: Apply protective barrier, creams and emollients, HOB 30 degrees or less, Lift sheet, Minimize layers, Sit at 90-degree angle

## 2021-01-20 NOTE — PALLIATIVE CARE
Consult received , because of high consult volume today , we will see patient tomorrow . Thank you for including Palliative care in the care of this patient .

## 2021-01-20 NOTE — PROGRESS NOTES
Reason for Admission:   Acute on chronic diastolic heart failure, acute respiratory failure with hypoxia and suspected COVID-19 virus infection RUR Score:    27 Moderate risk for readmission PCP: First and Last name:  Dr. Lena Castillo Name of Practice:  
 Are you a current patient: Yes/No: Yes Approximate date of last visit: Unknown Can you participate in a virtual visit if needed: No 
 
Do you (patient/family) have any concerns for transition/discharge? None Plan for utilizing home health:   Pt has had home health in the past. Pt is receptive to home health if needed at d/c. Current Advanced Directive/Advance Care Plan:  Pt is FULL code status. Pt has an ACP on file. Advance Care Planning General Advance Care Planning (ACP) Conversation Date of Conversation: 1/20/2021 Conducted with: Patient with Decision Making Capacity Healthcare Decision Maker:  
  Primary Decision Maker: Campos Rodriguezluisana - Daughter - [phone] Secondary Decision Maker: Fran Buchanan - Daughter - [phone] Content/Action Overview: Has ACP document(s) on file - reflects the patient's care preferences Reviewed DNR/DNI and patient elects Full Code (Attempt Resuscitation) Length of Voluntary ACP Conversation in minutes:  16 minutes Transition of Care Plan:       
Home Follow-up Appointment 2nd IM Letter CM met with pt at bedside to discuss d/c plan. Pt was alert and oriented CM verified pt's demographics, insurance and PCP. Pt is a 77 y/o -American female admitted to [facility]/18/2021 for  Acute on chronic diastolic heart failure, acute respiratory failure with hypoxia and suspected COVID-19 virus infection. Pt's PCP is Dr. Shannon Dickerson. Pt sees PCP twice a year. Pt uses [facility] in [place] for Rx. Pt resides with his daughter in a two level home with ramp. Pt is independent with ADL's and IADL's. Pt does drive. Pt has a walker, nebulizer and wheelchair. Pt has had home health in the past. No SNF or IPR in the past. Pt is FULL code status. Pt does have an ACP on file. Pt's daughter Fortino Plunkett will transport at d/c. Care Management Interventions PCP Verified by CM: No(Pt's PCP is Dr. Shannon Dickerson. Pt sees PCP twice a year.) Palliative Care Criteria Met (RRAT>21 & CHF Dx)?: No 
Mode of Transport at Discharge: Other (see comment)(Pt's daughter Fortino Plunkett will transport at d/c. ) Transition of Care Consult (CM Consult): Discharge Planning(Home with Follow-up Appointments) Discharge Durable Medical Equipment: No(Pt has a walker, nebulizer and wheelchair. ) Physical Therapy Consult: No 
Occupational Therapy Consult: No 
Speech Therapy Consult: No 
Current Support Network: Relative's Home(Pt resides with his daughter in a two level home with ramp.) Confirm Follow Up Transport: Self(Pt does drive) [employer] Information Provided?: No 
Discharge Location Discharge Placement: (Home with Follow-up Appointments) CM will continue to follow patient for discharge planning needs and arrange for services as deemed necessary. Bryson Albrecht [address] [place] [address] 
[phone]

## 2021-01-20 NOTE — PROGRESS NOTES
Hospitalist Progress Note NAME: Ottoniel Whipple :  1940 MRN:  [id number] Assessment / Plan: 
Acute hypoxic respiratory failure, POA Acute on chronic congestive heart failure, heart failure with preserved ejection fraction, POA Community-acquired pneumonia, POA Moderate to severe pulmonary hypertension, POA Covid PCR test came back negative DC droplet and  contact isolation. Patient's oxygen requirement increased to 10 high flow oxygen patients at 4 L at home. 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 Vascular surgery recommending TAVR in 2020 but patient refused surgery Asymptomatic Sick sinus syndrome Status post pacemaker insertion 2020 CKD stage III Creatinine baseline Hypertension Coronary artery disease Continue home meds as tolerated Chronic hoarseness supportive care Code Status: Full code DVT Prophylaxis: Lovenox GI Prophylaxis: not indicated Baseline: Active, independent Body mass index is 26.83 kg/m².: 25.0 - 29.9 Overweight Subjective: Chief Complaint / Reason for Physician Visit \"Patient looks comfortable sitting on the chair but his oxygen requirement increased to 10-15 and high flow this morning. No chest pain no any other issues. .\". Discussed with RN events overnight. Review of Systems: 
Symptom Y/N Comments  Symptom Y/N Comments Fever/Chills n   Chest Pain n   
Poor Appetite    Edema Cough y   Abdominal Pain n   
Sputum    Joint Pain SOB/WHEAT y   Pruritis/Rash Nausea/vomit    Tolerating PT/OT Diarrhea    Tolerating Diet Constipation    Other Could NOT obtain due to:   
 
Objective: VITALS:  
 Last 24hrs VS reviewed since prior progress note. Most recent are: 
Patient Vitals for the past 24 hrs: 
 Temp Pulse Resp BP SpO2  
01/20/21 0856  100  (!) 144/76   
01/20/21 0819 97.7 °F (36.5 °C) 100 20 (!) 122/91 93 % 01/20/21 0757     91 % 01/20/21 0718  96 18 (!) 121/103 91 % 01/20/21 0300     92 % 01/20/21 0257 97.8 °F (36.6 °C) 84 16 132/80 93 % 01/19/21 2353 97.8 °F (36.6 °C) 94 16 (!) 155/76 93 % 01/19/21 2212 97.6 °F (36.4 °C) 94 18 (!) 147/80 98 % 01/19/21 2023     93 % 01/19/21 1956  100 18 (!) 105/53 98 % 01/19/21 1630    127/70   
01/19/21 1538     93 % 01/19/21 1429 97.3 °F (36.3 °C) 86 18 122/70 90 % 01/19/21 1127     91 % 01/19/21 1117     (!) 89 % 01/19/21 1046 97.4 °F (36.3 °C) 73 18 116/74 90 % Intake/Output Summary (Last 24 hours) at 1/20/2021 1019 Last data filed at 1/20/2021 3772 Gross per 24 hour Intake 720 ml Output 900 ml Net -180 ml PHYSICAL EXAM: 
General: WD, WN. Alert, cooperative, no acute distress   
EENT:  EOMI. Anicteric sclerae. MMM Resp:  Decreased air entry in the lower lung fields and some crackles heard. CV:  Regular  rhythm,  No edema GI:  Soft, Non distended, Non tender.  +Bowel sounds Neurologic:  Alert and oriented X 3, normal speech, No lower extremity pitting significant edema. Reviewed most current lab test results and cultures  YES Reviewed most current radiology test results   YES Review and summation of old records today    NO Reviewed patient's current orders and MAR    YES 
PMH/SH reviewed - no change compared to H&P 
________________________________________________________________________ Care Plan discussed with: 
  Comments Patient y Family RN y   
Care Manager Consultant Multidiciplinary team rounds were held today with , nursing, pharmacist and clinical coordinator. Patient's plan of care was discussed; medications were reviewed and discharge planning was addressed. ________________________________________________________________________ Total NON critical care TIME: 35   Minutes Total CRITICAL CARE TIME Spent:   Minutes non procedure based Comments >50% of visit spent in counseling and coordination of care    
________________________________________________________________________ Peri Shepherd MD  
 
Procedures: see electronic medical records for all procedures/Xrays and details which were not copied into this note but were reviewed prior to creation of Plan. LABS: 
I reviewed today's most current labs and imaging studies. Pertinent labs include: 
Recent Labs  
  01/20/21 
0140 01/19/21 
0009 01/18/21 
0047 WBC 8.7 8.8 6.4 HGB 10.4* 10.8* 10.8* HCT 34.8* 35.5* 36.0*  
 192 175 Recent Labs  
  01/20/21 
0140 01/19/21 
0009 01/18/21 
0047  140 143 K 4.2 4.2 3.6 * 111* 113* CO2 21 22 23 * 130* 180* BUN 27* 19 16 CREA 1.48* 1.33* 1.24  
CA 8.9 8.5 8.0* ALB 3.0* 3.2* 3.1* TBILI 0.2 0.4 0.4 ALT 13 14 14 Signed:  Peri Shepherd MD

## 2021-01-20 NOTE — PROGRESS NOTES
[facility] Pulmonary, Critical Care, and Sleep Medicine Patient Consult Name: Van Parekh MRN: [id number] : 1940 Hospital: [facility] Date: 2021 IMPRESSION:  
· COVID NEGATIVE · Acute on Chronic Hypoxic Respiratory Failure, POx was 88% on RA. Used Inogene oxygen at 3L per NC. Now on 9 L when seen this am.  
· Suspected volume overload. Seems a little better when seen this am.  
· Possible atypical Pneumonia · Pulmonary Hypertension, felt to be moderate to severe. LVEFo f 55%. · COPD (PFT from 20: FVC: 4.35L 111% pred. FEV1: 2.18L 76% pred, FEV1/fvc: 50%. Normal Lung volumes. DLCO: severely reduced with DLCO/VA of 35%. 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 Had EBRT, on Lupron. Follow with Miah/Marko. · Chronic Hoarseness. · CAD, prior stents, Distal Aortic Arthc Ulcer. Pt refused TAVR in 2020. Follows with Dr. Ling Jha. · Has been on Chronic Prednsione therapy · Sick Sinus Syndrom: had PM placed in 2020. · CRI 
· PAD · Leg cramps · Ex Smoker · Sister is [place]: h: [id number] and cell: [phone]. · Full Code RECOMMENDATIONS:  
· Oxygen to keep pox over88%, Discussed with nurse this am.  
· On empiric Abx in case there is an acute infection, and for COPD exacerbation. · ON his baseline   Prednisone. · ON Iv lasix. · Agree with rest of current treatments. · If not improving may need palliative care evalution. · Will follow along with you. PCP: Verena Coto Subjective:  
 
Last 24 hrs: Pt feels about the same. On increased oxygen. NO chest pain, no back pain, Has a dry cough. Desaturates when he tries to eat. Pt was having more desaturations this am. No chest pain, no back pain. Pt has hoarseness. NO headaches. No back pain, no leg pain. Tolerating po intake pretty well. Discussed with  His nurse this am.  
 
CC: Shortness of breath This patient has been seen and evaluated at the request of Dr. Jama Cornelius for above. Patient is a 78 y.o. male who presents with above. Has increased shortness of breath over last 24 hrs, first started at 10pm on 1/17. NO chest pain, no cough. NO abdominal pain. No tobacco or Etoh use. Tolerating po intake well. He is on 4.5L of oxygen at home. He did not feelt that his breathing therapies were helping. NO cough. NO fevers, no nausea or vomiting. Past Medical History:  
Diagnosis Date  CAD (coronary artery disease)   
 mi 10/19/2015 with stent  COPD (chronic obstructive pulmonary disease) (HCC)  Hypertension  Prostate cancer ([facility] [address] 5/8/2012 Dr. Elisabet Laguna - diagnosed 2/2012 - s/p EBRT on Lupron Past Surgical History:  
Procedure Laterality Date  HX ORTHOPAEDIC  1961  
 left knee surgery  ND INS NEW/RPLCMT PRM PM W/TRANSV ELTRD ATRIAL&VENT N/A 9/21/2020 INSERT PPM DUAL performed by Nory Carrillo MD at [facility] C[facility] CATH LAB Prior to Admission medications Medication Sig Start Date End Date Taking? Authorizing Provider  
atorvastatin (LIPITOR) 40 mg tablet Take 1 tablet by mouth once daily Patient taking differently: Take 40 mg by mouth daily. 12/30/20  Yes Elly Dorantes MD  
omeprazole (PRILOSEC) 20 mg capsule Take 1 Cap by mouth daily. 10/7/20  Yes Elly Dorantes MD  
abiraterone 250 mg tab Take 250 mg by mouth daily (with breakfast). With Low Fat Breakfast   Yes Juliet, MD Nancy  
oxybutynin chloride XL (DITROPAN XL) 10 mg CR tablet Take 10 mg by mouth nightly. Yes Juliet, MD Nancy  
albuterol (PROVENTIL HFA, VENTOLIN HFA, PROAIR HFA) 90 mcg/actuation inhaler Use 2 puffs very 4 hours prn 7/30/20  Yes Elly Dorantes MD  
metoprolol tartrate (LOPRESSOR) 50 mg tablet TAKE ONE TABLET BY MOUTH TWICE DAILY Patient taking differently: Take 50 mg by mouth two (2) times a day. TAKE ONE TABLET BY MOUTH TWICE DAILY 20  Yes Clementine Andres MD  
lisinopriL (PRINIVIL, ZESTRIL) 2.5 mg tablet Take 1 tablet by mouth once daily Patient taking differently: Take 2.5 mg by mouth daily. 3/21/20  Yes Clementine Andres MD  
albuterol-ipratropium (DUO-NEB) 2.5 mg-0.5 mg/3 ml nebu 3 mL by Nebulization route every six (6) hours as needed (sob). 10/19/19  Yes Linda Kilpatrick NP  
fluticasone furoate-vilanterol (BREO ELLIPTA) 100-25 mcg/dose inhaler Take 1 Puff by inhalation daily. 3/25/19  Yes Gin Peres, DO Oxygen 4.5L continouus   Yes Provider, Historical  
fluticasone (FLONASE) 50 mcg/actuation nasal spray 2 Sprays by Both Nostrils route daily. 19  Yes Paz Kc MD  
aspirin 81 mg chewable tablet Take 1 Tab by mouth daily. RISK OF HEART ATTACK IF ANY MISSED DOSES Patient taking differently: Take 81 mg by mouth daily. RISK OF HEART ATTACK IF ANY MISSED DOSES  Indications: treatment to prevent a heart attack 10/21/15  Yes Clementine Andres MD  
 
No Known Allergies Social History Tobacco Use  Smoking status: Former Smoker Packs/day: 0.50 Years: 40.00 Pack years: 20.00 Types: Cigarettes Quit date: 10/19/2015 Years since quittin.2  Smokeless tobacco: Never Used  Tobacco comment: trying quit 10/13/15 -  ppd Substance Use Topics  Alcohol use: No  
  Alcohol/week: 0.0 standard drinks Family History Problem Relation Age of Onset  Heart Disease Mother   
     pacemaker  Diabetes Mother  Diabetes Sister  Heart Disease Brother Current Facility-Administered Medications Medication Dose Route Frequency  arformoterol 15 mcg/budesonide 0.5 mg neb solution   Nebulization BID RT  
 sodium chloride (NS) flush 5-40 mL  5-40 mL IntraVENous Q8H  
 aspirin chewable tablet 81 mg  81 mg Oral DAILY  atorvastatin (LIPITOR) tablet 40 mg  40 mg Oral DAILY  metoprolol tartrate (LOPRESSOR) tablet 50 mg  50 mg Oral DAILY  sodium chloride (NS) flush 5-40 mL  5-40 mL IntraVENous Q8H  
 enoxaparin (LOVENOX) injection 40 mg  40 mg SubCUTAneous DAILY  cefTRIAXone (ROCEPHIN) 1 g in 0.9% sodium chloride (MBP/ADV) 50 mL MBP  1 g IntraVENous Q24H  
 azithromycin (ZITHROMAX) tablet 500 mg  500 mg Oral DAILY  pantoprazole (PROTONIX) tablet 40 mg  40 mg Oral ACB  oxybutynin chloride XL (DITROPAN XL) tablet 10 mg  10 mg Oral DAILY  lisinopriL (PRINIVIL, ZESTRIL) tablet 2.5 mg  2.5 mg Oral DAILY  ipratropium (ATROVENT) 0.02 % nebulizer solution 0.5 mg  0.5 mg Nebulization QID RT  
 furosemide (LASIX) injection 40 mg  40 mg IntraVENous DAILY  predniSONE (DELTASONE) tablet 10 mg  10 mg Oral DAILY WITH BREAKFAST Review of Systems: A comprehensive review of systems was negative. Objective:  
Vital Signs:   
Visit Vitals BP (!) 144/76 Pulse 100 Temp 97.7 °F (36.5 °C) Resp 20 Ht 6' (1.829 m) Wt 89.7 kg (197 lb 12.8 oz) SpO2 93% BMI 26.83 kg/m² O2 Device: Hi flow nasal cannula O2 Flow Rate (L/min): 15 l/min Temp (24hrs), Av.6 °F (36.4 °C), Min:97.3 °F (36.3 °C), Max:97.8 °F (36.6 °C) Intake/Output:  
Last shift:      No intake/output data recorded. Last 3 shifts:  1901 -  0700 In: 1200 [P.O.:1200] Out: 1350 [OXWPP:5474] Intake/Output Summary (Last 24 hours) at 2021 9554 Last data filed at 2021 8427 Gross per 24 hour Intake 720 ml Output 900 ml Net -180 ml Physical Exam:  
General:  Alert, cooperative, no distress, appears stated age. Has his chronic Hoarseness. On 9L NC oxygen. Pox of 90%. Head:  Normocephalic, without obvious abnormality, atraumatic. Eyes:  Conjunctivae/corneas clear. PERRL, EOMs intact. Nose: Nares normal. Septum midline. Mucosa normal. No drainage or sinus tenderness. Throat: Lips, mucosa, and tongue normal. Teeth and gums normal.  
Neck: Supple, symmetrical, trachea midline, no adenopathy, thyroid: no enlargment/tenderness/nodules, no carotid bruit and no JVD. Back:   Symmetric, no curvature. ROM normal.  
Lungs:   Clear to auscultation bilaterally. Good air movement. Seems comfortable. Chest wall:  No tenderness or deformity. Has PPM in place over his left upper chest.   
Heart:  Regular rate and rhythm, S1, S2 normal, no murmur, click, rub or gallop. Abdomen:   Soft, non-tender. Bowel sounds normal. No masses,  No organomegaly. Extremities: Extremities normal, atraumatic, no cyanosis or edema. Pulses: 2+ and symmetric all extremities. Skin: Skin color, texture, turgor normal. No rashes or lesions Lymph nodes: Cervical, supraclavicular, and axillary nodes normal.  
Neurologic: Grossly nonfocal, motor seems to be intact. Psych: No overt anxiety or depression. Data review:  
 
Recent Results (from the past 24 hour(s)) ECHO ADULT COMPLETE Collection Time: 01/19/21  4:31 PM  
Result Value Ref Range IVSd 1.48 (A) 0.6 - 1.0 cm IVSs 1.92 cm LVIDd 4.04 (A) 4.2 - 5.9 cm LVIDs 2.58 cm  
 LVOT d 2.15 cm  
 LVPWd 1.57 (A) 0.6 - 1.0 cm  
 LVPWs 1.87 cm  
 LVOT Peak Gradient 2.48 mmHg LVOT Peak Velocity 78.67 cm/s RVIDd 2.36 cm Left Atrium Major Axis 3.69 cm  
 LA Area 4C 18.82 cm2 LA Vol 4C 43.94 18 - 58 mL Est. RA Pressure 10.00 mmHg Aortic Valve Area by Continuity of Peak Velocity 2.01 cm2 AoV PG 7.97 mmHg Aortic Valve Systolic Peak Velocity 920.63 cm/s  
 MV A Willian 63.76 cm/s Mitral Valve E Wave Deceleration Time 175.62 ms MV E Willian 29.68 cm/s  
 E/E' ratio (averaged) 7.24   
 E/E' lateral 4.82   
 E/E' septal 9.67 LV E' Lateral Velocity 6.16 cm/s LV E' Septal Velocity 3.07 cm/s Pulmonic Valve Systolic Peak Instantaneous Gradient 3.00 mmHg Pulmonic Valve Max Velocity 86.51 cm/s Ao Root D 3.35 cm MV E/A 0.47 LV Mass .3 88 - 224 g LV Mass AL Index 114.4 49 - 115 g/m2 Left Atrium Minor Axis 1.74 cm  
 LA Vol Index 20.75 16 - 28 ml/m2 MARV/BSA Pk Willian 0.9 cm2/m2 CBC WITH AUTOMATED DIFF Collection Time: 01/20/21  1:40 AM  
Result Value Ref Range WBC 8.7 4.1 - 11.1 K/uL  
 RBC 4.06 (L) 4.10 - 5.70 M/uL  
 HGB 10.4 (L) 12.1 - 17.0 g/dL HCT 34.8 (L) 36.6 - 50.3 % MCV 85.7 80.0 - 99.0 FL  
 MCH 25.6 (L) 26.0 - 34.0 PG  
 MCHC 29.9 (L) 30.0 - 36.5 g/dL  
 RDW 17.7 (H) 11.5 - 14.5 % PLATELET 243 466 - 536 K/uL MPV 12.5 8.9 - 12.9 FL  
 NRBC 0.0 0  WBC ABSOLUTE NRBC 0.00 0.00 - 0.01 K/uL NEUTROPHILS 77 (H) 32 - 75 % LYMPHOCYTES 17 12 - 49 % MONOCYTES 6 5 - 13 % EOSINOPHILS 0 0 - 7 % BASOPHILS 0 0 - 1 % IMMATURE GRANULOCYTES 0 0.0 - 0.5 % ABS. NEUTROPHILS 6.7 1.8 - 8.0 K/UL  
 ABS. LYMPHOCYTES 1.5 0.8 - 3.5 K/UL  
 ABS. MONOCYTES 0.5 0.0 - 1.0 K/UL  
 ABS. EOSINOPHILS 0.0 0.0 - 0.4 K/UL  
 ABS. BASOPHILS 0.0 0.0 - 0.1 K/UL  
 ABS. IMM. GRANS. 0.0 0.00 - 0.04 K/UL  
 DF MANUAL PLATELET COMMENTS Large Platelets RBC COMMENTS ANISOCYTOSIS 1+ 
    
 RBC COMMENTS POLYCHROMASIA 1+ METABOLIC PANEL, COMPREHENSIVE Collection Time: 01/20/21  1:40 AM  
Result Value Ref Range Sodium 138 136 - 145 mmol/L Potassium 4.2 3.5 - 5.1 mmol/L Chloride 110 (H) 97 - 108 mmol/L  
 CO2 21 21 - 32 mmol/L Anion gap 7 5 - 15 mmol/L Glucose 129 (H) 65 - 100 mg/dL BUN 27 (H) 6 - 20 MG/DL Creatinine 1.48 (H) 0.70 - 1.30 MG/DL  
 BUN/Creatinine ratio 18 12 - 20 GFR est AA 55 (L) >60 ml/min/1.73m2 GFR est non-AA 46 (L) >60 ml/min/1.73m2 Calcium 8.9 8.5 - 10.1 MG/DL Bilirubin, total 0.2 0.2 - 1.0 MG/DL  
 ALT (SGPT) 13 12 - 78 U/L  
 AST (SGOT) 15 15 - 37 U/L Alk. phosphatase 63 45 - 117 U/L Protein, total 7.2 6.4 - 8.2 g/dL Albumin 3.0 (L) 3.5 - 5.0 g/dL Globulin 4.2 (H) 2.0 - 4.0 g/dL A-G Ratio 0.7 (L) 1.1 - 2.2 Imaging: 
I have personally reviewed the patients radiographs and have reviewed the reports: 
 
 
1-18-21: CXR: TECHNIQUE: Portable AP upright chest view at 0051 hours 
  
FINDINGS: The left chest ICD and wires are stable. The cardiomediastinal 
contours are stable. There are mild bilateral interstitial and patchy airspace 
opacities. There is no pleural effusion or pneumothorax. The bones and upper 
abdomen are stable. 
  
IMPRESSION IMPRESSION: Mild bilateral interstitial and patchy airspace opacities can be 
seen with pulmonary edema or atypical/viral infection. 9-: CT of chest: IMPRESSION:  
1. No evidence for embolism. 2. 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 3. Nonspecific consolidative opacifications in the posterior medial basilar 
segments of the left lower lobe and the inferior lingula. · 9-17-20: Echo: LV: Estimated LVEF is 55 - 60%. Normal cavity size and systolic function (ejection fraction normal). Mild concentric hypertrophy. · TV: Mild to moderate tricuspid valve regurgitation is present. PA: Moderate to severe pulmonary hypertension. Pulmonary arterial systolic pressure is 61 mmHg.  
 
 
 
  
Opal Vyas MD

## 2021-01-20 NOTE — PROGRESS NOTES
0700: Bedside shift change report given to [facility] (oncoming nurse) by Sofie President, RN (offgoing nurse). Report included the following information SBAR and Kardex. 6417: Per Dr. Saul Days keep sats above 88%

## 2021-01-20 NOTE — PROGRESS NOTES
End of Shift Note Bedside shift change report given to [facility] (oncoming nurse) by Phuc Collins (offgoing nurse). Report included the following information SBAR, Kardex and [facility] Shift worked:  night Shift summary and any significant changes:  
   
  
Concerns for physician to address:   
  
Zone phone for oncoming shift:    
  
 
Activity: 
Activity Level: Up with Assistance Number times ambulated in hallways past shift: 0 Number of times OOB to chair past shift: 0 Cardiac:  
Cardiac Monitoring: Yes     
Cardiac Rhythm: Normal sinus rhythm Access:  
Current line(s): PIV Genitourinary:  
Urinary status: voiding Respiratory:  
O2 Device: Hi flow nasal cannula, Humidifier Chronic home O2 use?: YES Incentive spirometer at bedside: YES Actual Volume (ml): 250 ml GI: 
Last Bowel Movement Date: 01/18/21 Current diet:  DIET CARDIAC Regular Passing flatus: YES Tolerating current diet: YES 
% Diet Eaten: 75 % Pain Management:  
Patient states pain is manageable on current regimen: YES Skin: 
Joe Score: 18 Interventions: increase time out of bed Patient Safety: 
Fall Score: Total Score: 1 Interventions: bed/chair alarm, assistive device (walker, cane, etc), gripper socks, pt to call before getting OOB and stay with me (per policy) Length of Stay: 
Expected LOS: 4d 4h Actual LOS: 2 Phuc Collins

## 2021-01-20 NOTE — PROGRESS NOTES
Spiritual Care Assessment/Progress Note [place] 
 
 
NAME: Rosina Nice      MRN: [id number] AGE: 78 y.o. SEX: male Orthodoxy Affiliation: Orthodoxy  
Language: English  
 
1/20/2021     Total Time (in minutes): 72 Spiritual Assessment begun in [facility] through conversation with: 
  
    [x]Patient        [x] Family    [] Friend(s) Reason for Consult: Palliative Care, Initial/Spiritual Assessment Spiritual beliefs: (Please include comment if needed) [x] Identifies with a cristian tradition:     
   [] Supported by a cristian community:        
   [] Claims no spiritual orientation:       
   [] Seeking spiritual identity:            
   [] Adheres to an individual form of spirituality:       
   [] Not able to assess:                   
 
    
Identified resources for coping:  
   [x] Prayer                           
   [] Music                  [] Guided Imagery [x] Family/friends                 [] Pet visits [] Devotional reading                         [] Unknown 
   [] Other:                                      
 
 
Interventions offered during this visit: (See comments for more details) Patient Interventions: Affirmation of emotions/emotional suffering, Affirmation of cristian, Catharsis/review of pertinent events in supportive environment, Iconic (affirming the presence of God/Higher Power), Initial/Spiritual assessment, patient floor, Prayer (assurance of), Orthodoxy beliefs/image of God discussed Family/Friend(s): Affirmation of cristian, Iconic (affirming the presence of God/Higher Power), Prayer (assurance of) Plan of Care: 
 
 [] Support spiritual and/or cultural needs  
 [] Support AMD and/or advance care planning process    
 [] Support grieving process 
 [] Coordinate Rites and/or Rituals  
 [] Coordination with community clergy 
 [x] No spiritual needs identified at this time [] Detailed Plan of Care below (See Comments)  [] Make referral to Music Therapy 
[] Make referral to Pet Therapy    
[] Make referral to Addiction services 
[] Make referral to [facility] 
[] Make referral to Spiritual Care Partner 
[] No future visits requested       
[] Follow up upon further referrals Comments:   Initial visit on [facility] unit for spiritual assessment. Patient's sister was present. Provided pastoral presence and supportive listening as patient shared briefly his understanding of current medical concerns. He expressed he feels pretty good today although he mentioned having cramps in his legs. Patient's wife  a few years ago. He has supportive family as well as good spiritual support. 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 He expressed no spiritual needs/concerns at this time. He and his sister were receptive to assurance of prayer. Advised them of ongoing availability of pastoral support. DEMETRIUS Mcclelland, [facility], Staff  [facility]  Paging Service  [phone])

## 2021-01-21 NOTE — PROGRESS NOTES
Hospitalist Progress Note NAME: Nicole Tai :  1940 MRN:  [id number] Assessment / Plan: 
Acute hypoxic respiratory failure, POA Acute on chronic congestive heart failure, heart failure with preserved ejection fraction, POA Community-acquired pneumonia, POA Moderate to severe pulmonary hypertension, POA Patient's oxygen requirement increased to 10 high flow oxygen patients at 4 L at home. X-ray with possible pulmonary edema and airspace disease Echo done and  , ejection fraction within acceptable range. Suggesting diastolic congestive heart failure. 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 Nephrology help appreciated. Lisinopril stopped. The Lasix is continued. To have CT chest without contrast. 
Hypertension Coronary artery disease Continue home meds as tolerated Chronic hoarseness supportive care Code Status: Full code DVT Prophylaxis: Lovenox GI Prophylaxis: not indicated Baseline: Active, independent Body mass index is 26.83 kg/m².: 25.0 - 29.9 Overweight Subjective: Chief Complaint / Reason for Physician Visit \"Patient looks comfortable sitting on the chair but his oxygen requirement increased to 10-15 and high flow this morning. No chest pain no any other issues. .\". Discussed with RN events overnight. Review of Systems: 
Symptom Y/N Comments  Symptom Y/N Comments Fever/Chills n   Chest Pain n   
Poor Appetite    Edema Cough y   Abdominal Pain n   
Sputum    Joint Pain SOB/WHEAT y   Pruritis/Rash Nausea/vomit    Tolerating PT/OT Diarrhea    Tolerating Diet Constipation    Other Could NOT obtain due to:   
 
Objective: VITALS:  
Last 24hrs VS reviewed since prior progress note. Most recent are: 
Patient Vitals for the past 24 hrs: 
 Temp Pulse Resp BP SpO2  
01/21/21 0710  88 22 118/68 92 % 01/21/21 0350 97.6 °F (36.4 °C) 92 24 103/66 (!) 89 % 01/20/21 2330 98.7 °F (37.1 °C) 88 20 106/60 91 % 01/20/21 2009     93 % 01/20/21 1936 97.8 °F (36.6 °C) 84 20 (!) 101/55 92 % 01/20/21 1622     91 % 01/20/21 1604 97.7 °F (36.5 °C) 78 18 (!) 108/57 90 % 01/20/21 1204     90 % 01/20/21 1122 97.7 °F (36.5 °C) 84 20 124/68 92 % 01/20/21 0856  100  (!) 144/76   
01/20/21 0819 97.7 °F (36.5 °C) 100 20 (!) 122/91 93 % 01/20/21 0757     91 % Intake/Output Summary (Last 24 hours) at 1/21/2021 8943 Last data filed at 1/21/2021 6569 Gross per 24 hour Intake 1380 ml Output 1375 ml Net 5 ml PHYSICAL EXAM: 
General: WD, WN. Alert, cooperative, no acute distress   
EENT:  EOMI. Anicteric sclerae. MMM Resp:  Decreased air entry in the lower lung fields and some crackles heard. CV:  Regular  rhythm,  No edema GI:  Soft, Non distended, Non tender.  +Bowel sounds Neurologic:  Alert and oriented X 3, normal speech, No lower extremity pitting significant edema. Reviewed most current lab test results and cultures  YES Reviewed most current radiology test results   YES Review and summation of old records today    NO Reviewed patient's current orders and MAR    YES 
PMH/SH reviewed - no change compared to H&P 
________________________________________________________________________ Care Plan discussed with: 
  Comments Patient y Family RN y   
Care Manager Consultant  y Multidiciplinary team rounds were held today with , nursing, pharmacist and clinical coordinator. Patient's plan of care was discussed; medications were reviewed and discharge planning was addressed. ________________________________________________________________________ Total NON critical care TIME: 35   Minutes Total CRITICAL CARE TIME Spent:   Minutes non procedure based Comments >50% of visit spent in counseling and coordination of care    
________________________________________________________________________ Nelda Lezama MD  
 
Procedures: see electronic medical records for all procedures/Xrays and details which were not copied into this note but were reviewed prior to creation of Plan. LABS: 
I reviewed today's most current labs and imaging studies. Pertinent labs include: 
Recent Labs  
  01/21/21 
0115 01/20/21 
0140 01/19/21 
0009 WBC 9.3 8.7 8.8 HGB 10.5* 10.4* 10.8* HCT 34.1* 34.8* 35.5*  178 192 Recent Labs  
  01/21/21 
0115 01/20/21 
0140 01/19/21 
0009  138 140  
K 4.4 4.2 4.2  110* 111* CO2 21 21 22 * 129* 130* BUN 38* 27* 19  
CREA 1.79* 1.48* 1.33* CA 9.6 8.9 8.5 ALB 3.0* 3.0* 3.2* TBILI 0.4 0.2 0.4 ALT 12 13 14 Signed:  Nelda Lezama MD

## 2021-01-21 NOTE — CONSULTS
Palliative Medicine Consult Noé: [id number] ([id number]) Patient Name: Candelaria Hurtado YOB: 1940 Date of Initial Consult: 1/20/2021 Reason for Consult: Care Decisions Requesting Provider: Bret Cunningham MD 
Primary Care Physician: Andre Escoto MD 
 
 SUMMARY:  
Candelaria Hurtado is a 78 y.o. with a past history of CAD, COPD, HTN, CKD III, SSS s/p pacemaker, and an ulcer at the distal aortic arch and pulmonary hypertension who was admitted on 1/18/2021 from home with a diagnosis of acute hypoxic respiratory failure 2/2 acute on chronic chf, pneumonia and pulmonary hypertension. He was ruled out for COVID-19 but over the last 24-48 hours has had increasing oxygen needs Suspected volume overload, but also may have a component of atypical pneumonia. He also has pulmonary hypertension (moderate to severe) Current medical issues leading to Palliative Medicine involvement include: goals of care discussion in the setting of progressive respiratory illnesses and worsening respiratory failure this admission PALLIATIVE DIAGNOSES:  
1. Goals of care discussion 2. DNR Discussion 3. Shortness of breath 4. Debility PLAN:  
1. Reviewed the chart and spoke with nursing 2. Patient is sitting up in the chair, appears comfortable, but when he talks he desats on the monitor to the 80's. Clearly is not noticing though 3. His sister is at bedside, and asked a lot of great questions 4. We had a long discussion about his chronic health issues, and how they are all contributing to his current situation. We also talked about the progressive nature of his health issues. 5. We talked about the importance of knowing what your wishes are as your health deteriorates, especially around intubation and CPR. I have had this conversation with him before, but he does not have any opinion other than \"everyone dies\" 6. His sister talked a lot about converasations she has had with her PCP around the importance of talking about her wishes. She also shared that he has told her that it is easier on families when you are clear about your wishes. Even so, Mr Ginny Ovalles was still unable to give me ANY insight into what he was thinking 7. In the end, his sister was going to try and get his kids together to talk as a family about all of this. Daughter Liudmila Fontaine may call me tonight or tomorrow to hear all of this first hand. If not, she will be here tomorrow and I have asked our  to follow up with her and Mr Ginny Ovalles to continue these conversations 8. I tried to reiterate that this was important to talk about while we have time, as he has progressive diseases. I did not know how this hospitalization would go, but the fact that his oxygen requirements are increasing, means that these conversations are more important. 9. Initial consult note routed to primary continuity provider and/or primary health care team members 10. Communicated plan of care with: Palliative Brenden MONTANO 192 Team 
 
 GOALS OF CARE / TREATMENT PREFERENCES:  
 
GOALS OF CARE: 
Patient/Health Care Proxy Stated Goals: Prolong life TREATMENT PREFERENCES:  
Code Status: Full Code Advance Care Planning: 
[x] The [facility] Interdisciplinary Team has updated the ACP Navigator with Annamarie 8 and Patient Capacity Primary Decision Maker: Radames Ohara - Daughter - [phone] Secondary Decision Maker: Natalie Camarillo - Daughter - [phone] Advance Care Planning 1/19/2021 Patient's Healthcare Decision Maker is: - Confirm Advance Directive Yes, on file Patient Would Like to Complete Advance Directive - Does the patient have other document types - Medical Interventions: Full interventions Other Instructions: Other: As far as possible, the palliative care team has discussed with patient / health care proxy about goals of care / treatment preferences for patient. HISTORY:  
 
History obtained from: patient CHIEF COMPLAINT: none- feels fine HPI/SUBJECTIVE: The patient is:  
[x] Verbal and participatory [] Non-participatory due to:  
 
Conversational, in no distress, but on the monitor he is hypoxic with minimal exertion Clinical Pain Assessment (nonverbal scale for severity on nonverbal patients):  
Clinical Pain Assessment Severity: 0 Duration: for how long has pt been experiencing pain (e.g., 2 days, 1 month, years) Frequency: how often pain is an issue (e.g., several times per day, once every few days, constant) FUNCTIONAL ASSESSMENT:  
 
Palliative Performance Scale (PPS): PPS: 40 PSYCHOSOCIAL/SPIRITUAL SCREENING:  
 
Palliative IDT has assessed this patient for cultural preferences / practices and a referral made as appropriate to needs (Cultural Services, Patient Advocacy, Ethics, etc.) Any spiritual / Caodaism concerns: 
[] Yes /  [x] No 
 
Caregiver Burnout: 
[] Yes /  [x] No /  [] No Caregiver Present Anticipatory grief assessment:  
[x] Normal  / [] Maladaptive ESAS Anxiety: Anxiety: 0 
 
ESAS Depression: Depression: 0 REVIEW OF SYSTEMS:  
 
Positive and pertinent negative findings in ROS are noted above in HPI. The following systems were [x] reviewed / [] unable to be reviewed as noted in HPI Other findings are noted below. Systems: constitutional, ears/nose/mouth/throat, respiratory, gastrointestinal, genitourinary, musculoskeletal, integumentary, neurologic, psychiatric, endocrine. Positive findings noted below. Modified ESAS Completed by: provider Fatigue: 0 Depression: 0 Pain: 0 Anxiety: 0 Nausea: 0 Anorexia: 0 Dyspnea: 0 PHYSICAL EXAM:  
 
From RN flowsheet: 
Wt Readings from Last 3 Encounters: 01/21/21 197 lb 12.8 oz (89.7 kg) 10/07/20 194 lb (88 kg) 10/06/20 194 lb 8 oz (88.2 kg) Blood pressure 118/64, pulse 71, temperature 97.7 °F (36.5 °C), resp. rate 20, height 6' (1.829 m), weight 197 lb 12.8 oz (89.7 kg), SpO2 93 %. Pain Scale 1: Numeric (0 - 10) Pain Intensity 1: 0 Last bowel movement, if known:  
 
Constitutional: awake, alert, conversational 
Eyes: pupils equal, anicteric ENMT: no nasal discharge, moist mucous membranes Cardiovascular: regular rhythm, distal pulses intact Respiratory: breathing not labored, symmetric Gastrointestinal: soft non-tender, +bowel sounds Musculoskeletal: no deformity, no tenderness to palpation Skin: warm, dry Neurologic: following commands, moving all extremities Psychiatric: full affect, no hallucinations Other: 
 
 
 HISTORY:  
 
Active Problems: 
  Acute respiratory failure with hypoxia ([facility] [address] (1/18/2021) Acute on chronic diastolic (congestive) heart failure ([address] (1/18/2021) Suspected COVID-19 virus infection (1/18/2021) Past Medical History:  
Diagnosis Date  CAD (coronary artery disease)   
 mi 10/19/2015 with stent  COPD (chronic obstructive pulmonary disease) (HCC)  Hypertension  Prostate cancer ([facility] [address] 5/8/2012 Dr. Nevaeh Higgins - diagnosed 2/2012 - s/p EBRT on Lupron Past Surgical History:  
Procedure Laterality Date  HX ORTHOPAEDIC  1961  
 left knee surgery  [place] INS NEW/RPLCMT PRM PM W/TRANSV ELTRD ATRIAL&VENT N/A 9/21/2020 INSERT PPM DUAL performed by Harvey Alaniz MD at [facility] CARD[facility]AC CATH LAB Family History Problem Relation Age of Onset  Heart Disease Mother   
     pacemaker  Diabetes Mother  Diabetes Sister  Heart Disease Brother History reviewed, no pertinent family history. Social History Tobacco Use  Smoking status: Former Smoker Packs/day: 0.50 Years: 40.00 Pack years: 20.00 Types: Cigarettes Quit date: 10/19/2015 Years since quittin.2  Smokeless tobacco: Never Used  Tobacco comment: trying quit 10/13/15 -  ppd Substance Use Topics  Alcohol use: No  
  Alcohol/week: 0.0 standard drinks No Known Allergies Current Facility-Administered Medications Medication Dose Route Frequency  methylPREDNISolone (PF) (SOLU-MEDROL) injection 40 mg  40 mg IntraVENous Q6H  
 ipratropium (ATROVENT) 0.02 % nebulizer solution 0.5 mg  0.5 mg Nebulization TID RT  
 arformoterol 15 mcg/budesonide 0.5 mg neb solution   Nebulization BID RT  
 sodium chloride (NS) flush 5-40 mL  5-40 mL IntraVENous Q8H  
 sodium chloride (NS) flush 5-40 mL  5-40 mL IntraVENous PRN  
 . PHARMACY TO SUBSTITUTE PER PROTOCOL (Reordered from: abiraterone 250 mg tab)    Per Protocol  albuterol (PROVENTIL HFA, VENTOLIN HFA, PROAIR HFA) inhaler 2 Puff  2 Puff Inhalation Q4H PRN  
 aspirin chewable tablet 81 mg  81 mg Oral DAILY  atorvastatin (LIPITOR) tablet 40 mg  40 mg Oral DAILY  metoprolol tartrate (LOPRESSOR) tablet 50 mg  50 mg Oral DAILY  sodium chloride (NS) flush 5-40 mL  5-40 mL IntraVENous Q8H  
 sodium chloride (NS) flush 5-40 mL  5-40 mL IntraVENous PRN  
 acetaminophen (TYLENOL) tablet 650 mg  650 mg Oral Q6H PRN Or  
 acetaminophen (TYLENOL) suppository 650 mg  650 mg Rectal Q6H PRN  polyethylene glycol (MIRALAX) packet 17 g  17 g Oral DAILY PRN  promethazine (PHENERGAN) tablet 12.5 mg  12.5 mg Oral Q6H PRN Or  
 ondansetron (ZOFRAN) injection 4 mg  4 mg IntraVENous Q6H PRN  
 enoxaparin (LOVENOX) injection 40 mg  40 mg SubCUTAneous DAILY  cefTRIAXone (ROCEPHIN) 1 g in 0.9% sodium chloride (MBP/ADV) 50 mL MBP  1 g IntraVENous Q24H  
 azithromycin (ZITHROMAX) tablet 500 mg  500 mg Oral DAILY  pantoprazole (PROTONIX) tablet 40 mg  40 mg Oral ACB  oxybutynin chloride XL (DITROPAN XL) tablet 10 mg  10 mg Oral DAILY  furosemide (LASIX) injection 40 mg  40 mg IntraVENous DAILY  
 
 
 
 LAB AND IMAGING FINDINGS:  
 
Lab Results Component Value Date/Time WBC 9.3 01/21/2021 01:15 AM  
 HGB 10.5 (L) 01/21/2021 01:15 AM  
 PLATELET 038 57/91/2926 01:15 AM  
 
Lab Results Component Value Date/Time Sodium 138 01/21/2021 01:15 AM  
 Potassium 4.4 01/21/2021 01:15 AM  
 Chloride 108 01/21/2021 01:15 AM  
 CO2 21 01/21/2021 01:15 AM  
 BUN 38 (H) 01/21/2021 01:15 AM  
 Creatinine 1.79 (H) 01/21/2021 01:15 AM  
 Calcium 9.6 01/21/2021 01:15 AM  
 Magnesium 2.8 (H) 09/18/2020 03:34 AM  
 Phosphorus 2.7 09/18/2020 03:34 AM  
  
Lab Results Component Value Date/Time Alk. phosphatase 62 01/21/2021 01:15 AM  
 Protein, total 7.3 01/21/2021 01:15 AM  
 Albumin 3.0 (L) 01/21/2021 01:15 AM  
 Globulin 4.3 (H) 01/21/2021 01:15 AM  
 
Lab Results Component Value Date/Time INR 1.0 09/16/2020 03:51 AM  
 Prothrombin time 10.2 09/16/2020 03:51 AM  
 aPTT 28.3 06/05/2016 07:36 AM  
  
Lab Results Component Value Date/Time Iron 50 08/02/2013 10:18 AM  
 TIBC 231 (L) 08/02/2013 10:18 AM  
 Iron % saturation 22 08/02/2013 10:18 AM  
 Ferritin 34 01/18/2021 11:41 AM  
  
Lab Results Component Value Date/Time pH 7.44 06/05/2016 08:15 AM  
 PCO2 34 (L) 06/05/2016 08:15 AM  
 PO2 65 (L) 06/05/2016 08:15 AM  
 
No components found for: [place] Lab Results Component Value Date/Time CK 90 01/18/2021 11:41 AM  
 CK - MB 3.7 (H) 10/19/2015 05:37 PM  
  
 
 
   
 
Total time:  
Counseling / coordination time, spent as noted above:  
> 50% counseling / coordination?:  
 
Prolonged service was provided for  []30 min   []75 min in face to face time in the presence of the patient, spent as noted above. Time Start:  
Time End:  
Note: this can only be billed with 15422 (initial) or 11066 (follow up). If multiple start / stop times, list each separately.

## 2021-01-21 NOTE — PROGRESS NOTES
0700: Bedside shift change report given to Marlyn cueto RN (oncoming nurse) by ELODIA Chavarria RN (offgoing nurse). Report included the following information SBAR and Kardex.

## 2021-01-21 NOTE — PROGRESS NOTES
End of Shift Note Bedside shift change report given to Lieutenant Andre RN (oncoming nurse) by Analia Proctor RN (offgoing nurse). Report included the following information SBAR and Kardex Shift worked:  [id number] Shift summary and any significant changes:  
  Patient at 12L high flow to maintain O2 sat >90. Concerns for physician to address:  none.   
  
Zone phone for oncoming shift:    
  
 
 
 
Analia Proctor RN

## 2021-01-21 NOTE — CONSULTS
NEPHROLOGY CONSULT NOTE Patient: Daria Michel MRN: [id number]  PCP: Maida Valenzuela MD  
:     1940  Age:   78 y.o. Sex:  male Referring physician: Lashae Lu,* 
 
 
REASON FOR CONSULT: 
SEPIDEH 
 
HPI: 
Daria Michel is a 78 y.o. male with a history of chronic kidney disease stage III, last serum creatinine level was 1.4 from 2020, coronary artery disease, COPD, hypertension, prostate cancer who is admitted to the hospital on 2021 with shortness of breath. In the emergency department chest x-ray shows infiltrate with pulmonary vascular congestion. The time of admission his serum creatinine level was 1.24. Patient was aggressive diuresis with the Lasix but continued to require higher demand of oxygen. His creatinine continues to up trended 1.33, 1.48 and today is 1.79. Nephrology is contacted for the management of acute kidney injury on chronic kidney disease stage III. Upon reviewing the med reconciliation patient was getting lisinopril and he is on Lasix 40 mg IV daily. Review of Systems Positive for shortness of breath Past Medical History:  
Diagnosis Date  CAD (coronary artery disease)   
 mi 10/19/2015 with stent  COPD (chronic obstructive pulmonary disease) (HCC)  Hypertension  Prostate cancer ([facility] [address] 2012 Dr. Annel Laguna - diagnosed 2012 - s/p EBRT on Lupron Past Surgical History:  
Procedure Laterality Date  HX ORTHOPAEDIC    
 left knee surgery  [place] INS NEW/RPLCMT PRM PM W/TRANSV ELTRD ATRIAL&VENT N/A 2020 INSERT PPM DUAL performed by Grayson Galicia MD at [facility] No Known Allergies Current Facility-Administered Medications Medication Dose Route Frequency  methylPREDNISolone (PF) (SOLU-MEDROL) injection 40 mg  40 mg IntraVENous Q6H  
 arformoterol 15 mcg/budesonide 0.5 mg neb solution   Nebulization BID RT  
  sodium chloride (NS) flush 5-40 mL  5-40 mL IntraVENous Q8H  
 sodium chloride (NS) flush 5-40 mL  5-40 mL IntraVENous PRN  
 . PHARMACY TO SUBSTITUTE PER PROTOCOL (Reordered from: abiraterone 250 mg tab)    Per Protocol  albuterol (PROVENTIL HFA, VENTOLIN HFA, PROAIR HFA) inhaler 2 Puff  2 Puff Inhalation Q4H PRN  
 aspirin chewable tablet 81 mg  81 mg Oral DAILY  atorvastatin (LIPITOR) tablet 40 mg  40 mg Oral DAILY  metoprolol tartrate (LOPRESSOR) tablet 50 mg  50 mg Oral DAILY  sodium chloride (NS) flush 5-40 mL  5-40 mL IntraVENous Q8H  
 sodium chloride (NS) flush 5-40 mL  5-40 mL IntraVENous PRN  
 acetaminophen (TYLENOL) tablet 650 mg  650 mg Oral Q6H PRN Or  
 acetaminophen (TYLENOL) suppository 650 mg  650 mg Rectal Q6H PRN  polyethylene glycol (MIRALAX) packet 17 g  17 g Oral DAILY PRN  promethazine (PHENERGAN) tablet 12.5 mg  12.5 mg Oral Q6H PRN Or  
 ondansetron (ZOFRAN) injection 4 mg  4 mg IntraVENous Q6H PRN  
 enoxaparin (LOVENOX) injection 40 mg  40 mg SubCUTAneous DAILY  cefTRIAXone (ROCEPHIN) 1 g in 0.9% sodium chloride (MBP/ADV) 50 mL MBP  1 g IntraVENous Q24H  
 azithromycin (ZITHROMAX) tablet 500 mg  500 mg Oral DAILY  pantoprazole (PROTONIX) tablet 40 mg  40 mg Oral ACB  oxybutynin chloride XL (DITROPAN XL) tablet 10 mg  10 mg Oral DAILY  ipratropium (ATROVENT) 0.02 % nebulizer solution 0.5 mg  0.5 mg Nebulization QID RT  
 furosemide (LASIX) injection 40 mg  40 mg IntraVENous DAILY Family History Problem Relation Age of Onset  Heart Disease Mother   
     pacemaker  Diabetes Mother  Diabetes Sister  Heart Disease Brother Social History Socioeconomic History  Marital status:  Spouse name: Not on file  Number of children: Not on file  Years of education: Not on file  Highest education level: Not on file Occupational History  Not on file Social Needs  Financial resource strain: Not on file  Food insecurity Worry: Not on file Inability: Not on file  Transportation needs Medical: Not on file Non-medical: Not on file Tobacco Use  Smoking status: Former Smoker Packs/day: 0.50 Years: 40.00 Pack years: 20.00 Types: Cigarettes Quit date: 10/19/2015 Years since quittin.2  Smokeless tobacco: Never Used  Tobacco comment: trying quit 10/13/15 -  ppd Substance and Sexual Activity  Alcohol use: No  
  Alcohol/week: 0.0 standard drinks  Drug use: No  
 Sexual activity: Not Currently Lifestyle  Physical activity Days per week: Not on file Minutes per session: Not on file  Stress: Not on file Relationships  Social connections Talks on phone: Not on file Gets together: Not on file Attends Baptism service: Not on file Active member of club or organization: Not on file Attends meetings of clubs or organizations: Not on file Relationship status: Not on file  Intimate partner violence Fear of current or ex partner: Not on file Emotionally abused: Not on file Physically abused: Not on file Forced sexual activity: Not on file Other Topics Concern  Not on file Social History Narrative  Not on file Vitals:  
 21 2677 21 0710 21 0820 21 3105 BP:  118/68  121/60 Pulse:  88  93 Resp:  22 Temp:  97.4 °F (36.3 °C) SpO2:  92% 93% Weight: 89.7 kg (197 lb 12.8 oz) Height:      
 
 
 
Intake/Output Summary (Last 24 hours) at 2021 1037 Last data filed at 2021 9159 Gross per 24 hour Intake 1380 ml Output 1375 ml Net 5 ml PHYSICAL EXAM: 
GENERAL : Sitting in the chair with mildly distressed HEENT: AT NC PEERLA NECK: Supple no JVP 
CVS: S1 S2 RRR, no murmur or gallops heard RS: Coarse breath sounds heard on both lungs ABDOMEN: soft NT ND positive BS 
 EXTREMITY: No edema clubbing or cyanosis, pedal pulse + 
NEUROLOGY: AAA X3, no focal deficit or asterixis 
 
 
 
LABS/STUDIES: 
BMP:  
Lab Results  
Component Value Date/Time  
  01/21/2021 01:15 AM  
 K 4.4 01/21/2021 01:15 AM  
  01/21/2021 01:15 AM  
 CO2 21 01/21/2021 01:15 AM  
 AGAP 9 01/21/2021 01:15 AM  
  (H) 01/21/2021 01:15 AM  
 BUN 38 (H) 01/21/2021 01:15 AM  
 CREA 1.79 (H) 01/21/2021 01:15 AM  
 GFRAA 44 (L) 01/21/2021 01:15 AM  
 GFRNA 37 (L) 01/21/2021 01:15 AM  
  
 
CBC:   
Lab Results  
Component Value Date/Time  
 WBC 9.3 01/21/2021 01:15 AM  
 HGB 10.5 (L) 01/21/2021 01:15 AM  
 HCT 34.1 (L) 01/21/2021 01:15 AM  
  01/21/2021 01:15 AM  
 
 
No results found for: MCACR, MCA1, MCA2, MCA3, MCAU, MCAU2, MCALPOCT 
 
Lab Results  
Component Value Date/Time  
 Color YELLOW/STRAW 09/15/2020 11:25 PM  
 Appearance CLEAR 09/15/2020 11:25 PM  
 Specific gravity 1.030 09/15/2020 11:25 PM  
 pH (UA) 6.0 09/15/2020 11:25 PM  
 Protein Negative 09/15/2020 11:25 PM  
 Glucose Negative 09/15/2020 11:25 PM  
 Ketone Negative 09/15/2020 11:25 PM  
 Bilirubin Negative 09/15/2020 11:25 PM  
 Urobilinogen 0.2 09/15/2020 11:25 PM  
 Nitrites Negative 09/15/2020 11:25 PM  
 Leukocyte Esterase Negative 09/15/2020 11:25 PM  
 Epithelial cells FEW 09/15/2020 11:25 PM  
 Bacteria Negative 09/15/2020 11:25 PM  
 WBC 0-4 09/15/2020 11:25 PM  
 RBC 0-5 09/15/2020 11:25 PM  
 
 
 
ASSESSMENT/PLAN: 
 
Acute kidney injury on chronic kidney disease stage III: Likely due to prerenal versus early phase of ischemic ATN in the setting of hypoxic respiratory failure and pneumonia 
-On examination patient do not have any evidence of edema, lungs sound is coarse 
-I think he has pulmonary infection which making his respiratory status worsen 
-I will check urine electrolytes to calculate FENa and FE urea 
-I will do ultrasound of kidney and bladder 
-CT scan of the chest without IV contrast 
 -Continue on Lasix for now 
-I will discontinue lisinopril 
-Renally dose all medication with current GFR Metabolic acidosis: Bicarb is 21 
-I will hold on sodium bicarb repletion Acute respiratory failure: Multifactorial due to pneumonia as well as some component of heart failure 
-Continue on the Lasix 
-Check CT scan of the chest without contrast 
 
Beena Johnson MD 
1/21/2021 RNA

## 2021-01-21 NOTE — PROGRESS NOTES
[facility] INTERDISCIPLINARY ROUNDS 
 
Cardiopulmonary Care Interdisciplinary Rounds were held today to discuss patient's plan of care and outcomes. The following members were present: NP/Physician, Pharmacy, Nursing and Case Management. 
 
PLAN OF CARE:  
Continue current treatment plan, plan for pt to have CT chest today and pt place on fluid restriction.  
 
Expected Length of Stay:  4d 2h

## 2021-01-21 NOTE — PROGRESS NOTES
ADULT PROTOCOL: JET AEROSOL  REASSESSMENT Patient  Franco Kennedy     78 y.o.   male     1/21/2021  3:00 PM 
 
Breath Sounds Pre Procedure: Right Breath Sounds: Diminished Left Breath Sounds: Diminished Breath Sounds Post Procedure: Right Breath Sounds: Diminished Left Breath Sounds: Diminished Breathing pattern: Pre procedure Breathing Pattern: Regular Post procedure Breathing Pattern: Regular Heart Rate: Pre procedure Pulse: 84 
         Post procedure Pulse: 84 Resp Rate: Pre procedure Respirations: 18 Post procedure Respirations: 18 Incentive Spirometry:  Actual Volume (ml): 250 ml 
    
Cough: Pre procedure Cough: Non-productive Post procedure Cough: Non-productive Oxygen: O2 Device: Hi flow nasal cannula Changed: no 
 
SpO2: Pre procedure SpO2: 94 % Post procedure SpO2: 94 % Nebulizer Therapy: Current medications Aerosolized Medications: Ipratropium bromide Changed: yes treatment tid Problem List:  
Patient Active Problem List  
Diagnosis Code  ED (erectile dysfunction) N52.9  
 HTN (hypertension) I10  
 Prostate cancer ([facility] C61  
 PAD (peripheral artery disease) ([facility]) I73.9  
 STEMI (ST elevation myocardial infarction) ([facility]) I21.3  Chronic interstitial lung disease ([facility]) J84.9  
 S/P PTCA (percutaneous transluminal coronary angioplasty) Z98.61  
 Mucopurulent chronic bronchitis ([facility]) J41.1  Acute myocardial infarction of anterior wall, subsequent episode of care ([facility] I21.09  
 Penetrating atherosclerotic ulcer of aorta ([facility]) I71.9  Hyperlipidemia E78.5  Advance directive discussed with patient Z70.80  Coronary artery disease involving native coronary artery of native heart without angina pectoris I25.10  Hypoxemia R09.02  
 COPD (chronic obstructive pulmonary disease) ([facility]) J44.9  Mixed simple and mucopurulent chronic bronchitis ([facility]) J41.8  COPD exacerbation ([facility] [address] J44.1  Acute hypoxemic respiratory failure ([facility]) J96.01  
 SSS (sick sinus syndrome) ([facility]) I49.5  DNR (do not resuscitate) discussion Z70.80  
 Goals of care, counseling/discussion Z71.89  
 Advanced care planning/counseling discussion Z71.89  
 Acute respiratory failure with hypoxia ([facility] [address] J96.01  
 Acute on chronic diastolic (congestive) heart failure ([facility]) I50.33  
 Suspected COVID-19 virus infection W54.248 Respiratory Therapist: Marino Hahn

## 2021-01-21 NOTE — PROGRESS NOTES
[facility] Pulmonary, Critical Care, and Sleep Medicine Patient Consult Name: Jace Hudson MRN: [id number] : 1940 Hospital: [address] Date: 2021 IMPRESSION:  
· COVID NEGATIVE · Acute on Chronic Hypoxic Respiratory Failure, POx was 88% on RA. Used Inogene oxygen at 3L per NC. Now on 12L from 9l yesterday · Suspected volume overload. Seems a little better when seen this am. May still be a component of volume overload. · Possible atypical Pneumonia · Pulmonary Hypertension, felt to be moderate to severe. LVEFo f 55%. · COPD (PFT from 20: FVC: 4.35L 111% pred. FEV1: 2.18L 76% pred, FEV1/fvc: 50%. Normal Lung volumes. DLCO: severely reduced with DLCO/VA of 35%. The CT scan of chest from  demonstrates very severe obstructive lung disease with severe emphysematous changes. The PFT FVC and FEV1 under represent the severity of illness. The severe reduced DLCO is representative of severe lung disease. Does not appear to be volume overload or acute bacterial infection. 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 Had EBRT, on Lupron. Follow with Miah/Marko. · Chronic Hoarseness. · CAD, prior stents, Distal Aortic Arthc Ulcer. Pt refused TAVR in 2020. Follows with Dr. Tim Childers. · Has been on Chronic Prednsione therapy-christie to worsening pulmonary status will place on increased steroids. · Sick Sinus Syndrom: had PM placed in 2020. · CRI 
· PAD · Leg cramps · Ex Smoker · Sister is Plum: h: [phone] and cell: [phone]. · Full Code RECOMMENDATIONS:  
· Oxygen to keep pox over88%, Discussed with nurse this am.  
· On empiric Abx in case there is an acute infection, and for COPD exacerbation. · ON his baseline   Prednisone. Will place on solumedrol in case this is acute exacerbation. · Due to worsening renal function, Nephrology was consulted. · ON Iv lasix. · Agree with rest of current treatments. · If not improving may need palliative care evalution. Their eval is pending. · Will follow along with you. PCP: Verena Coto Subjective:  
 
Last 24 hrs: Pt feels about the same. On increased oxygen again, seems to be progressively worsening. . NO chest pain, no back pain, Has a dry cough. Desaturates when he tries to eat or with any exertion. Pt was having more desaturations this am. No chest pain, no back pain. Pt has hoarseness. NO headaches. No back pain, no leg pain. Tolerating po intake pretty well. Discussed with  His nurse this am.  
 
CC: Shortness of breath This patient has been seen and evaluated at the request of Dr. Karon Granados for above. Patient is a 78 y.o. male who presents with above. Has increased shortness of breath over last 24 hrs, first started at 10pm on 1/17. NO chest pain, no cough. NO abdominal pain. No tobacco or Etoh use. Tolerating po intake well. He is on 4.5L of oxygen at home. He did not feelt that his breathing therapies were helping. NO cough. NO fevers, no nausea or vomiting. Past Medical History:  
Diagnosis Date  CAD (coronary artery disease)   
 mi 10/19/2015 with stent  COPD (chronic obstructive pulmonary disease) (HCC)  Hypertension  Prostate cancer ([facility] [address] 5/8/2012 Dr. Sushil Qiu - diagnosed 2/2012 - s/p EBRT on Lupron Past Surgical History:  
Procedure Laterality Date  HX ORTHOPAEDIC  1961  
 left knee surgery  [place] INS NEW/RPLCMT PRM PM W/TRANSV ELTRD ATRIAL&VENT N/A 9/21/2020 INSERT PPM DUAL performed by Lakshmi Garza MD at [facility] Prior to Admission medications Medication Sig Start Date End Date Taking? Authorizing Provider  
atorvastatin (LIPITOR) 40 mg tablet Take 1 tablet by mouth once daily Patient taking differently: Take 40 mg by mouth daily.  12/30/20  Yes Olesya Redman MD  
 omeprazole (PRILOSEC) 20 mg capsule Take 1 Cap by mouth daily. 10/7/20  Yes Sergo Mi MD  
abiraterone 250 mg tab Take 250 mg by mouth daily (with breakfast). With Low Fat Breakfast   Yes Nancy Chung MD  
oxybutynin chloride XL (DITROPAN XL) 10 mg CR tablet Take 10 mg by mouth nightly. Yes Nancy Chung MD  
albuterol (PROVENTIL HFA, VENTOLIN HFA, PROAIR HFA) 90 mcg/actuation inhaler Use 2 puffs very 4 hours prn 20  Yes Sergo Mi MD  
metoprolol tartrate (LOPRESSOR) 50 mg tablet TAKE ONE TABLET BY MOUTH TWICE DAILY Patient taking differently: Take 50 mg by mouth two (2) times a day. TAKE ONE TABLET BY MOUTH TWICE DAILY 20  Yes Lance Cole MD  
lisinopriL (PRINIVIL, ZESTRIL) 2.5 mg tablet Take 1 tablet by mouth once daily Patient taking differently: Take 2.5 mg by mouth daily. 3/21/20  Yes Lance Cole MD  
albuterol-ipratropium (DUO-NEB) 2.5 mg-0.5 mg/3 ml nebu 3 mL by Nebulization route every six (6) hours as needed (sob). 10/19/19  Yes Miles Heredia NP  
fluticasone furoate-vilanterol (BREO ELLIPTA) 100-25 mcg/dose inhaler Take 1 Puff by inhalation daily. 3/25/19  Yes Ana Bernabe, DO Oxygen 4.5L continouus   Yes Provider, Historical  
fluticasone (FLONASE) 50 mcg/actuation nasal spray 2 Sprays by Both Nostrils route daily. 19  Yes Soledad Mccullough MD  
aspirin 81 mg chewable tablet Take 1 Tab by mouth daily. RISK OF HEART ATTACK IF ANY MISSED DOSES Patient taking differently: Take 81 mg by mouth daily. RISK OF HEART ATTACK IF ANY MISSED DOSES  Indications: treatment to prevent a heart attack 10/21/15  Yes Lance Cole MD  
 
No Known Allergies Social History Tobacco Use  Smoking status: Former Smoker Packs/day: 0.50 Years: 40.00 Pack years: 20.00 Types: Cigarettes Quit date: 10/19/2015 Years since quittin.2  Smokeless tobacco: Never Used  Tobacco comment: trying quit 10/13/15 -  ppd  
 Substance Use Topics  Alcohol use: No  
  Alcohol/week: 0.0 standard drinks Family History Problem Relation Age of Onset  Heart Disease Mother   
     pacemaker  Diabetes Mother  Diabetes Sister  Heart Disease Brother Current Facility-Administered Medications Medication Dose Route Frequency  arformoterol 15 mcg/budesonide 0.5 mg neb solution   Nebulization BID RT  
 sodium chloride (NS) flush 5-40 mL  5-40 mL IntraVENous Q8H  
 aspirin chewable tablet 81 mg  81 mg Oral DAILY  atorvastatin (LIPITOR) tablet 40 mg  40 mg Oral DAILY  metoprolol tartrate (LOPRESSOR) tablet 50 mg  50 mg Oral DAILY  sodium chloride (NS) flush 5-40 mL  5-40 mL IntraVENous Q8H  
 enoxaparin (LOVENOX) injection 40 mg  40 mg SubCUTAneous DAILY  cefTRIAXone (ROCEPHIN) 1 g in 0.9% sodium chloride (MBP/ADV) 50 mL MBP  1 g IntraVENous Q24H  
 azithromycin (ZITHROMAX) tablet 500 mg  500 mg Oral DAILY  pantoprazole (PROTONIX) tablet 40 mg  40 mg Oral ACB  oxybutynin chloride XL (DITROPAN XL) tablet 10 mg  10 mg Oral DAILY  lisinopriL (PRINIVIL, ZESTRIL) tablet 2.5 mg  2.5 mg Oral DAILY  ipratropium (ATROVENT) 0.02 % nebulizer solution 0.5 mg  0.5 mg Nebulization QID RT  
 furosemide (LASIX) injection 40 mg  40 mg IntraVENous DAILY  predniSONE (DELTASONE) tablet 10 mg  10 mg Oral DAILY WITH BREAKFAST Review of Systems: A comprehensive review of systems was negative. Objective:  
Vital Signs:   
Visit Vitals /68 Pulse 88 Temp 97.4 °F (36.3 °C) Resp 22 Ht 6' (1.829 m) Wt 89.7 kg (197 lb 12.8 oz) SpO2 93% BMI 26.83 kg/m² O2 Device: Hi flow nasal cannula O2 Flow Rate (L/min): 12 l/min Temp (24hrs), Av.8 °F (36.6 °C), Min:97.4 °F (36.3 °C), Max:98.7 °F (37.1 °C) Intake/Output:  
Last shift:      No intake/output data recorded. Last 3 shifts:  1901 -  0700 In: 0273 [P.O.:1620] Out: 1875 [VUDAU:0475] Intake/Output Summary (Last 24 hours) at 1/21/2021 3845 Last data filed at 1/21/2021 7275 Gross per 24 hour Intake 1380 ml Output 1375 ml Net 5 ml Physical Exam:  
General:  Alert, cooperative, no distress, appears stated age. Has his chronic Hoarseness. On 12 NC oxygen. Pox of 93%, No distress. Head:  Normocephalic, without obvious abnormality, atraumatic. Eyes:  Conjunctivae/corneas clear. PERRL, EOMs intact. Nose: Nares normal. Septum midline. Mucosa normal. No drainage or sinus tenderness. Throat: Lips, mucosa, and tongue normal. Teeth and gums normal.  
Neck: Supple, symmetrical, trachea midline, no adenopathy, thyroid: no enlargment/tenderness/nodules, no carotid bruit and no JVD. Back:   Symmetric, no curvature. ROM normal.  
Lungs:   Clear to auscultation bilaterally. Good air movement. Seems comfortable. May be a few rales in bases. Chest wall:  No tenderness or deformity. Has PPM in place over his left upper chest.   
Heart:  Regular rate and rhythm, S1, S2 normal, no murmur, click, rub or gallop. Abdomen:   Soft, non-tender. Bowel sounds normal. No masses,  No organomegaly. Extremities: Extremities normal, atraumatic, no cyanosis or edema. Pulses: 2+ and symmetric all extremities. Skin: Skin color, texture, turgor normal. No rashes or lesions Lymph nodes: Cervical, supraclavicular, and axillary nodes normal.  
Neurologic: Grossly nonfocal, motor seems to be intact. Psych: No overt anxiety or depression. Data review:  
 
Recent Results (from the past 24 hour(s)) CBC WITH AUTOMATED DIFF Collection Time: 01/21/21  1:15 AM  
Result Value Ref Range WBC 9.3 4.1 - 11.1 K/uL  
 RBC 4.09 (L) 4.10 - 5.70 M/uL  
 HGB 10.5 (L) 12.1 - 17.0 g/dL HCT 34.1 (L) 36.6 - 50.3 % MCV 83.4 80.0 - 99.0 FL  
 MCH 25.7 (L) 26.0 - 34.0 PG  
 MCHC 30.8 30.0 - 36.5 g/dL  
 RDW 17.4 (H) 11.5 - 14.5 % PLATELET 696 544 - 980 K/uL  MPV 11.6 8.9 - 12.9 FL  
 NRBC 0.0 0  WBC ABSOLUTE NRBC 0.00 0.00 - 0.01 K/uL NEUTROPHILS 67 32 - 75 % LYMPHOCYTES 26 12 - 49 % MONOCYTES 7 5 - 13 % EOSINOPHILS 0 0 - 7 % BASOPHILS 0 0 - 1 % IMMATURE GRANULOCYTES 0 0.0 - 0.5 % ABS. NEUTROPHILS 6.0 1.8 - 8.0 K/UL  
 ABS. LYMPHOCYTES 2.5 0.8 - 3.5 K/UL  
 ABS. MONOCYTES 0.7 0.0 - 1.0 K/UL  
 ABS. EOSINOPHILS 0.0 0.0 - 0.4 K/UL  
 ABS. BASOPHILS 0.0 0.0 - 0.1 K/UL  
 ABS. IMM. GRANS. 0.0 0.00 - 0.04 K/UL  
 DF AUTOMATED METABOLIC PANEL, COMPREHENSIVE Collection Time: 01/21/21  1:15 AM  
Result Value Ref Range Sodium 138 136 - 145 mmol/L Potassium 4.4 3.5 - 5.1 mmol/L Chloride 108 97 - 108 mmol/L  
 CO2 21 21 - 32 mmol/L Anion gap 9 5 - 15 mmol/L Glucose 124 (H) 65 - 100 mg/dL BUN 38 (H) 6 - 20 MG/DL Creatinine 1.79 (H) 0.70 - 1.30 MG/DL  
 BUN/Creatinine ratio 21 (H) 12 - 20 GFR est AA 44 (L) >60 ml/min/1.73m2 GFR est non-AA 37 (L) >60 ml/min/1.73m2 Calcium 9.6 8.5 - 10.1 MG/DL Bilirubin, total 0.4 0.2 - 1.0 MG/DL  
 ALT (SGPT) 12 12 - 78 U/L  
 AST (SGOT) 11 (L) 15 - 37 U/L Alk. phosphatase 62 45 - 117 U/L Protein, total 7.3 6.4 - 8.2 g/dL Albumin 3.0 (L) 3.5 - 5.0 g/dL Globulin 4.3 (H) 2.0 - 4.0 g/dL A-G Ratio 0.7 (L) 1.1 - 2.2 Imaging: 
I have personally reviewed the patients radiographs and have reviewed the reports: 
 
 
1-18-21: CXR: TECHNIQUE: Portable AP upright chest view at 0051 hours 
  
FINDINGS: The left chest ICD and wires are stable. The cardiomediastinal 
contours are stable. There are mild bilateral interstitial and patchy airspace 
opacities. There is no pleural effusion or pneumothorax. The bones and upper 
abdomen are stable. 
  
IMPRESSION IMPRESSION: Mild bilateral interstitial and patchy airspace opacities can be 
seen with pulmonary edema or atypical/viral infection. 9-: CT of chest: IMPRESSION:  
1. No evidence for embolism. 2. 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 3. Nonspecific consolidative opacifications in the posterior medial basilar 
segments of the left lower lobe and the inferior lingula. · 9-17-20: Echo: LV: Estimated LVEF is 55 - 60%. Normal cavity size and systolic function (ejection fraction normal). Mild concentric hypertrophy. · TV: Mild to moderate tricuspid valve regurgitation is present. PA: Moderate to severe pulmonary hypertension. Pulmonary arterial systolic pressure is 61 mmHg.  
 
 
 
  
Kael Rankin MD

## 2021-01-22 NOTE — HOSPICE
[facility] 
[phone] Nursing Note Patient Name: Olga Lidia Doss YOB: 1940 Age: 78 y.o. [employer] RN Note:  Hospice consult noted. Chart reviewed. Will see patient today and see if family is present to arrange hospice information session. Thank you for the opportunity to be of service to this patient and his family. 1430: In to meet with patient and Durene Kawasaki. Discussed Hospice philosophy, general plan of care, levels of care, services and on call procedures. Family information packet provided and reviewed with patient and daughter. Also discussed Full Code v. DNR; patient and daughter will think about it. Daughter wishes to seek out other options. Hospice contact information left with daughter and patient.

## 2021-01-22 NOTE — PROGRESS NOTES
End of Shift Note 
 
Bedside shift change report given to CHRIS Boyle (oncoming nurse) by Esperanza Paris (offgoing nurse).  Report included the following information SBAR, Kardex, Procedure Summary and Recent Results 
 
Shift worked:  7a-7p 
  
Shift summary and any significant changes:  
  Palliative saw patient. POC. Started in 14L high flow at beginning of shift. Hospice consulted.  
  
Concerns for physician to address:   
  
Zone phone for oncoming shift:   [phone] 
  
 
Activity: 
Activity Level: Up with Assistance 
Number times ambulated in hallways past shift: 0 
Number of times OOB to chair past shift: 4 
 
Cardiac:  
Cardiac Monitoring: Yes     
Cardiac Rhythm: Normal sinus rhythm 
 
Access:  
Current line(s): PIV  
 
Genitourinary:  
Urinary status: voiding 
 
Respiratory:  
O2 Device: Hi flow nasal cannula 
Chronic home O2 use?: YES 
Incentive spirometer at bedside: YES 
Actual Volume (ml): 250 ml 
GI: 
Last Bowel Movement Date: 01/22/21 
Current diet:  DIET CARDIAC Regular; FR 1200ML 
Passing flatus: YES 
Tolerating current diet: YES 
% Diet Eaten: 100 % 
 
Pain Management:  
Patient states pain is manageable on current regimen: YES 
 
Skin: 
Joe Score: 19 
Interventions: float heels and increase time out of bed 
 
Patient Safety: 
Fall Score: Total Score: 2 
Interventions: bed/chair alarm 
  
 
Length of Stay: 
Expected LOS: 4d 2h 
Actual LOS: 4 
 
 
Esperanza Paris

## 2021-01-22 NOTE — PROGRESS NOTES
[facility] Pulmonary, Critical Care, and Sleep Medicine Patient Consult Name: Fallon Taylor MRN: [id number] : 1940 Hospital: [facility] Date: 2021 IMPRESSION:  
· I suspect what we are seeing is near end stage lung disease. His CT scan does not demonstrate much that this reversible. Pt states this am that he would like to go home if there is not much else that we can do for him. I feel that it is reasonable. Will have to look into the options to try and get him home. ? Hospice. · COVID NEGATIVE · Acute on Chronic Hypoxic Respiratory Failure, POx was 88% on RA. Used Inogene oxygen at 3L per NC. Now on 12L from 9l yesterday, need to try to wean down oxygen. · NO evidence of volume overload. · No evidence of Pneumonia on CT of ches. · Pulmonary Hypertension, felt to be moderate to severe. LVEFo f 55%. · COPD (PFT from 20: FVC: 4.35L 111% pred. FEV1: 2.18L 76% pred, FEV1/fvc: 50%. Normal Lung volumes. DLCO: severely reduced with DLCO/VA of 35%. The CT scan of chest from  demonstrates very severe obstructive lung disease with severe emphysematous changes. The PFT FVC and FEV1 under represent the severity of illness. The severe reduced DLCO is representative of severe lung disease. Does not appear to be volume overload or acute bacterial infection. 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 Had EBRT, on Lupron. Follow with Miah/Marko. · Chronic Hoarseness. · CAD, prior stents, Distal Aortic Arthc Ulcer. Pt refused TAVR in 2020. Follows with Dr. Quan Tariq. · Has been on Chronic Prednsione therapy-christie to worsening pulmonary status will place on increased steroids. · Sick Sinus Syndrom: had PM placed in 2020. · CRI 
· PAD · Leg cramps · Ex Smoker · Sister is Plum: h: [phone] and cell: [phone]. · Full Code RECOMMENDATIONS:  
· Oxygen to keep pox over 88%. · Would agree with hospice evaluation · On empiric Abx in case there is an acute infection, and for COPD exacerbation. · ON his baseline   Prednisone. Will place on solumedrol in case this is acute exacerbation. Can wean steroids if able to go home. · Due to worsening renal function, Nephrology was consulted. · ON Iv lasix. · Agree with rest of current treatments. · Will discussed hospice with palliative care. PCP: Corby Vizcaino Subjective:  
 
Last 24 hrs: Pt feels about the same. ON 14 L oxygen. PT states he would like to go home if not much of a reversible component to his lung disease. Based on CT I do not see any severe Pneumonia or Volume overload. He feels good this am. Still on moderate levels of oxygen. 1-21: On increased oxygen again, seems to be progressively worsening. . NO chest pain, no back pain, Has a dry cough. Desaturates when he tries to eat or with any exertion. Pt was having more desaturations this am. No chest pain, no back pain. Pt has hoarseness. NO headaches. No back pain, no leg pain. Tolerating po intake pretty well. Discussed with  His nurse this am.  
 
CC: Shortness of breath This patient has been seen and evaluated at the request of Dr. Douglass for above. Patient is a 78 y.o. male who presents with above. Has increased shortness of breath over last 24 hrs, first started at 10pm on 1/17. NO chest pain, no cough. NO abdominal pain. No tobacco or Etoh use. Tolerating po intake well. He is on 4.5L of oxygen at home. He did not feelt that his breathing therapies were helping. NO cough. NO fevers, no nausea or vomiting. Past Medical History:  
Diagnosis Date  CAD (coronary artery disease)   
 mi 10/19/2015 with stent  COPD (chronic obstructive pulmonary disease) (HCC)  Hypertension  Prostate cancer ([facility] [address] 5/8/2012 Dr. Donal Stevens - diagnosed 2/2012 - s/p EBRT on Lupron Past Surgical History: Procedure Laterality Date  HX ORTHOPAEDIC  1961  
 left knee surgery  [place] INS NEW/RPLCMT PRM PM W/TRANSV ELTRD ATRIAL&VENT N/A 9/21/2020 INSERT PPM DUAL performed by Gail Stern MD at [facility]ATH [facility] Prior to Admission medications Medication Sig Start Date End Date Taking? Authorizing Provider  
atorvastatin (LIPITOR) 40 mg tablet Take 1 tablet by mouth once daily Patient taking differently: Take 40 mg by mouth daily. 12/30/20  Yes Jeremy Mckeon MD  
omeprazole (PRILOSEC) 20 mg capsule Take 1 Cap by mouth daily. 10/7/20  Yes Jeremy Mckeon MD  
abiraterone 250 mg tab Take 250 mg by mouth daily (with breakfast). With Low Fat Breakfast   Yes Other, MD Nancy  
oxybutynin chloride XL (DITROPAN XL) 10 mg CR tablet Take 10 mg by mouth nightly. Yes Other, MD Nancy  
albuterol (PROVENTIL HFA, VENTOLIN HFA, PROAIR HFA) 90 mcg/actuation inhaler Use 2 puffs very 4 hours prn 7/30/20  Yes Jeremy Mckeon MD  
metoprolol tartrate (LOPRESSOR) 50 mg tablet TAKE ONE TABLET BY MOUTH TWICE DAILY Patient taking differently: Take 50 mg by mouth two (2) times a day. TAKE ONE TABLET BY MOUTH TWICE DAILY 7/13/20  Yes Evie Browning MD  
lisinopriL (PRINIVIL, ZESTRIL) 2.5 mg tablet Take 1 tablet by mouth once daily Patient taking differently: Take 2.5 mg by mouth daily. 3/21/20  Yes Evie Browning MD  
albuterol-ipratropium (DUO-NEB) 2.5 mg-0.5 mg/3 ml nebu 3 mL by Nebulization route every six (6) hours as needed (sob). 10/19/19  Yes Enrique Green NP  
fluticasone furoate-vilanterol (BREO ELLIPTA) 100-25 mcg/dose inhaler Take 1 Puff by inhalation daily. 3/25/19  Yes Marialuisa Foster, DO Oxygen 4.5L continouus   Yes Provider, Historical  
fluticasone (FLONASE) 50 mcg/actuation nasal spray 2 Sprays by Both Nostrils route daily. 2/18/19  Yes Sharmaine Dent MD  
aspirin 81 mg chewable tablet Take 1 Tab by mouth daily.  RISK OF HEART ATTACK IF ANY MISSED DOSES 
 Patient taking differently: Take 81 mg by mouth daily. RISK OF HEART ATTACK IF ANY MISSED DOSES  Indications: treatment to prevent a heart attack 10/21/15  Yes Lance Cole MD  
 
No Known Allergies Social History Tobacco Use  Smoking status: Former Smoker Packs/day: 0.50 Years: 40.00 Pack years: 20.00 Types: Cigarettes Quit date: 10/19/2015 Years since quittin.2  Smokeless tobacco: Never Used  Tobacco comment: trying quit 10/13/15 -  ppd Substance Use Topics  Alcohol use: No  
  Alcohol/week: 0.0 standard drinks Family History Problem Relation Age of Onset  Heart Disease Mother   
     pacemaker  Diabetes Mother  Diabetes Sister  Heart Disease Brother Current Facility-Administered Medications Medication Dose Route Frequency  methylPREDNISolone (PF) (SOLU-MEDROL) injection 40 mg  40 mg IntraVENous Q6H  
 ipratropium (ATROVENT) 0.02 % nebulizer solution 0.5 mg  0.5 mg Nebulization TID RT  
 arformoterol 15 mcg/budesonide 0.5 mg neb solution   Nebulization BID RT  
 sodium chloride (NS) flush 5-40 mL  5-40 mL IntraVENous Q8H  
 aspirin chewable tablet 81 mg  81 mg Oral DAILY  atorvastatin (LIPITOR) tablet 40 mg  40 mg Oral DAILY  metoprolol tartrate (LOPRESSOR) tablet 50 mg  50 mg Oral DAILY  sodium chloride (NS) flush 5-40 mL  5-40 mL IntraVENous Q8H  
 enoxaparin (LOVENOX) injection 40 mg  40 mg SubCUTAneous DAILY  azithromycin (ZITHROMAX) tablet 500 mg  500 mg Oral DAILY  pantoprazole (PROTONIX) tablet 40 mg  40 mg Oral ACB  oxybutynin chloride XL (DITROPAN XL) tablet 10 mg  10 mg Oral DAILY  furosemide (LASIX) injection 40 mg  40 mg IntraVENous DAILY Review of Systems: A comprehensive review of systems was negative. Objective:  
Vital Signs:   
Visit Vitals BP (!) 154/72 (BP 1 Location: Left arm, BP Patient Position: Sitting) Pulse 89 Temp 97.5 °F (36.4 °C) Resp 20 Ht 6' (1.829 m) Wt 89.8 kg (198 lb) SpO2 95% BMI 26.85 kg/m² O2 Device: Hi flow nasal cannula O2 Flow Rate (L/min): 14 l/min Temp (24hrs), Av.7 °F (36.5 °C), Min:97.5 °F (36.4 °C), Max:98 °F (36.7 °C) Intake/Output:  
Last shift:      No intake/output data recorded. Last 3 shifts:  1901 -  0700 In: 960 [P.O.:860; I.V.:100] Out: 1370 [TLEMB:3291] Intake/Output Summary (Last 24 hours) at 2021 0801 Last data filed at 2021 3484 Gross per 24 hour Intake 300 ml Output 970 ml Net -670 ml Physical Exam:  
General:  Alert, cooperative, no distress, appears stated age. Has his chronic Hoarseness. On 14 NC oxygen. Pox of 93%, No distress. Head:  Normocephalic, without obvious abnormality, atraumatic. Eyes:  Conjunctivae/corneas clear. PERRL, EOMs intact. Nose: Nares normal. Septum midline. Mucosa normal. No drainage or sinus tenderness. Throat: Lips, mucosa, and tongue normal. Teeth and gums normal.  
Neck: Supple, symmetrical, trachea midline, no adenopathy, thyroid: no enlargment/tenderness/nodules, no carotid bruit and no JVD. Back:   Symmetric, no curvature. ROM normal.  
Lungs:   Clear to auscultation bilaterally. Good air movement. Seems comfortable Chest wall:  No tenderness or deformity. Has PPM in place over his left upper chest.   
Heart:  Regular rate and rhythm, S1, S2 normal, no murmur, click, rub or gallop. Abdomen:   Soft, non-tender. Bowel sounds normal. No masses,  No organomegaly. Extremities: Extremities normal, atraumatic, no cyanosis or edema. Pulses: 2+ and symmetric all extremities. Skin: Skin color, texture, turgor normal. No rashes or lesions Lymph nodes: Cervical, supraclavicular, and axillary nodes normal.  
Neurologic: Grossly nonfocal, motor seems to be intact. Psych: No overt anxiety or depression. Data review:  
 
Recent Results (from the past 24 hour(s)) PROCALCITONIN  
 Collection Time: 01/22/21  4:44 AM  
Result Value Ref Range Procalcitonin <0.05 ng/mL CBC WITH AUTOMATED DIFF Collection Time: 01/22/21  4:44 AM  
Result Value Ref Range WBC 7.6 4.1 - 11.1 K/uL  
 RBC 4.05 (L) 4.10 - 5.70 M/uL  
 HGB 10.6 (L) 12.1 - 17.0 g/dL HCT 34.3 (L) 36.6 - 50.3 % MCV 84.7 80.0 - 99.0 FL  
 MCH 26.2 26.0 - 34.0 PG  
 MCHC 30.9 30.0 - 36.5 g/dL  
 RDW 17.1 (H) 11.5 - 14.5 % PLATELET 415 132 - 612 K/uL MPV 12.3 8.9 - 12.9 FL  
 NRBC 0.0 0  WBC ABSOLUTE NRBC 0.00 0.00 - 0.01 K/uL NEUTROPHILS 88 (H) 32 - 75 % LYMPHOCYTES 11 (L) 12 - 49 % MONOCYTES 1 (L) 5 - 13 % EOSINOPHILS 0 0 - 7 % BASOPHILS 0 0 - 1 % IMMATURE GRANULOCYTES 0 0.0 - 0.5 % ABS. NEUTROPHILS 6.7 1.8 - 8.0 K/UL  
 ABS. LYMPHOCYTES 0.8 0.8 - 3.5 K/UL  
 ABS. MONOCYTES 0.1 0.0 - 1.0 K/UL  
 ABS. EOSINOPHILS 0.0 0.0 - 0.4 K/UL  
 ABS. BASOPHILS 0.0 0.0 - 0.1 K/UL  
 ABS. IMM. GRANS. 0.0 0.00 - 0.04 K/UL  
 DF MANUAL    
 RBC COMMENTS ANISOCYTOSIS 
1+ METABOLIC PANEL, COMPREHENSIVE Collection Time: 01/22/21  4:44 AM  
Result Value Ref Range Sodium 135 (L) 136 - 145 mmol/L Potassium 5.5 (H) 3.5 - 5.1 mmol/L Chloride 108 97 - 108 mmol/L  
 CO2 19 (L) 21 - 32 mmol/L Anion gap 8 5 - 15 mmol/L Glucose 157 (H) 65 - 100 mg/dL BUN 41 (H) 6 - 20 MG/DL Creatinine 1.80 (H) 0.70 - 1.30 MG/DL  
 BUN/Creatinine ratio 23 (H) 12 - 20 GFR est AA 44 (L) >60 ml/min/1.73m2 GFR est non-AA 36 (L) >60 ml/min/1.73m2 Calcium 9.2 8.5 - 10.1 MG/DL Bilirubin, total 0.7 0.2 - 1.0 MG/DL  
 ALT (SGPT) 14 12 - 78 U/L  
 AST (SGOT) 12 (L) 15 - 37 U/L Alk. phosphatase 61 45 - 117 U/L Protein, total 7.5 6.4 - 8.2 g/dL Albumin 2.9 (L) 3.5 - 5.0 g/dL Globulin 4.6 (H) 2.0 - 4.0 g/dL A-G Ratio 0.6 (L) 1.1 - 2.2 MAGNESIUM Collection Time: 01/22/21  4:44 AM  
Result Value Ref Range Magnesium 2.5 (H) 1.6 - 2.4 mg/dL PHOSPHORUS  
 Collection Time: 01/22/21  4:44 AM  
Result Value Ref Range Phosphorus 3.7 2.6 - 4.7 MG/DL Imaging: 
I have personally reviewed the patients radiographs and have reviewed the reports: 
 
 
1-18-21: CXR: TECHNIQUE: Portable AP upright chest view at 0051 hours 
  
FINDINGS: The left chest ICD and wires are stable. The cardiomediastinal 
contours are stable. There are mild bilateral interstitial and patchy airspace 
opacities. There is no pleural effusion or pneumothorax. The bones and upper 
abdomen are stable. 
  
IMPRESSION IMPRESSION: Mild bilateral interstitial and patchy airspace opacities can be 
seen with pulmonary edema or atypical/viral infection. 9-: CT of chest: IMPRESSION:  
1. No evidence for embolism. 2. 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 3. Nonspecific consolidative opacifications in the posterior medial basilar 
segments of the left lower lobe and the inferior lingula. · 9-17-20: Echo: LV: Estimated LVEF is 55 - 60%. Normal cavity size and systolic function (ejection fraction normal). Mild concentric hypertrophy. · TV: Mild to moderate tricuspid valve regurgitation is present. PA: Moderate to severe pulmonary hypertension. Pulmonary arterial systolic pressure is 61 mmHg.  
 
 
 
  
Lesvia Jon MD

## 2021-01-22 NOTE — PROGRESS NOTES
Palliative Medicine [place]: [phone] ([phone]) [facility]: [phone] ([phone]) Olu Morton is a 78 y.o. with a past history of CAD, COPD, HTN, CKD III, SSS s/p pacemaker, and an ulcer at the distal aortic arch and pulmonary hypertension who was admitted on 1/18/2021 from home with a diagnosis of acute hypoxic respiratory failure 2/2 acute on chronic chf, pneumonia and pulmonary hypertension (from [facility] notes). 
  
LCSW met with patient and his daughter/mpoa, Noreen Almeida. They were just finishing up their meeting with Dr Laura Palacio. Dr Kaitlynn Thompson was in earlier this morning, prior to daughter coming in. Hospice recommendation was mentioned in the notes per chart review. Patient sitting up in chair, is vocal and able to engage in conversation about his [address]. He tends to defer to his daughter, although he has his own opinions. His daughter [address] [place] lives with him and works. She did confirm that if patient comes home that there would be someone there who can help him. Patient is currently on high flow O2; his desire is to go home. Daughter Raman Curry and he are open to a hospice evaluation. Daughter wanting to make sure that patient will have O2 there. Let her know that hospice would provide O2, but unsure how much they can provide in terms of patient's needs. Let them know that hospice could evaluate that. Shared that goal of hospice is comfort and that patient would need to be okay with allowing progression of illness to occur naturally. He has voiced several times, \"if there is nothing more they can do for me here then I would rather be home\". Daughter may not be as ready to focus on comfort only, as she became quite defensive when Code status discussed again, \"we don't need to talk about that right now, he also has a pacemaker\". LCSW did discuss Code status with patient, based on his weak lungs and shared how CPR burden would be more than benefit based on his medical conditions. Patient seemed to agree with no CPR but then deferred to his daughter who was not ready to make a decision today. Did reinforce that this decision will need to be made in light of patient's fragile state. Both patient and daughter are open to meeting with hospice team, to see whether they can provide support. Patient's goal is to go home. Daughter did not have a particular hospice in mind, she was open to [address] coming to talk to them. Daughter appears somewhat private and does not share much information. Case discussed with bedside RN and with Rosemary ALFARO. Hospice consult placed by bedside RN.

## 2021-01-22 NOTE — PROGRESS NOTES
Hospitalist Progress Note NAME: Patience Platt :  1940 MRN:  [id number] Assessment / Plan: 
Acute hypoxic respiratory failure, POA Acute on chronic congestive heart failure, heart failure with preserved ejection fraction, POA Community-acquired pneumonia, POA Moderate to severe pulmonary hypertension, POA Patient's now on 15 L high flow oxygen. Appreciated pulmonary input. Patient has end-stage lung disease. Discussed with the family and patient about CODE STATUS. Patient still a full code. Palliative care consult was made. Appreciated palliative care input. Hospice to discuss with patient and family tomorrow. In the meantime we will continue to get Lasix. He is also on empiric antibiotics and steroid. Appreciated pulmonary input pulmonary is also closely following the patient. History of allergic enlarging ulcer of the distal aortic arch Vascular surgery recommending TAVR in 2020 but patient refused surgery Asymptomatic Sick sinus syndrome Status post pacemaker insertion 2020 SEPIDEH on CKD stage II Creatinine getting worse with diuresis. Nephrology help appreciated. Lisinopril stopped. The Lasix is continued. To have CT chest without contrast. 
Hypertension Coronary artery disease Continue home meds as tolerated Chronic hoarseness supportive care Code Status: Full code DVT Prophylaxis: Lovenox GI Prophylaxis: not indicated Baseline: Active, independent Body mass index is 26.85 kg/m².: 25.0 - 29.9 Overweight Subjective: Chief Complaint / Reason for Physician Visit Patient was seen and examined this morning daughter in the room. Try discussing about the end-stage lung disease. Patient was not in any form of distress even though he was using 15 L of oxygen. [place] No chest pain no any other issues. .\". Discussed with RN events overnight. Review of Systems: 
Symptom Y/N Comments  Symptom Y/N Comments Fever/Chills n   Chest Pain n   
 Poor Appetite    Edema Cough y   Abdominal Pain n   
Sputum    Joint Pain SOB/WHEAT y   Pruritis/Rash Nausea/vomit    Tolerating PT/OT Diarrhea    Tolerating Diet Constipation    Other Could NOT obtain due to:   
 
Objective: VITALS:  
Last 24hrs VS reviewed since prior progress note. Most recent are: 
Patient Vitals for the past 24 hrs: 
 Temp Pulse Resp BP SpO2  
01/22/21 1351     91 % 01/22/21 1116 97.5 °F (36.4 °C) 81 20 137/67 96 % 01/22/21 1053  82   92 % 01/22/21 0839     95 % 01/22/21 0752 97.5 °F (36.4 °C) 89 20 (!) 154/72 95 % 01/22/21 0452 97.8 °F (36.6 °C) 88 19 120/66 94 % 01/21/21 2331 98 °F (36.7 °C) 95 20 (!) 120/51 93 % 01/21/21 2150     92 % 01/21/21 1927 97.9 °F (36.6 °C) 85 19 (!) 107/49 91 % 01/21/21 1540 97.7 °F (36.5 °C) 71 20 118/64 93 % Intake/Output Summary (Last 24 hours) at 1/22/2021 1524 Last data filed at 1/22/2021 1430 Gross per 24 hour Intake 300 ml Output 1620 ml Net -1320 ml PHYSICAL EXAM: 
General: WD, WN. Alert, cooperative, no acute distress   
EENT:  EOMI. Anicteric sclerae. MMM Resp:  Decreased air entry in the lower lung fields and some crackles heard. CV:  Regular  rhythm,  No edema GI:  Soft, Non distended, Non tender.  +Bowel sounds Neurologic:  Alert and oriented X 3, normal speech, No lower extremity pitting significant edema. Reviewed most current lab test results and cultures  YES Reviewed most current radiology test results   YES Review and summation of old records today    NO Reviewed patient's current orders and MAR    YES 
PMH/SH reviewed - no change compared to H&P 
________________________________________________________________________ Care Plan discussed with: 
  Comments Patient y Family RN y   
Care Manager Consultant  y Multidiciplinary team rounds were held today with , nursing, pharmacist and clinical coordinator. Patient's plan of care was discussed; medications were reviewed and discharge planning was addressed. ________________________________________________________________________ Total NON critical care TIME: 35   Minutes Total CRITICAL CARE TIME Spent:   Minutes non procedure based Comments >50% of visit spent in counseling and coordination of care    
________________________________________________________________________ Neyda Rodriguez MD  
 
Procedures: see electronic medical records for all procedures/Xrays and details which were not copied into this note but were reviewed prior to creation of Plan. LABS: 
I reviewed today's most current labs and imaging studies. Pertinent labs include: 
Recent Labs  
  01/22/21 
0444 01/21/21 
0115 01/20/21 
0140 WBC 7.6 9.3 8.7 HGB 10.6* 10.5* 10.4* HCT 34.3* 34.1* 34.8*  
 180 178 Recent Labs  
  01/22/21 
0444 01/21/21 
0115 01/20/21 
0140 * 138 138  
K 5.5* 4.4 4.2  108 110* CO2 19* 21 21 * 124* 129* BUN 41* 38* 27* CREA 1.80* 1.79* 1.48* CA 9.2 9.6 8.9 MG 2.5*  --   --   
PHOS 3.7  --   --   
ALB 2.9* 3.0* 3.0* TBILI 0.7 0.4 0.2 ALT 14 12 13 Signed:  Neyda Rodriguez MD

## 2021-01-22 NOTE — PROGRESS NOTES
NEPHROLOGY PROGRESS NOTE Chief complaints: f/u SEPIDEH CKD stage III Subjective: resting comfortable in the chair with c/o SOB 24 hr interval history:scr 1.8 K 5.5 CO2 19 ROS:  
 
Objective: 
Vitals:  
 01/22/21 7379 01/22/21 0501 01/22/21 0664 01/22/21 5582 BP: 120/66  (!) 154/72 Pulse: 88  89 Resp: 19  20 Temp: 97.8 °F (36.6 °C)  97.5 °F (36.4 °C) SpO2: 94%  95% 95% Weight:  89.8 kg (198 lb) Height:      
 
 
Intake/Output Summary (Last 24 hours) at 1/22/2021 1027 Last data filed at 1/22/2021 6658 Gross per 24 hour Intake 300 ml Output 970 ml Net -670 ml PHYSICAL EXAM: 
GENERAL : Lying down in bed with no acute distress HEENT: AT NC PEERLA NECK: Supple no JVP 
CVS: S1 S2 RRR, no murmur or gallops heard RS: CTABL, no rhonchi,or wheezing heard ABDOMEN: soft NT ND positive BS EXTREMITY: No edema clubbing or cyanosis, pedal pulse + NEUROLOGY: AAA X3, no focal deficit or asterixis Labs: CBC:   
Lab Results Component Value Date/Time WBC 7.6 01/22/2021 04:44 AM  
 HGB 10.6 (L) 01/22/2021 04:44 AM  
 HCT 34.3 (L) 01/22/2021 04:44 AM  
  01/22/2021 04:44 AM  
 
BMP:  
Lab Results Component Value Date/Time  (L) 01/22/2021 04:44 AM  
 K 5.5 (H) 01/22/2021 04:44 AM  
  01/22/2021 04:44 AM  
 CO2 19 (L) 01/22/2021 04:44 AM  
 AGAP 8 01/22/2021 04:44 AM  
  (H) 01/22/2021 04:44 AM  
 BUN 41 (H) 01/22/2021 04:44 AM  
 CREA 1.80 (H) 01/22/2021 04:44 AM  
 GFRAA 44 (L) 01/22/2021 04:44 AM  
 GFRNA 36 (L) 01/22/2021 04:44 AM  
  
 
Lab Results Component Value Date/Time  Color YELLOW/STRAW 09/15/2020 11:25 PM  
 Appearance CLEAR 09/15/2020 11:25 PM  
 Specific gravity 1.030 09/15/2020 11:25 PM  
 pH (UA) 6.0 09/15/2020 11:25 PM  
 Protein Negative 09/15/2020 11:25 PM  
 Glucose Negative 09/15/2020 11:25 PM  
 Ketone Negative 09/15/2020 11:25 PM  
 Bilirubin Negative 09/15/2020 11:25 PM  
 Urobilinogen 0.2 09/15/2020 11:25 PM  
 Nitrites Negative 09/15/2020 11:25 PM  
 Leukocyte Esterase Negative 09/15/2020 11:25 PM  
 Epithelial cells FEW 09/15/2020 11:25 PM  
 Bacteria Negative 09/15/2020 11:25 PM  
 WBC 0-4 09/15/2020 11:25 PM  
 RBC 0-5 09/15/2020 11:25 PM  
 
 
Imaging study: 
 
USG OF KIDNEY: 
FINDINGS: 
The patient is studied with coronal and axial real-time ultrasound.  
  
The right kidney measures 10.4 cm, and the left kidney measures 11.6 cm. There 
is mildly increased echogenicity of the bilateral renal parenchyma. There is no 
mass or shadowing calculus. Right kidney cysts are again seen measuring up to 
5.9 cm. There is no hydronephrosis.   
  
The ureters are not dilated. The IVC, aorta and proximal origins of the iliac arteries are obscured by bowel 
gas.  
  
The urinary bladder is unremarkable. 
  
IMPRESSION 1. Mildly increased echogenicity of the bilateral renal parenchyma can be seen 
with nonspecific medical renal disease. No hydronephrosis. Stable right kidney 
cysts measuring up to 5.9 cm. 
  
2. Normal urinary bladder. 
  
 
Assessment &Plan SEPIDEH on CKD stage IIIB d/t prerenal state Metabolic acidosis Hyperkalemia 
ckd stage IIIB Acute hypoxic respiratory failure Plan: 
Change lasix 40 mg po daily from 1/23 Start NAHC03 650 mg TIDWM Give Lokelma 10 gm daily for 2 days Appreciate palliative care input Total time spent with patient:  35 minutes 
  [] Critical Care Provided Care Plan discussed with: 
 Medical Team 
 
Beena Palencia MD 
1/22/2021 RNA

## 2021-01-22 NOTE — PROGRESS NOTES
BRADLEY: Home with Home Hospice 11:45am-CM met with pt and pt's daughter Jing Posada at bedside to discuss d/c plan. CM inquired with pt and pt's daughter if they were interested in an informational session about hospice. Pt and pt's daughter were interested in an informational session about hospice. CM provided pt and pt's daughter with hospice list. Pt and pt's daughter selected [facility] hospice. [facility] document signed by pt and placed in pt's bedside chart. Referral sent to hospice via [facility]. Medicare pt has received, reviewed, and signed 2nd IM letter informing them of their right to appeal the discharge. Signed copy has been placed on pt bedside chart. CM will continue to follow patient for discharge planning needs and arrange for services as deemed necessary. Bryson Eagle [address] [place] [address] 
[phone]

## 2021-01-22 NOTE — PROGRESS NOTES
End of Shift Note Bedside shift change report given to  (oncoming nurse) by Rafat Meza RN (offgoing nurse). Report included the following information Kardex Shift worked:   
  
Shift summary and any significant changes:  
   
  
Concerns for physician to address:   
  
Zone phone for oncoming shift:    
  
 
Activity: 
Activity Level: Up with Assistance Number times ambulated in hallways past shift: 0 Number of times OOB to chair past shift: 0 Cardiac:  
Cardiac Monitoring: Yes     
Cardiac Rhythm: Normal sinus rhythm Access:  
Current line(s): PIV Genitourinary:  
Urinary status: voiding,dribbles uses brief at his request  
 
Respiratory:  
O2 Device: Hi flow nasal cannula Chronic home O2 use?: YES Incentive spirometer at bedside: YES Actual Volume (ml): 250 ml GI: 
Last Bowel Movement Date: 01/21/21 Current diet:  DIET CARDIAC Regular; FR 1200ML Passing flatus: YES Tolerating current diet: YES 
% Diet Eaten: 100 % Pain Management:  
Patient states pain is manageable on current regimen: YES Skin: 
Joe Score: 19 Interventions: increase time out of bed Patient Safety: 
Fall Score: Total Score: 2 Interventions: bed/chair alarm and pt to call before getting OOB Length of Stay: 
Expected LOS: 4d 2h Actual LOS: 4 Rafat Meza RN

## 2021-01-23 NOTE — PROGRESS NOTES
Hospitalist Progress Note NAME: Khalif Watt :  1940 MRN:  [id number] Assessment / Plan: 
Acute hypoxic respiratory failure, POA Acute on chronic congestive heart failure, heart failure with preserved ejection fraction, POA Community-acquired pneumonia, POA Moderate to severe pulmonary hypertension, POA Patient is now on 10 L high flow oxygen better compared with 15 L yesterday. Cesar Cooper Appreciated pulmonary input. Patient has end-stage lung disease. Discussed with the family and patient about CODE STATUS. Patient still a full code. Palliative care consult was made. Appreciated palliative care input. Hospice to discuss with patient and family tomorrow. In the meantime we will continue to get Lasix. He is also on empiric antibiotics and steroid. Appreciated pulmonary input pulmonary is also closely following the patient. There is an active ongoing palliative care discussion with family. We will follow-up with that. 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 Patient's hyperkalemia corrected. Hypertension Coronary artery disease Continue home meds as tolerated Chronic hoarseness supportive care Code Status: Full code DVT Prophylaxis: Lovenox GI Prophylaxis: not indicated Baseline: Active, independent Body mass index is 26.99 kg/m².: 25.0 - 29.9 Overweight Subjective: Chief Complaint / Reason for Physician Visit Patient was examined and seen this morning sitting on the chair. Does not look to be in respiratory distress. Denied to have any other issues. Daughter was in the room as well. .  Discussed with RN events overnight. Review of Systems: 
Symptom Y/N Comments  Symptom Y/N Comments Fever/Chills n   Chest Pain n   
Poor Appetite    Edema Cough y   Abdominal Pain n   
Sputum    Joint Pain SOB/WHEAT y   Pruritis/Rash Nausea/vomit    Tolerating PT/OT Diarrhea    Tolerating Diet Constipation    Other Could NOT obtain due to:   
 
Objective: VITALS:  
Last 24hrs VS reviewed since prior progress note. Most recent are: 
Patient Vitals for the past 24 hrs: 
 Temp Pulse Resp BP SpO2  
01/23/21 0827 97.5 °F (36.4 °C) 93 20 (!) 140/74 91 % 01/23/21 0314 97.6 °F (36.4 °C) 95 21 (!) 149/74 91 % 01/22/21 2143     92 % 01/22/21 1918 97.7 °F (36.5 °C) 90 20 (!) 108/59 91 % 01/22/21 1714 97.8 °F (36.6 °C) 91 20 (!) 144/71 90 % 01/22/21 1351     91 % 01/22/21 1116 97.5 °F (36.4 °C) 81 20 137/67 96 % 01/22/21 1053  82   92 % Intake/Output Summary (Last 24 hours) at 1/23/2021 7384 Last data filed at 1/23/2021 9052 Gross per 24 hour Intake  Output 950 ml Net -950 ml PHYSICAL EXAM: 
General: WD, WN. Alert, cooperative, no acute distress   
EENT:  EOMI. Anicteric sclerae. MMM Resp:  Decreased air entry in the lower lung fields and some crackles heard. CV:  Regular  rhythm,  No edema GI:  Soft, Non distended, Non tender.  +Bowel sounds Neurologic:  Alert and oriented X 3, normal speech, No lower extremity pitting significant edema. Reviewed most current lab test results and cultures  YES Reviewed most current radiology test results   YES Review and summation of old records today    NO Reviewed patient's current orders and MAR    YES 
PMH/SH reviewed - no change compared to H&P 
________________________________________________________________________ Care Plan discussed with: 
  Comments Patient y Family RN y   
Care Manager Consultant  y Multidiciplinary team rounds were held today with , nursing, pharmacist and clinical coordinator. Patient's plan of care was discussed; medications were reviewed and discharge planning was addressed. ________________________________________________________________________ Total NON critical care TIME: 35   Minutes Total CRITICAL CARE TIME Spent:   Minutes non procedure based Comments >50% of visit spent in counseling and coordination of care    
________________________________________________________________________ Neyda Rodriguez MD  
 
Procedures: see electronic medical records for all procedures/Xrays and details which were not copied into this note but were reviewed prior to creation of Plan. LABS: 
I reviewed today's most current labs and imaging studies. Pertinent labs include: 
Recent Labs  
  01/23/21 
0312 01/22/21 
0444 01/21/21 
0115 WBC 11.8* 7.6 9.3 HGB 11.3* 10.6* 10.5* HCT 37.6 34.3* 34.1*  
 181 180 Recent Labs  
  01/23/21 
4226 01/22/21 
0444 01/21/21 
0115  135* 138  
K 4.5 5.5* 4.4  
 108 108 CO2 22 19* 21 * 157* 124* BUN 43* 41* 38* CREA 1.77* 1.80* 1.79* CA 8.8 9.2 9.6 MG  --  2.5*  --   
PHOS 3.6 3.7  --   
ALB 3.1* 2.9* 3.0* TBILI 0.2 0.7 0.4 ALT 17 14 12 Signed:  Neyda Rodriguez MD

## 2021-01-23 NOTE — ROUTINE PROCESS
End of Shift Note Bedside shift change report given to  (oncoming nurse) by Navneet Barillas RN (offgoing nurse). 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  
   
  
Concerns for physician to address:   
  
Zone phone for oncoming shift:    
  
 
Activity: 
Activity Level: Up with Assistance Number times ambulated in hallways past shift: 0 Number of times OOB to chair past shift: 1 Cardiac:  
Cardiac Monitoring: Yes     
Cardiac Rhythm: Normal sinus rhythm Access:  
Current line(s): PIV Genitourinary:  
Urinary status: voiding Respiratory:  
O2 Device: Hi flow nasal cannula Chronic home O2 use?: YES Incentive spirometer at bedside: YES Actual Volume (ml): 250 ml GI: 
Last Bowel Movement Date: 01/22/21 Current diet:  DIET CARDIAC Regular; FR 1200ML Passing flatus: YES Tolerating current diet: YES 
% Diet Eaten: 100 % Pain Management:  
Patient states pain is manageable on current regimen: N/A Skin: 
Joe Score: 19 Interventions: turn team, increase time out of bed and PT/OT consult Patient Safety: 
Fall Score: Total Score: 2 Interventions: bed/chair alarm, gripper socks, pt to call before getting OOB and stay with me (per policy) Length of Stay: 
Expected LOS: 4d 2h Actual LOS: 5 Navneet Barillas RN

## 2021-01-24 NOTE — PROGRESS NOTES
Hospitalist Progress Note NAME: Sam Lafleur :  1940 MRN:  [id number] Assessment / Plan: 
Acute hypoxic respiratory failure, POA Acute on chronic congestive heart failure, heart failure with preserved ejection fraction, POA Community-acquired pneumonia, POA Moderate to severe pulmonary hypertension, POA Patient is now on 10 L high flow oxygen better compared with 15 L yesterday. Destiny Potter Appreciated pulmonary input. Patient has end-stage lung disease. Discussed with the family and patient about CODE STATUS. Patient still a full code. Palliative care consult was made. Appreciated palliative care input. Hospice to discuss with patient and family tomorrow. In the meantime we will continue to get Lasix. He is also on empiric antibiotics and steroid. Appreciated pulmonary input pulmonary is also closely following the patient. There is an active ongoing palliative care discussion with family. We will follow-up with that. 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 Patient's hyperkalemia corrected. Hypertension Coronary artery disease Continue home meds as tolerated Chronic hoarseness supportive care Code Status: Full code DVT Prophylaxis: Lovenox GI Prophylaxis: not indicated Baseline: Active, independent Body mass index is 26.83 kg/m².: 25.0 - 29.9 Overweight We are all hoping to wean down the oxygen at least below 6 letter and talk about placement. Subjective: Chief Complaint / Reason for Physician Visit Patient was examined and seen this morning sitting on the chair. Does not look to be in respiratory distress. Denied to have any other issues. Patient looks calm and optimistic . Discussed with RN events overnight. Review of Systems: 
Symptom Y/N Comments  Symptom Y/N Comments Fever/Chills n   Chest Pain n   
Poor Appetite    Edema Cough y   Abdominal Pain n   
Sputum    Joint Pain SOB/WHEAT y   Pruritis/Rash Nausea/vomit    Tolerating PT/OT Diarrhea    Tolerating Diet Constipation    Other Could NOT obtain due to:   
 
Objective: VITALS:  
Last 24hrs VS reviewed since prior progress note. Most recent are: 
Patient Vitals for the past 24 hrs: 
 Temp Pulse Resp BP SpO2  
01/24/21 1310     91 % 01/24/21 1208     96 % 01/24/21 1200 97.1 °F (36.2 °C) 71 20 120/63 91 % 01/24/21 0805 97.1 °F (36.2 °C) (!) 102 24 132/74 90 % 01/24/21 0720     94 % 01/24/21 0314     95 % 01/24/21 0221 97.6 °F (36.4 °C) 89 22 (!) 125/58 95 % 01/24/21 0046     96 % 01/24/21 0013 97.8 °F (36.6 °C) 67 18 133/67 96 % 01/23/21 2059     94 % 01/23/21 2012 97.6 °F (36.4 °C) 78 20 112/64 91 % 01/23/21 1535 97.5 °F (36.4 °C) 86 18 111/68 90 % Intake/Output Summary (Last 24 hours) at 1/24/2021 1420 Last data filed at 1/24/2021 0301 Gross per 24 hour Intake 340 ml Output 700 ml Net -360 ml PHYSICAL EXAM: 
General: WD, WN. Alert, cooperative, no acute distress   
EENT:  EOMI. Anicteric sclerae. MMM Resp:  Decreased air entry in the lower lung fields and some crackles heard. CV:  Regular  rhythm,  No edema GI:  Soft, Non distended, Non tender.  +Bowel sounds Neurologic:  Alert and oriented X 3, normal speech, No lower extremity pitting significant edema. Reviewed most current lab test results and cultures  YES Reviewed most current radiology test results   YES Review and summation of old records today    NO 
 Reviewed patient's current orders and MAR    YES 
PMH/SH reviewed - no change compared to H&P 
________________________________________________________________________ Care Plan discussed with: 
  Comments Patient y Family RN y   
Care Manager Consultant  y Multidiciplinary team rounds were held today with , nursing, pharmacist and clinical coordinator. Patient's plan of care was discussed; medications were reviewed and discharge planning was addressed. ________________________________________________________________________ Total NON critical care TIME: 35   Minutes Total CRITICAL CARE TIME Spent:   Minutes non procedure based Comments >50% of visit spent in counseling and coordination of care    
________________________________________________________________________ Shirlean Duverney, MD  
 
Procedures: see electronic medical records for all procedures/Xrays and details which were not copied into this note but were reviewed prior to creation of Plan. LABS: 
I reviewed today's most current labs and imaging studies. Pertinent labs include: 
Recent Labs  
  01/23/21 
0312 01/22/21 
0444 WBC 11.8* 7.6 HGB 11.3* 10.6* HCT 37.6 34.3*  
 181 Recent Labs  
  01/24/21 
1075 01/23/21 
5144 01/22/21 
0444  136 135* K 4.2 4.5 5.5*  
 106 108 CO2 23 22 19* * 181* 157* BUN 37* 43* 41* CREA 1.57* 1.77* 1.80* CA 8.1* 8.8 9.2 MG  --   --  2.5* PHOS  --  3.6 3.7 ALB 2.8* 3.1* 2.9* TBILI 0.6 0.2 0.7 ALT 20 17 14 Signed:  Shirlean Duverney, MD

## 2021-01-24 NOTE — PROGRESS NOTES
End of Shift Note Bedside shift change report given to Heaven Chapman (oncoming nurse) by Tatiana Del Rio (offgoing nurse). Report included the following information SBAR, Kardex and [facility] Shift worked:  night Shift summary and any significant changes:  
  Pt still on 10L NC requiring up to 15L with slight ambulation to bedisde commode. Concerns for physician to address:   
  
Zone phone for oncoming shift:    
  
 
Activity: 
Activity Level: Up with Assistance Number times ambulated in hallways past shift: 0 Number of times OOB to chair past shift: 0 Cardiac:  
Cardiac Monitoring: Yes     
Cardiac Rhythm: Normal sinus rhythm Access:  
Current line(s): PIV Genitourinary:  
Urinary status: voiding Respiratory:  
O2 Device: Hi flow nasal cannula, Humidifier Chronic home O2 use?: NO Incentive spirometer at bedside: YES Actual Volume (ml): 250 ml GI: 
Last Bowel Movement Date: 01/23/21 Current diet:  DIET CARDIAC Regular; FR 1200ML Passing flatus: YES Tolerating current diet: YES 
% Diet Eaten: 100 % Pain Management:  
Patient states pain is manageable on current regimen: YES Skin: 
Joe Score: 17 Interventions: speciality bed, increase time out of bed and internal/external urinary devices Patient Safety: 
Fall Score: Total Score: 3 Interventions: bed/chair alarm, assistive device (walker, cane, etc), gripper socks, pt to call before getting OOB and stay with me (per policy) High Fall Risk: Yes Length of Stay: 
Expected LOS: 4d 2h Actual LOS: 6 Tatiana Console

## 2021-01-25 NOTE — PROGRESS NOTES
0700: Bedside shift change report given to Jesu Sol RN (oncoming nurse) by Luciana Humphrey (offgoing nurse). Report included the following information SBAR, Kardex and ED Summary.

## 2021-01-25 NOTE — PROGRESS NOTES
ADULT PROTOCOL: JET AEROSOL  REASSESSMENT Patient  Jace Hudson     78 y.o.   male     1/24/2021  10:22 PM 
 
Breath Sounds Pre Procedure: Right Breath Sounds: Diminished Left Breath Sounds: Diminished Breath Sounds Post Procedure: Right Breath Sounds: Diminished Left Breath Sounds: Diminished Breathing pattern: Pre procedure Breathing Pattern: Regular Post procedure Breathing Pattern: Regular Heart Rate: Pre procedure Pulse: 82 
         Post procedure Pulse: 84 Resp Rate: Pre procedure Respirations: 16 Post procedure Respirations: 16 Incentive Spirometry:  Actual Volume (ml): 250 ml 
     
 
Cough: Pre procedure Cough: Non-productive Post procedure Cough: Non-productive Oxygen: O2 Device: Hi flow nasal cannula   Flow rate (L/min) 10 lpm hfnc Changed: NO SpO2: Pre procedure SpO2: 93 %   with oxygen Post procedure SpO2: 93 %  with oxygen Nebulizer Therapy: Current medications Aerosolized Medications: Brovana, Ipratropium bromide, Pulmicort Changed: NO 
 
Problem List:  
Patient Active Problem List  
Diagnosis Code  ED (erectile dysfunction) N52.9  
 HTN (hypertension) I10  
 Prostate cancer ([facility] C61  
 PAD (peripheral artery disease) ([facility]) I73.9  
 STEMI (ST elevation myocardial infarction) ([facility]) I21.3  Chronic interstitial lung disease ([facility]) J84.9  
 S/P PTCA (percutaneous transluminal coronary angioplasty) Z98.61  
 Mucopurulent chronic bronchitis ([facility]) J41.1  Acute myocardial infarction of anterior wall, subsequent episode of care ([facility] I21.09  
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  
  COPD (chronic obstructive pulmonary disease) ([facility]) J44.9  Mixed simple and mucopurulent chronic bronchitis ([facility]) J41.8  COPD exacerbation ([facility] J44.1  Acute hypoxemic respiratory failure ([facility]) J96.01  
 SSS (sick sinus syndrome) ([facility]) I49.5  DNR (do not resuscitate) discussion Z70.80  
 Goals of care, counseling/discussion Z71.89  
 Advanced care planning/counseling discussion Z71.89  
 Acute respiratory failure with hypoxia ([facility] J96.01  
 Acute on chronic diastolic (congestive) heart failure ([facility]) I50.33  
 Suspected COVID-19 virus infection X02.313  Shortness of breath R06.02  
 Debility R53.81 Respiratory Therapist: Dali Coates, RT

## 2021-01-25 NOTE — PROGRESS NOTES
Music Therapy Assessment [facility] Madeline Mcgee [id number]    
1940  78 y.o.  male Patient Telephone Number: [phone] (home) [facility] Affiliation: [facility]  
Language: [place] Patient Active Problem List  
 Diagnosis Date Noted  Shortness of breath  Debility  Acute respiratory failure with hypoxia ([address] 01/18/2021  Acute on chronic diastolic (congestive) heart failure ([address] 01/18/2021  Suspected COVID-19 virus infection 01/18/2021  DNR (do not resuscitate) discussion  Goals of care, counseling/discussion  Advanced care planning/counseling discussion  SSS (sick sinus syndrome) ([address] 09/17/2020  COPD exacerbation ([address] 09/15/2020  Acute hypoxemic respiratory failure ([address] 09/15/2020  Mixed simple and mucopurulent chronic bronchitis ([address] 04/17/2019  Hypoxemia 03/23/2019  COPD (chronic obstructive pulmonary disease) ([address] 03/23/2019  Coronary artery disease involving native coronary artery of native heart without angina pectoris 04/25/2017  Advance directive discussed with patient 07/22/2016  Hyperlipidemia 06/27/2016  Acute myocardial infarction of anterior wall, subsequent episode of care ([address] 06/06/2016  Penetrating atherosclerotic ulcer of aorta ([address] 06/06/2016  Mucopurulent chronic bronchitis ([address] 06/05/2016  S/P PTCA (percutaneous transluminal coronary angioplasty) 10/21/2015  Chronic interstitial lung disease ([address] 10/20/2015  
 STEMI (ST elevation myocardial infarction) ([address] 10/19/2015  PAD (peripheral artery disease) ([facility] 12/31/2013  Prostate cancer ([address] 05/08/2012  
 HTN (hypertension) 07/05/2011  ED (erectile dysfunction) 02/24/2010 Date: 1/25/2021            Total Time (in minutes): 25          MRM 2 CARDIOPULMONARY CARE Mental Status:   [x] Alert [  ] Ines Sor [  ]  Confused  [  ] Minimally responsive  [  ] Sleeping Communication Status: [x] Impaired Speech-Pt's sister said the pt's voice box has been damaged. [  ] Nonverbal  
 
Physical Status:   [x] Oxygen in use  [  ] Hard of Hearing [  ] Vision Impaired [  ] Ambulatory  [  ] Ambulatory with assistance [  ] Non-ambulatory Music Preferences, Background: Pt said he likes \"everything\" when this music therapist asked about his music preferences. His sister shared that the pt sang in a [employer] group for a number of years. Clinical Problem addressed: Support healthy pt/family coping, positive social interaction, spiritual support. Goal(s) met in session: 
Physical/Pain management (Scale of 1-10): Pre-session rating: Pt denied pain. Post-session rating: Pt denied pain. [  ] Increased relaxation   [  ] Affected breathing patterns [  ] Decreased muscle tension   [  ] Decreased agitation [  ] Affected heart rate    [  ] Increased alertness Emotional/Psychological: 
[x] Increased self-expression   [  ] Decreased aggressive behavior [  ] Decreased feelings of stress  [  ] Discussed healthy coping skills [  ] Improved mood    [  ] Decreased withdrawn behavior Social: 
[  ] Decreased feelings of isolation/loneliness [x] Positive social interaction  
[x] Provided support and/or comfort for family/friends Spiritual: 
[x] Spiritual support    [  ] Expressed peace [  ] Expressed cristian    [  ] Discussed beliefs Techniques Utilized (Check all that apply):  
[  ] Procedural support MT [  ] Music for relaxation [  ] Patient preferred music 
[  ] Selene analysis  [  ] Song choice  [x] Music for validation [  ] Entrainment  [  ] Movement to music [  ] Guided visualization [  ] Corey Durant  [  ] Patient instrument playing [  ] Diana Manual writing [  ] Td Navaom along   [  ] Marshall Candelario  [  ] Sensory stimulation 
[x] Active Listening  [x] Music for spiritual support [  ] Making of CDs as gifts Session Observations:  Referral from Fletcher Egan, Palliative . Patient (pt) was alert sitting in a chair eating. His sister Micah Sommer (spelling?) was visiting with him. This music therapist (MT) greeted them and asked the pt how he was feeling. Pt denied pain and said he wished he could go home. MT provided words of validation, and then the pt's sister shared about the pt's, her, and other family members' music backgrounds. MT provided active listening and then the pt and his sister requested music. MT sat across from them and sang and played the [employer] on My Mind after the pt's sister shared that a family member lives in [facility]. Pt and his sister expressed enjoyment in the music. Pt's sister shared more about the pt's medical history and the hardships of multiple family hospitalizations in 2020 during a pandemic. MT offered active listening and words of validation. Pt's sister shared more about the pt's family's music background. She requested to hear the [employer]. MT sang and played this with iza. Pt and his sister expressed gratitude for the session. Margie Omer MT-BC (Music Therapist-Board Certified) Spiritual Care Department Referral-based service

## 2021-01-25 NOTE — PROGRESS NOTES
Comprehensive Nutrition Assessment Type and Reason for Visit: Initial, RD nutrition re-screen/LOS Nutrition Recommendations/Plan:  
Continue current diet Nutrition Assessment:     
Chart reviewed for LOS and case discussed during IDR. Pt noted for end stage lung disease, acute hypoxic respiratory failure, CHF, CAP, pulmonary HTN, SEPIDEH on CKD2, HTN, CAD. Good appetite, 100% per nursing notes. Wt hx stable. MST negative for malnutrition risk factors. Possible hospice pt. Wt Readings from Last 10 Encounters:  
01/25/21 89.6 kg (197 lb 9.6 oz) 10/07/20 88 kg (194 lb) 10/06/20 88.2 kg (194 lb 8 oz) 09/20/20 91.9 kg (202 lb 11.2 oz) 09/14/20 88 kg (194 lb) 08/28/20 88 kg (194 lb)  
07/15/20 88.2 kg (194 lb 6.4 oz) 03/05/20 92 kg (202 lb 14.4 oz) 10/19/19 88.5 kg (195 lb)  
09/15/19 87.1 kg (192 lb)  
] Estimated Daily Nutrient Needs: 
Energy (kcal): 2124 kcal (BMR x 1. 3AF); Weight Used for Energy Requirements: Current Protein (g): 90-108g (1-1.2g/kg); Weight Used for Protein Requirements: Current Fluid (ml/day): 2100mL; Method Used for Fluid Requirements: 1 ml/kcal 
 
 
Nutrition Related Findings:  Labs reviewed. Meds: lasix, solu-medrol, protonix, Na bicarb. Trace edema BLE. BM 1/23. Wounds:   
None Current Nutrition Therapies: DIET CARDIAC Regular; FR 1200ML Anthropometric Measures: 
· Height:  6' (182.9 cm) · Current Body Wt:  89.6 kg (197 lb 8.5 oz) · Ideal Body Wt:  178 lbs:  111 % · BMI Category: Overweight (BMI 25.0-29. 9) Nutrition Diagnosis:  
· Increased nutrient needs related to catabolic illness as evidenced by (end stage lung disease) Nutrition Interventions:  
Food and/or Nutrient Delivery: Continue current diet Nutrition Education and Counseling: No recommendations at this time Coordination of Nutrition Care: Continue to monitor while inpatient Goals: 
PO intake >70% meals next 5-7 days Nutrition Monitoring and Evaluation: Behavioral-Environmental Outcomes: None identified Food/Nutrient Intake Outcomes: Food and nutrient intake Physical Signs/Symptoms Outcomes: Biochemical data, GI status, Fluid status or edema, Weight Discharge Planning:   
Continue current diet Electronically signed by Erlinda Smith RD on 1/25/2021 at 1:22 PM 
 
Contact: [id number] Pager [phone]

## 2021-01-25 NOTE — PROGRESS NOTES
End of Shift Note Bedside shift change report given to [facility] (oncoming nurse) by Nanda Norris (offgoing nurse). Report included the following information SBAR, Kardex and [facility] Shift worked:  night Shift summary and any significant changes:  
  patient still on 10L, does desat some with ambulation. Concerns for physician to address:   
  
Zone phone for oncoming shift:    
  
 
Activity: 
Activity Level: Up with Assistance Number times ambulated in hallways past shift: 0 Number of times OOB to chair past shift: 1 Cardiac:  
Cardiac Monitoring: Yes     
Cardiac Rhythm: Normal sinus rhythm Access:  
Current line(s): PIV Genitourinary:  
Urinary status: voiding Respiratory:  
O2 Device: Hi flow nasal cannula Chronic home O2 use?: YES Incentive spirometer at bedside: YES Actual Volume (ml): 250 ml GI: 
Last Bowel Movement Date: 01/23/21 Current diet:  DIET CARDIAC Regular; FR 1200ML Passing flatus: YES Tolerating current diet: YES 
% Diet Eaten: 100 % Pain Management:  
Patient states pain is manageable on current regimen: YES Skin: 
Joe Score: 18 Interventions: speciality bed, float heels and increase time out of bed Patient Safety: 
Fall Score: Total Score: 3 Interventions: bed/chair alarm, assistive device (walker, cane, etc), gripper socks, pt to call before getting OOB and stay with me (per policy) High Fall Risk: Yes Length of Stay: 
Expected LOS: 4d 2h Actual LOS: 7 Nanda Norris

## 2021-01-25 NOTE — PROGRESS NOTES
Nephrology Progress Note Cliff Romero  
 
[email or web address]  Phone - [phone] Patient: Rosina Nice    YOB: 1940     Admit Date: 1/18/2021 Date- 1/25/2021 CC: Follow up for acute kidney injury IMPRESSION & PLAN:  
  SEPIDEH on CKD stage IIIB d/t prerenal state Metabolic acidosis Hyperkalemia 
ckd stage IIIB Acute hypoxic respiratory failure-Congestive heart failure, pneumonia Pulmonary hypertension CAD PLAN- 
? Continue Lasix 40 mg daily ? Stop sodium bicarb ? Fluid restriction 1500 mL/day 
? Okay to restart lisinopril ? We will sign off please call us if needed Subjective: Interval History:  
-Creatinine continue to improve No shortness of breath Leg edema improving No c/o sob,  No c/o chest pain, No c/o nausea or vomiting, No c/o  fever. Objective:  
Vitals:  
 01/25/21 0342 01/25/21 0537 01/25/21 0727 01/25/21 1044 BP: (!) 131/59  (!) 150/88 (!) 141/71 Pulse: 93  86 70 Resp: 18  18 18 Temp: 98.6 °F (37 °C)  97.5 °F (36.4 °C) 97.6 °F (36.4 °C) SpO2: 93%  91% 96% Weight:  89.6 kg (197 lb 9.6 oz) Height:      
  
01/24 0701 - 01/25 0700 In: 120 [P.O.:120] Out: 600 [Urine:600] Last 3 Recorded Weights in this Encounter 01/23/21 0405 01/24/21 0300 01/25/21 4009 Weight: 90.3 kg (199 lb) 89.7 kg (197 lb 12.8 oz) 89.6 kg (197 lb 9.6 oz) Physical exam:  
GEN: NAD NECK- Supple, no mass RESP: No wheezing, Clear b/l CVS: S1,S2  RRR 
NEURO: Normal speech, Non focal 
EXT: + Edema PSYCH: Normal Mood Chart reviewed. Pertinent Notes reviewed. Data Review : 
Recent Labs  
  01/25/21 
0319 01/24/21 
5166 01/23/21 
6131  136 136  
K 4.3 4.2 4.5  
 106 106 CO2 25 23 22 BUN 33* 37* 43* CREA 1.41* 1.57* 1.77* * 199* 181* CA 8.2* 8.1* 8.8 PHOS  --   --  3.6 Recent Labs  
  01/23/21 
5961 WBC 11.8* HGB 11.3* HCT 37.6  No results for input(s): FE, TIBC, PSAT, FERR in the last 72 hours. Medication list  reviewed Current Facility-Administered Medications Medication Dose Route Frequency  ipratropium (ATROVENT) 0.02 % nebulizer solution 0.5 mg  0.5 mg Nebulization QID RT  
 albuterol (PROVENTIL VENTOLIN) nebulizer solution 2.5 mg  2.5 mg Nebulization QID RT  
 sodium bicarbonate tablet 650 mg  650 mg Oral TID  furosemide (LASIX) tablet 40 mg  40 mg Oral DAILY  methylPREDNISolone (PF) (SOLU-MEDROL) injection 40 mg  40 mg IntraVENous Q6H  
 arformoterol 15 mcg/budesonide 0.5 mg neb solution   Nebulization BID RT  
 sodium chloride (NS) flush 5-40 mL  5-40 mL IntraVENous Q8H  
 sodium chloride (NS) flush 5-40 mL  5-40 mL IntraVENous PRN  
 albuterol (PROVENTIL HFA, VENTOLIN HFA, PROAIR HFA) inhaler 2 Puff  2 Puff Inhalation Q4H PRN  
 aspirin chewable tablet 81 mg  81 mg Oral DAILY  atorvastatin (LIPITOR) tablet 40 mg  40 mg Oral DAILY  metoprolol tartrate (LOPRESSOR) tablet 50 mg  50 mg Oral DAILY  sodium chloride (NS) flush 5-40 mL  5-40 mL IntraVENous Q8H  
 sodium chloride (NS) flush 5-40 mL  5-40 mL IntraVENous PRN  
 acetaminophen (TYLENOL) tablet 650 mg  650 mg Oral Q6H PRN Or  
 acetaminophen (TYLENOL) suppository 650 mg  650 mg Rectal Q6H PRN  polyethylene glycol (MIRALAX) packet 17 g  17 g Oral DAILY PRN  promethazine (PHENERGAN) tablet 12.5 mg  12.5 mg Oral Q6H PRN Or  
 ondansetron (ZOFRAN) injection 4 mg  4 mg IntraVENous Q6H PRN  
 enoxaparin (LOVENOX) injection 40 mg  40 mg SubCUTAneous DAILY  pantoprazole (PROTONIX) tablet 40 mg  40 mg Oral ACB  oxybutynin chloride XL (DITROPAN XL) tablet 10 mg  10 mg Oral DAILY Diaflores Lin, [address]  [facility] / [place] [address], [address] [place], [address] Phone - [phone]               Fax - [phone]

## 2021-01-25 NOTE — PROGRESS NOTES
Hospitalist Progress Note NAME: Olu Morton :  1940 MRN:  [id number] Assessment / Plan: 
Acute hypoxic respiratory failure, POA Acute on chronic congestive heart failure, heart failure with preserved ejection fraction, POA Community-acquired pneumonia, POA Moderate to severe pulmonary hypertension, POA Patient is now on 10 L high flow oxygen better compared with 15 L yesterday. Yohana Keating Appreciated pulmonary input. Patient has end-stage lung disease. Discussed with the family and patient about CODE STATUS. Patient still a full code. Palliative care consult was made. Appreciated palliative care input. In the meantime we will continue to get Lasix. He is also on empiric antibiotics and steroid. Appreciated pulmonary input pulmonary is also closely following the patient. There is an active ongoing palliative care discussion with family. We will follow-up with that. 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 Patient's hyperkalemia corrected. Hypertension Coronary artery disease Continue home meds as tolerated Chronic hoarseness supportive care Code Status: Full code DVT Prophylaxis: Lovenox GI Prophylaxis: not indicated Baseline: Active, independent Body mass index is 26.8 kg/m².: 25.0 - 29.9 Overweight We are all hoping to wean down the oxygen at least below 6 letter and talk about placement. Subjective: Chief Complaint / Reason for Physician Visit Patient seen sitting on his chair as usual.  Does not look to be in any form of distress. Denied any chest pain or shortness of breath. He still on 10 L. I discussed with patient the plan to follow her again. .  Discussed with RN events overnight. Review of Systems: 
Symptom Y/N Comments  Symptom Y/N Comments Fever/Chills n   Chest Pain n   
Poor Appetite    Edema Cough y   Abdominal Pain n   
Sputum    Joint Pain SOB/WHEAT y   Pruritis/Rash Nausea/vomit    Tolerating PT/OT Diarrhea    Tolerating Diet Constipation    Other Could NOT obtain due to:   
 
Objective: VITALS:  
Last 24hrs VS reviewed since prior progress note. Most recent are: 
Patient Vitals for the past 24 hrs: 
 Temp Pulse Resp BP SpO2  
01/25/21 1044 97.6 °F (36.4 °C) 70 18 (!) 141/71 96 % 01/25/21 0727 97.5 °F (36.4 °C) 86 18 (!) 150/88 91 % 01/25/21 0342 98.6 °F (37 °C) 93 18 (!) 131/59 93 % 01/25/21 0250  72   95 % 01/24/21 2316     93 % 01/24/21 2302 98.3 °F (36.8 °C) 69 20 129/68 95 % 01/24/21 1925 98.5 °F (36.9 °C) 88 20 115/89 92 % 01/24/21 1906     93 % 01/24/21 1600  71  128/71  Intake/Output Summary (Last 24 hours) at 1/25/2021 1508 Last data filed at 1/25/2021 2015 Gross per 24 hour Intake 120 ml Output 600 ml Net -480 ml PHYSICAL EXAM: 
General: WD, WN. Alert, cooperative, no acute distress   
EENT:  EOMI. Anicteric sclerae. MMM Resp:  Decreased air entry in the lower lung fields and some crackles heard. CV:  Regular  rhythm,  No edema GI:  Soft, Non distended, Non tender.  +Bowel sounds Neurologic:  Alert and oriented X 3, normal speech, No lower extremity pitting significant edema. Reviewed most current lab test results and cultures  YES Reviewed most current radiology test results   YES Review and summation of old records today    NO Reviewed patient's current orders and MAR    YES 
PMH/SH reviewed - no change compared to H&P 
________________________________________________________________________ Care Plan discussed with: 
  Comments Patient y Family RN y   
Care Manager Consultant  y Multidiciplinary team rounds were held today with , nursing, pharmacist and clinical coordinator. Patient's plan of care was discussed; medications were reviewed and discharge planning was addressed. ________________________________________________________________________ Total NON critical care TIME: 35   Minutes Total CRITICAL CARE TIME Spent:   Minutes non procedure based Comments >50% of visit spent in counseling and coordination of care    
________________________________________________________________________ Maren Rayo MD  
 
Procedures: see electronic medical records for all procedures/Xrays and details which were not copied into this note but were reviewed prior to creation of Plan. LABS: 
I reviewed today's most current labs and imaging studies. Pertinent labs include: 
Recent Labs  
  01/23/21 
2280 WBC 11.8* HGB 11.3* HCT 37.6  Recent Labs  
  01/25/21 
0319 01/24/21 
8037 01/23/21 
1553  136 136  
K 4.3 4.2 4.5  
 106 106 CO2 25 23 22 * 199* 181* BUN 33* 37* 43* CREA 1.41* 1.57* 1.77* CA 8.2* 8.1* 8.8 PHOS  --   --  3.6 ALB 2.9* 2.8* 3.1* TBILI 0.5 0.6 0.2 ALT 20 20 17 Signed:  Maren Rayo MD

## 2021-01-25 NOTE — PROGRESS NOTES
[facility] Pulmonary, Critical Care, and Sleep Medicine Patient Consult Name: Gopi Castellano MRN: [id number] : 1940 Hospital: [facility] Date: 2021 IMPRESSION:  
· I suspect what we are seeing is near end stage lung disease. His CT scan does not demonstrate much that this reversible. Pt states this am that he would like to go home if there is not much else that we can do for him. I feel that it is reasonable. Will have to look into the options to try and get him home. ? Hospice. · Acute on Chronic Hypoxic Respiratory Failure, Used Inogen oxygen at 3L per NC. Now on 11L · No evidence of volume overload. · No evidence of Pneumonia on CT of chest.  
· Pulmonary Hypertension, moderate to severe. LVEFo f 55%. · COPD (PFT from 20: FVC: 4.35L 111% pred. FEV1: 2.18L 76% pred, FEV1/fvc: 50%. Normal Lung volumes. DLCO: severely reduced with DLCO/VA of 35%. The CT scan of chest from  demonstrates very severe obstructive lung disease with severe emphysematous changes. The PFT FVC and FEV1 under represent the severity of illness. The severe reduced DLCO is representative of severe lung disease. Does not appear to be volume overload or acute bacterial infection. 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 Had EBRT, on Lupron. Follow with Miah/Marko. · Chronic Hoarseness. · CAD, prior stents, Distal Aortic Arthc Ulcer. Pt refused TAVR in 2020. Follows with Dr. Courtney Benavides. · Has been on Chronic Prednsione therapy-christie to worsening pulmonary status · Sick Sinus Syndrom: had PM placed in 2020. · CRI 
· PAD · Leg cramps · Ex Smoker · Sister is Plum: h: [id number] and cell: [phone]. · Full Code RECOMMENDATIONS:  
· O2 - currently on 11 LPM with barely acceptable SpO2 · Increase bronchodilators · Continue IV steroids · Monitor creatinine, lower today · DVT prophylaxis · Difficult situation. Not currently on a level of O2 that can be given at home, though he feels at baseline PCP: Shannon Dickerson Subjective:  
 
1-25-21: wants to go home, feels at baseline. However his SpO2 is 88-91% on 11 LPM O2.   
 
CC: Shortness of breath This patient has been seen and evaluated at the request of Dr. Sienna Leone for above. Patient is a 78 y.o. male who presents with above. Has increased shortness of breath over last 24 hrs, first started at 10pm on 1/17. NO chest pain, no cough. NO abdominal pain. No tobacco or Etoh use. Tolerating po intake well. He is on 4.5L of oxygen at home. He did not feelt that his breathing therapies were helping. NO cough. NO fevers, no nausea or vomiting. Past Medical History:  
Diagnosis Date  CAD (coronary artery disease)   
 mi 10/19/2015 with stent  COPD (chronic obstructive pulmonary disease) (HCC)  Hypertension  Prostate cancer ([facility] [address] 5/8/2012 Dr. Ritu Lea - diagnosed 2/2012 - s/p EBRT on Lupron Past Surgical History:  
Procedure Laterality Date  HX ORTHOPAEDIC  1961  
 left knee surgery  [place] INS NEW/RPLCMT PRM PM W/TRANSV ELTRD ATRIAL&VENT N/A 9/21/2020 INSERT PPM DUAL performed by Sebastian Moura MD at [facility]ATH [facility] Prior to Admission medications Medication Sig Start Date End Date Taking? Authorizing Provider  
atorvastatin (LIPITOR) 40 mg tablet Take 1 tablet by mouth once daily Patient taking differently: Take 40 mg by mouth daily. 12/30/20  Yes Chary Thomas MD  
omeprazole (PRILOSEC) 20 mg capsule Take 1 Cap by mouth daily. 10/7/20  Yes Chary Thomas MD  
abiraterone 250 mg tab Take 250 mg by mouth daily (with breakfast). With Low Fat Breakfast   Yes Juliet, MD Nancy  
oxybutynin chloride XL (DITROPAN XL) 10 mg CR tablet Take 10 mg by mouth nightly.    Yes Nancy Chung MD  
 albuterol (PROVENTIL HFA, VENTOLIN HFA, PROAIR HFA) 90 mcg/actuation inhaler Use 2 puffs very 4 hours prn 20  Yes Valery Metcalf MD  
metoprolol tartrate (LOPRESSOR) 50 mg tablet TAKE ONE TABLET BY MOUTH TWICE DAILY Patient taking differently: Take 50 mg by mouth two (2) times a day. TAKE ONE TABLET BY MOUTH TWICE DAILY 20  Yes Karan Cosby MD  
lisinopriL (PRINIVIL, ZESTRIL) 2.5 mg tablet Take 1 tablet by mouth once daily Patient taking differently: Take 2.5 mg by mouth daily. 3/21/20  Yes Karan Cosby MD  
albuterol-ipratropium (DUO-NEB) 2.5 mg-0.5 mg/3 ml nebu 3 mL by Nebulization route every six (6) hours as needed (sob). 10/19/19  Yes Renato Sotomayor NP  
fluticasone furoate-vilanterol (BREO ELLIPTA) 100-25 mcg/dose inhaler Take 1 Puff by inhalation daily. 3/25/19  Yes Kwabena Payne, DO Oxygen 4.5L continouus   Yes Provider, Historical  
fluticasone (FLONASE) 50 mcg/actuation nasal spray 2 Sprays by Both Nostrils route daily. 19  Yes Dharmesh Martin MD  
aspirin 81 mg chewable tablet Take 1 Tab by mouth daily. RISK OF HEART ATTACK IF ANY MISSED DOSES Patient taking differently: Take 81 mg by mouth daily. RISK OF HEART ATTACK IF ANY MISSED DOSES  Indications: treatment to prevent a heart attack 10/21/15  Yes Karan Cosby MD  
 
No Known Allergies Social History Tobacco Use  Smoking status: Former Smoker Packs/day: 0.50 Years: 40.00 Pack years: 20.00 Types: Cigarettes Quit date: 10/19/2015 Years since quittin.2  Smokeless tobacco: Never Used  Tobacco comment: trying quit 10/13/15 - 2 ppd Substance Use Topics  Alcohol use: No  
  Alcohol/week: 0.0 standard drinks Family History Problem Relation Age of Onset  Heart Disease Mother   
     pacemaker  Diabetes Mother  Diabetes Sister  Heart Disease Brother Current Facility-Administered Medications Medication Dose Route Frequency  ipratropium (ATROVENT) 0.02 % nebulizer solution 0.5 mg  0.5 mg Nebulization QID RT  
 albuterol (PROVENTIL VENTOLIN) nebulizer solution 2.5 mg  2.5 mg Nebulization QID RT  
 sodium bicarbonate tablet 650 mg  650 mg Oral TID  furosemide (LASIX) tablet 40 mg  40 mg Oral DAILY  methylPREDNISolone (PF) (SOLU-MEDROL) injection 40 mg  40 mg IntraVENous Q6H  
 arformoterol 15 mcg/budesonide 0.5 mg neb solution   Nebulization BID RT  
 sodium chloride (NS) flush 5-40 mL  5-40 mL IntraVENous Q8H  
 aspirin chewable tablet 81 mg  81 mg Oral DAILY  atorvastatin (LIPITOR) tablet 40 mg  40 mg Oral DAILY  metoprolol tartrate (LOPRESSOR) tablet 50 mg  50 mg Oral DAILY  sodium chloride (NS) flush 5-40 mL  5-40 mL IntraVENous Q8H  
 enoxaparin (LOVENOX) injection 40 mg  40 mg SubCUTAneous DAILY  pantoprazole (PROTONIX) tablet 40 mg  40 mg Oral ACB  oxybutynin chloride XL (DITROPAN XL) tablet 10 mg  10 mg Oral DAILY Review of Systems: A comprehensive review of systems was negative. Objective:  
Vital Signs:   
Visit Vitals BP (!) 150/88 (BP 1 Location: Left arm, BP Patient Position: Sitting) Pulse 86 Temp 98.6 °F (37 °C) Resp 18 Ht 6' (1.829 m) Wt 89.6 kg (197 lb 9.6 oz) SpO2 91% BMI 26.80 kg/m² O2 Device: Hi flow nasal cannula O2 Flow Rate (L/min): 10 l/min Temp (24hrs), Av.9 °F (36.6 °C), Min:97.1 °F (36.2 °C), Max:98.6 °F (37 °C) Intake/Output:  
Last shift:      No intake/output data recorded. Last 3 shifts:  1901 -  0700 In: 240 [P.O.:240] Out: 1200 [Urine:1200] Intake/Output Summary (Last 24 hours) at 2021 7367 Last data filed at 2021 0231 Gross per 24 hour Intake 120 ml Output 600 ml Net -480 ml Physical Exam:  
General:  Alert, cooperative, no distress Head:  Normocephalic, without obvious abnormality, atraumatic. Eyes:  Conjunctivae/corneas clear. Nose: Nares normal. Septum midline.  Mucosa normal.    
 Throat: Lips, mucosa, and tongue normal.    
Neck: Supple, symmetrical, trachea midline Back:   Symmetric, no curvature. ROM normal.  
Lungs:   Few scattered wheeze, no distress Chest wall:  No tenderness or deformity. Has PPM in place over his left upper chest.   
Heart:  Regular rate and rhythm Abdomen:   Soft, non-tender. Bowel sounds normal.   
Extremities: Extremities normal, atraumatic, no cyanosis or edema. Skin: Skin color, texture, turgor normal. No rashes or lesions Neurologic: Grossly nonfocal   
 
Data:  
Labs: 
Recent Labs  
  01/23/21 
4562 WBC 11.8* HGB 11.3* HCT 37.6  Recent Labs  
  01/25/21 
0319 01/24/21 
4830 01/23/21 
5199  136 136  
K 4.3 4.2 4.5  
 106 106 CO2 25 23 22 * 199* 181* BUN 33* 37* 43* CREA 1.41* 1.57* 1.77* CA 8.2* 8.1* 8.8 PHOS  --   --  3.6 ALB 2.9* 2.8* 3.1* TBILI 0.5 0.6 0.2 ALT 20 20 17 No results for input(s): PHI, PCO2I, PO2I, HCO3I, FIO2I in the last 72 hours. Imaging: 
I have personally reviewed the patients radiographs: 
None today Mercedez Charles MD

## 2021-01-26 NOTE — PROGRESS NOTES
Hospitalist Progress Note NAME: Lucero France :  1940 MRN:  [id number] Assessment / Plan: 
Acute hypoxic respiratory failure, POA Acute on chronic congestive heart failure, heart failure with preserved ejection fraction, POA Community-acquired pneumonia, POA Moderate to severe pulmonary hypertension, POA  
CT chest  showed emphysema Cont' empiric IV abx, IV steroids Repeat Echo to r/o shunt. O2 suppl, wean as tolerated. 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 Patient's hyperkalemia corrected. Hypertension Coronary artery disease Continue home meds as tolerated Chronic hoarseness supportive care Code Status: Full code DVT Prophylaxis: Lovenox GI Prophylaxis: not indicated Baseline: Active, independent Body mass index is 26.8 kg/m².: 25.0 - 29.9 Overweight We are all hoping to wean down the oxygen at least below 6 letter and talk about placement. Subjective: Chief Complaint / Reason for Physician Visit Patient seen, NAD. Discussed with RN events overnight. Review of Systems: 
Symptom Y/N Comments  Symptom Y/N Comments Fever/Chills n   Chest Pain n   
Poor Appetite    Edema Cough    Abdominal Pain n   
Sputum    Joint Pain SOB/WHEAT y   Pruritis/Rash Nausea/vomit    Tolerating PT/OT Diarrhea    Tolerating Diet Constipation    Other Could NOT obtain due to:   
 
Objective: VITALS:  
Last 24hrs VS reviewed since prior progress note. Most recent are: 
Patient Vitals for the past 24 hrs: 
 Temp Pulse Resp BP SpO2  
21 1501 97.4 °F (36.3 °C) 81 20 (!) 144/71 90 % 21 1231     96 % 21 1101 97.3 °F (36.3 °C) 71 20 (!) 140/77 94 % 01/26/21 0816  95  (!) 144/63   
01/26/21 0717 97.7 °F (36.5 °C) 85 20 (!) 150/84 94 % 01/26/21 0417 98.1 °F (36.7 °C) 84 18 (!) 153/75 95 % 01/25/21 2337 98.2 °F (36.8 °C) 85 18 (!) 150/74 99 % 01/25/21 1950 98.4 °F (36.9 °C) 77 18 (!) 149/87 97 % 01/25/21 1946     98 % Intake/Output Summary (Last 24 hours) at 1/26/2021 1551 Last data filed at 1/26/2021 0121 Gross per 24 hour Intake 460 ml Output 625 ml Net -165 ml PHYSICAL EXAM: 
General: WD, WN. Alert, cooperative, no acute distress   
EENT:  EOMI. Anicteric sclerae. MMM Resp:  Decreased air entry in the lower lung fields and some crackles heard. CV:  Regular  rhythm,  No edema GI:  Soft, Non distended, Non tender.  +Bowel sounds Neurologic:  Alert and oriented X 3, normal speech, No lower extremity pitting significant edema. Reviewed most current lab test results and cultures  YES Reviewed most current radiology test results   YES Review and summation of old records today    NO Reviewed patient's current orders and MAR    YES 
PMH/ reviewed - no change compared to H&P 
________________________________________________________________________ Care Plan discussed with: 
  Comments Patient y Family RN y   
Care Manager Consultant Multidiciplinary team rounds were held today with , nursing, pharmacist and clinical coordinator. Patient's plan of care was discussed; medications were reviewed and discharge planning was addressed. ________________________________________________________________________ Total NON critical care TIME: 35   Minutes Total CRITICAL CARE TIME Spent:   Minutes non procedure based Comments >50% of visit spent in counseling and coordination of care    
________________________________________________________________________ Norma Fletcher MD  
 
 Procedures: see electronic medical records for all procedures/Xrays and details which were not copied into this note but were reviewed prior to creation of Plan. LABS: 
I reviewed today's most current labs and imaging studies. Pertinent labs include: 
Recent Labs  
  01/26/21 
0502 WBC 9.2 HGB 11.4* HCT 36.9  Recent Labs  
  01/26/21 
0502 01/25/21 
0319 01/24/21 
5417  137 136  
K 4.5 4.3 4.2  105 106 CO2 26 25 23 * 209* 199* BUN 29* 33* 37* CREA 1.46* 1.41* 1.57* CA 8.3* 8.2* 8.1* ALB 3.2* 2.9* 2.8* TBILI 0.3 0.5 0.6 ALT 23 20 20 Signed: Tyrell Morales MD

## 2021-01-26 NOTE — PROGRESS NOTES
End of Shift Note Bedside shift change report given to Marciano (oncoming nurse) by Sabrina Romero (offgoing nurse). Report included the following information SBAR, Kardex, Procedure Summary, Intake/Output and Recent Results Shift worked:  7a-7p Shift summary and any significant changes:  
  Weaned down to 7 Liters sats @ 92%. Patient had coughing fit after swallowing turkey \"the wrong way\" for dinner. Patient back on 11 Liters. Cont. IV steroids and weaning. Concerns for physician to address:  none Zone phone for oncoming shift:   [phone] Activity: 
Activity Level: Up with Assistance Number times ambulated in hallways past shift: 0 Number of times OOB to chair past shift: 5 Cardiac:  
Cardiac Monitoring: Yes     
Cardiac Rhythm: Normal sinus rhythm Access:  
Current line(s): PIV Genitourinary:  
Urinary status: voiding Respiratory:  
O2 Device: Hi flow nasal cannula Chronic home O2 use?: YES Incentive spirometer at bedside: YES Actual Volume (ml): 250 ml GI: 
Last Bowel Movement Date: 01/23/21 Current diet:  DIET CARDIAC Regular; FR 1200ML 
DIET ONE TIME MESSAGE Passing flatus: YES Tolerating current diet: YES 
% Diet Eaten: 100 % Pain Management:  
Patient states pain is manageable on current regimen: YES Skin: 
Joe Score: 19 Interventions: increase time out of bed Patient Safety: 
Fall Score: Total Score: 3 Interventions: bed/chair alarm High Fall Risk: Yes Length of Stay: 
Expected LOS: 4d 2h Actual LOS: 8 Sabrina Romero

## 2021-01-26 NOTE — PROGRESS NOTES
[facility] [place] Pulmonary, Critical Care, and Sleep Medicine Patient Consult Name: Patience Platt MRN: [id number] : 1940 Hospital: [facility] Date: 2021 IMPRESSION:  
· Acute on Chronic Hypoxic Respiratory Failure, Used Inogen oxygen at 3L per [place]. Now on 11L · No evidence of volume overload. · No evidence of Pneumonia on CT of chest.  
· Pulmonary Hypertension, moderate to severe. LVEFo f 55%. · COPD (PFT from 20: FVC: 4.35L 111% pred. FEV1: 2.18L 76% pred, FEV1/fvc: 50%. Normal Lung volumes. DLCO: severely reduced with DLCO/VA of 35%. The CT scan of chest from  demonstrates very severe obstructive lung disease with severe emphysematous changes. The PFT FVC and FEV1 under represent the severity of illness. The severe reduced DLCO is representative of severe lung disease. Does not appear to be volume overload or acute bacterial infection. 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 Had EBRT, on Lupron. Follow with Miah/Marko. · Chronic Hoarseness. · CAD, prior stents, Distal Aortic Arthc Ulcer. Pt refused TAVR in 2020. Follows with Dr. Andrey Ryan. · Has been on Chronic Prednsione therapy-due to worsening pulmonary status · Sick Sinus Syndrome: had PM placed in 2020. · CRI 
· PAD · Leg cramps · Ex Smoker · Sister is Plum: h: [id number] and cell: [phone]. · Full Code RECOMMENDATIONS:  
· O2 - currently on 11 LPM with barely acceptable SpO2 
· Repeat limited echo to look for shunt - only small improvement in oxygenation with increase in O2 therapy (and small drop when lowered significantly) · Bronchodilators · Continue IV steroids · Monitor creatinine, lower today · DVT prophylaxis · Difficult situation. Not currently on a level of O2 that can be given at home, though he feels at baseline PCP: Marcela Goldberg Subjective:  
 
21: no events.   Denies complaint, but remains on 11 LPM 
 1-25-21: wants to go home, feels at baseline. However his SpO2 is 88-91% on 11 LPM O2.   
 
CC: Shortness of breath This patient has been seen and evaluated at the request of Dr. Debra Cole for above. Patient is a 78 y.o. male who presents with above. Has increased shortness of breath over last 24 hrs, first started at 10pm on 1/17. NO chest pain, no cough. NO abdominal pain. No tobacco or Etoh use. Tolerating po intake well. He is on 4.5L of oxygen at home. He did not feelt that his breathing therapies were helping. NO cough. NO fevers, no nausea or vomiting. Past Medical History:  
Diagnosis Date  CAD (coronary artery disease)   
 mi 10/19/2015 with stent  COPD (chronic obstructive pulmonary disease) (HCC)  Hypertension  Prostate cancer ([facility] [address] 5/8/2012 Dr. Annel Laguna - diagnosed 2/2012 - s/p EBRT on Lupron Past Surgical History:  
Procedure Laterality Date  HX ORTHOPAEDIC  1961  
 left knee surgery  [place] INS NEW/RPLCMT PRM PM W/TRANSV ELTRD ATRIAL&VENT N/A 9/21/2020 INSERT PPM DUAL performed by Grayson Galicia MD at [facility] C[facility] CATH LAB Prior to Admission medications Medication Sig Start Date End Date Taking? Authorizing Provider  
atorvastatin (LIPITOR) 40 mg tablet Take 1 tablet by mouth once daily Patient taking differently: Take 40 mg by mouth daily. 12/30/20  Yes Maida Valenzuela MD  
omeprazole (PRILOSEC) 20 mg capsule Take 1 Cap by mouth daily. 10/7/20  Yes Maida Valenzuela MD  
abiraterone 250 mg tab Take 250 mg by mouth daily (with breakfast). With Low Fat Breakfast   Yes Other, MD Nancy  
oxybutynin chloride XL (DITROPAN XL) 10 mg CR tablet Take 10 mg by mouth nightly. Yes Other, MD Nancy  
albuterol (PROVENTIL HFA, VENTOLIN HFA, PROAIR HFA) 90 mcg/actuation inhaler Use 2 puffs very 4 hours prn 7/30/20  Yes Maida Valenzuela MD  
metoprolol tartrate (LOPRESSOR) 50 mg tablet TAKE ONE TABLET BY MOUTH TWICE DAILY Patient taking differently: Take 50 mg by mouth two (2) times a day. TAKE ONE TABLET BY MOUTH TWICE DAILY 20  Yes Karan Cosby MD  
lisinopriL (PRINIVIL, ZESTRIL) 2.5 mg tablet Take 1 tablet by mouth once daily Patient taking differently: Take 2.5 mg by mouth daily. 3/21/20  Yes Karan Cosby MD  
albuterol-ipratropium (DUO-NEB) 2.5 mg-0.5 mg/3 ml nebu 3 mL by Nebulization route every six (6) hours as needed (sob). 10/19/19  Yes Renato Sotomayor NP  
fluticasone furoate-vilanterol (BREO ELLIPTA) 100-25 mcg/dose inhaler Take 1 Puff by inhalation daily. 3/25/19  Yes Kwabena Payne, DO Oxygen 4.5L continouus   Yes Provider, Historical  
fluticasone (FLONASE) 50 mcg/actuation nasal spray 2 Sprays by Both Nostrils route daily. 19  Yes Dharmesh Martin MD  
aspirin 81 mg chewable tablet Take 1 Tab by mouth daily. RISK OF HEART ATTACK IF ANY MISSED DOSES Patient taking differently: Take 81 mg by mouth daily. RISK OF HEART ATTACK IF ANY MISSED DOSES  Indications: treatment to prevent a heart attack 10/21/15  Yes Karan Cosby MD  
 
No Known Allergies Social History Tobacco Use  Smoking status: Former Smoker Packs/day: 0.50 Years: 40.00 Pack years: 20.00 Types: Cigarettes Quit date: 10/19/2015 Years since quittin.2  Smokeless tobacco: Never Used  Tobacco comment: trying quit 10/13/15 -  ppd Substance Use Topics  Alcohol use: No  
  Alcohol/week: 0.0 standard drinks Family History Problem Relation Age of Onset  Heart Disease Mother   
     pacemaker  Diabetes Mother  Diabetes Sister  Heart Disease Brother Current Facility-Administered Medications Medication Dose Route Frequency  ipratropium (ATROVENT) 0.02 % nebulizer solution 0.5 mg  0.5 mg Nebulization QID RT  
 albuterol (PROVENTIL VENTOLIN) nebulizer solution 2.5 mg  2.5 mg Nebulization QID RT  
 sodium bicarbonate tablet 650 mg  650 mg Oral TID  furosemide (LASIX) tablet 40 mg  40 mg Oral DAILY  methylPREDNISolone (PF) (SOLU-MEDROL) injection 40 mg  40 mg IntraVENous Q6H  
 arformoterol 15 mcg/budesonide 0.5 mg neb solution   Nebulization BID RT  
 sodium chloride (NS) flush 5-40 mL  5-40 mL IntraVENous Q8H  
 aspirin chewable tablet 81 mg  81 mg Oral DAILY  atorvastatin (LIPITOR) tablet 40 mg  40 mg Oral DAILY  metoprolol tartrate (LOPRESSOR) tablet 50 mg  50 mg Oral DAILY  sodium chloride (NS) flush 5-40 mL  5-40 mL IntraVENous Q8H  
 enoxaparin (LOVENOX) injection 40 mg  40 mg SubCUTAneous DAILY  pantoprazole (PROTONIX) tablet 40 mg  40 mg Oral ACB  oxybutynin chloride XL (DITROPAN XL) tablet 10 mg  10 mg Oral DAILY Review of Systems: A comprehensive review of systems was negative. Objective:  
Vital Signs:   
Visit Vitals BP (!) 150/84 Pulse 85 Temp 97.7 °F (36.5 °C) Resp 20 Ht 6' (1.829 m) Wt 89.6 kg (197 lb 9.6 oz) SpO2 94% BMI 26.80 kg/m² O2 Device: Nasal cannula O2 Flow Rate (L/min): 12 l/min Temp (24hrs), Av.9 °F (36.6 °C), Min:97.6 °F (36.4 °C), Max:98.4 °F (36.9 °C) Intake/Output:  
Last shift:      No intake/output data recorded. Last 3 shifts:  1901 -  0700 In: 1300 [P.O.:1300] Out: 1225 [Urine:1225] Intake/Output Summary (Last 24 hours) at 2021 8937 Last data filed at 2021 0121 Gross per 24 hour Intake 940 ml Output 625 ml Net 315 ml Physical Exam:  
General:  Alert, cooperative, no distress Head:  Normocephalic, without obvious abnormality, atraumatic. Eyes:  Conjunctivae/corneas clear. Nose: Nares normal. Septum midline. Mucosa normal.    
Throat: Lips, mucosa, and tongue normal.    
Neck: Supple, symmetrical, trachea midline Back:   Symmetric, no curvature. ROM normal.  
Lungs:   Few scattered wheeze, no distress Chest wall:  No tenderness or deformity.  Has PPM in place over his left upper chest.   
 Heart:  Regular rate and rhythm Abdomen:   Soft, non-tender. Bowel sounds normal.   
Extremities: Extremities normal, atraumatic, no cyanosis or edema. Skin: Skin color, texture, turgor normal. No rashes or lesions Neurologic: Grossly nonfocal   
 
Data:  
Labs: 
Recent Labs  
  01/26/21 
0502 WBC 9.2 HGB 11.4* HCT 36.9  Recent Labs  
  01/26/21 
0502 01/25/21 
0319 01/24/21 
7541  137 136  
K 4.5 4.3 4.2  105 106 CO2 26 25 23 * 209* 199* BUN 29* 33* 37* CREA 1.46* 1.41* 1.57* CA 8.3* 8.2* 8.1* ALB 3.2* 2.9* 2.8* TBILI 0.3 0.5 0.6 ALT 23 20 20 No results for input(s): PHI, PCO2I, PO2I, HCO3I, FIO2I in the last 72 hours. Imaging: 
I have personally reviewed the patients radiographs: 
None today Maru Julian MD

## 2021-01-26 NOTE — PROGRESS NOTES
BRADLEY: Home with Hospice vs Home with Home Health and follow-up Appointments 11:15am-CM met with pt and pt's daughter to discuss d/c plan. No new needs at this time. CM will continue to follow pt for d/c planning needs. [place] [address] 
[phone]

## 2021-01-26 NOTE — PROGRESS NOTES
ADULT PROTOCOL: JET AEROSOL  REASSESSMENT Patient  Diana Gonzales     78 y.o.   male     1/25/2021  11:12 PM 
 
Breath Sounds Pre Procedure: Right Breath Sounds: Diminished Left Breath Sounds: Diminished Breath Sounds Post Procedure: Right Breath Sounds: Diminished Left Breath Sounds: Diminished Breathing pattern: Pre procedure Breathing Pattern: Regular Post procedure Breathing Pattern: Regular Heart Rate: Pre procedure Pulse: 72 Post procedure Pulse: 78 Resp Rate: Pre procedure Respirations: 18 Post procedure Respirations: 20 Incentive Spirometry:  Actual Volume (ml): 250 ml 
 
Cough: Pre procedure Cough: Strong Post procedure Cough: Non-productive Oxygen: O2 Device: Hi flow nasal cannula  11LPM 
   Changed: NO SpO2: Pre procedure SpO2: 98 %  WITH oxygen Post procedure SpO2: 99 %  WITH oxygen Nebulizer Therapy: Current medications Aerosolized Medications: Brovana, Albuterol, Ipratropium bromide, Pulmicort Changed: NO 
 
Problem List:  
Patient Active Problem List  
Diagnosis Code  ED (erectile dysfunction) N52.9  
 HTN (hypertension) I10  
 Prostate cancer ([facility] C61  
 PAD (peripheral artery disease) ([facility]) I73.9  
 STEMI (ST elevation myocardial infarction) ([facility]) I21.3  Chronic interstitial lung disease ([facility]) J84.9  
 S/P PTCA (percutaneous transluminal coronary angioplasty) Z98.61  
 Mucopurulent chronic bronchitis ([facility]) J41.1  Acute myocardial infarction of anterior wall, subsequent episode of care ([facility] I21.09  
 Penetrating atherosclerotic ulcer of aorta ([facility]) I71.9  Hyperlipidemia E78.5  Advance directive discussed with patient Z70.80  Coronary artery disease involving native coronary artery of native heart without angina pectoris I25.10  Hypoxemia R09.02  
 COPD (chronic obstructive pulmonary disease) ([facility]) J44.9  Mixed simple and mucopurulent chronic bronchitis ([facility]) J41.8  COPD exacerbation ([facility] J44.1  Acute hypoxemic respiratory failure ([facility]) J96.01  
 SSS (sick sinus syndrome) ([facility]) I49.5  DNR (do not resuscitate) discussion Z70.80  
 Goals of care, counseling/discussion Z71.89  
 Advanced care planning/counseling discussion Z71.89  
 Acute respiratory failure with hypoxia ([facility] J96.01  
 Acute on chronic diastolic (congestive) heart failure ([facility]) I50.33  
 Suspected COVID-19 virus infection D91.108  Shortness of breath R06.02  
 Debility R53.81 Respiratory Therapist: Tania Cline

## 2021-01-26 NOTE — INTERDISCIPLINARY ROUNDS
[address] INTERDISCIPLINARY ROUNDS Cardiopulmonary Care Interdisciplinary Rounds were held today to discuss patient's plan of care and outcomes. The following members were present: NP/Physician, Pharmacy, Nursing and Case Management. PLAN OF CARE:  
Continue current treatment plan Expected Length of Stay:  4d 2h

## 2021-01-27 NOTE — PROGRESS NOTES
Palliative Medicine [place]: [phone] ([phone]) [facility]: [phone] ([phone]) Attempt made to see patient, but he was sitting on the commode. Chart reviewed, noted that daughter wishes to be able to take patient home with [facility] services, but is perhaps a little more open to considering services of hospice (per note from [address]). At this time, it is unknown if patient can be supported at home without hospice, taking his O2 needs into account. Patient also remains Full Code. This conversation will need to be continued with patient and daughter in the outpatient setting. Daughter was adamant that she did not want to discuss this \"right now, until we need to\". Palliative team will follow patient peripherally.

## 2021-01-27 NOTE — PROGRESS NOTES
End of Shift Note Bedside shift change report given to Tomeka Garcia RN (oncoming nurse) by Payam Diamond (offgoing nurse). Report included the following information SBAR and Kardex Shift worked:  night Shift summary and any significant changes:  
  patient resting, no complaints of pain. Up from bed to chair. O2 at 2L, SATs between 89 - 95. Concerns for physician to address:  O2 needs Zone phone for oncoming shift:    
  
 
Activity: 
Activity Level: Up with Assistance Number times ambulated in hallways past shift: 0 Number of times OOB to chair past shift: 1 Cardiac:  
Cardiac Monitoring: Yes     
Cardiac Rhythm: Normal sinus rhythm Access:  
Current line(s): PIV Genitourinary:  
Urinary status: voiding Respiratory:  
O2 Device: Hi flow nasal cannula Chronic home O2 use?: YES Incentive spirometer at bedside: NO Actual Volume (ml): 250 ml GI: 
Last Bowel Movement Date: 01/23/21 Current diet:  DIET CARDIAC Regular; FR 1200ML 
DIET ONE TIME MESSAGE Passing flatus: YES Tolerating current diet: YES 
% Diet Eaten: 100 % Pain Management:  
Patient states pain is manageable on current regimen: YES Skin: 
Joe Score: 19 Interventions: float heels, increase time out of bed and PT/OT consult Patient Safety: 
Fall Score: Total Score: 3 Interventions: bed/chair alarm, gripper socks and pt to call before getting OOB High Fall Risk: Yes Length of Stay: 
Expected LOS: 4d 2h Actual LOS: 9 Payam Diamond

## 2021-01-27 NOTE — PROGRESS NOTES
[address] INTERDISCIPLINARY ROUNDS Cardiopulmonary Care Interdisciplinary Rounds were held today to discuss patient's plan of care and outcomes. The following members were present: NP/Physician, Pharmacy, Nursing and Case Management. PLAN OF CARE:  
Continue current treatment plan, pt weaned to 8L HiFlow nasal cannula this AM. Encourage ambulation today to determine pt's oxygen requirements with activity. Pt will need family to bring in chemo medication from home if he is to stay inpatient.   
 
Expected Length of Stay:  4d 2h

## 2021-01-27 NOTE — PROGRESS NOTES
BRADLEY:  Home with Home Health and follow-up Appointments. 12:58pm- CM called pt's daughter to discuss d/c plan. CM discussed with pt's daughter about home health. Pt's daughter stated that she would like pt to have home health with [facility]. Pt's daughter provided verbal consent for [facility] document and FOC document  placed in pt's bedside chart. Referral was sent to [facility] via [employer]. CM will continue to follow patient for discharge planning needs and arrange for services as deemed necessary. Bryson Nascimento [address] [place] [address] 
[phone]

## 2021-01-27 NOTE — PROGRESS NOTES
0700: Bedside shift change [facility] ([facility]) by Eugenio Matamoros RN (offgoing nurse). Report included the following information SBAR and Kardex.

## 2021-01-27 NOTE — HOSPICE
[facility] RN note: In to briefly meet with pt and daughter Chava Oakley at bedside. Pt noticeably dyspneic, focused on getting home and having help with bathing. Initially daughter dismissed hospice as an option but was able to review some of the benefits of 24hr availability via phone and visit in response to any emergency. P acknowledges that his breathing is a problem and wants to avoid a resp crisis. The [place] continues to be for pt to go home with [facility] but pt/family encouraged to contact hospice for any further discussion while inpt or when home. Thank you for the opportunity to care for this pt and family. Please contact hospice at [phone] with any questions or concerns.

## 2021-01-27 NOTE — PROGRESS NOTES
[facility] Pulmonary, Critical Care, and Sleep Medicine Name: Scar Oviedo MRN: [id number] : 1940 Hospital: [address] Date: 2021 IMPRESSION:  
· Acute on Chronic Hypoxic Respiratory Failure, Used Inogen oxygen at 3L per NC. Now on 11L · No evidence of volume overload. · No evidence of Pneumonia on CT of chest.  
· Pulmonary Hypertension, moderate to severe. LVEFo f 55%. · COPD (PFT from 20: FVC: 4.35L 111% pred. FEV1: 2.18L 76% pred, FEV1/fvc: 50%. Normal Lung volumes. DLCO: severely reduced with DLCO/VA of 35%. The CT scan of chest from  demonstrates very severe obstructive lung disease with severe emphysematous changes. The PFT FVC and FEV1 under represent the severity of illness. The severe reduced DLCO is representative of severe lung disease. Does not appear to be volume overload or acute bacterial infection. 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 Had EBRT, on Lupron. Follow with Miah/Marko. · Chronic Hoarseness. · CAD, prior stents, Distal Aortic Arthc Ulcer. Pt refused TAVR in 2020. Follows with Dr. Arin Poon. · Has been on Chronic Prednsione therapy-due to worsening pulmonary status · Sick Sinus Syndrome: had PM placed in 2020. · CRI 
· PAD · Leg cramps · Ex Smoker · Sister is Plum: h: [phone] and cell: [phone]. · Full Code RECOMMENDATIONS:  
· O2 - wean as tolerated. I have weaned to 8 LPM this morning with SpO2 89-92%. We will have to accept SpO2 >88% · He appears to have end-stage disease without room for much further titration. He refused hospice, so will have to get him to an O2 level that can be done at home. If he can be maintained on <10 LPM with SpO2 89 or better, he could go home with a new 10 LPM concentrator · Bronchodilators · Continue steroids · Monitor creatinine · DVT prophylaxis · Difficult situation. Not currently on a level of O2 that can be given at home, though he feels at baseline PCP: Rankin Lefort Subjective:  
 
1-27-21: frustrated because he wants to go home. He feels at his baseline. He was transiently able to wean to 7 LPM but had to go back up to 11 LPM 
1-26-21: no events. Denies complaint, but remains on 11 LPM 
1-25-21: wants to go home, feels at baseline. However his SpO2 is 88-91% on 11 LPM O2.   
 
CC: Shortness of breath This patient has been seen and evaluated at the request of Dr. Jama Cornelius for above. Patient is a 78 y.o. male who presents with above. Has increased shortness of breath over last 24 hrs, first started at 10pm on 1/17. NO chest pain, no cough. NO abdominal pain. No tobacco or Etoh use. Tolerating po intake well. He is on 4.5L of oxygen at home. He did not feelt that his breathing therapies were helping. NO cough. NO fevers, no nausea or vomiting. Past Medical History:  
Diagnosis Date  CAD (coronary artery disease)   
 mi 10/19/2015 with stent  COPD (chronic obstructive pulmonary disease) (HCC)  Hypertension  Prostate cancer ([facility] 5/8/2012 Dr. Elisabet Laguna - diagnosed 2/2012 - s/p EBRT on Lupron Past Surgical History:  
Procedure Laterality Date  HX ORTHOPAEDIC  1961  
 left knee surgery  IA INS NEW/RPLCMT PRM PM W/TRANSV ELTRD ATRIAL&VENT N/A 9/21/2020 INSERT PPM DUAL performed by Nory Carrillo MD at [facility] C[facility]ATH [facility] Prior to Admission medications Medication Sig Start Date End Date Taking? Authorizing Provider  
atorvastatin (LIPITOR) 40 mg tablet Take 1 tablet by mouth once daily Patient taking differently: Take 40 mg by mouth daily. 12/30/20  Yes Elly Dorantes MD  
omeprazole (PRILOSEC) 20 mg capsule Take 1 Cap by mouth daily.  10/7/20  Yes Elly Dorantes MD  
 abiraterone 250 mg tab Take 250 mg by mouth daily (with breakfast). With Low Fat Breakfast   Yes Nancy Chung MD  
oxybutynin chloride XL (DITROPAN XL) 10 mg CR tablet Take 10 mg by mouth nightly. Yes Nancy Chung MD  
albuterol (PROVENTIL HFA, VENTOLIN HFA, PROAIR HFA) 90 mcg/actuation inhaler Use 2 puffs very 4 hours prn 20  Yes Nadia Gibbs MD  
metoprolol tartrate (LOPRESSOR) 50 mg tablet TAKE ONE TABLET BY MOUTH TWICE DAILY Patient taking differently: Take 50 mg by mouth two (2) times a day. TAKE ONE TABLET BY MOUTH TWICE DAILY 20  Yes Raffy Peters MD  
lisinopriL (PRINIVIL, ZESTRIL) 2.5 mg tablet Take 1 tablet by mouth once daily Patient taking differently: Take 2.5 mg by mouth daily. 3/21/20  Yes Raffy Peters MD  
albuterol-ipratropium (DUO-NEB) 2.5 mg-0.5 mg/3 ml nebu 3 mL by Nebulization route every six (6) hours as needed (sob). 10/19/19  Yes Mario Alberto Tirado, TANJA  
fluticasone furoate-vilanterol (BREO ELLIPTA) 100-25 mcg/dose inhaler Take 1 Puff by inhalation daily. 3/25/19  Yes Emerson Chaney DO Oxygen 4.5L continouus   Yes Provider, Historical  
fluticasone (FLONASE) 50 mcg/actuation nasal spray 2 Sprays by Both Nostrils route daily. 19  Yes Tyra Nagy MD  
aspirin 81 mg chewable tablet Take 1 Tab by mouth daily. RISK OF HEART ATTACK IF ANY MISSED DOSES Patient taking differently: Take 81 mg by mouth daily. RISK OF HEART ATTACK IF ANY MISSED DOSES  Indications: treatment to prevent a heart attack 10/21/15  Yes Raffy Peters MD  
 
No Known Allergies Social History Tobacco Use  Smoking status: Former Smoker Packs/day: 0.50 Years: 40.00 Pack years: 20.00 Types: Cigarettes Quit date: 10/19/2015 Years since quittin.2  Smokeless tobacco: Never Used  Tobacco comment: trying quit 10/13/15 -  ppd Substance Use Topics  Alcohol use: No  
  Alcohol/week: 0.0 standard drinks Family History Problem Relation Age of Onset  Heart Disease Mother   
     pacemaker  Diabetes Mother  Diabetes Sister  Heart Disease Brother Current Facility-Administered Medications Medication Dose Route Frequency  ipratropium (ATROVENT) 0.02 % nebulizer solution 0.5 mg  0.5 mg Nebulization QID RT  
 albuterol (PROVENTIL VENTOLIN) nebulizer solution 2.5 mg  2.5 mg Nebulization QID RT  
 sodium bicarbonate tablet 650 mg  650 mg Oral TID  furosemide (LASIX) tablet 40 mg  40 mg Oral DAILY  methylPREDNISolone (PF) (SOLU-MEDROL) injection 40 mg  40 mg IntraVENous Q6H  
 arformoterol 15 mcg/budesonide 0.5 mg neb solution   Nebulization BID RT  
 sodium chloride (NS) flush 5-40 mL  5-40 mL IntraVENous Q8H  
 aspirin chewable tablet 81 mg  81 mg Oral DAILY  atorvastatin (LIPITOR) tablet 40 mg  40 mg Oral DAILY  metoprolol tartrate (LOPRESSOR) tablet 50 mg  50 mg Oral DAILY  sodium chloride (NS) flush 5-40 mL  5-40 mL IntraVENous Q8H  
 enoxaparin (LOVENOX) injection 40 mg  40 mg SubCUTAneous DAILY  pantoprazole (PROTONIX) tablet 40 mg  40 mg Oral ACB  oxybutynin chloride XL (DITROPAN XL) tablet 10 mg  10 mg Oral DAILY Review of Systems: A comprehensive review of systems was negative. Objective:  
Vital Signs:   
Visit Vitals /87 Pulse 84 Temp 98.2 °F (36.8 °C) Resp 18 Ht 6' (1.829 m) Wt 90.1 kg (198 lb 11.2 oz) SpO2 94% BMI 26.95 kg/m² O2 Device: Hi flow nasal cannula O2 Flow Rate (L/min): 10 l/min Temp (24hrs), Av.8 °F (36.6 °C), Min:97.3 °F (36.3 °C), Max:98.2 °F (36.8 °C) Intake/Output:  
Last shift:      No intake/output data recorded. Last 3 shifts:  1901 -  0700 In: 1380 [P.O.:1380] Out: 1225 [Urine:1225] Intake/Output Summary (Last 24 hours) at 2021 8888 Last data filed at 2021 6605 Gross per 24 hour Intake 1160 ml Output 600 ml Net 560 ml Physical Exam: General:  Alert, cooperative, no distress Head:  Normocephalic, without obvious abnormality, atraumatic. Eyes:  Conjunctivae/corneas clear. Nose: Nares normal. Septum midline. Mucosa normal.    
Throat: Lips, mucosa, and tongue normal.    
Neck: Supple, symmetrical, trachea midline Back:   Symmetric, no curvature. ROM normal.  
Lungs:   Few scattered wheeze, no distress Chest wall:  No tenderness or deformity. Has PPM in place over his left upper chest.   
Heart:  Regular rate and rhythm Abdomen:   Soft, non-tender. Bowel sounds normal.   
Extremities: Extremities normal, atraumatic, no cyanosis or edema. Skin: Skin color, texture, turgor normal. No rashes or lesions Neurologic: Grossly nonfocal   
 
Data:  
Labs: 
Recent Labs  
  01/26/21 
0502 WBC 9.2 HGB 11.4* HCT 36.9  Recent Labs  
  01/26/21 
0502 01/25/21 
0319  137  
K 4.5 4.3  105 CO2 26 25 * 209* BUN 29* 33* CREA 1.46* 1.41* CA 8.3* 8.2* ALB 3.2* 2.9* TBILI 0.3 0.5 ALT 23 20 No results for input(s): PHI, PCO2I, PO2I, HCO3I, FIO2I in the last 72 hours. Imaging: 
I have personally reviewed the patients radiographs: 
None today Ismael Mahan MD

## 2021-01-27 NOTE — PROGRESS NOTES
Hospitalist Progress Note NAME: Sam Lafleur :  1940 MRN:  [id number] Assessment / Plan: 
Acute hypoxic respiratory failure, POA Acute on chronic congestive heart failure, heart failure with preserved ejection fraction, POA Community-acquired pneumonia, POA Moderate to severe pulmonary hypertension, POA  
CT chest  showed emphysema Cont' empiric IV abx, IV steroids Repeat Echo - no obvious ASD or patent foramen ovale. O2 suppl, wean as tolerated. Pt currently on hiflow @8L, wean daily Palliative following. Pt refused hospice. Appreciate pulmonary following, plan to discharge to home with <10LPM. History of allergic enlarging ulcer of the distal aortic arch Vascular surgery recommending TAVR in 2020 but patient refused surgery Asymptomatic Sick sinus syndrome Status post pacemaker insertion 2020 SEPIDEH on CKD stage II Renal function stable. Patient's hyperkalemia corrected. Hypertension Coronary artery disease Continue home meds as tolerated Chronic hoarseness supportive care Code Status: Full code DVT Prophylaxis: Lovenox GI Prophylaxis: not indicated Baseline: Active, independent Body mass index is 26.95 kg/m².: 25.0 - 29.9 Overweight We are all hoping to wean down the oxygen at least below 6 letter and talk about placement. Subjective: Chief Complaint / Reason for Physician Visit Patient seen, NAD. Discussed with RN events overnight. Review of Systems: 
Symptom Y/N Comments  Symptom Y/N Comments Fever/Chills n   Chest Pain n   
Poor Appetite    Edema Cough    Abdominal Pain n   
Sputum    Joint Pain SOB/WHEAT y   Pruritis/Rash Nausea/vomit    Tolerating PT/OT Diarrhea    Tolerating Diet Constipation    Other Could NOT obtain due to:   
 
Objective: VITALS:  
Last 24hrs VS reviewed since prior progress note. Most recent are: 
Patient Vitals for the past 24 hrs: Temp Pulse Resp BP SpO2  
01/27/21 0743 98.2 °F (36.8 °C) 84 18 136/87 94 % 01/27/21 0739     92 % 01/27/21 0257 98.2 °F (36.8 °C) 98 18 129/76 92 % 01/26/21 2228 97.6 °F (36.4 °C) 81 18 128/72 95 % 01/26/21 2004 97.9 °F (36.6 °C) 90 18 106/63 93 % 01/26/21 1611     92 % 01/26/21 1501 97.4 °F (36.3 °C) 81 20 (!) 144/71 90 % 01/26/21 1231     96 % 01/26/21 1101 97.3 °F (36.3 °C) 71 20 (!) 140/77 94 % 01/26/21 0816  95  (!) 144/63  Intake/Output Summary (Last 24 hours) at 1/27/2021 0288 Last data filed at 1/27/2021 4882 Gross per 24 hour Intake 1160 ml Output 600 ml Net 560 ml PHYSICAL EXAM: 
General: WD, WN. Alert, cooperative, no acute distress   
EENT:  EOMI. Anicteric sclerae. MMM Resp:  Decreased air entry in the lower lung fields and some crackles heard. CV:  Regular  rhythm,  No edema GI:  Soft, Non distended, Non tender.  +Bowel sounds Neurologic:  Alert and oriented X 3, normal speech, No lower extremity pitting significant edema. Reviewed most current lab test results and cultures  YES Reviewed most current radiology test results   YES Review and summation of old records today    NO Reviewed patient's current orders and MAR    YES 
PMH/ reviewed - no change compared to H&P 
________________________________________________________________________ Care Plan discussed with: 
  Comments Patient y Family RN y   
Care Manager Consultant Multidiciplinary team rounds were held today with , nursing, pharmacist and clinical coordinator. Patient's plan of care was discussed; medications were reviewed and discharge planning was addressed. ________________________________________________________________________ Total NON critical care TIME: 35   Minutes Total CRITICAL CARE TIME Spent:   Minutes non procedure based Comments >50% of visit spent in counseling and coordination of care ________________________________________________________________________ Kanwal Armando MD  
 
Procedures: see electronic medical records for all procedures/Xrays and details which were not copied into this note but were reviewed prior to creation of Plan. LABS: 
I reviewed today's most current labs and imaging studies. Pertinent labs include: 
Recent Labs  
  01/26/21 
0502 WBC 9.2 HGB 11.4* HCT 36.9  Recent Labs  
  01/26/21 
0502 01/25/21 
0319  137  
K 4.5 4.3  105 CO2 26 25 * 209* BUN 29* 33* CREA 1.46* 1.41* CA 8.3* 8.2* ALB 3.2* 2.9* TBILI 0.3 0.5 ALT 23 20 Signed: Kanwal Armando MD

## 2021-01-28 NOTE — PROGRESS NOTES
ADULT PROTOCOL: JET AEROSOL  REASSESSMENT Patient  Diana Gonzales     78 y.o.   male     1/28/2021  10:29 AM 
 
Breath Sounds Pre Procedure: Right Breath Sounds: Lower, Crackles Left Breath Sounds: Lower, Crackles Breath Sounds Post Procedure: Right Breath Sounds: Lower, Crackles Left Breath Sounds: Lower, Crackles Breathing pattern: Pre procedure Breathing Pattern: Regular Post procedure Breathing Pattern: Regular Heart Rate: Pre procedure Pulse: 81 
         Post procedure Pulse: 81 Resp Rate: Pre procedure Respirations: 16 Post procedure Respirations: 16 
 
     
 
Cough: Pre procedure Cough: Non-productive Post procedure Cough: Non-productive Oxygen: O2 Device: Hi flow nasal cannula   8 lpm 
   Changed: NO/ will wean as tolerated SpO2: Pre procedure SpO2: 98 %   with oxygen Post procedure SpO2: 99 %  with oxygen Nebulizer Therapy: Current medications Aerosolized Medications: Brovana Pulmicort Changed: NO 
 
 
 
Problem List:  
Patient Active Problem List  
Diagnosis Code  ED (erectile dysfunction) N52.9  
 HTN (hypertension) I10  
 Prostate cancer ([facility] C61  
 PAD (peripheral artery disease) ([facility]) I73.9  
 STEMI (ST elevation myocardial infarction) ([facility]) I21.3  Chronic interstitial lung disease ([facility]) J84.9  
 S/P PTCA (percutaneous transluminal coronary angioplasty) Z98.61  
 Mucopurulent chronic bronchitis ([facility]) J41.1  Acute myocardial infarction of anterior wall, subsequent episode of care ([facility] I21.09  
 Penetrating atherosclerotic ulcer of aorta ([facility]) I71.9  Hyperlipidemia E78.5  Advance directive discussed with patient Z70.80  Coronary artery disease involving native coronary artery of native heart without angina pectoris I25.10  Hypoxemia R09.02  
 COPD (chronic obstructive pulmonary disease) ([facility]) J44.9  Mixed simple and mucopurulent chronic bronchitis ([facility]) J41.8  COPD exacerbation ([facility] J44.1  Acute hypoxemic respiratory failure ([facility]) J96.01  
 SSS (sick sinus syndrome) ([facility]) I49.5  DNR (do not resuscitate) discussion Z70.80  
 Goals of care, counseling/discussion Z71.89  
 Advanced care planning/counseling discussion Z71.89  
 Acute respiratory failure with hypoxia ([facility] J96.01  
 Acute on chronic diastolic (congestive) heart failure ([facility]) I50.33  
 Suspected COVID-19 virus infection V74.002  Shortness of breath R06.02  
 Debility R53.81 Respiratory Therapist: Antonino Stewart, RT

## 2021-01-28 NOTE — PROGRESS NOTES
OCCUPATIONAL THERAPY EVALUATION/DISCHARGE Patient: Lucero France (47 y.o. male) Date: 1/28/2021 Primary Diagnosis: Acute on chronic diastolic (congestive) heart failure ([facility] [I50.33] Suspected COVID-19 virus infection [Z20.822] Acute respiratory failure with hypoxia ([facility] [J96.01] Precautions: Falls ASSESSMENT Based on the objective data described below, the patient presents with decreased activity, mild GW and intermittently decreased safety awareness. Despite the above deficits the patient is functioning very near his independent to mod I baseline, now performing functional mobility independently and is independent to supervision for ADLs. Patient has supportive family to provide supervision/assistance PRN after discharge. He is normally on 5 L NC, but now requires 8L HF NC to maintain his oxygen saturation in the 90s during activity. Patient reports using pacing technique during functional mobility and ADLs at baseline. At this time the patient is without further OT needs and is generally disinterested in participating in therapy. Functional Outcome Measure: The patient scored 70/100 on the Barthel Index outcome measure which is indicative of a 30% decline in function. PLAN : 
Recommendation for discharge: (in order for the patient to meet his/her long term goals) No skilled occupational therapy/ follow up rehabilitation needs identified at this time. He will benefit family providing supervision. Equipment recommendations for successful discharge: HF NC oxygen. OBJECTIVE DATA SUMMARY:  
HISTORY:  
Past Medical History:  
Diagnosis Date  CAD (coronary artery disease)   
 mi 10/19/2015 with stent  COPD (chronic obstructive pulmonary disease) (HCC)  Hypertension  Prostate cancer ([facility] 5/8/2012 Dr. Graciela Tejeda - diagnosed 2/2012 - s/p EBRT on Lupron Past Surgical History:  
Procedure Laterality Date Via [facility] 127 left knee surgery  ID INS NEW/RPLCMT PRM PM W/TRANSV ELTRD ATRIAL&VENT N/A 9/21/2020 INSERT PPM DUAL performed by Phil Lima MD at [facility] Prior Level of Function/Environment/Context: Independent to mod I for ADLs and functional mobility. Patient was on 5 L NC at all times and using pacing techniques for energy conservation Expanded or extensive additional review of patient history:  
Home Situation Home Environment: Private residence Wheelchair Ramp: Yes One/Two Story Residence: One story Living Alone: No 
Support Systems: Family member(s)(daughter and grandchildren) Patient Expects to be Discharged to[de-identified] Private residence Current DME Used/Available at Home: None, Oxygen, portable(patient on 5L NC at baseline) Tub or Shower Type: Tub/Shower combination Hand dominance: Right EXAMINATION OF PERFORMANCE DEFICITS: 
Cognitive/Behavioral Status: 
Neurologic State: Alert;Irritable Orientation Level: Oriented to person;Oriented to place;Oriented to situation Cognition: Follows commands; Appropriate for age attention/concentration Safety/Judgement: Decreased awareness of need for safety; Insight into deficits Hearing: Auditory Auditory Impairment: None Vision/Perceptual:   
Acuity: Within Defined Limits Range of Motion: 
AROM: Generally decreased, functional 
 
Strength: 
Strength: Generally decreased, functional 
Coordination: 
Coordination: Within functional limits Fine Motor Skills-Upper: Left Intact; Right Intact Gross Motor Skills-Upper: Left Intact; Right Intact Tone & Sensation: 
Tone: Normal 
Sensation: Intact Balance: 
Sitting: Intact Standing: Intact Functional Mobility and Transfers for ADLs: 
Bed Mobility: 
Scooting: Independent Transfers: 
Sit to Stand: Independent Stand to Sit: Independent Bed to Chair: Independent ADL Assessment: 
Feeding: Independent Oral Facial Hygiene/Grooming: Independent Bathing: Supervision Upper Body Dressing: Supervision Lower Body Dressing: Supervision Toileting: Independent Functional Measure: 
Barthel Index: 
 
Bathin Bladder: 0 Bowels: 10 
Groomin Dressin Feeding: 10 Mobility: 10 Stairs: 5 Toilet Use: 10 Transfer (Bed to Chair and Back): 15 Total: 70/100 The Barthel ADL Index: Guidelines 1. The index should be used as a record of what a patient does, not as a record of what a patient could do. 2. The main aim is to establish degree of independence from any help, physical or verbal, however minor and for whatever reason. 3. The need for supervision renders the patient not independent. 4. A patient's performance should be established using the best available evidence. Asking the patient, friends/relatives and nurses are the usual sources, but direct observation and common sense are also important. However direct testing is not needed. 5. Usually the patient's performance over the preceding 24-48 hours is important, but occasionally longer periods will be relevant. 6. Middle categories imply that the patient supplies over 50 per cent of the effort. 7. Use of aids to be independent is allowed. Connor Foster., Barthel, D.W. (8243). Functional evaluation: the Barthel Index. [facility] (14)2. Lexi Osullivan, GABRIELLA.F, Wan Smith., Renny Omalley., Ronaldo [facility], [address] [place] [address] (). Measuring the change indisability after inpatient rehabilitation; comparison of the responsiveness of the Barthel Index and Functional Bandera Measure. Journal of Neurology, Neurosurgery, and Psychiatry, 66(4), 432-968. Zahida Lee, N.J.A, DORCAS Alaniz, & Namrata Phelps MJinnyA. (2004.) Assessment of post-stroke quality of life in cost-effectiveness studies: The usefulness of the Barthel Index and the EuroQoL-5D. [facility], 13, 409-69 \ Activity Tolerance:  
Fair SpO2 stable on 8 L HF NC during ADL performance, despite SOB. After treatment patient left in no apparent distress:   
Sitting in chair, Call bell within reach and Caregiver / family present COMMUNICATION/EDUCATION:  
The patients plan of care was discussed with: Registered Nurse. Thank you for this referral. 
Jass Herrera OTR/NEWTON Time Calculation: 20 mins

## 2021-01-28 NOTE — PROGRESS NOTES
PHYSICAL THERAPY EVALUATION/DISCHARGE Patient: Van Parekh (44 y.o. male) Date: 1/28/2021 Primary Diagnosis: Acute on chronic diastolic (congestive) heart failure ([facility] [I50.33] Suspected COVID-19 virus infection [Z20.822] Acute respiratory failure with hypoxia ([facility] [J96.01] Precautions: fall ASSESSMENT Based on the objective data described below, the patient presents with decreased activity tolerance with increased O2 requirement from baseline s/p admit with respiratory failure. Of note, pt with h/o pulmonary disease requiring paced activity and use of home O2. Pt received in chair with 7 L O2. Tolerated amb in room without device, supervision for line mgmt, SpO2 84%. RN present and recommended O2 titration to 8 L; pt then able to maintain SpO2 >90%. Pt endorses baseline LOF with mobility at this time. Pt with end stage disease per pulmonary documentation. No further PT needs at this time. Functional Outcome Measure: The patient scored 26/28 on the Tinetti outcome measure which is indicative of low risk for fall. Other factors to consider for discharge: chronic pulmonary issues with limited activity tolerance at baseline, good family support Further skilled acute physical therapy is not indicated at this time. PLAN : 
Recommendation for discharge: (in order for the patient to meet his/her long term goals) No skilled physical therapy/ follow up rehabilitation needs identified at this time. This discharge recommendation: 
Has not yet been discussed the attending provider and/or case management IF patient discharges home will need the following DME: none SUBJECTIVE:  
Patient stated I walk better with my shoes on. OBJECTIVE DATA SUMMARY:  
HISTORY:   
Past Medical History:  
Diagnosis Date  CAD (coronary artery disease)   
 mi 10/19/2015 with stent  COPD (chronic obstructive pulmonary disease) (HCC)  Hypertension  Prostate cancer ([facility] 5/8/2012 Dr. Malinda Gibbons - diagnosed 2/2012 - s/p EBRT on Lupron Past Surgical History:  
Procedure Laterality Date  HX ORTHOPAEDIC  1961  
 left knee surgery  NE INS NEW/RPLCMT PRM PM W/TRANSV ELTRD ATRIAL&VENT N/A 9/21/2020 INSERT PPM DUAL performed by Lisa Jimenez MD at [facility] Prior level of function: indep with functional mobility using paced activity and 5 L O2 Personal factors and/or comorbidities impacting plan of care: h/o cardiopulmonary disease, good family support Home Situation Home Environment: Private residence Wheelchair Ramp: Yes One/Two Story Residence: One story Living Alone: No 
Support Systems: Family member(s)(daughter and grandchildren) Patient Expects to be Discharged to[de-identified] Private residence Current DME Used/Available at Home: None EXAMINATION/PRESENTATION/DECISION MAKING:  
Hearing: Auditory Auditory Impairment: None Range Of Motion: 
AROM: Generally decreased, functional 
  
  
  
  
  
  
  
Strength:   
Strength: Generally decreased, functional 
  
  
  
  
  
  
Tone & Sensation:  
Tone: Normal 
  
  
  
  
Sensation: Intact Coordination: 
Coordination: Within functional limits Vision:  
  
Functional Mobility: 
Bed Mobility: 
 not observed Transfers: 
Sit to Stand: Independent Stand to Sit: Independent Balance:  
Sitting: Intact Standing: Intact Ambulation/Gait Training: 
Distance (ft): 20 Feet (ft) Ambulation - Level of Assistance: Supervision Gait Abnormalities: Decreased step clearance Speed/Mary: Pace decreased (<100 feet/min) Step Length: Left shortened;Right shortened Functional Measure: 
Tinetti test: 
 
Sitting Balance: 1 Arises: 1 Attempts to Rise: 2 Immediate Standing Balance: 2 Standing Balance: 2 Nudged: 2 Eyes Closed: 1 Turn 360 Degrees - Continuous/Discontinuous: 1 Turn 360 Degrees - Steady/Unsteady: 1 Sitting Down: 2 Balance Score: 15 Balance total score Indication of Gait: 1 
R Step Length/Height: 1 L Step Length/Height: 1 
R Foot Clearance: 1 L Foot Clearance: 1 Step Symmetry: 1 Step Continuity: 1 Path: 2 Trunk: 2 Walking Time: 0 Gait Score: 11 Gait total score Total Score: 26/28 Overall total score Tinetti Tool Score Risk of Falls 
<19 = High Fall Risk 19-24 = Moderate Fall Risk 25-28 = Low Fall Risk Tinetti ME. Performance-Oriented Assessment of Mobility Problems in Elderly Patients. [facility] 66; [id number]. (Scoring Description: PT Bulletin Feb. 10, 1993) Older adults: Norma Middleton et al, 2009; n = [address] elderly evaluated with ABC, LIZETTE, ADL, and IADL) · Mean LIZETTE score for males aged 69-68 years = 26.21(3.40) · Mean LIZETTE score for females age 69-68 years = 25.16(4.30) · Mean LIZETTE score for males over 80 years = 23.29(6.02) · Mean LIZETTE score for females over 80 years = 17.20(8.32) Physical Therapy Evaluation Charge Determination History Examination Presentation Decision-Making HIGH Complexity :3+ comorbidities / personal factors will impact the outcome/ POC  LOW Complexity : 1-2 Standardized tests and measures addressing body structure, function, activity limitation and / or participation in recreation  LOW Complexity : Stable, uncomplicated  LOW Complexity : FOTO score of  Based on the above components, the patient evaluation is determined to be of the following complexity level: LOW Pain Rating: 
Pt denies c/o pain Activity Tolerance:  
Poor, desaturates with exertion and requires oxygen and observed SOB with activity After treatment patient left in no apparent distress:  
Sitting in chair, Call bell within reach and Caregiver / family present COMMUNICATION/EDUCATION:  
The patients plan of care was discussed with: Registered nurse. Fall prevention education was provided and the patient/caregiver indicated understanding., Patient/family have participated as able in goal setting and plan of care. and Patient/family agree to work toward stated goals and plan of care. Thank you for this referral. 
Francoise Angel, PT Time Calculation: 21 mins

## 2021-01-28 NOTE — ROUTINE PROCESS
Home Oxygen Test 
Date of test:1/28/21Time of test: 12:52 pm  
 
Sa02 88 % on room air AT REST. Sa02 na  % on room air DURING AMBULATION. He continued to drop to 80%on RA Sa02 87 % on 7 Liters DURING AMBULATION. High flow Sa02 84 % on 7 Liters AT REST/AFTER AMBULATION.

## 2021-01-28 NOTE — CONSULTS
Palliative Medicine [phone] Daughter not amenable to hospice or DNR discussion at this point Will sign off Chata Presley NP

## 2021-01-28 NOTE — PROGRESS NOTES
BRADLEY: Home with  Follow-up Appointments Portable tank provided by [facility] 2:45pm-No further CM needs identified. CM notified pt's nurse of d/c. 
 
2:41pm- CM called [facility] to check on the status of portable tank. [facility] stated that oxygen should be delivered by 3:20pm. 
 
1:12pm- CM faxed clinical documentation and order to [facility] [phone]. 
 
12:47pm- sent emailed to [facility] Specialist to schedule pt's follow-up appointment. 12:15pm-  called [facility] [phone] via phone to discuss the requires or clinical documentation needed to get oxygen arranged for pt. [facility] requires order and O2 challenge to be faxed to [phone].  
 
11:54pm-CM called pt's daughter Michela Ng via phone to discuss d/c plan. CM informed pt's daughter that [facility] wasn't able to accept pt for home health and other companies that [facility] sent referrals to, were unable to accept. Pt's daughter declined CM sending referral to other companies and stated that she was able to handle the pt at home. Pt's daughter stated that pt's other daughter will transport at d/c.  
 
8:45am-  informed pt's nurse that she would need to complete O2 challenge for pt. Care Management Interventions PCP Verified by CM: Yes 
Palliative Care Criteria Met (RRAT>21 & CHF Dx)?: No 
Mode of Transport at Discharge: Other (see comment)(Pt's daughter will transport at d/c. ) Transition of Care Consult (CM Consult): Other(Home with Follow-up Appointments) Discharge Durable Medical Equipment: Yes(New stats sent in for home oxygen) Physical Therapy Consult: Yes Occupational Therapy Consult: Yes Speech Therapy Consult: No 
Current Support Network: Relative's Home Confirm Follow Up Transport: Family The Plan for Transition of Care is Related to the Following Treatment Goals : [address] The Patient and/or Patient Representative was Provided with a Choice of Provider and Agrees with the Discharge Plan?: Yes 
 Name of the Patient Representative Who was Provided with a Choice of Provider and Agrees with the Discharge Plan: Allen Valladares Freedom of Choice List was Provided with Basic Dialogue that Supports the Patient's Individualized Plan of Care/Goals, Treatment Preferences and Shares the Quality Data Associated with the Providers?: Yes [employer] Information Provided?: No 
Discharge Location Discharge Placement: (Home with Follow-up Appointments) Megha Quijano, [facility] 
[phone]'

## 2021-01-28 NOTE — ROUTINE PROCESS
DISCHARGE SUMMARY FROM [facility] NURSE The patient is stable for discharge. I have reviewed the discharge instructions with the patient and caregiver. The patient and caregiver verbalized understanding. All questions were fully answered. The patient and caregiver verbalized no complaints. Hard scripts and medication handouts were given and reviewed with the patient and caregiver. Appropriate educational materials and medication side effects teaching were provided also provided. Yes Cardiac monitor and IV line(s) were removed. The following personal items collected during admission were returned to the patient/family Home medications: none Dental Appliance: Dental Appliances: Sent home Vision: Visual Aid: Glasses Hearing Aid:   
Jewelry: Jewelry: None Clothing: Clothing: At bedside Other Valuables: Other Valuables: Cell Phone, Other (comment)(headphones, ) Valuables sent to safe:   
 
OR _________________________________________________________________________________________ There were no personal belongings, valuables or home medications left at patient's bedside,  or safe.

## 2021-01-28 NOTE — PROGRESS NOTES
Problem: Falls - Risk of 
Goal: *Absence of Falls Description: Document Rito Saleh Fall Risk and appropriate interventions in the flowsheet. Outcome: Progressing Towards Goal 
Note: Fall Risk Interventions: 
Mobility Interventions: Assess mobility with egress test, Bed/chair exit alarm, Patient to call before getting OOB, OT consult for ADLs, PT Consult for mobility concerns, PT Consult for assist device competence, Strengthening exercises (ROM-active/passive), Utilize walker, cane, or other assistive device, Utilize gait belt for transfers/ambulation Medication Interventions: Bed/chair exit alarm, Utilize gait belt for transfers/ambulation, Teach patient to arise slowly, Patient to call before getting OOB Elimination Interventions: Bed/chair exit alarm, Call light in reach, Patient to call for help with toileting needs, Stay With Me (per policy), Toilet paper/wipes in reach, Toileting schedule/hourly rounds History of Falls Interventions: Bed/chair exit alarm, Consult care management for discharge planning, Assess for delayed presentation/identification of injury for 48 hrs (comment for end date), Room close to nurse's station, Vital signs minimum Q4HRs X 24 hrs (comment for end date), Utilize gait belt for transfer/ambulation

## 2021-01-28 NOTE — ROUTINE PROCESS
Report received from Krzysztof Jolley , [facility]. SBAR were discussed.  
 
Josr Maurer RN

## 2021-01-28 NOTE — PROGRESS NOTES
1900 Received report from [place]n [place], [address] [place] [address]. Assumed care of patient. End of Shift Note Bedside shift change report given to [place], RN (oncoming nurse) by Chelly Joyner RN (offgoing nurse). Report included the following information SBAR Shift worked:  7pm-7am 
  
Shift summary and any significant changes:  
  Uneventful night. Pt remains on 8L hi flow- 88-90% throughout night.  
  
Concerns for physician to address:   
  
[employer] phone for oncoming shift:

## 2021-01-28 NOTE — DISCHARGE SUMMARY
Discharge Summary Name: Gregorio Cantu 
[id number] YOB: 1940 (Age: 78 y.o.) Date of Admission: 1/18/2021 Date of Discharge: 1/28/2021 Attending Physician: Kayla Cummings MD 
 
Discharge Diagnosis:  
Acute hypoxic respiratory failure, POA Acute on chronic congestive heart failure, heart failure with preserved ejection fraction, POA Community-acquired pneumonia, POA Moderate to severe pulmonary hypertension, POA Chronic ILD from prior Chemo for Prostate Ca? COPD Consultations: 
IP CONSULT TO PULMONOLOGY 
IP CONSULT TO PALLIATIVE CARE - PROVIDER 
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 Patient reported that since last night he has been feeling short of breath and has been getting worse since that time for which he decided to come to the emergency department today. He denied any chest pain, cough, abdominal pain, nausea, vomiting, dizziness, diarrhea or any other symptoms. He reported that his shortness of breath is always there even if he is not moving around. Patient does not smoke, drink alcohol or use illicit drugs. 
  
The emergency department, x-ray revealed possible edema and infiltrates. He was found to be hypoxic for which he required oxygen via nasal cannula. 
  
We were asked to admit for work up and evaluation of the above problems. Brief Hospital Course by Main Problems:  
Acute hypoxic respiratory failure, POA Acute on chronic congestive heart failure, heart failure with preserved ejection fraction, POA Community-acquired pneumonia, POA Moderate to severe pulmonary hypertension, POA  
 Chronic ILD from prior Chemo for Prostate Ca? COPD 
CT chest 1/21 showed emphysema. He was treated with IV abx and IV steroids inpt. Repeat Echo - no obvious ASD or patent foramen ovale. Patient appears to have end stage disease without room for much further titration. Patient required hiflow up to 12L. He was able to be weaned to 8LPM.  He was evaluated by pulmonary who clear patient for discharge on 8LPM.  CM to help obtain 10LPM concentrator. Pt will need to cont' tapered dose steroids at discharge. F/u with Dr Boom Wilks outpt.   
  
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 
  
Sick sinus syndrome Status post pacemaker insertion September 2020 
  
SEPIDEH on CKD stage II Renal function stable. Patient's hyperkalemia corrected. 
  
Hypertension Coronary artery disease Continue home meds as tolerated Chronic hoarseness supportive care  
  
Hyperglycemia, likely due to steroid use. NPH added Discharge Exam: 
Patient seen and examined by me on discharge day. Pertinent Findings: 
Visit Vitals BP (!) 159/65 (BP Patient Position: Sitting) Pulse 77 Temp (!) 96.4 °F (35.8 °C) Resp 24 Ht 6' (1.829 m) Wt 90.3 kg (199 lb) SpO2 92% BMI 26.99 kg/m² Gen:    Not in distress Chest: Clear lungs CVS:   Regular rhythm. No edema Abd:  Soft, not distended, not tender Discharge/Recent Laboratory Results: 
Recent Labs  
  01/28/21 
0201 * K 4.6  CO2 26 BUN 29* * CA 7.4* Recent Labs  
  01/28/21 
0201 HGB 10.7* HCT 34.0* WBC 10.0  Discharge Medications: 
Current Discharge Medication List  
  
START taking these medications Details  
sodium bicarbonate 650 mg tablet Take 1 Tab by mouth three (3) times daily for 30 days. Qty: 90 Tab, Refills: 0 predniSONE (DELTASONE) 10 mg tablet 4 tabs daily for 2 days, then 3 tabs daily for 2 days , then 2 tabs daily for 2 days, then 1 tab daily for 2 days Qty: 20 Tab, Refills: 0 CONTINUE these medications which have NOT CHANGED Details  
atorvastatin (LIPITOR) 40 mg tablet Take 1 tablet by mouth once daily 
Qty: 90 Tab, Refills: 0 Associated Diagnoses: Hyperlipidemia, unspecified hyperlipidemia type  
  
omeprazole (PRILOSEC) 20 mg capsule Take 1 Cap by mouth daily. Qty: 90 Cap, Refills: 3 Associated Diagnoses: Gastroesophageal reflux disease, unspecified whether esophagitis present  
  
abiraterone 250 mg tab Take 250 mg by mouth daily (with breakfast). With Low Fat Breakfast  
  
oxybutynin chloride XL (DITROPAN XL) 10 mg CR tablet Take 10 mg by mouth nightly. albuterol (PROVENTIL HFA, VENTOLIN HFA, PROAIR HFA) 90 mcg/actuation inhaler Use 2 puffs very 4 hours prn 
Qty: 1 Inhaler, Refills: 1 Associated Diagnoses: Chronic obstructive pulmonary disease, unspecified COPD type ([facility] [address] metoprolol tartrate (LOPRESSOR) 50 mg tablet TAKE ONE TABLET BY MOUTH TWICE DAILY Qty: 180 Tab, Refills: 3 Comments: Please consider 90 day supplies to promote better adherence  
  
lisinopriL (PRINIVIL, ZESTRIL) 2.5 mg tablet Take 1 tablet by mouth once daily 
Qty: 90 Tab, Refills: 3  
  
albuterol-ipratropium (DUO-NEB) 2.5 mg-0.5 mg/3 ml nebu 3 mL by Nebulization route every six (6) hours as needed (sob). Qty: 30 Nebule, Refills: 4 Associated Diagnoses: SOB (shortness of breath)  
  
fluticasone furoate-vilanterol (BREO ELLIPTA) 100-25 mcg/dose inhaler Take 1 Puff by inhalation daily. Qty: 1 Inhaler, Refills: 4 Oxygen 4.5L continouus  
  
fluticasone (FLONASE) 50 mcg/actuation nasal spray 2 Sprays by Both Nostrils route daily. Qty: 1 Bottle, Refills: 1 Associated Diagnoses: Acute sinusitis, recurrence not specified, unspecified location aspirin 81 mg chewable tablet Take 1 Tab by mouth daily. RISK OF HEART ATTACK IF ANY MISSED DOSES Qty: 90 Tab, Refills: 3 DISPOSITION:   
Home with Family:   
Home with HH/PT/OT/RN: x  
SNF/LTC:   
NAEL:   
OTHER:   
 
 
 
Follow up with: PCP : Maria Teresa Salazar MD 
Follow-up Information Follow up With Specialties Details Why Contact Info Maria Teresa Salazar MD Internal Medicine In 3 days  [address] [address] [phone] Otis Mc MD Pulmonary Disease In 2 weeks  [address] [place] [address] 
[phone] Total time in minutes spent coordinating this discharge (includes going over instructions, follow-up, prescriptions, and preparing report for sign off to her PCP) : 35 minutes

## 2021-01-29 NOTE — PROGRESS NOTES
1-29-21: Care Transitions Nurse met briefly with patient by phone for transitions of care call. A short time into the telephonic assessment, patient politely declined BRADLEY services and our phone call was ended. Patient requested not to be contacted by the [facility] team going forward and a FYI flag was placed on patient's chart. Patient was admitted to [facility] on 1-18-21 and discharged on 1-28-21 for: 
 
Discharge Diagnosis:  
Acute hypoxic respiratory failure, POA Acute on chronic congestive heart failure, heart failure with preserved ejection fraction, POA Community-acquired pneumonia, POA Moderate to severe pulmonary hypertension, POA  
Chronic ILD from prior Chemo for Prostate Ca? COPD Outreach made within 2 business days of discharge: Yes Discharge Challenges to be reviewed by the provider:  
Additional needs identified to be addressed with provider:  yes - Patient politely declined Transitions of Care services, and requested not to be contacted by the [facility] team going forward. COVID-19 related testing was available at this time. Test results were negative. Patient informed of results, if available? no  
Method of communication with provider : chart routing Advance Care Planning:  
Does patient have an Advance Directive:  Patient has an ACP document, dated 9-, currently scanned into his chart. Was this a readmission? no  
Patient stated reason for the admission: Unable to assess/ask. Patients top risk factors for readmission: medical condition Interventions to address risk factors: Unable to provide education, patient declined BRADLEY services. Care Transition Nurse (CTN) contacted the patient by telephone to perform post hospital discharge assessment. Verified name and  with patient as identifiers. Provided introduction to self, and explanation of the CTN role. A short while into the telephonic assessment, patient politely declined transitions of care services and requested not to be contacted going forward by the [facility] team.  
 
Medication reconciliation was not performed with patient during this phone conversation as patient declined BRADLEY services. Home Health/Outpatient orders at discharge: none [address]: n/a Date of initial visit: n/a Durable Medical Equipment ordered at discharge: none [facility]: n/a Durable Medical Equipment received: n/a Covid Risk Education Patient has following risk factors of: heart failure, COPD, acute respiratory failure and history of prostate cancer per chart. Unable to provide Covid-19 education, BRADLEY call was ended. Discussed follow-up appointments. If no appointment was previously scheduled, appointment scheduling offered: no, BRADLEY call was ended [facility]  follow up appointment(s):  
Future Appointments Date Time Provider Estrellita Hinson 2021 11:00 AM Nadia Gibbs MD [facility]  
2021  1:00 PM Nadia Gibbs MD [facility]  
2021  9:15 AM Daune Bloch, Sadie Pap, MD [facility] follow up appointment(s): Joanna Brar, NP/pulmonary on 2-15-21 telemed appointment at 9:30am.  
No further patient outreach is planned at this time. based on severity of symptoms and risk factors. CTN provided contact information for future needs.

## 2021-01-29 NOTE — Clinical Note
Please see patient outreach dated 1-29-21.  Patient politely declined BRADLEY services and requested not to be contacted going forward by [facility] team.

## 2021-02-03 NOTE — ED NOTES
0446  PT resting comfortable at this time in bed sleeping eyes closed. No respiratory distress noted. PT continues on BIPAP. Awaiting for family to arrive.

## 2021-02-03 NOTE — ED PROVIDER NOTES
EMERGENCY DEPARTMENT HISTORY AND PHYSICAL EXAM 
 
 
Date: 2/3/2021 Patient Name: Olu Morton History of Presenting Illness Chief Complaint Patient presents with  Shortness of Breath Fluid in lungs for two weeks. Two duo nebs were given. History Provided By: Patient HPI: Olu Morton, 78 y.o. male with PMHx significant for coronary artery disease, severe COPD, pulmonary hypertension sick sinus syndrome with pacemaker, known penetrating aortic arch ulcer with pseudoaneurysm who was recently admitted for acute respiratory failure and discharged on 8 L oxygen presents with shortness of breath that started yesterday. Symptoms got progressively worse through the day. EMS was called and patient was noted to have sats in the 40s on their arrival.  He received 2 nebs and was placed on CPAP. He denies any fevers at home. Daughter reports that he walks with a walker at home. He has not had any nausea, vomiting, diarrhea. She reports he has been generally weak since his hospitalization. He is followed by Dr. Vasquez Conley from pulmonary. Review of medical records reveals that hospice was consulted, but family was not ready to proceed with hospice or DNR status during his most recent hospitalization. PCP: Gustavo Hi MD 
 
No current facility-administered medications on file prior to encounter. Current Outpatient Medications on File Prior to Encounter Medication Sig Dispense Refill  sodium bicarbonate 650 mg tablet Take 1 Tab by mouth three (3) times daily for 30 days. 90 Tab 0  predniSONE (DELTASONE) 10 mg tablet 4 tabs daily for 2 days, then 3 tabs daily for 2 days , then 2 tabs daily for 2 days, then 1 tab daily for 2 days 20 Tab 0  
 atorvastatin (LIPITOR) 40 mg tablet Take 1 tablet by mouth once daily (Patient taking differently: Take 40 mg by mouth daily.) 90 Tab 0  
 omeprazole (PRILOSEC) 20 mg capsule Take 1 Cap by mouth daily.  90 Cap 3  
  abiraterone 250 mg tab Take 250 mg by mouth daily (with breakfast). With Low Fat Breakfast    
 oxybutynin chloride XL (DITROPAN XL) 10 mg CR tablet Take 10 mg by mouth nightly.  metoprolol tartrate (LOPRESSOR) 50 mg tablet TAKE ONE TABLET BY MOUTH TWICE DAILY (Patient taking differently: Take 50 mg by mouth two (2) times a day. TAKE ONE TABLET BY MOUTH TWICE DAILY) 180 Tab 3  
 lisinopriL (PRINIVIL, ZESTRIL) 2.5 mg tablet Take 1 tablet by mouth once daily (Patient taking differently: Take 2.5 mg by mouth daily.) 90 Tab 3  
 aspirin 81 mg chewable tablet Take 1 Tab by mouth daily. RISK OF HEART ATTACK IF ANY MISSED DOSES (Patient taking differently: Take 81 mg by mouth daily. RISK OF HEART ATTACK IF ANY MISSED DOSES  Indications: treatment to prevent a heart attack) 90 Tab 3  
 albuterol (PROVENTIL HFA, VENTOLIN HFA, PROAIR HFA) 90 mcg/actuation inhaler Use 2 puffs very 4 hours prn 1 Inhaler 1  
 albuterol-ipratropium (DUO-NEB) 2.5 mg-0.5 mg/3 ml nebu 3 mL by Nebulization route every six (6) hours as needed (sob). 30 Nebule 4  
 fluticasone furoate-vilanterol (BREO ELLIPTA) 100-25 mcg/dose inhaler Take 1 Puff by inhalation daily. 1 Inhaler 4  
 Oxygen 4.5L continouus  fluticasone (FLONASE) 50 mcg/actuation nasal spray 2 Sprays by Both Nostrils route daily. 1 Bottle 1 Past History Past Medical History: 
Past Medical History:  
Diagnosis Date  CAD (coronary artery disease)   
 mi 10/19/2015 with stent  COPD (chronic obstructive pulmonary disease) (HCC)  Hypertension  Prostate cancer ([facility] [address] 5/8/2012 Dr. Ema Waller - diagnosed 2/2012 - s/p EBRT on Lupron Past Surgical History: 
Past Surgical History:  
Procedure Laterality Date  HX ORTHOPAEDIC  1961  
 left knee surgery  AR INS NEW/RPLCMT PRM PM W/TRANSV ELTRD ATRIAL&VENT N/A 9/21/2020 INSERT PPM DUAL performed by Ehsan Willard MD at [facility] CARDIAC CATH LAB Family History: 
Family History Problem Relation Age of Onset  Heart Disease Mother   
     pacemaker  Diabetes Mother  Diabetes Sister  Heart Disease Brother Social History: 
Social History Tobacco Use  Smoking status: Former Smoker Packs/day: 0.50 Years: 40.00 Pack years: 20.00 Types: Cigarettes Quit date: 10/19/2015 Years since quittin.2  Smokeless tobacco: Never Used  Tobacco comment: trying quit 10/13/15 - /2 ppd Substance Use Topics  Alcohol use: No  
  Alcohol/week: 0.0 standard drinks  Drug use: No  
 
 
Allergies: 
No Known Allergies Review of Systems Review of Systems Unable to perform ROS: Acuity of condition Physical Exam  
General appearance -overweight, severe respiratory distress with accessory muscle use and tachypnea on BiPAP  
eyes - pupils equal and reactive, extraocular eye movements intact ENT - mucous membranes moist, pharynx normal without lesions Neck - supple, no significant adenopathy; non-tender to palpation Chest -Rales bilateral bases i; non-tender to palpation Heart -tachycardic, regular rhythm,  no murmurs noted Abdomen - soft, nontender, nondistended, no masses or organomegaly Musculoskeletal - no joint tenderness, deformity or swelling; normal ROM Extremities - peripheral pulses normal, 1+ bilateral pedal edema Skin - normal coloration and turgor, no rashes Neurological -anxious appearing alert, oriented x2, speaks 1-2 words at a time due to respiratory distress, no focal findings or movement disorder noted Diagnostic Study Results Labs - Recent Results (from the past 12 hour(s)) POC LACTIC ACID Collection Time: 21  1:57 AM  
Result Value Ref Range Lactic Acid (POC) 4.87 (HH) 0.40 - 2.00 mmol/L  
EKG, 12 LEAD, INITIAL Collection Time: 21  1:58 AM  
Result Value Ref Range Ventricular Rate 109 BPM  
 Atrial Rate 109 BPM  
 P-R Interval 122 ms QRS Duration 82 ms Q-T Interval 310 ms QTC Calculation (Bezet) 417 ms Calculated P Axis 70 degrees Calculated R Axis 28 degrees Calculated T Axis 109 degrees Diagnosis Sinus tachycardia Possible Inferior infarct , age undetermined Anterior infarct , age undetermined T wave abnormality, consider lateral ischemia When compared with ECG of 18-JAN-2021 00:42, Anterior infarct is now present Borderline criteria for Inferior infarct are now present T wave inversion no longer evident in Anterior leads POC EG7 Collection Time: 02/03/21  1:59 AM  
Result Value Ref Range Calcium, ionized (POC) 1.12 1.12 - 1.32 mmol/L  
 FIO2 (POC) 100 % pH (POC) 7.30 (L) 7.35 - 7.45    
 pCO2 (POC) 35.8 35.0 - 45.0 MMHG  
 pO2 (POC) 74 (L) 80 - 100 MMHG  
 HCO3 (POC) 17.4 (L) 22 - 26 MMOL/L Base deficit (POC) 9 mmol/L  
 sO2 (POC) 93 92 - 97 % Site LEFT BRACHIAL Device: BIPAP    
 PEEP/CPAP (POC) 12 cmH2O Allens test (POC) YES Specimen type (POC) ARTERIAL Total resp. rate 34 CBC WITH AUTOMATED DIFF Collection Time: 02/03/21  2:00 AM  
Result Value Ref Range WBC 17.0 (H) 4.1 - 11.1 K/uL  
 RBC 4.28 4.10 - 5.70 M/uL  
 HGB 10.9 (L) 12.1 - 17.0 g/dL HCT 36.1 (L) 36.6 - 50.3 % MCV 84.3 80.0 - 99.0 FL  
 MCH 25.5 (L) 26.0 - 34.0 PG  
 MCHC 30.2 30.0 - 36.5 g/dL  
 RDW 17.7 (H) 11.5 - 14.5 % PLATELET 310 274 - 156 K/uL NRBC 0.2 (H) 0  WBC ABSOLUTE NRBC 0.04 (H) 0.00 - 0.01 K/uL NEUTROPHILS 80 (H) 32 - 75 % BAND NEUTROPHILS 1 % LYMPHOCYTES 12 12 - 49 % MONOCYTES 6 5 - 13 % EOSINOPHILS 0 0 - 7 % BASOPHILS 0 0 - 1 % METAMYELOCYTES 1 % IMMATURE GRANULOCYTES 0 0.0 - 0.5 % ABS. NEUTROPHILS 13.8 (H) 1.8 - 8.0 K/UL  
 ABS. LYMPHOCYTES 2.0 0.8 - 3.5 K/UL  
 ABS. MONOCYTES 1.0 0.0 - 1.0 K/UL  
 ABS. EOSINOPHILS 0.0 0.0 - 0.4 K/UL  
 ABS. BASOPHILS 0.0 0.0 - 0.1 K/UL  
 ABS. IMM.  GRANS. 0.0 0.00 - 0.04 K/UL  
 DF MANUAL    
 RBC COMMENTS ANISOCYTOSIS 
1+ 
 RBC COMMENTS TEARDROP CELLS 
PRESENT 
    
METABOLIC PANEL, COMPREHENSIVE Collection Time: 02/03/21  2:00 AM  
Result Value Ref Range Sodium 137 136 - 145 mmol/L Potassium 5.3 (H) 3.5 - 5.1 mmol/L Chloride 108 97 - 108 mmol/L  
 CO2 19 (L) 21 - 32 mmol/L Anion gap 10 5 - 15 mmol/L Glucose 151 (H) 65 - 100 mg/dL BUN 23 (H) 6 - 20 MG/DL Creatinine 1.89 (H) 0.70 - 1.30 MG/DL  
 BUN/Creatinine ratio 12 12 - 20 GFR est AA 42 (L) >60 ml/min/1.73m2 GFR est non-AA 34 (L) >60 ml/min/1.73m2 Calcium 8.5 8.5 - 10.1 MG/DL Bilirubin, total 1.2 (H) 0.2 - 1.0 MG/DL  
 ALT (SGPT) 34 12 - 78 U/L  
 AST (SGOT) 39 (H) 15 - 37 U/L Alk. phosphatase 66 45 - 117 U/L Protein, total 7.3 6.4 - 8.2 g/dL Albumin 2.4 (L) 3.5 - 5.0 g/dL Globulin 4.9 (H) 2.0 - 4.0 g/dL A-G Ratio 0.5 (L) 1.1 - 2.2 CK W/ CKMB & INDEX Collection Time: 02/03/21  2:00 AM  
Result Value Ref Range CK - MB 1.7 <3.6 NG/ML  
 CK-MB Index 2.3 0.0 - 2.5 CK 75 39 - 308 U/L  
TROPONIN I Collection Time: 02/03/21  2:00 AM  
Result Value Ref Range Troponin-I, Qt. 0.14 (H) <0.05 ng/mL NT-PRO BNP Collection Time: 02/03/21  2:00 AM  
Result Value Ref Range NT pro-BNP 6,120 (H) <450 PG/ML  
MAGNESIUM Collection Time: 02/03/21  2:00 AM  
Result Value Ref Range Magnesium 2.5 (H) 1.6 - 2.4 mg/dL SAMPLES BEING HELD Collection Time: 02/03/21  2:00 AM  
Result Value Ref Range SAMPLES BEING HELD  SST 2RD COMMENT Add-on orders for these samples will be processed based on acceptable specimen integrity and analyte stability, which may vary by analyte. SARS-COV-2 Collection Time: 02/03/21  2:30 AM  
Result Value Ref Range SARS-CoV-2 Please find results under separate order INFLUENZA A+B VIRAL AGS Collection Time: 02/03/21  2:30 AM  
Result Value Ref Range Influenza A Antigen Negative NEG  Influenza B Antigen Negative NEG    
COVID-19 RAPID TEST  
 Collection Time: 02/03/21  2:30 AM  
Result Value Ref Range Specimen source Nasopharyngeal    
 COVID-19 rapid test Detected (AA) NOTD Radiologic Studies -  
XR CHEST PORT Final Result Mild interstitial pulmonary edema CT Results  (Last 48 hours) None CXR Results  (Last 48 hours) 02/03/21 0236  XR CHEST PORT Final result Impression:  Mild interstitial pulmonary edema Narrative:  Clinical history: Chest pain INDICATION:   Chest pain COMPARISON: 1/21/2021 FINDINGS:  
AP portable upright view of the chest demonstrates a stable  cardiopericardial  
silhouette. Mild interstitial edema. There is no focal consolidation. .There is no  
pneumothorax. . Patient is on a cardiac monitor. Cardiac pacer. Medical Decision Making I am the first provider for this patient. I reviewed the vital signs, available nursing notes, past medical history, past surgical history, family history and social history. Vital Signs-Reviewed the patient's vital signs. Patient Vitals for the past 12 hrs: 
 Temp Pulse Resp BP SpO2  
02/03/21 0437     94 % 02/03/21 0256  (!) 108  (!) 125/92   
02/03/21 0245 (!) 101.2 °F (38.4 °C) (!) 106 19 (!) 125/92 (!) 70 % 02/03/21 0215  (!) 108 (!) 40 112/67   
02/03/21 0149 100.3 °F (37.9 °C) (!) 110 (!) 40 (!) 149/68  EKG at 1:58 AM on February 3, 2021 interpreted by me: Sinus tachycardia, 109 bpm, normal axis, normal MT, QRS, QTc intervals, nonspecific ST changes Records Reviewed: Nursing Notes and Old Medical Records Provider Notes (Medical Decision Making):  
Differential diagnosis: COPD exacerbation, congestive heart failure, acute coronary syndrome, pneumonia, Covid We will check CBC, CMP, CPK, troponin, proBNP, chest x-ray, ABG, Covid swab ED Course: Initial assessment performed. The patients presenting problems have been discussed, and they are in agreement with the care plan formulated and outlined with them. I have encouraged them to ask questions as they arise throughout their visit. Progress Notes: 
ED Course as of Feb 03 0611 Wed Feb 03, 2021  
0300 Called family Dessie Crigler who is patient's daughter). Discussed with them the severity of his condition. They would like to make him a DNR at this time.  
 [AO] 0345 Case discussed with Dr. Rodríguez Lebron (intensivist). Patient is Covid positive with underlying severe lung disease on 8 L oxygen at baseline. Likelihood of survival is extremely low. Dr. Landy Shaffer is recommending morphine and palliative care. He has discussed with the family and they are in agreement  
 [AO] 4055 . [AO] ED Course User Index [AO] Maricarmen Fong MD  
 
CRITICAL CARE NOTE : 
 
 
 
IMPENDING DETERIORATION -Airway, Respiratory, Cardiovascular, CNS and Metabolic ASSOCIATED RISK FACTORS - Hypoxia, Dysrhythmia, Metabolic changes and CNS Decompensation MANAGEMENT- Bedside Assessment and Supervision of Care INTERPRETATION -  Xrays, Blood Gases, ECG and Blood Pressure INTERVENTIONS - Neurologic interventions , Metobolic interventions and bipap CASE REVIEW - Nursing, Family and intensivist and hospitalist 
 
TREATMENT RESPONSE -Unchanged PERFORMED BY - Self NOTES   : 
 
 
I have spent 65 minutes of critical care time involved in lab review, consultations with specialist, family decision- making, bedside attention and documentation. During this entire length of time I was immediately available to the patient . Yesika Gibson MD 
 
 Called to see patient for loss of vital signs. Patient had been made comfort care and removed from BiPAP. On my exam, he is unresponsive with no respiratory effort. There are no auscultated heart breath sounds. There is no palpable carotid pulse. Time of death 7:47 AM on February 3, 2021. Disposition: 
 Diagnosis Clinical Impression: 1. Acute on chronic respiratory failure with hypoxia (HCC) 2. COPD, very severe ([facility] [address] 3. COVID-19 virus infection

## 2021-02-03 NOTE — CONSULTS
I was called to evaluate Mr Tyler Freire for possible ICU admission. He is an 78 M with very severe COPD/emphysema on 8 LPM NC chronically. He was just discharged 01/28 after a 10 day hospitalization for acute on chronic hypoxemic respiratory failure attributed to CAP. There had been discussion about Home Hospice during that hospitalization. He returned to [facility] this early morning with worsening dyspnea. His COVID rapid test is positive. His CXR reveals bilateral infiltrates. Dr Linh Foote discussed Advanced Directives and pt is now appropriately DNR/DNI. He is on BiPAP but remains markedly dyspneic and tachypneic. He is not able to provide much history to me. I spoke with his daughter, Vandana Stein, and explained that he has COVID (that result came back since the last communication between her and Dr Linh Foote) and that due to his severe underlying lung disease, he will not survive this illness. I emphasized that we are compelled to provide him relief from his (obvious) suffering. She agrees with this. I have ordered morphine and asked that pharmacy begin morphine infusion. As he is DNR/DNI and CMO now and since there are no ICU beds available presently, he will remain in ED. If his death is not as imminent as I expect it to be, suggest that he be admitted to the Hospice floor [facility] ([place] 
2/3/2021 4:24 AM

## 2021-02-03 NOTE — PROGRESS NOTES
Responded to page for this pt's death in [address]. Family was present at pt's bedside. Offered pastoral support to this family but family declined. Family was inside pt's room and pt was COVID positive and family didn't want to doff and don PPE to speak with [address] [place] [address]. [facility] remains available to pt's family to provide pastoral support. Paddy Gomez MDiv. Staff  Request  Support/Spiritual Care Services via [address] [place] [address]

## 2021-02-03 NOTE — PROGRESS NOTES
[employer] called to release patient's body from all donations. Body can be released to [facility] of family's choice.

## 2021-02-03 NOTE — PROGRESS NOTES
CM acknowledges consult to hospice and that pt has . CM has closed hospice consult. Searcy Galeazzi, [address] [place] [address], [address] [place] [address]

## 2021-02-03 NOTE — ED NOTES
ED visit d/t worsening SOB - EMS placed pt on CPAP during transport - pt with noticeable sob / accessory mucle use / unable to speak in full sentences at on arrival / febrile with axillary temp COPD/emphysema on 8 LPM NC chronically - pt recently discharged 01/28 after a 10 day hospitalization for acute on chronic hypoxemic respiratory failure due to PNA - to CAP. [address] MD at bedside for eval, ?? ICU admission - MD will contact patient's family members to discussion plan of care - pending for comfort measures / morphine drip if family agrees with plan of care 1230 - Family members at bedside for comfort of the patient / pt made comfort measures / currently on morphine drip 
 
0554 - Hetal TIRADO at bedside to speak with pt's family member regarding plan of care and disease process 0715 - pt removed from BIPAP at this time per request of family - pt remains on morphine drip max at 10 mg per hr;;

## 2021-02-03 NOTE — H&P
[facility] 
Consult/Death Discharge Summary 
 
Primary Care Provider: Breann Hector MD 
Date of Service:  2/3/2021 
 
Subjective:  
 
Cricket Joseph is a 80 y.o. male with PMH of CAD, severe COPD, pulmonary hypertension, SSS s/p pacemaker, known penetrating aortic arch ulcer with pseudoaneurysm who was recently admitted for acute respiratory failure and discharged on 8L oxygen presents with shortness of breath that started yesterday. HPI is per ED/chart review as patient is unresponsive.  Symptoms got progressively worse through the day.  EMS was called and patient was noted to have sats in the 40s. He received 2 nebs and was placed on CPAP.  He denies any fevers at home.  Daughter reports that he walks with a walker at home.  He has not had any nausea, vomiting, diarrhea.  She reports he has been generally weak since his hospitalization.  He is followed by Dr. Js Salas from pulmonary.  Review of medical records reveals that hospice was consulted, but family was not ready to proceed with hospice or DNR status during his most recent hospitalization.  
ED discussed case with Dr. Johnson (intensivist) re: Covid positive with underlying severe lung disease on 8 L oxygen at baseline.  Likelihood of survival is extremely low.  Dr. Griffith is recommending morphine and palliative care.  He discussed with the family and they are in agreement. Two family members are now at bedside as the decision has been made to transition to comfort care measures only. Morphine drop was started and bipap has been removed. Family is asking for more pain medication as he still appears uncomfortable. 
 
Patient was planning on being admitted but a few minutes later pt . Pt was pronounced by ED physician @0747. 
 
Past Medical History:  
Diagnosis Date  
• CAD (coronary artery disease)   
 mi 10/19/2015 with stent  
• COPD (chronic obstructive pulmonary disease) (HCC)   
• Hypertension   
  Prostate cancer ([facility] [address] 5/8/2012 Dr. Hailey Ferraro - diagnosed 2/2012 - s/p EBRT on Lupron Past Surgical History:  
Procedure Laterality Date  HX ORTHOPAEDIC  1961  
 left knee surgery  MS INS NEW/RPLCMT PRM PM W/TRANSV ELTRD ATRIAL&VENT N/A 9/21/2020 INSERT PPM DUAL performed by Gail Stern MD at [facility]ATH [facility] Diagnoses contributing to death: 
Covid positive Acute on chronic hypoxemic resp failure Sepsis Underlying severe lung disease Acidosis Severe COPD 
SSS s/p pacer CAD Pulmonary hypertension Known penetrating aortic arch ulcer with pseudoaneurysm Signed By: Tamika Desai MD   
 February 3, 2021 7:19 AM

## 2021-02-03 NOTE — ED NOTES
TRANSFER - IN REPORT: 
 
Verbal and bedside report received from [facility] and [place] RN (name) on Scar Amel. Report consisted of patients Situation, Background, Assessment and  
Recommendations(SBAR). Information from the following report(s) SBAR and ED Summary was reviewed with the receiving nurse. Opportunity for questions and clarification was provided. 5: [employer] called. Voicemail left with information requested. Pt body okay to be released due to pt age.

## 2021-02-08 LAB
BACTERIA SPEC CULT: NORMAL
SERVICE CMNT-IMP: NORMAL
